# Patient Record
Sex: FEMALE | Race: WHITE | NOT HISPANIC OR LATINO | Employment: STUDENT | URBAN - METROPOLITAN AREA
[De-identification: names, ages, dates, MRNs, and addresses within clinical notes are randomized per-mention and may not be internally consistent; named-entity substitution may affect disease eponyms.]

---
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
## 2019-01-09 ENCOUNTER — TRANSFERRED RECORDS (OUTPATIENT)
Dept: HEALTH INFORMATION MANAGEMENT | Facility: CLINIC | Age: 19
End: 2019-01-09

## 2019-01-10 ENCOUNTER — TRANSFERRED RECORDS (OUTPATIENT)
Dept: HEALTH INFORMATION MANAGEMENT | Facility: CLINIC | Age: 19
End: 2019-01-10

## 2019-01-28 ENCOUNTER — TRANSFERRED RECORDS (OUTPATIENT)
Dept: HEALTH INFORMATION MANAGEMENT | Facility: CLINIC | Age: 19
End: 2019-01-28

## 2019-01-28 ENCOUNTER — MEDICAL CORRESPONDENCE (OUTPATIENT)
Dept: HEALTH INFORMATION MANAGEMENT | Facility: CLINIC | Age: 19
End: 2019-01-28

## 2019-05-20 ENCOUNTER — TRANSFERRED RECORDS (OUTPATIENT)
Dept: HEALTH INFORMATION MANAGEMENT | Facility: CLINIC | Age: 19
End: 2019-05-20

## 2019-05-22 ENCOUNTER — OFFICE VISIT (OUTPATIENT)
Dept: PEDIATRICS | Facility: CLINIC | Age: 19
End: 2019-05-22
Payer: COMMERCIAL

## 2019-05-22 VITALS
HEART RATE: 84 BPM | BODY MASS INDEX: 22.4 KG/M2 | WEIGHT: 131.2 LBS | SYSTOLIC BLOOD PRESSURE: 104 MMHG | DIASTOLIC BLOOD PRESSURE: 68 MMHG | OXYGEN SATURATION: 100 % | HEIGHT: 64 IN | TEMPERATURE: 98.1 F

## 2019-05-22 DIAGNOSIS — L71.0 PERIORAL DERMATITIS: ICD-10-CM

## 2019-05-22 DIAGNOSIS — J30.2 SEASONAL ALLERGIC RHINITIS, UNSPECIFIED TRIGGER: ICD-10-CM

## 2019-05-22 DIAGNOSIS — L30.9 ECZEMA, UNSPECIFIED TYPE: Primary | ICD-10-CM

## 2019-05-22 DIAGNOSIS — L70.0 ACNE VULGARIS: ICD-10-CM

## 2019-05-22 PROCEDURE — 99214 OFFICE O/P EST MOD 30 MIN: CPT | Performed by: INTERNAL MEDICINE

## 2019-05-22 RX ORDER — NORGESTIMATE AND ETHINYL ESTRADIOL 7DAYSX3 28
1 KIT ORAL DAILY
Qty: 84 TABLET | Refills: 1 | Status: SHIPPED | OUTPATIENT
Start: 2019-05-22 | End: 2019-08-07

## 2019-05-22 RX ORDER — MOMETASONE FUROATE 1 MG/ML
SOLUTION TOPICAL
Qty: 60 ML | Refills: 0 | Status: SHIPPED | OUTPATIENT
Start: 2019-05-22 | End: 2020-10-05

## 2019-05-22 RX ORDER — PIMECROLIMUS 10 MG/G
CREAM TOPICAL 2 TIMES DAILY
Qty: 60 G | Refills: 1 | Status: SHIPPED | OUTPATIENT
Start: 2019-05-22 | End: 2019-08-07

## 2019-05-22 SDOH — ECONOMIC STABILITY: INCOME INSECURITY: HOW HARD IS IT FOR YOU TO PAY FOR THE VERY BASICS LIKE FOOD, HOUSING, MEDICAL CARE, AND HEATING?: NOT VERY HARD

## 2019-05-22 SDOH — HEALTH STABILITY: MENTAL HEALTH: HOW OFTEN DO YOU HAVE A DRINK CONTAINING ALCOHOL?: MONTHLY OR LESS

## 2019-05-22 SDOH — SOCIAL STABILITY: SOCIAL NETWORK: ARE YOU MARRIED, WIDOWED, DIVORCED, SEPARATED, NEVER MARRIED, OR LIVING WITH A PARTNER?: NEVER MARRIED

## 2019-05-22 SDOH — ECONOMIC STABILITY: TRANSPORTATION INSECURITY
IN THE PAST 12 MONTHS, HAS THE LACK OF TRANSPORTATION KEPT YOU FROM MEDICAL APPOINTMENTS OR FROM GETTING MEDICATIONS?: NO

## 2019-05-22 SDOH — HEALTH STABILITY: MENTAL HEALTH: HOW MANY STANDARD DRINKS CONTAINING ALCOHOL DO YOU HAVE ON A TYPICAL DAY?: 3 OR 4

## 2019-05-22 SDOH — SOCIAL STABILITY: SOCIAL NETWORK: HOW OFTEN DO YOU GET TOGETHER WITH FRIENDS OR RELATIVES?: TWICE A WEEK

## 2019-05-22 SDOH — HEALTH STABILITY: MENTAL HEALTH
STRESS IS WHEN SOMEONE FEELS TENSE, NERVOUS, ANXIOUS, OR CAN'T SLEEP AT NIGHT BECAUSE THEIR MIND IS TROUBLED. HOW STRESSED ARE YOU?: ONLY A LITTLE

## 2019-05-22 SDOH — ECONOMIC STABILITY: TRANSPORTATION INSECURITY
IN THE PAST 12 MONTHS, HAS LACK OF TRANSPORTATION KEPT YOU FROM MEETINGS, WORK, OR FROM GETTING THINGS NEEDED FOR DAILY LIVING?: NO

## 2019-05-22 SDOH — ECONOMIC STABILITY: FOOD INSECURITY: WITHIN THE PAST 12 MONTHS, THE FOOD YOU BOUGHT JUST DIDN'T LAST AND YOU DIDN'T HAVE MONEY TO GET MORE.: NEVER TRUE

## 2019-05-22 SDOH — SOCIAL STABILITY: SOCIAL NETWORK: HOW OFTEN DO YOU ATTEND CHURCH OR RELIGIOUS SERVICES?: MORE THAN 4 TIMES PER YEAR

## 2019-05-22 SDOH — SOCIAL STABILITY: SOCIAL NETWORK: HOW OFTEN DO YOU ATTENT MEETINGS OF THE CLUB OR ORGANIZATION YOU BELONG TO?: 1 TO 4 TIMES PER YEAR

## 2019-05-22 SDOH — HEALTH STABILITY: PHYSICAL HEALTH: ON AVERAGE, HOW MANY DAYS PER WEEK DO YOU ENGAGE IN MODERATE TO STRENUOUS EXERCISE (LIKE A BRISK WALK)?: 1 DAY

## 2019-05-22 SDOH — HEALTH STABILITY: PHYSICAL HEALTH: ON AVERAGE, HOW MANY MINUTES DO YOU ENGAGE IN EXERCISE AT THIS LEVEL?: 90 MIN

## 2019-05-22 SDOH — SOCIAL STABILITY: SOCIAL NETWORK
IN A TYPICAL WEEK, HOW MANY TIMES DO YOU TALK ON THE PHONE WITH FAMILY, FRIENDS, OR NEIGHBORS?: MORE THAN THREE TIMES A WEEK

## 2019-05-22 SDOH — ECONOMIC STABILITY: FOOD INSECURITY: WITHIN THE PAST 12 MONTHS, YOU WORRIED THAT YOUR FOOD WOULD RUN OUT BEFORE YOU GOT MONEY TO BUY MORE.: NEVER TRUE

## 2019-05-22 SDOH — HEALTH STABILITY: MENTAL HEALTH: HOW OFTEN DO YOU HAVE 6 OR MORE DRINKS ON ONE OCCASION?: NEVER

## 2019-05-22 SDOH — SOCIAL STABILITY: SOCIAL NETWORK
DO YOU BELONG TO ANY CLUBS OR ORGANIZATIONS SUCH AS CHURCH GROUPS UNIONS, FRATERNAL OR ATHLETIC GROUPS, OR SCHOOL GROUPS?: YES

## 2019-05-22 ASSESSMENT — MIFFLIN-ST. JEOR: SCORE: 1355.12

## 2019-05-22 NOTE — PATIENT INSTRUCTIONS
Call for an appointment with dermatology. Dr. Mady Tena at [facility].  Dr. Patricia Dominguez at [facility] in [place] ([phone]) and Dr. Ronald Alba at [facility] in [place].([phone]).     Use mild soap and triamcinolone on flared areas of skin. You can also use this on your ears/lobes. You can switch to elidel and see if this works better.  Keep using really moisturizing lotions, etc.     If you feel you have to use something on your eyelids, just use OTC hydrocortisone.    Use elidel around lips. No steroid cream.    Use steroid solution on scalp areas.    Take zyrtec claritin or allegra daily.  Consider adding OTC nasal steroid such as flonase.  We could have you see an allergist. Call for an appointment.

## 2019-05-22 NOTE — PROGRESS NOTES
"Subjective     Nuris Reddy is a 18 year old female who presents to clinic today for the following health issues:    History of Present Illness     She eats 2-3 servings of fruits and vegetables daily.She consumes 4 sweetened beverage(s) daily.She is missing 2 dose(s) of medications per week.  She is not taking prescribed medications regularly due to remembering to take.     Renew birth control, referal for derm.    Has seasonal allergies, flaring right now. Had been taking wal-zyr. This works for her.    Patient Active Problem List   Diagnosis     Other congenital anomalies of intestine     Proteinuria     Ulceration of intestine     Anemia     Short gut syndrome     Iron deficiency anemia     Chronic rhinitis     Past Surgical History:   Procedure Laterality Date     COLONOSCOPY  2012     COLONOSCOPY  2012     RESECTION ILEOCECAL  1999    ~ 20 cm of ileum as well as ileocecal valve resected in  period       Social History     Tobacco Use     Smoking status: Never Smoker     Smokeless tobacco: Never Used     Tobacco comment: Non-smoking home   Substance Use Topics     Alcohol use: No     Frequency: Monthly or less     Drinks per session: 3 or 4     Binge frequency: Never     Family History   Problem Relation Age of Onset     Hypertension Paternal Grandmother      Hypertension Paternal Grandfather      Cardiovascular Paternal Grandfather         Valve condition     Cancer Sister         ovarian teratoma, s/p resection     Kidney Disease Maternal Uncle         kidney stones           Reviewed and updated as needed this visit by Provider  Tobacco  Allergies  Meds  Problems  Med Hx  Surg Hx  Fam Hx         Review of Systems   ROS COMP: Constitutional, HEENT, cardiovascular, pulmonary, gi and gu systems are negative, except as otherwise noted.      Objective    /68 (Cuff Size: Adult Regular)   Pulse 84   Temp 98.1  F (36.7  C) (Tympanic)   Ht 1.626 m (5' 4\")   Wt 59.5 kg (131 lb " 3.2 oz)   SpO2 100%   BMI 22.52 kg/m    Body mass index is 22.52 kg/m .  Physical Exam   GENERAL: healthy, alert and no distress  HENT: ear canals and TM's normal, nose and mouth without ulcers or lesions, nasal mucosa boggy  NECK: no adenopathy and thyroid normal to palpation   RESP: lungs clear to auscultation - no rales, rhonchi or wheezes  CV: regular rate and rhythm, normal S1 S2, no S3 or S4, no murmur, click or rub, no peripheral edema and peripheral pulses strong  SKIN: dry, flaky patches on forearms. Erythematous flaking of scalp at base of neck.    Diagnostic Test Results:  Labs reviewed in Epic        Assessment & Plan     1. Eczema, unspecified type  Inflammatory dermatitis of skin and scalp. Has triamcinolone at home for body which does help. Will also rx a solution for her scalp. Will refer to derm for definitive diagnosis given her complex medical issues.  - DERMATOLOGY REFERRAL  - mometasone (ELOCON) 0.1 % external solution; Apply small amount topically to affected areas of scalp daily prn for up to 2 weeks.  Dispense: 60 mL; Refill: 0    2. Perioral dermatitis  Has used elidel before with good results.   - pimecrolimus (ELIDEL) 1 % external cream; Apply topically 2 times daily  Dispense: 60 g; Refill: 1    3. Seasonal allergic rhinitis, unspecified trigger  Considering allergy testing since her symptoms are difficult to control. Discussed OTC and symptomatic management in meanwhile.  - ALLERGY/ASTHMA ADULT REFERRAL    4. Acne vulgaris  Refilled.  - norgestim-eth estrad triphasic (TRINESSA, 28,) 0.18/0.215/0.25 MG-35 MCG tablet; Take 1 tablet by mouth daily  Dispense: 84 tablet; Refill: 1       Patient Instructions   Call for an appointment with dermatology. Dr. Mady Tena at [facility].  Dr. Patricia Dominguez at [facility] in [place] ([phone]) and Dr. Ronald Alba at [facility] in [place].([phone]).     Use mild soap and triamcinolone on flared areas  of skin. You can also use this on your ears/lobes. You can switch to elidel and see if this works better.  Keep using really moisturizing lotions, etc.     If you feel you have to use something on your eyelids, just use OTC hydrocortisone.    Use elidel around lips. No steroid cream.    Use steroid solution on scalp areas.    Take zyrtec claritin or allegra daily.  Consider adding OTC nasal steroid such as flonase.  We could have you see an allergist. Call for an appointment.         I spent 30 minutes with this patient face-to-face, of which 50% or greater was spent in counseling and coordination of care with regards to allergies, eczema, acne and symptomatic management. Please see plan above.      Return in about 2 months (around 7/23/2019) for Preventive Visit.    Aliya Osei MD  [facility]

## 2019-05-28 ENCOUNTER — TELEPHONE (OUTPATIENT)
Dept: PEDIATRICS | Facility: CLINIC | Age: 19
End: 2019-05-28

## 2019-05-28 ENCOUNTER — TELEPHONE (OUTPATIENT)
Dept: DERMATOLOGY | Facility: CLINIC | Age: 19
End: 2019-05-28

## 2019-05-28 NOTE — TELEPHONE ENCOUNTER
Prior Authorization Retail Medication Request    Medication/Dose: pimecrolimus (ELIDEL) 1 % external cream  ICD code (if different than what is on RX):  [L71.0]   Previously Tried and Failed:    Rationale: [L71.0]     Insurance Name:  [facility]  Insurance ID:  [id number]      Pharmacy Information (if different than what is on RX)  Name:  [facility]  Phone:  [phone]

## 2019-06-04 NOTE — TELEPHONE ENCOUNTER
Prior Authorization Approval    Authorization Effective Date: 4/30/2019  Authorization Expiration Date: 5/29/2020  Medication: pimecrolimus (ELIDEL) 1 % external cream-APPROVED  Approved Dose/Quantity:   Reference #:     Insurance Company: [employer] - Phone [phone] Fax [phone]  Expected CoPay:       CoPay Card Available:      Foundation Assistance Needed:    Which Pharm[facility] is filling the prescription (Not needed for infusion/clinic administered[facility] [address] [place] [address] [address] AT [address]  Pharmacy Notified: Yes  Patient Notified: No    Pharmacy will notify patient when medication is ready.

## 2019-06-11 ENCOUNTER — OFFICE VISIT (OUTPATIENT)
Dept: DERMATOLOGY | Facility: CLINIC | Age: 19
End: 2019-06-11
Payer: COMMERCIAL

## 2019-06-11 DIAGNOSIS — L29.9 PRURITUS: ICD-10-CM

## 2019-06-11 DIAGNOSIS — L56.8 PHOTOSENSITIVITY: ICD-10-CM

## 2019-06-11 DIAGNOSIS — L20.84 INTRINSIC ATOPIC DERMATITIS: Primary | ICD-10-CM

## 2019-06-11 PROCEDURE — 99243 OFF/OP CNSLTJ NEW/EST LOW 30: CPT | Performed by: DERMATOLOGY

## 2019-06-11 RX ORDER — TACROLIMUS 1 MG/G
OINTMENT TOPICAL
Qty: 30 G | Refills: 11 | Status: SHIPPED | OUTPATIENT
Start: 2019-06-11 | End: 2019-08-07

## 2019-06-11 RX ORDER — TRIAMCINOLONE ACETONIDE 1 MG/G
CREAM TOPICAL
Qty: 454 G | Refills: 1 | Status: SHIPPED | OUTPATIENT
Start: 2019-06-11 | End: 2020-08-04

## 2019-06-11 RX ORDER — KETOCONAZOLE 20 MG/ML
SHAMPOO TOPICAL
Qty: 120 ML | Refills: 3 | Status: SHIPPED | OUTPATIENT
Start: 2019-06-11 | End: 2020-09-26

## 2019-06-11 RX ORDER — HYDROCORTISONE 2.5 %
CREAM (GRAM) TOPICAL
Qty: 30 G | Refills: 1 | Status: SHIPPED | OUTPATIENT
Start: 2019-06-11 | End: 2023-01-20

## 2019-06-11 NOTE — LETTER
6/11/2019      RE: Nuris Reddy  [address] [place] [address]       Dermatology Clinic Note    Dermatology Problem List:  1. Intermittent photosensitive rxn, pruritic  2. Seborrheic dermatitis  3. Dermatitis on the hands, lips, face, trunk, extremities, suspect atopic dermatitis with possible contact component      Assessment and Plan:    1. Dermatitis-  Combination of seborrheic dermatitis on the scalp and central face and contact vs atopic dermatitis on the trunk and extremities, lips. Flaring this spring in the setting of moving back home with exposure to a dog and stopping sulfasalazine. No past allergy evaluation. Given that Ms. Reddy is on a break from systemic immunosuppressive agents in preparation for GI evaluation it seems like a good time to pursue patch testing (and prick testing if needed). I will place a referral to patch clinic. In the interim I recommended:  -Daily bathing with mild cleanser'  -BID hydrocortisone 2.5% cream to face, transition to protopic when PA is completed  -Triamcinolone 0.1% cream to body, arms, legs BID  -Transition to Edilson's toothpaste  -BID Vanicream moisturizer  -Patch referral at [facility]      2. 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 Will defer to Dr. Beth in regard for need for photopatch testing.   - Anti Nuclear Tri IgG by IFA with Reflex; Future  - VIKTORIA antibody panel; Future  - Complement C3; Future  - Complement C4; Future  - Complement total; Future    3. Benign pigmented nevi: No lesions of concern. Sun protection recommended. Discussed ABCDEs of malignant melanoma.         RTC in 6-8 weeks.     Thank you for involving me in this patient's care.     Mady Tena MD  Dermatology Staff    CC:     Aliya Osei,  MD    ____________________________________________________________________________________________________________________________________________    CC: Patient presents with:  New Patient: np/ref by Dr Crenshaw/eczema      HPI: Nuris Reddy is a 18 year old female presenting for initial evaluation of dermatitis seen at the request of Dr. Crenshaw. She notes that as a child she had atopic dermatitis, but it was very mild. Since returning home from college this spring she has had a significant flare of dermatitis on the face, scalp, eyelids, lips, upper thighs, back, dorsal hands, antecubital fossae. No hobbies. She thinks that she may be allergic to the family dog. Bathing every other day to daily with Dove for sensitive skin. Using a Eucerin lotion daily. Tresseme shampoo, Argon oil conditioner. No known contact allergies to metals, but is getting dermatitis on the ear lobes, wears earrings rarely. Has prescription for triamcinolone cream for the body, elidel to the lips. Medications help, but rash recurs with stopping the topicals. She has GI symptoms, anemia and had been on sulfasalazine until this spring, but was taken off the medicine for MRI of the GI tract in July.     Notes that for the last few years she has gotten a pruritic rash on the extremities after sun exposure. Has used a variety of sunscreens. Does not feel like it was a sunburn type reaction, but was assd with diffuse itch and redness.       Patient Active Problem List   Diagnosis     Other congenital anomalies of intestine     Proteinuria     Ulceration of intestine     Anemia     Short gut syndrome     Iron deficiency anemia     Chronic rhinitis       Allergies   Allergen Reactions     No Known Allergies          Current Outpatient Medications   Medication     Cetirizine HCl (ZYRTEC ALLERGY PO)     Cyanocobalamin (VITAMIN B-12) 50 MCG TABS     hydrocortisone 2.5 % cream     ketoconazole (NIZORAL) 2 % external shampoo     mometasone  (ELOCON) 0.1 % external solution     Multiple Vitamins-Iron (MULTIVITAMIN/IRON PO)     norgestim-eth estrad triphasic (TRINESSA, 28,) 0.18/0.215/0.25 MG-35 MCG tablet     pimecrolimus (ELIDEL) 1 % external cream     tacrolimus (PROTOPIC) 0.1 % external ointment     triamcinolone (KENALOG) 0.1 % cream     VENTOLIN  (90 Base) MCG/ACT inhaler     No current facility-administered medications for this visit.        Family History   Problem Relation Age of Onset     Hypertension Paternal Grandmother      Hypertension Paternal Grandfather      Cardiovascular Paternal Grandfather         Valve condition     Cancer Sister         ovarian teratoma, s/p resection     Kidney Disease Maternal Uncle         kidney stones   Sister with asthma    Social history:  Student.       ROS: Feeling well without other skin concerns.     EXAM:  There were no vitals taken for this visit.  GEN: Alert, no distress  HEENT: Conjunctiva clear.   PULM: Breathing comfortably on RA  CV: Extrem warm and well perfused  ABD: No distension  SKIN: exam of the scalp, face, neck, chest, back, arms, legs, hands, feet, abdomen  --Pink plaques on the bilateral central cheeks with greasy scale  --Waxy scale on the scalp  --Scaling plaques on the eyelids, ear lobules  --Xerosis of the lips  --Ill defined scaling plaques on the posterior neck, upper and lower back, antecubital fossae, posterior  Thighs  -Xerosis of the dorsal hands  -Light brown macules on the upper cheeks  --Scattered medium brown macules on the bilateral upper arms, forearms      Mady Tena MD

## 2019-06-11 NOTE — PROGRESS NOTES
Dermatology Clinic Note    Dermatology Problem List:  1. Intermittent photosensitive rxn, pruritic  2. Seborrheic dermatitis  3. Dermatitis on the hands, lips, face, trunk, extremities, suspect atopic dermatitis with possible contact component      Assessment and Plan:    1. Dermatitis-  Combination of seborrheic dermatitis on the scalp and central face and contact vs atopic dermatitis on the trunk and extremities, lips. Flaring this spring in the setting of moving back home with exposure to a dog and stopping sulfasalazine. No past allergy evaluation. Given that Ms. Reddy is on a break from systemic immunosuppressive agents in preparation for GI evaluation it seems like a good time to pursue patch testing (and prick testing if needed). I will place a referral to patch clinic. In the interim I recommended:  -Daily bathing with mild cleanser'  -BID hydrocortisone 2.5% cream to face, transition to protopic when PA is completed  -Triamcinolone 0.1% cream to body, arms, legs BID  -Transition to Edilson's toothpaste  -BID Vanicream moisturizer  -[facility]      2. 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 Will defer to Dr. Beth in regard for need for photopatch testing.   - Anti Nuclear Tri IgG by IFA with Reflex; Future  - VIKTORIA antibody panel; Future  - Complement C3; Future  - Complement C4; Future  - Complement total; Future    3. Benign pigmented nevi: No lesions of concern. Sun protection recommended. Discussed ABCDEs of malignant melanoma.         RTC in 6-8 weeks.     Thank you for involving me in this patient's care.     Mady Tena MD  Dermatology Staff    CC:     Aliya Osei MD    ____________________________________________________________________________________________________________________________________________    CC: Patient presents with:  New Patient: np/ref by  Dr Crenshaw/eczema      HPI: Nuris Reddy is a 18 year old female presenting for initial evaluation of dermatitis seen at the request of Dr. Crenshaw. She notes that as a child she had atopic dermatitis, but it was very mild. Since returning home from college this spring she has had a significant flare of dermatitis on the face, scalp, eyelids, lips, upper thighs, back, dorsal hands, antecubital fossae. No hobbies. She thinks that she may be allergic to the family dog. Bathing every other day to daily with Dove for sensitive skin. Using a Eucerin lotion daily. Tresseme shampoo, Argon oil conditioner. No known contact allergies to metals, but is getting dermatitis on the ear lobes, wears earrings rarely. Has prescription for triamcinolone cream for the body, elidel to the lips. Medications help, but rash recurs with stopping the topicals. She has GI symptoms, anemia and had been on sulfasalazine until this spring, but was taken off the medicine for MRI of the GI tract in July.     Notes that for the last few years she has gotten a pruritic rash on the extremities after sun exposure. Has used a variety of sunscreens. Does not feel like it was a sunburn type reaction, but was assd with diffuse itch and redness.       Patient Active Problem List   Diagnosis     Other congenital anomalies of intestine     Proteinuria     Ulceration of intestine     Anemia     Short gut syndrome     Iron deficiency anemia     Chronic rhinitis       Allergies   Allergen Reactions     No Known Allergies          Current Outpatient Medications   Medication     Cetirizine HCl (ZYRTEC ALLERGY PO)     Cyanocobalamin (VITAMIN B-12) 50 MCG TABS     hydrocortisone 2.5 % cream     ketoconazole (NIZORAL) 2 % external shampoo     mometasone (ELOCON) 0.1 % external solution     Multiple Vitamins-Iron (MULTIVITAMIN/IRON PO)     norgestim-eth estrad triphasic (TRINESSA, 28,) 0.18/0.215/0.25 MG-35 MCG tablet     pimecrolimus (ELIDEL) 1 % external  cream     tacrolimus (PROTOPIC) 0.1 % external ointment     triamcinolone (KENALOG) 0.1 % cream     VENTOLIN  (90 Base) MCG/ACT inhaler     No current facility-administered medications for this visit.        Family History   Problem Relation Age of Onset     Hypertension Paternal Grandmother      Hypertension Paternal Grandfather      Cardiovascular Paternal Grandfather         Valve condition     Cancer Sister         ovarian teratoma, s/p resection     Kidney Disease Maternal Uncle         kidney stones   Sister with asthma    Social history:  Student.       ROS: Feeling well without other skin concerns.     EXAM:  There were no vitals taken for this visit.  GEN: Alert, no distress  HEENT: Conjunctiva clear.   PULM: Breathing comfortably on RA  CV: Extrem warm and well perfused  ABD: No distension  SKIN: exam of the scalp, face, neck, chest, back, arms, legs, hands, feet, abdomen  --Pink plaques on the bilateral central cheeks with greasy scale  --Waxy scale on the scalp  --Scaling plaques on the eyelids, ear lobules  --Xerosis of the lips  --Ill defined scaling plaques on the posterior neck, upper and lower back, antecubital fossae, posterior  Thighs  -Xerosis of the dorsal hands  -Light brown macules on the upper cheeks  --Scattered medium brown macules on the bilateral upper arms, forearms

## 2019-06-11 NOTE — PATIENT INSTRUCTIONS
Pediatric Dermatology  [facility]  [address]  1st [place]  [address]  Phone: [phone]    Pediatric & Adult Dermatology  [facility]  [address]   2nd Floor  [facility] [address]  [phone]                  General information: Dr. Mady Tena is a board-certified dermatologist with subspecialty certification in pediatric dermatology.     Scheduling and Nurse Triage: Dr. Tena sees pediatric patients on Mondays in [place] and adult and pediatric patients on Tuesdays in [place]. The remainder of the week she practices at the [facility]. Please call the above phone numbers to schedule or to talk to a nurse.     -For scheduling at the [place] or [place] locations, or to talk to the triage nurse please call the above phone number at the clinic where you were seen.     -For medication refills, please call your pharmacy.           -Referral to patch testing. They will call to schedule  -Bath or shower daily  -Use Vanicream moisturizer ([facility] best place to find) and sunscreen, look for zinc or titanium as the active ingredients  -Ketoconazole shampoo 2x/week  -OK to use the mometasone solution to scalp  -triamcinolone to body, arms, legs twice daily as needed  -use the hydrocortisone 2.5% cream to face and lips until clear, transition to protopic when available  -Consider using the Edilson's toothpaste

## 2019-07-08 ENCOUNTER — TRANSFERRED RECORDS (OUTPATIENT)
Dept: HEALTH INFORMATION MANAGEMENT | Facility: CLINIC | Age: 19
End: 2019-07-08

## 2019-07-10 ENCOUNTER — TRANSFERRED RECORDS (OUTPATIENT)
Dept: HEALTH INFORMATION MANAGEMENT | Facility: CLINIC | Age: 19
End: 2019-07-10

## 2019-07-19 NOTE — TELEPHONE ENCOUNTER
FUTURE VISIT INFORMATION      FUTURE VISIT INFORMATION:    Date: 7.22.19    Time: 8:00    Location: [facility]  REFERRAL INFORMATION:    Referring provider:  Dr. Tena    Referring providers clinic:  [facility]    Reason for visit/diagnosis:  Patch testing    RECORDS REQUESTED FROM:       Clinic name Comments Records Status Imaging Status   [facility] 6.11.19 Dr. Tena In [facility]

## 2019-07-22 ENCOUNTER — PRE VISIT (OUTPATIENT)
Dept: DERMATOLOGY | Facility: CLINIC | Age: 19
End: 2019-07-22

## 2019-07-22 ENCOUNTER — OFFICE VISIT (OUTPATIENT)
Dept: DERMATOLOGY | Facility: CLINIC | Age: 19
End: 2019-07-22
Payer: COMMERCIAL

## 2019-07-22 DIAGNOSIS — L20.84 INTRINSIC ATOPIC DERMATITIS: Primary | ICD-10-CM

## 2019-07-22 DIAGNOSIS — L29.9 PRURITUS: ICD-10-CM

## 2019-07-22 DIAGNOSIS — L23.89 ALLERGIC CONTACT DERMATITIS DUE TO OTHER AGENTS: ICD-10-CM

## 2019-07-22 RX ORDER — SULFASALAZINE 500 MG/1
2 TABLET ORAL DAILY
Refills: 0 | COMMUNITY
Start: 2018-12-14 | End: 2019-08-07

## 2019-07-22 ASSESSMENT — PAIN SCALES - GENERAL: PAINLEVEL: NO PAIN (0)

## 2019-07-22 NOTE — NURSING NOTE
Dermatology Rooming Note    Nuris Reddy's goals for this visit include:   Chief Complaint   Patient presents with     Allergies     Nuris is here for allergy testing     Khushboo Prado, CMA

## 2019-07-22 NOTE — LETTER
7/22/2019       RE: Nuris Reddy  [address] [place] [address]     Dear Colleague,    Thank you for referring your patient, Nuris Reddy, to the [facility] D[facility] at [facility]. Please see a copy of my visit note below.    [facility] Dermatology Note      Dermatology Problem List:  1. Intermittent photosensitive rxn, pruritic  2. Seborrheic dermatitis  3. 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      History of Present Illness:  Ms. Nuris Reddy is a 18 year old female who presents for consultation for possible patch testing referred from Dr. Tena. She has history of eczema since childhood that has been localized to the antecubital fossa. After returning from college in spring, she experienced a widespread rash on face, scalp, eyelids, lips, upper thighs, dorsal hands and antecubital fossae. The rash has been itchy and occasionally red on body and she has experienced frequent peeling of her lips. She also endorses dryness of her ears that started after she got them pierced but has persisted despite not wearing any jewelry. She believes that she might be allergic to family dog, as her rash worsened when she came home from college. She also experiences intermittent itchy hives that started this summer that last for a few hours and resolve with showers.    She has been using triamcinolone cream for rash on body, hydrocortisone 2.5% for face and Vanicream moisturizer. Currently, she is being evaluated for Crohn's disease and her sulfasalazine was stopped in spring for a MRI.     Of note, she has perennial rhinitis with exacerbations in the spring and around the family pet. Her sister has asthma.      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 She has never used smokeless tobacco. She reports that she does not drink alcohol or use drugs.    Family History:  Family History   Problem Relation Age of Onset     Hypertension Paternal Grandmother      Hypertension Paternal Grandfather      Cardiovascular Paternal Grandfather         Valve condition     Cancer Sister         ovarian teratoma, s/p resection     Kidney Disease Maternal Uncle         kidney stones       Medications:  Current Outpatient Medications   Medication Sig Dispense Refill     Cetirizine HCl (ZYRTEC ALLERGY PO) Take by mouth daily       Cyanocobalamin (VITAMIN B-12) 50 MCG TABS Take 100 mcg by mouth daily.       hydrocortisone 2.5 % cream To lips and face rash twice daily as needed for rash 30 g 1     ketoconazole (NIZORAL) 2 % external shampoo Use to shampoo twice weekly. Leave on 5 min then rinse. 120 mL 3     mometasone (ELOCON) 0.1 % external solution Apply small amount topically to affected areas of scalp daily prn for up to 2 weeks. 60 mL 0     Multiple Vitamins-Iron (MULTIVITAMIN/IRON PO) Take 1 tablet by mouth daily.       norgestim-eth estrad triphasic (TRINESSA, 28,) 0.18/0.215/0.25 MG-35 MCG tablet Take  1 tablet by mouth daily 84 tablet 1     pimecrolimus (ELIDEL) 1 % external cream Apply topically 2 times daily 60 g 1     tacrolimus (PROTOPIC) 0.1 % external ointment Twice daily to eyelid and face rash until clear, then as needed. 30 g 11     triamcinolone (KENALOG) 0.1 % cream Apply sparingly to affected area two times daily as needed for up to 2 weeks. Do not apply to face. 30 g 3     triamcinolone (KENALOG) 0.1 % external cream To rash on the ears, body, arms, legs twice daily until clear, then as needed ongoing. 454 g 1     VENTOLIN  (90 Base) MCG/ACT inhaler INHALE 2 PUFFS PO Q 4 H PRF SOB OR WHZ  1        Allergies   Allergen Reactions     No Known Allergies          Review of Systems:  -As per HPI  -Constitutional: Otherwise feeling well today, in usual state of health.  -HEENT: Patient denies nonhealing oral sores.  -Skin: As above in HPI. No additional skin concerns.    Physical exam:  Vitals: There were no vitals taken for this visit.  GEN: This is a well developed, well-nourished female in no acute distress, in a pleasant mood.    SKIN: Focused examination of the face, bilateral lower and upper extremities, back and abdomen was performed.  -Scattered pink plaques with mild overlying scale on anterior forearms, bilateral posteromedial thighs.  -Mild erythema of upper eyelids and bilateral cheeks.  -Back clear  -No other lesions of concern on areas examined.       Order for PATCH TESTS    [] Outpatient  [] Inpatient: [facility]..../ Bed ....      Skin Atopy (atopic dermatitis) [x] Yes   [] No  Rhinitis/Sinusitis:   [x] Yes   [] No  Allergic Asthma:   [] Yes   [x] No  Food Allergy:   [] Yes   [x] No GI evaluation for Crohn's  Leg ulcers:   [] Yes   [x] No  Hand eczema:   [] Yes   [] No   Leading hand:   [x] R   [] L       [] Ambidextrous                        Reason for tests (suspected allergy): atopic predisposition with components of contact dermatitis  Known previous allergies: NA    Standardized  panels  [x] Standard panel (40 tests)  [x] Preservatives & Antimicrobials (31 tests)  [x] Emulsifiers & Additives (25 tests)   [x] Perfumes/Flavours & Plants (25 tests)  [] Hairdresser panel (12 tests)  [] Rubber Chemicals (22 tests)  [] Plastics (26 tests)  [x] Colorants/Dyes/Food additives (20 tests)  [] Metals (implants/dental) (24 tests)  [] Local anaesthetics/NSAIDs (13 tests)  [] Antibiotics & Antimycotics (14 tests)   [] Corticosteroids (15 tests)   [] Photopatch test (62 tests)   [] others: ...      [] Patient's own products: ...    DO NOT test if chemical or biological identity is unknown!     always ask from patient the product information and safety sheets (MSDS)     [x] Atopy screen prick test    RESULTS & EVALUATION of PATCH TESTS    Patch test readings after     [x] 2 days, [] 3 days [x] 4 days, [] 5 days,    Applied patch tests with results (import here the list of patch tests):    Applied 07/22/2019  No lesions on back    STANDARD Series         No Substance 2 days 4 days remarks   1 Alverto Mix [C] - -    2 Colophony - -    3  2-Mercaptobenzothiazole  - -     4 Methylisothiazolinone - -    5 Carba Mix - -    6 Thiuram Mix [A] - -    7 Bisphenol A Epoxy Resin - -    8 O-Incz-Mwjschjufhm-Formaldehyde Resin - -    9 Mercapto Mix [A] - -    10 Black Rubber Mix- PPD [B] - -    11 Potassium Dichromate  -  -    12 Balsam of [place] (Myroxylon Pereirae Resin) - -    13 Nickel Sulphate Hexahydrate - -    14 Mixed Dialkyl Thiourea - -    15 Paraben Mix [B] - -    16 Methyldibromo Glutaronitrile - -    17 Fragrance Mix - -    18 2-Bromo-2-Nitropropane-1,3-Diol (Bronopol) - -    19 Lyral - -    20 Tixocortol-21- Pivalate - -    21 Diazolidiyl Urea (Germall II) - -    22 Methyl Methacrylate - -    23 Cobalt (II) Chloride Hexahydrate - -    24 Fragrance Mix II  - -    25 Compositae Mix - -    26 Benzoyl Peroxide - -    27 Bacitracin - -    28 Formaldehyde - -    29 Methylchloroisothiazolinone / Methylisothiazolinone -  -    30 Corticosteroid Mix - -    31 Sodium Lauryl Sulfate - -    32 Lanolin Alcohol - -    33 Turpentine - -    34 Cetylstearylalcohol - -    35 Chlorhexidine Dicluconate - -    36 Budenoside - -    37 Imidazolidinyl Urea  - -    38 Ethyl-2 Cyanoacrylate - -    39 Quaternium 15 (Dowicil 200) - -    40 Decyl Glucoside - -      COLORANTS, DYES, FOOD ADDITIVES    No Substance 2 days 4 days remarks   41 Aniline - -    42 Jasmeet Brown R - -    43 Textile Dye Mix [A] - -    44 Nixa  - -    45 Disperse Blue-3 - -    46 Disperse Orange-3 - -    47 Disperse Red-11 - -    48 Disperse Yellow-9 - -    49 Erythrosine-B - -    50 Naphthol AS - -      Food additives      51 Aspartame - -    52 Azorubine - -    53 Brilliant Black - -    54 Cochineal Red - -    55 Patent Blue-VF - -    56 Pectin - -    57 Quinoline Yellow - -    58 Saccharin - -    59 Tartrazine - -    60 Sodium Glutamate - -      EMULSIFIERS & ADDITIVES        No Substance 2 days 4 days remarks   61 Polyethylene Glycol-400 - -    62 Cocamidopropyl Betaine - -    63 Amerchol L101 - -    64 Propylene Glycol - -    65 Triethanolamine - -    66 Sorbitane Sesquiolate - -    67 Isopropylmyristate - -    68 Polysorbate 80  - -    69 Amidoamine   (Stearamidopropyl Dimethylamine) - -    70 Oleamidopropyl Dimethylamine - -    71 Lauryl Glucoside - -    72 Coconut Diethanolamide  - -    73 2-Hydroxy-4-Methoxy Benzophenone (Oxybenzone) - -    74 Benzophenone-4 (Sulisobenzon) - -    75 Propolis - -    76 Dexpanthenol - -    77 Carboxymethyl Cellulose Sodium - -    78 Abitol - -    79 Tert-Butylhydroquinone - -    80 Benzyl Salicylate - -     Antioxidant      81 Dodecyl Gallate - -    82 Butylhydroxyanisole (BHA) - -    83 Butylhydroxytoluene (BHT) - -    84 Di-Alpha-Tocopherol (Vit E) - -    85 Propyl Gallate - -      PERFUMES, FLAVORS & PLANTS         No Substance 2 days 4 days remarks   86 Benzyl Salicylate - -    87 Benzyl Cinnamate - -    88 Di-Limonene (Dipentene) - -     89 Cananga Odorata (Hailey Hart) (I) - -    90 Lichen Acid Mix - -    91 Mentha Piperita Oil (Peppermint Oil) - -    92 Sesquiterpenelactone mix - -    93 Tea Tree Oil, Oxidized - -    94 Wood Tar Mix - -    95 Abietic Acid - -    96 Lavendula Angustifolia Oil (Lavender Oil) - -    97 Camphor  - -     Fragrance Mix I      98 Oakmoss Absolute - -    99 Eugenol - -    100 Geraniol - -    101 Hydroxycitronellal - -    102 Isoeugenol - -    103 Cinnamic Aldehyde - -    104 Cinnamic Alcohol  - -    105 Sorbitane Sesquioleate - -     Fragrance mix II      106 Citronellol - -    107 Alpha-Hexylcinnamic Aldehyde    - -    108 Citral - -    109 Farnesol - -    110 Coumarin - -      PRESERVATIVES & ANTIMICROBIALS         No Substance 2 days 4 days remarks   111  1,2-Benzisothiazoline-3-One, Sodium Salt - -    112  1,3,5-Sammy (2-Hydroxyethyl) - Hexahydrotriazine (Grotan BK) - -    113 2-Vcwbxoqxurobo-6-Nitro-1, 3-Propanediol - -    114  3, 4, 4' - Triclocarban - -    115 4 - Chloro - 3 - Cresol - -    116 4 - Chloro - 4 - Xylenol (PCMX) - -    117 7-Ethylbicyclooxazolidine ([place] Transcoan [id number]) - -    118 Benzalkonium Chloride - -    119 Benzyl Alcohol - -    120 Cetalkonium Chloride - -    121 Cetylpyrimidine Chloride  - -    122 Chloroacetamide - -    123 DMDM Hydantoin - -    124 Glutaraldehyde - -    125 Triclosan - -    126 Glyoxal Trimeric Dihydrate - -    127 Iodopropynyl Butylcarbamate - -    128 Octylisothiazoline - -    129 Iodoform - -    130 (Nitrobutyl) Morpholine/(Ethylnitro-Trimethylene) Dimorpholine (Bioban P 1487) - -    131 Phenoxyethanol - -    132 Phenyl Salicylate - -    133 Povidone Iodine - -    134 Sodium Benzoate - -    135 Sodium Disulfite - -    136 Sorbic Acid - -    137 Thimerosal - -     Parabens      138 Butyl-P-Hydroxybenzoate - -    139 Ethyl-P-Hydroxybenzoate - -    140 Methyl-P-Hydroxybenzoate - -    141 Propyl-P-Hydroxybenzoate - -      Results of patch tests:                          Interpretation:    - Negative                    A    = Allergic      (+) Erythema    TI   = Toxic/irritant   + E + Infiltration    RaP = Relevance at Present     ++ E/I + Papulovesicle   Rpr  = Relevance Previously     +++ E/I/P + Blister     nR   = No Relevance      Atopy Screen (07/22/2019)    No Substance Readings (15min) Evaluation   POS Histamine 1mg/ml +/++    NEG NaCl 0.9% -      No Substance Readings (15min) Evaluation   1 Alternaria alternata (tenuis)  -    2 Cladosporium herbarum -    3 Aspergillus fumigatus -    4 Penicillium notatum -    5 Dermatophagoides pteronyssinus -    6 Dermatophagoides farinae -    7 Dog epithelium (canis spp) -    8 Cat hair (doe catus) +    9 Cockroach   (Blatella americana & germanica) -    10 Grass mix midwest   (Lauren, Orchard, Redtop, Wyatt) -    11 Leland grass (sorghum halepense) -    12 Weed mix   (common Cocklebur, Lamb s quarters, rough redroot Pigweed, Dock/Sorrel) +    13 Mugwort (artemisia vulgare) -    14 Ragweed giant/short (ambrosia spp) -    15 English Plantain (plantago lanceolata) -    16 Tree mix 1 (Pecan, Maple BHR, Oak RVW, american Seaview, black Wharton) +/++    17 Red cedar (juniperus virginia) -    18 Tree mix 2   (white Joby, river/red Birch, black [place], common Ware, american Elm) -    19 Box elder/Maple mix (acer spp) ++    20 Hickory shagbark (carya ovata) -       -      [] No relevant allergic reaction observed    [] Allergic reaction diagnosed against: see later      Interpretation/ remarks:   See later    [] Patient information given   [] ACSD information   [] SmartPractice information    ==> final Diagnosis:  See later    ==> Treatment prescribed/Plan:  See later      These conclusions are made at the best of ones knowledge and belief  based on the provided evidence such as patients history and allergy test results and they can change over time or can be incomplete because of missing informations.      Impression/Plan:    Atopic  predisposition with components of intermediate and delayed hypersensitivity reactions with       history of perennial rhinitis with seasonal exacerbations.      long-standing history of undiagnosed GI inflammation s/p MRI and biopsies. Last CBC in November 2018 did not show eosinophilia.     remote history of photosensitivity in early 2019 but has not has recurrence--currently being evaluated for SLE.    Plans    Atopic prick test today    Allergy patch testing day 1    Defer photopatch test for after evaluation of additives and preservatives    Continue using Vanicream moisturizer BID        CC Mady Tena MD  [address] [place] [address] on close of this encounter.       Staff Involved:  I, Liz Qureshi MS4, saw and examined the patient in the presence of Dr. Beth.  Staff Physician Comments:  I was present with the medical student who participated in the service and in the documentation of the note. I have verified the history and personally performed the physical exam and medical decision making. I agree with the assessment and plan as documented in the note. I have reviewed and if necessary amended the note.  I personally performed the procedure.      García Beth MD  Professor  Head of Dermato-Allergy Division  Department of Dermatology  [facility]  I spent a total of 35 minutes face to face with Nuris Reddy during today s office visit. Over 50% of this time was spent counseling the patient and/or coordinating care. Please see Assessment and Plan for details.

## 2019-07-22 NOTE — NURSING NOTE
Patient had 141 patch tests placed this visit.    Standard panel (40 tests)    Preservatives & Antimicrobials (31 tests)    Emulsifiers & Additives (25 tests)     Perfumes/Flavours & Plants (25 tests)    Colorants/Dyes/Food additives (20 tests)    Patient had 22 prick tests placed this visit.    Control Tests (2 tests)    Standard panel (20 tests)

## 2019-07-22 NOTE — PROGRESS NOTES
[facility] Dermatology Note      Dermatology Problem List:  1. Intermittent photosensitive rxn, pruritic  2. Seborrheic dermatitis  3. 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      History of Present Illness:  Ms. Nuris Reddy is a 18 year old female who presents for consultation for possible patch testing referred from Dr. Tena. She has history of eczema since childhood that has been localized to the antecubital fossa. After returning from college in spring, she experienced a widespread rash on face, scalp, eyelids, lips, upper thighs, dorsal hands and antecubital fossae. The rash has been itchy and occasionally red on body and she has experienced frequent peeling of her lips. She also endorses dryness of her ears that started after she got them pierced but has persisted despite not wearing any jewelry. She believes that she might be allergic to family dog, as her rash worsened when she came home from college. She also experiences intermittent itchy hives that started this summer that last for a few hours and resolve with showers.    She has been using triamcinolone cream for rash on body, hydrocortisone 2.5% for face and Vanicream moisturizer. Currently, she is being evaluated for Crohn's disease and her sulfasalazine was stopped in spring for a MRI.     Of note, she has perennial rhinitis with exacerbations in the spring and around the family pet. Her sister has asthma.      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 She has never used smokeless tobacco. She reports that she does not drink alcohol or use drugs.    Family History:  Family History   Problem Relation Age of Onset     Hypertension Paternal Grandmother      Hypertension Paternal Grandfather      Cardiovascular Paternal Grandfather         Valve condition     Cancer Sister         ovarian teratoma, s/p resection     Kidney Disease Maternal Uncle         kidney stones       Medications:  Current Outpatient Medications   Medication Sig Dispense Refill     Cetirizine HCl (ZYRTEC ALLERGY PO) Take by mouth daily       Cyanocobalamin (VITAMIN B-12) 50 MCG TABS Take 100 mcg by mouth daily.       hydrocortisone 2.5 % cream To lips and face rash twice daily as needed for rash 30 g 1     ketoconazole (NIZORAL) 2 % external shampoo Use to shampoo twice weekly. Leave on 5 min then rinse. 120 mL 3     mometasone (ELOCON) 0.1 % external solution Apply small amount topically to affected areas of scalp daily prn for up to 2 weeks. 60 mL 0     Multiple Vitamins-Iron (MULTIVITAMIN/IRON PO) Take 1 tablet by mouth daily.       norgestim-eth estrad triphasic (TRINESSA, 28,) 0.18/0.215/0.25 MG-35 MCG tablet Take 1 tablet by mouth daily 84 tablet 1     pimecrolimus (ELIDEL) 1 % external cream Apply topically 2 times daily 60 g 1     tacrolimus (PROTOPIC) 0.1 % external ointment Twice daily to eyelid and face rash until clear, then as needed. 30 g 11     triamcinolone (KENALOG) 0.1 % cream  Apply sparingly to affected area two times daily as needed for up to 2 weeks. Do not apply to face. 30 g 3     triamcinolone (KENALOG) 0.1 % external cream To rash on the ears, body, arms, legs twice daily until clear, then as needed ongoing. 454 g 1     VENTOLIN  (90 Base) MCG/ACT inhaler INHALE 2 PUFFS PO Q 4 H PRF SOB OR WHZ  1        Allergies   Allergen Reactions     No Known Allergies          Review of Systems:  -As per HPI  -Constitutional: Otherwise feeling well today, in usual state of health.  -HEENT: Patient denies nonhealing oral sores.  -Skin: As above in HPI. No additional skin concerns.    Physical exam:  Vitals: There were no vitals taken for this visit.  GEN: This is a well developed, well-nourished female in no acute distress, in a pleasant mood.    SKIN: Focused examination of the face, bilateral lower and upper extremities, back and abdomen was performed.  -Scattered pink plaques with mild overlying scale on anterior forearms, bilateral posteromedial thighs.  -Mild erythema of upper eyelids and bilateral cheeks.  -Back clear  -No other lesions of concern on areas examined.       Order for PATCH TESTS    [] Outpatient  [] Inpatient: [facility]..../ Bed ....      Skin Atopy (atopic dermatitis) [x] Yes   [] No  Rhinitis/Sinusitis:   [x] Yes   [] No  Allergic Asthma:   [] Yes   [x] No  Food Allergy:   [] Yes   [x] No GI evaluation for Crohn's  Leg ulcers:   [] Yes   [x] No  Hand eczema:   [] Yes   [] No   Leading hand:   [x] R   [] L       [] Ambidextrous                        Reason for tests (suspected allergy): atopic predisposition with components of contact dermatitis  Known previous allergies: NA    Standardized panels  [x] Standard panel (40 tests)  [x] Preservatives & Antimicrobials (31 tests)  [x] Emulsifiers & Additives (25 tests)   [x] Perfumes/Flavours & Plants (25 tests)  [] Hairdresser panel (12 tests)  [] Rubber Chemicals (22 tests)  [] Plastics (26 tests)  [x] Colorants/Dyes/Food  additives (20 tests)  [] Metals (implants/dental) (24 tests)  [] Local anaesthetics/NSAIDs (13 tests)  [] Antibiotics & Antimycotics (14 tests)   [] Corticosteroids (15 tests)   [] Photopatch test (62 tests)   [] others: ...      [] Patient's own products: ...    DO NOT test if chemical or biological identity is unknown!     always ask from patient the product information and safety sheets (MSDS)     [x] Atopy screen prick test    RESULTS & EVALUATION of PATCH TESTS    Patch test readings after     [x] 2 days, [] 3 days [x] 4 days, [] 5 days,    Applied patch tests with results (import here the list of patch tests):    Applied 07/22/2019  No lesions on back    STANDARD Series         No Substance 2 days 4 days remarks   1 Alverto Mix [C] - -    2 Colophony - -    3  2-Mercaptobenzothiazole  - -     4 Methylisothiazolinone - -    5 Carba Mix - -    6 Thiuram Mix [A] - -    7 Bisphenol A Epoxy Resin - -    8 G-Wmjn-Ssmhdwynwrn-Formaldehyde Resin - -    9 Mercapto Mix [A] - -    10 Black Rubber Mix- PPD [B] - -    11 Potassium Dichromate  -  -    12 Balsam of [place] (Myroxylon Pereirae Resin) - -    13 Nickel Sulphate Hexahydrate - -    14 Mixed Dialkyl Thiourea - -    15 Paraben Mix [B] - -    16 Methyldibromo Glutaronitrile - -    17 Fragrance Mix - -    18 2-Bromo-2-Nitropropane-1,3-Diol (Bronopol) - -    19 Lyral - -    20 Tixocortol-21- Pivalate - -    21 Diazolidiyl Urea (Germall II) - -    22 Methyl Methacrylate - -    23 Cobalt (II) Chloride Hexahydrate - -    24 Fragrance Mix II  - -    25 Compositae Mix - -    26 Benzoyl Peroxide - -    27 Bacitracin - -    28 Formaldehyde - -    29 Methylchloroisothiazolinone / Methylisothiazolinone - -    30 Corticosteroid Mix - -    31 Sodium Lauryl Sulfate - -    32 Lanolin Alcohol - -    33 Turpentine - -    34 Cetylstearylalcohol - -    35 Chlorhexidine Dicluconate - -    36 Budenoside - -    37 Imidazolidinyl Urea  - -    38 Ethyl-2 Cyanoacrylate - -    39 Quaternium 15  (Dowicil 200) - -    40 Decyl Glucoside - -      COLORANTS, DYES, FOOD ADDITIVES    No Substance 2 days 4 days remarks   41 Aniline - -    42 Jasmeet Brown R - -    43 Textile Dye Mix [A] - -    44 Hubbell  - -    45 Disperse Blue-3 - -    46 Disperse Orange-3 - -    47 Disperse Red-11 - -    48 Disperse Yellow-9 - -    49 Erythrosine-B - -    50 Naphthol AS - -      Food additives      51 Aspartame - -    52 Azorubine - -    53 Brilliant Black - -    54 Cochineal Red - -    55 Patent Blue-VF - -    56 Pectin - -    57 Quinoline Yellow - -    58 Saccharin - -    59 Tartrazine - -    60 Sodium Glutamate - -      EMULSIFIERS & ADDITIVES        No Substance 2 days 4 days remarks   61 Polyethylene Glycol-400 - -    62 Cocamidopropyl Betaine - -    63 Amerchol L101 - -    64 Propylene Glycol - -    65 Triethanolamine - -    66 Sorbitane Sesquiolate - -    67 Isopropylmyristate - -    68 Polysorbate 80  - -    69 Amidoamine   (Stearamidopropyl Dimethylamine) - -    70 Oleamidopropyl Dimethylamine - -    71 Lauryl Glucoside - -    72 Coconut Diethanolamide  - -    73 2-Hydroxy-4-Methoxy Benzophenone (Oxybenzone) - -    74 Benzophenone-4 (Sulisobenzon) - -    75 Propolis - -    76 Dexpanthenol - -    77 Carboxymethyl Cellulose Sodium - -    78 Abitol - -    79 Tert-Butylhydroquinone - -    80 Benzyl Salicylate - -     Antioxidant      81 Dodecyl Gallate - -    82 Butylhydroxyanisole (BHA) - -    83 Butylhydroxytoluene (BHT) - -    84 Di-Alpha-Tocopherol (Vit E) - -    85 Propyl Gallate - -      PERFUMES, FLAVORS & PLANTS         No Substance 2 days 4 days remarks   86 Benzyl Salicylate - -    87 Benzyl Cinnamate - -    88 Di-Limonene (Dipentene) - -    89 Cananga Odorata (Ylang Ylang) (I) - -    90 Lichen Acid Mix - -    91 Mentha Piperita Oil (Peppermint Oil) - -    92 Sesquiterpenelactone mix - -    93 Tea Tree Oil, Oxidized - -    94 Wood Tar Mix - -    95 Abietic Acid - -    96 Lavendula Angustifolia Oil (Lavender Oil)  - -    97 Camphor  - -     Fragrance Mix I      98 Oakmoss Absolute - -    99 Eugenol - -    100 Geraniol - -    101 Hydroxycitronellal - -    102 Isoeugenol - -    103 Cinnamic Aldehyde - -    104 Cinnamic Alcohol  - -    105 Sorbitane Sesquioleate - -     Fragrance mix II      106 Citronellol - -    107 Alpha-Hexylcinnamic Aldehyde    - -    108 Citral - -    109 Farnesol - -    110 Coumarin - -      PRESERVATIVES & ANTIMICROBIALS         No Substance 2 days 4 days remarks   111  1,2-Benzisothiazoline-3-One, Sodium Salt - -    112  1,3,5-Sammy (2-Hydroxyethyl) - Hexahydrotriazine (Grotan BK) - -    113 6-Qmnijeaikaxtq-2-Nitro-1, 3-Propanediol - -    114  3, 4, 4' - Triclocarban - -    115 4 - Chloro - 3 - Cresol - -    116 4 - Chloro - 4 - Xylenol (PCMX) - -    117 7-Ethylbicyclooxazolidine (Energatean [id number]) - -    118 Benzalkonium Chloride - -    119 Benzyl Alcohol - -    120 Cetalkonium Chloride - -    121 Cetylpyrimidine Chloride  - -    122 Chloroacetamide - -    123 DMDM Hydantoin - -    124 Glutaraldehyde - -    125 Triclosan - -    126 Glyoxal Trimeric Dihydrate - -    127 Iodopropynyl Butylcarbamate - -    128 Octylisothiazoline - -    129 Iodoform - -    130 (Nitrobutyl) Morpholine/(Ethylnitro-Trimethylene) Dimorpholine (Bioban P 1487) - -    131 Phenoxyethanol - -    132 Phenyl Salicylate - -    133 Povidone Iodine - -    134 Sodium Benzoate - -    135 Sodium Disulfite - -    136 Sorbic Acid - -    137 Thimerosal - -     Parabens      138 Butyl-P-Hydroxybenzoate - -    139 Ethyl-P-Hydroxybenzoate - -    140 Methyl-P-Hydroxybenzoate - -    141 Propyl-P-Hydroxybenzoate - -      Results of patch tests:                         Interpretation:    - Negative                    A    = Allergic      (+) Erythema    TI   = Toxic/irritant   + E + Infiltration    RaP = Relevance at Present     ++ E/I + Papulovesicle   Rpr  = Relevance Previously     +++ E/I/P + Blister     nR   = No Relevance      Atopy Screen  (07/22/2019)    No Substance Readings (15min) Evaluation   POS Histamine 1mg/ml +/++    NEG NaCl 0.9% -      No Substance Readings (15min) Evaluation   1 Alternaria alternata (tenuis)  -    2 Cladosporium herbarum -    3 Aspergillus fumigatus -    4 Penicillium notatum -    5 Dermatophagoides pteronyssinus -    6 Dermatophagoides farinae -    7 Dog epithelium (canis spp) -    8 Cat hair (doe catus) +    9 Cockroach   (Blatella americana & germanica) -    10 Grass mix midwest   (Lauren, Orchard, Redtop, Wyatt) -    11 Leland grass (sorghum halepense) -    12 Weed mix   (common Cocklebur, Lamb s quarters, rough redroot Pigweed, Dock/Sorrel) +    13 Mugwort (artemisia vulgare) -    14 Ragweed giant/short (ambrosia spp) -    15 English Plantain (plantago lanceolata) -    16 Tree mix 1 (Pecan, Maple BHR, Oak RVW, american Limestone, black Escanaba) +/++    17 Red cedar (juniperus virginia) -    18 Tree mix 2   (white Joby, river/red Birch, black Fishtail, common Galax, american Elm) -    19 Box elder/Maple mix (acer spp) ++    20 Hickory shagbark (carya ovata) -       -      [] No relevant allergic reaction observed    [] Allergic reaction diagnosed against: see later      Interpretation/ remarks:   See later    [] Patient information given   [] [facility] information   [] [employer] information    ==> final Diagnosis:  See later    ==> Treatment prescribed/Plan:  See later      These conclusions are made at the best of ones knowledge and belief  based on the provided evidence such as patients history and allergy test results and they can change over time or can be incomplete because of missing informations.      Impression/Plan:    Atopic predisposition with components of intermediate and delayed hypersensitivity reactions with       history of perennial rhinitis with seasonal exacerbations.      long-standing history of undiagnosed GI inflammation s/p MRI and biopsies. Last CBC in November 2018 did not show  eosinophilia.     remote history of photosensitivity in early 2019 but has not has recurrence--currently being evaluated for SLE.    Plans    Atopic prick test today    Allergy patch testing day 1    Defer photopatch test for after evaluation of additives and preservatives    Continue using Vanicream moisturizer BID        CC Mady Tena MD  [address] [place] [address] on close of this encounter.       Staff Involved:  I, Liz Qureshi MS4, saw and examined the patient in the presence of Dr. Beth.  Staff Physician Comments:  I was present with the medical student who participated in the service and in the documentation of the note. I have verified the history and personally performed the physical exam and medical decision making. I agree with the assessment and plan as documented in the note. I have reviewed and if necessary amended the note.  I personally performed the procedure.      García Beth MD  Professor  Head of Dermato-Allergy Division  Department of Dermatology  [facility]  I spent a total of 35 minutes face to face with Nuris Reddy during today s office visit. Over 50% of this time was spent counseling the patient and/or coordinating care. Please see Assessment and Plan for details.

## 2019-07-23 DIAGNOSIS — L56.8 PHOTOSENSITIVITY: ICD-10-CM

## 2019-07-23 PROCEDURE — 86162 COMPLEMENT TOTAL (CH50): CPT | Mod: 90 | Performed by: DERMATOLOGY

## 2019-07-23 PROCEDURE — 99000 SPECIMEN HANDLING OFFICE-LAB: CPT | Performed by: DERMATOLOGY

## 2019-07-23 PROCEDURE — 86160 COMPLEMENT ANTIGEN: CPT | Performed by: DERMATOLOGY

## 2019-07-23 PROCEDURE — 86038 ANTINUCLEAR ANTIBODIES: CPT | Performed by: DERMATOLOGY

## 2019-07-23 PROCEDURE — 86235 NUCLEAR ANTIGEN ANTIBODY: CPT | Performed by: DERMATOLOGY

## 2019-07-23 PROCEDURE — 36415 COLL VENOUS BLD VENIPUNCTURE: CPT | Performed by: DERMATOLOGY

## 2019-07-24 ENCOUNTER — OFFICE VISIT (OUTPATIENT)
Dept: ALLERGY | Facility: CLINIC | Age: 19
End: 2019-07-24
Payer: COMMERCIAL

## 2019-07-24 DIAGNOSIS — L23.0 ALLERGIC CONTACT DERMATITIS DUE TO METALS: Primary | ICD-10-CM

## 2019-07-24 LAB
ANA SER QL IF: NEGATIVE
C3 SERPL-MCNC: 91 MG/DL (ref 76–169)
C4 SERPL-MCNC: 10 MG/DL (ref 15–50)
ENA RNP IGG SER IA-ACNC: <0.2 AI (ref 0–0.9)
ENA SCL70 IGG SER IA-ACNC: <0.2 AI (ref 0–0.9)
ENA SM IGG SER-ACNC: <0.2 AI (ref 0–0.9)
ENA SS-A IGG SER IA-ACNC: <0.2 AI (ref 0–0.9)
ENA SS-B IGG SER IA-ACNC: <0.2 AI (ref 0–0.9)

## 2019-07-24 ASSESSMENT — PAIN SCALES - GENERAL: PAINLEVEL: NO PAIN (0)

## 2019-07-24 NOTE — NURSING NOTE
Dermatology Rooming Note    Nuris Reddy's goals for this visit include:   Chief Complaint   Patient presents with     Allergies     Nuris is here for allergy testing day 3     Khushboo Prado, CMA

## 2019-07-24 NOTE — PROGRESS NOTES
[facility] Dermatology Note      Dermatology Problem List:  1. Intermittent photosensitive rxn, pruritic  2. Seborrheic dermatitis  3. 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 Nuris Reddy is a 18 year old female who presents for consultation for possible patch testing referred from Dr. Tena. She has history of eczema since childhood that has been localized to the antecubital fossa. After returning from college in spring, she experienced a widespread rash on face, scalp, eyelids, lips, upper thighs, dorsal hands and antecubital fossae. The rash has been itchy and occasionally red on body and she has experienced frequent peeling of her lips. She also endorses dryness of her ears that started after she got them pierced but has persisted despite not wearing any jewelry. She believes that she might be allergic to family dog, as her rash worsened when she came home from college. She also experiences intermittent itchy hives that started this summer that last for a few hours and resolve with showers.    She has been using triamcinolone cream for rash on body, hydrocortisone 2.5% for face and Vanicream moisturizer. Currently, she is being evaluated for Crohn's disease and her sulfasalazine was stopped in spring for a MRI.     Of note, she has perennial rhinitis with exacerbations in the spring and around the family pet. Her sister has asthma.    In January biopsy done outside by [facility] and in the biopsies some non-specfic inflemmation, but no clear description if Eosinophils in there      Past Medical History:   Patient Active Problem List   Diagnosis     Other congenital  anomalies of intestine     Proteinuria     Ulceration of intestine     Anemia     Short gut syndrome     Iron deficiency anemia     Chronic rhinitis     Past Medical History:   Diagnosis Date     Anemia 10/16/2012     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, s/p resection     Short gut syndrome 10/16/2012    ~ 20 cm of ileum as well as ileocecal valve resected in  period     Ulceration of intestine 2012     Past Surgical History:   Procedure Laterality Date     COLONOSCOPY  2012     COLONOSCOPY  2012     RESECTION ILEOCECAL  1999    ~ 20 cm of ileum as well as ileocecal valve resected in  period       Social History:  Patient reports that she has never smoked. She has never used smokeless tobacco. She reports that she does not drink alcohol or use drugs.    Family History:  Family History   Problem Relation Age of Onset     Hypertension Paternal Grandmother      Hypertension Paternal Grandfather      Cardiovascular Paternal Grandfather         Valve condition     Cancer Sister         ovarian teratoma, s/p resection     Kidney Disease Maternal Uncle         kidney stones       Medications:  Current Outpatient Medications   Medication Sig Dispense Refill     Cetirizine HCl (ZYRTEC ALLERGY PO) Take by mouth daily       Cyanocobalamin (VITAMIN B-12) 50 MCG TABS Take 100 mcg by mouth daily.       hydrocortisone 2.5 % cream To lips and face rash twice daily as needed for rash 30 g 1     ketoconazole (NIZORAL) 2 % external shampoo Use to shampoo twice weekly. Leave on 5 min then rinse. 120 mL 3     mometasone (ELOCON) 0.1 % external solution Apply small amount topically to affected areas of scalp daily prn for up to 2 weeks. (Patient not taking: Reported on 2019) 60 mL 0     Multiple Vitamins-Iron (MULTIVITAMIN/IRON PO) Take 1 tablet by mouth daily.       norgestim-eth estrad triphasic (TRINESSA, 28,) 0.18/0.215/0.25 MG-35 MCG tablet Take 1 tablet by mouth  daily 84 tablet 1     pimecrolimus (ELIDEL) 1 % external cream Apply topically 2 times daily (Patient not taking: Reported on 7/22/2019) 60 g 1     sulfaSALAzine (AZULFIDINE) 500 MG tablet Take 2 tablets by mouth daily  0     tacrolimus (PROTOPIC) 0.1 % external ointment Twice daily to eyelid and face rash until clear, then as needed. (Patient not taking: Reported on 7/22/2019) 30 g 11     triamcinolone (KENALOG) 0.1 % cream Apply sparingly to affected area two times daily as needed for up to 2 weeks. Do not apply to face. 30 g 3     triamcinolone (KENALOG) 0.1 % external cream To rash on the ears, body, arms, legs twice daily until clear, then as needed ongoing. (Patient not taking: Reported on 7/22/2019) 454 g 1     VENTOLIN  (90 Base) MCG/ACT inhaler INHALE 2 PUFFS PO Q 4 H PRF SOB OR WHZ  1        Allergies   Allergen Reactions     No Known Allergies          Review of Systems:  -As per HPI  -Constitutional: Otherwise feeling well today, in usual state of health.  -HEENT: Patient denies nonhealing oral sores.  -Skin: As above in HPI. No additional skin concerns.    Physical exam:  Vitals: There were no vitals taken for this visit.  GEN: This is a well developed, well-nourished female in no acute distress, in a pleasant mood.    SKIN: Focused examination of the face, bilateral lower and upper extremities, back and abdomen was performed.  -Scattered pink plaques with mild overlying scale on anterior forearms, bilateral posteromedial thighs.  -Mild erythema of upper eyelids and bilateral cheeks.  -Back clear  -No other lesions of concern on areas examined.       Order for PATCH TESTS    [] Outpatient  [] Inpatient: [facility]..../ Bed ....      Skin Atopy (atopic dermatitis) [x] Yes   [] No  Rhinitis/Sinusitis:   [x] Yes   [] No  Allergic Asthma:   [] Yes   [x] No  Food Allergy:   [] Yes   [x] No GI evaluation for Crohn's  Leg ulcers:   [] Yes   [x] No  Hand eczema:   [] Yes   [] No   Leading hand:   [x] R   [] L        [] Ambidextrous                        Reason for tests (suspected allergy): atopic predisposition with components of contact dermatitis  Known previous allergies: NA    Standardized panels  [x] Standard panel (40 tests)  [x] Preservatives & Antimicrobials (31 tests)  [x] Emulsifiers & Additives (25 tests)   [x] Perfumes/Flavours & Plants (25 tests)  [] Hairdresser panel (12 tests)  [] Rubber Chemicals (22 tests)  [] Plastics (26 tests)  [x] Colorants/Dyes/Food additives (20 tests)  [] Metals (implants/dental) (24 tests)  [] Local anaesthetics/NSAIDs (13 tests)  [] Antibiotics & Antimycotics (14 tests)   [] Corticosteroids (15 tests)   [] Photopatch test (62 tests)   [] others: ...      [] Patient's own products: ...    DO NOT test if chemical or biological identity is unknown!     always ask from patient the product information and safety sheets (MSDS)     [x] Atopy screen prick test    RESULTS & EVALUATION of PATCH TESTS    Patch test readings after     [x] 2 days, [] 3 days [x] 4 days, [] 5 days,    Applied patch tests with results (import here the list of patch tests):    Applied 07/22/2019  No lesions on back    STANDARD Series         No Substance 2 days 4 days remarks   1 Alverto Mix [C] - -    2 Colophony - -    3  2-Mercaptobenzothiazole  - -     4 Methylisothiazolinone - -    5 Carba Mix - -    6 Thiuram Mix [A] - -    7 Bisphenol A Epoxy Resin + -    8 E-Hslt-Kfwgtiavixr-Formaldehyde Resin + -    9 Mercapto Mix [A] - -    10 Black Rubber Mix- PPD [B] - -    11 Potassium Dichromate +/++  -    12 Balsam of [place] (Myroxylon Pereirae Resin) - -    13 Nickel Sulphate Hexahydrate - -    14 Mixed Dialkyl Thiourea - -    15 Paraben Mix [B] - -    16 Methyldibromo Glutaronitrile - -    17 Fragrance Mix - -    18 2-Bromo-2-Nitropropane-1,3-Diol (Bronopol) - -    19 Lyral - -    20 Tixocortol-21- Pivalate - -    21 Diazolidiyl Urea (Germall II) - -    22 Methyl Methacrylate - -    23 Cobalt (II) Chloride  Hexahydrate - -    24 Fragrance Mix II  - -    25 Compositae Mix - -    26 Benzoyl Peroxide (+) -    27 Bacitracin - -    28 Formaldehyde - -    29 Methylchloroisothiazolinone / Methylisothiazolinone - -    30 Corticosteroid Mix - -    31 Sodium Lauryl Sulfate - -    32 Lanolin Alcohol - -    33 Turpentine + -    34 Cetylstearylalcohol - -    35 Chlorhexidine Dicluconate - -    36 Budenoside - -    37 Imidazolidinyl Urea  - -    38 Ethyl-2 Cyanoacrylate - -    39 Quaternium 15 (Dowicil 200) - -    40 Decyl Glucoside - -      COLORANTS, DYES, FOOD ADDITIVES    No Substance 2 days 4 days remarks   41 Aniline - -    42 Jasmeet Brown R - -    43 Textile Dye Mix [A] - -    44 Billings  - -    45 Disperse Blue-3 - -    46 Disperse Orange-3 - -    47 Disperse Red-11 - -    48 Disperse Yellow-9 - -    49 Erythrosine-B - -    50 Naphthol AS - -      Food additives      51 Aspartame - -    52 Azorubine - -    53 Brilliant Black - -    54 Cochineal Red - -    55 Patent Blue-VF - -    56 Pectin - -    57 Quinoline Yellow - -    58 Saccharin - -    59 Tartrazine - -    60 Sodium Glutamate - -      EMULSIFIERS & ADDITIVES        No Substance 2 days 4 days remarks   61 Polyethylene Glycol-400 - -    62 Cocamidopropyl Betaine - -    63 Amerchol L101 - -    64 Propylene Glycol - -    65 Triethanolamine - -    66 Sorbitane Sesquiolate - -    67 Isopropylmyristate - -    68 Polysorbate 80  - -    69 Amidoamine   (Stearamidopropyl Dimethylamine) - -    70 Oleamidopropyl Dimethylamine - -    71 Lauryl Glucoside - -    72 Coconut Diethanolamide  - -    73 2-Hydroxy-4-Methoxy Benzophenone (Oxybenzone) - -    74 Benzophenone-4 (Sulisobenzon) - -    75 Propolis - -    76 Dexpanthenol - -    77 Carboxymethyl Cellulose Sodium - -    78 Abitol - -    79 Tert-Butylhydroquinone - -    80 Benzyl Salicylate - -     Antioxidant      81 Dodecyl Gallate - -    82 Butylhydroxyanisole (BHA) - -    83 Butylhydroxytoluene (BHT) - -    84  Di-Alpha-Tocopherol (Vit E) - -    85 Propyl Gallate - -      PERFUMES, FLAVORS & PLANTS         No Substance 2 days 4 days remarks   86 Benzyl Salicylate - -    87 Benzyl Cinnamate - -    88 Di-Limonene (Dipentene) - -    89 Cananga Odorata (Hailey Hart) (I) - -    90 Lichen Acid Mix - -    91 Mentha Piperita Oil (Peppermint Oil) - -    92 Sesquiterpenelactone mix - -    93 Tea Tree Oil, Oxidized - -    94 Wood Tar Mix - -    95 Abietic Acid - -    96 Lavendula Angustifolia Oil (Lavender Oil) - -    97 Camphor  - -     Fragrance Mix I      98 Oakmoss Absolute - -    99 Eugenol - -    100 Geraniol - -    101 Hydroxycitronellal - -    102 Isoeugenol - -    103 Cinnamic Aldehyde - -    104 Cinnamic Alcohol  - -    105 Sorbitane Sesquioleate - -     Fragrance mix II      106 Citronellol - -    107 Alpha-Hexylcinnamic Aldehyde    - -    108 Citral - -    109 Farnesol - -    110 Coumarin - -      PRESERVATIVES & ANTIMICROBIALS         No Substance 2 days 4 days remarks   111  1,2-Benzisothiazoline-3-One, Sodium Salt - -    112  1,3,5-Sammy (2-Hydroxyethyl) - Hexahydrotriazine (Grotan BK) - -    113 1-Tuzsoojphadrm-3-Nitro-1, 3-Propanediol - -    114  3, 4, 4' - Triclocarban - -    115 4 - Chloro - 3 - Cresol - -    116 4 - Chloro - 4 - Xylenol (PCMX) - -    117 7-Ethylbicyclooxazolidine (Bioban [id number]) - -    118 Benzalkonium Chloride - -    119 Benzyl Alcohol - -    120 Cetalkonium Chloride - -    121 Cetylpyrimidine Chloride  - -    122 Chloroacetamide - -    123 DMDM Hydantoin - -    124 Glutaraldehyde - -    125 Triclosan - -    126 Glyoxal Trimeric Dihydrate - -    127 Iodopropynyl Butylcarbamate - -    128 Octylisothiazoline - -    129 Iodoform - -    130 (Nitrobutyl) Morpholine/(Ethylnitro-Trimethylene) Dimorpholine (Bioban P 1487) - -    131 Phenoxyethanol - -    132 Phenyl Salicylate - -    133 Povidone Iodine - -    134 Sodium Benzoate - -    135 Sodium Disulfite - -    136 Sorbic Acid - -    137 Thimerosal - -      Parabens      138 Butyl-P-Hydroxybenzoate - -    139 Ethyl-P-Hydroxybenzoate - -    140 Methyl-P-Hydroxybenzoate - -    141 Propyl-P-Hydroxybenzoate - -      Results of patch tests:                         Interpretation:    - Negative                    A    = Allergic      (+) Erythema    TI   = Toxic/irritant   + E + Infiltration    RaP = Relevance at Present     ++ E/I + Papulovesicle   Rpr  = Relevance Previously     +++ E/I/P + Blister     nR   = No Relevance      Atopy Screen (07/22/2019)    No Substance Readings (15min) Evaluation   POS Histamine 1mg/ml +/++    NEG NaCl 0.9% -      No Substance Readings (15min) Evaluation   1 Alternaria alternata (tenuis)  -    2 Cladosporium herbarum -    3 Aspergillus fumigatus -    4 Penicillium notatum -    5 Dermatophagoides pteronyssinus -    6 Dermatophagoides farinae -    7 Dog epithelium (canis spp) -    8 Cat hair (doe catus) +    9 Cockroach   (Blatella americana & germanica) -    10 Grass mix [facility]   (Lauren, Orchard, Redtop, Wyatt) -    11 Leland grass (sorghum halepense) -    12 Weed mix   (common Cocklebur, Lamb s quarters, rough redroot Pigweed, Dock/Sorrel) +    13 Mugwort (artemisia vulgare) -    14 Ragweed giant/short (ambrosia spp) -    15 English Plantain (plantago lanceolata) -    16 Tree mix 1 (Pecan, Maple BHR, Oak RVW, american [place], black Rentz) +/++    17 Red cedar (juniperus virginia) -    18 Tree mix 2   (white Joby, river/red Birch, black Mon[place], common [place], american Elm) -    19 Box elder/Maple mix (acer spp) ++    20 Hickory shagbark (carya ovata) -       -      [] No relevant allergic reaction observed    [x] Allergic reaction diagnosed against: Chromate +/++, Epoxide? Formaldehyde resin? Turpentine      Interpretation/ remarks:   See later    [x] Patient information given   [] [facility] information   [x] SmartPractice information: chromate and Bisphenol Epoxide  ==> final Diagnosis:  See later    ==> Treatment  prescribed/Plan:  See later      These conclusions are made at the best of ones knowledge and belief  based on the provided evidence such as patients history and allergy test results and they can change over time or can be incomplete because of missing informations.      Impression/Plan:    Atopic predisposition with components of intermediate and delayed hypersensitivity reactions with       history of perennial rhinitis with seasonal exacerbations.      long-standing history of undiagnosed GI inflammation s/p MRI and biopsies. Last CBC in November 2018 did not show eosinophilia.     remote history of photosensitivity in early 2019 but has not has recurrence--currently being evaluated for SLE.    Plans    Atopic prick test today    Allergy patch testing day 1    Defer photopatch test for after evaluation of additives and preservatives    Continue using Vanicream moisturizer BID        CC Mady Tena MD  [address] [place] [place] [address] on close of this encounter.

## 2019-07-24 NOTE — LETTER
7/24/2019         RE: Nuris Reddy  [address] [place] [address]        Dear Colleague,    Thank you for referring your patient, Nuris Reddy, to the [facility]. Please see a copy of my visit note below.    [facility] Dermatology Note      Dermatology Problem List:  1. Intermittent photosensitive rxn, pruritic  2. Seborrheic dermatitis  3. 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 Nuris Reddy is a 18 year old female who presents for consultation for possible patch testing referred from Dr. Tena. She has history of eczema since childhood that has been localized to the antecubital fossa. After returning from college in spring, she experienced a widespread rash on face, scalp, eyelids, lips, upper thighs, dorsal hands and antecubital fossae. The rash has been itchy and occasionally red on body and she has experienced frequent peeling of her lips. She also endorses dryness of her ears that started after she got them pierced but has persisted despite not wearing any jewelry. She believes that she might be allergic to family dog, as her rash worsened when she came home from college. She also experiences intermittent itchy hives that started this summer that last for a few hours and resolve with showers.    She has been using triamcinolone cream for rash on body, hydrocortisone 2.5% for face and Vanicream moisturizer. Currently, she is being evaluated for Crohn's disease and her sulfasalazine was stopped in spring for a MRI.     Of note, she has perennial rhinitis with exacerbations in the spring and around the family pet. Her sister has  asthma.    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 She has never used smokeless tobacco. She reports that she does not drink alcohol or use drugs.    Family History:  Family History   Problem Relation Age of Onset     Hypertension Paternal Grandmother      Hypertension Paternal Grandfather      Cardiovascular Paternal Grandfather         Valve condition     Cancer Sister         ovarian teratoma, s/p resection     Kidney Disease Maternal Uncle         kidney stones       Medications:  Current Outpatient Medications   Medication Sig Dispense Refill     Cetirizine HCl (ZYRTEC ALLERGY PO) Take by mouth daily       Cyanocobalamin (VITAMIN B-12) 50 MCG TABS Take 100 mcg by mouth daily.       hydrocortisone 2.5 % cream To lips and face rash twice daily as needed for rash 30 g 1     ketoconazole (NIZORAL) 2 % external shampoo Use to shampoo twice weekly. Leave on 5 min then rinse. 120 mL 3     mometasone (ELOCON) 0.1 % external solution Apply small amount topically to affected areas of scalp daily prn for up to 2 weeks.  (Patient not taking: Reported on 7/22/2019) 60 mL 0     Multiple Vitamins-Iron (MULTIVITAMIN/IRON PO) Take 1 tablet by mouth daily.       norgestim-eth estrad triphasic (TRINESSA, 28,) 0.18/0.215/0.25 MG-35 MCG tablet Take 1 tablet by mouth daily 84 tablet 1     pimecrolimus (ELIDEL) 1 % external cream Apply topically 2 times daily (Patient not taking: Reported on 7/22/2019) 60 g 1     sulfaSALAzine (AZULFIDINE) 500 MG tablet Take 2 tablets by mouth daily  0     tacrolimus (PROTOPIC) 0.1 % external ointment Twice daily to eyelid and face rash until clear, then as needed. (Patient not taking: Reported on 7/22/2019) 30 g 11     triamcinolone (KENALOG) 0.1 % cream Apply sparingly to affected area two times daily as needed for up to 2 weeks. Do not apply to face. 30 g 3     triamcinolone (KENALOG) 0.1 % external cream To rash on the ears, body, arms, legs twice daily until clear, then as needed ongoing. (Patient not taking: Reported on 7/22/2019) 454 g 1     VENTOLIN  (90 Base) MCG/ACT inhaler INHALE 2 PUFFS PO Q 4 H PRF SOB OR WHZ  1        Allergies   Allergen Reactions     No Known Allergies          Review of Systems:  -As per HPI  -Constitutional: Otherwise feeling well today, in usual state of health.  -HEENT: Patient denies nonhealing oral sores.  -Skin: As above in HPI. No additional skin concerns.    Physical exam:  Vitals: There were no vitals taken for this visit.  GEN: This is a well developed, well-nourished female in no acute distress, in a pleasant mood.    SKIN: Focused examination of the face, bilateral lower and upper extremities, back and abdomen was performed.  -Scattered pink plaques with mild overlying scale on anterior forearms, bilateral posteromedial thighs.  -Mild erythema of upper eyelids and bilateral cheeks.  -Back clear  -No other lesions of concern on areas examined.       Order for PATCH TESTS    [] Outpatient  [] Inpatient: [facility]..../ Bed ....      Skin Atopy (atopic  dermatitis) [x] Yes   [] No  Rhinitis/Sinusitis:   [x] Yes   [] No  Allergic Asthma:   [] Yes   [x] No  Food Allergy:   [] Yes   [x] No GI evaluation for Crohn's  Leg ulcers:   [] Yes   [x] No  Hand eczema:   [] Yes   [] No   Leading hand:   [x] R   [] L       [] Ambidextrous                        Reason for tests (suspected allergy): atopic predisposition with components of contact dermatitis  Known previous allergies: NA    Standardized panels  [x] Standard panel (40 tests)  [x] Preservatives & Antimicrobials (31 tests)  [x] Emulsifiers & Additives (25 tests)   [x] Perfumes/Flavours & Plants (25 tests)  [] Hairdresser panel (12 tests)  [] Rubber Chemicals (22 tests)  [] Plastics (26 tests)  [x] Colorants/Dyes/Food additives (20 tests)  [] Metals (implants/dental) (24 tests)  [] Local anaesthetics/NSAIDs (13 tests)  [] Antibiotics & Antimycotics (14 tests)   [] Corticosteroids (15 tests)   [] Photopatch test (62 tests)   [] others: ...      [] Patient's own products: ...    DO NOT test if chemical or biological identity is unknown!     always ask from patient the product information and safety sheets (MSDS)     [x] Atopy screen prick test    RESULTS & EVALUATION of PATCH TESTS    Patch test readings after     [x] 2 days, [] 3 days [x] 4 days, [] 5 days,    Applied patch tests with results (import here the list of patch tests):    Applied 07/22/2019  No lesions on back    STANDARD Series         No Substance 2 days 4 days remarks   1 Alverto Mix [C] - -    2 Colophony - -    3  2-Mercaptobenzothiazole  - -     4 Methylisothiazolinone - -    5 Carba Mix - -    6 Thiuram Mix [A] - -    7 Bisphenol A Epoxy Resin + -    8 F-Efut-Bwdvvzzutjs-Formaldehyde Resin + -    9 Mercapto Mix [A] - -    10 Black Rubber Mix- PPD [B] - -    11 Potassium Dichromate +/++  -    12 Balsam of [place] (Myroxylon Pereirae Resin) - -    13 Nickel Sulphate Hexahydrate - -    14 Mixed Dialkyl Thiourea - -    15 Paraben Mix [B] - -    16  Methyldibromo Glutaronitrile - -    17 Fragrance Mix - -    18 2-Bromo-2-Nitropropane-1,3-Diol (Bronopol) - -    19 Lyral - -    20 Tixocortol-21- Pivalate - -    21 Diazolidiyl Urea (Germall II) - -    22 Methyl Methacrylate - -    23 Cobalt (II) Chloride Hexahydrate - -    24 Fragrance Mix II  - -    25 Compositae Mix - -    26 Benzoyl Peroxide (+) -    27 Bacitracin - -    28 Formaldehyde - -    29 Methylchloroisothiazolinone / Methylisothiazolinone - -    30 Corticosteroid Mix - -    31 Sodium Lauryl Sulfate - -    32 Lanolin Alcohol - -    33 Turpentine + -    34 Cetylstearylalcohol - -    35 Chlorhexidine Dicluconate - -    36 Budenoside - -    37 Imidazolidinyl Urea  - -    38 Ethyl-2 Cyanoacrylate - -    39 Quaternium 15 (Dowicil 200) - -    40 Decyl Glucoside - -      COLORANTS, DYES, FOOD ADDITIVES    No Substance 2 days 4 days remarks   41 Aniline - -    42 Jasmeet Brown R - -    43 Textile Dye Mix [A] - -    44 [place]  - -    45 Disperse Blue-3 - -    46 Disperse Orange-3 - -    47 Disperse Red-11 - -    48 Disperse Yellow-9 - -    49 Erythrosine-B - -    50 Naphthol AS - -      Food additives      51 Aspartame - -    52 Azorubine - -    53 Brilliant Black - -    54 Cochineal Red - -    55 Patent Blue-VF - -    56 Pectin - -    57 Quinoline Yellow - -    58 Saccharin - -    59 Tartrazine - -    60 Sodium Glutamate - -      EMULSIFIERS & ADDITIVES        No Substance 2 days 4 days remarks   61 Polyethylene Glycol-400 - -    62 Cocamidopropyl Betaine - -    63 Amerchol L101 - -    64 Propylene Glycol - -    65 Triethanolamine - -    66 Sorbitane Sesquiolate - -    67 Isopropylmyristate - -    68 Polysorbate 80  - -    69 Amidoamine   (Stearamidopropyl Dimethylamine) - -    70 Oleamidopropyl Dimethylamine - -    71 Lauryl Glucoside - -    72 Coconut Diethanolamide  - -    73 2-Hydroxy-4-Methoxy Benzophenone (Oxybenzone) - -    74 Benzophenone-4 (Sulisobenzon) - -    75 Propolis - -    76 Dexpanthenol - -     77 Carboxymethyl Cellulose Sodium - -    78 Abitol - -    79 Tert-Butylhydroquinone - -    80 Benzyl Salicylate - -     Antioxidant      81 Dodecyl Gallate - -    82 Butylhydroxyanisole (BHA) - -    83 Butylhydroxytoluene (BHT) - -    84 Di-Alpha-Tocopherol (Vit E) - -    85 Propyl Gallate - -      PERFUMES, FLAVORS & PLANTS         No Substance 2 days 4 days remarks   86 Benzyl Salicylate - -    87 Benzyl Cinnamate - -    88 Di-Limonene (Dipentene) - -    89 Cananga Odorata (Hailey Hart) (I) - -    90 Lichen Acid Mix - -    91 Mentha Piperita Oil (Peppermint Oil) - -    92 Sesquiterpenelactone mix - -    93 Tea Tree Oil, Oxidized - -    94 Wood Tar Mix - -    95 Abietic Acid - -    96 Lavendula Angustifolia Oil (Lavender Oil) - -    97 Camphor  - -     Fragrance Mix I      98 [place]oss Absolute - -    99 Eugenol - -    100 Geraniol - -    101 Hydroxycitronellal - -    102 Isoeugenol - -    103 Cinnamic Aldehyde - -    104 Cinnamic Alcohol  - -    105 Sorbitane Sesquioleate - -     Fragrance mix II      106 Citronellol - -    107 Alpha-Hexylcinnamic Aldehyde    - -    108 Citral - -    109 Farnesol - -    110 Coumarin - -      PRESERVATIVES & ANTIMICROBIALS         No Substance 2 days 4 days remarks   111  1,2-Benzisothiazoline-3-One, Sodium Salt - -    112  1,3,5-Sammy (2-Hydroxyethyl) - Hexahydrotriazine (Grotan BK) - -    113 2-Rizqirtvaqbwi-8-Nitro-1, 3-Propanediol - -    114  3, 4, 4' - Triclocarban - -    115 4 - Chloro - 3 - Cresol - -    116 4 - Chloro - 4 - Xylenol (PCMX) - -    117 7-Ethylbicyclooxazolidine (Bioban [id number]) - -    118 Benzalkonium Chloride - -    119 Benzyl Alcohol - -    120 Cetalkonium Chloride - -    121 Cetylpyrimidine Chloride  - -    122 Chloroacetamide - -    123 DMDM Hydantoin - -    124 Glutaraldehyde - -    125 Triclosan - -    126 Glyoxal Trimeric Dihydrate - -    127 Iodopropynyl Butylcarbamate - -    128 Octylisothiazoline - -    129 Iodoform - -    130 (Nitrobutyl)  Morpholine/(Ethylnitro-Trimethylene) Dimorpholine (Bioban P 1487) - -    131 Phenoxyethanol - -    132 Phenyl Salicylate - -    133 Povidone Iodine - -    134 Sodium Benzoate - -    135 Sodium Disulfite - -    136 Sorbic Acid - -    137 Thimerosal - -     Parabens      138 Butyl-P-Hydroxybenzoate - -    139 Ethyl-P-Hydroxybenzoate - -    140 Methyl-P-Hydroxybenzoate - -    141 Propyl-P-Hydroxybenzoate - -      Results of patch tests:                         Interpretation:    - Negative                    A    = Allergic      (+) Erythema    TI   = Toxic/irritant   + E + Infiltration    RaP = Relevance at Present     ++ E/I + Papulovesicle   Rpr  = Relevance Previously     +++ E/I/P + Blister     nR   = No Relevance      Atopy Screen (07/22/2019)    No Substance Readings (15min) Evaluation   POS Histamine 1mg/ml +/++    NEG NaCl 0.9% -      No Substance Readings (15min) Evaluation   1 Alternaria alternata (tenuis)  -    2 Cladosporium herbarum -    3 Aspergillus fumigatus -    4 Penicillium notatum -    5 Dermatophagoides pteronyssinus -    6 Dermatophagoides farinae -    7 Dog epithelium (canis spp) -    8 Cat hair (doe catus) +    9 Cockroach   (Blatella americana & germanica) -    10 Grass mix Ingalls   (Lauren, Orchard, Redtop, Wyatt) -    11 Leland grass (sorghum halepense) -    12 Weed mix   (common Cocklebur, Lamb s quarters, rough redroot Pigweed, Dock/Sorrel) +    13 Mugwort (artemisia vulgare) -    14 Ragweed giant/short (ambrosia spp) -    15 English Plantain (plantago lanceolata) -    16 Tree mix 1 (Pecan, Maple BHR, Oak RVW, american Rex, black Monsey) +/++    17 Red cedar (juniperus virginia) -    18 Tree mix 2   (white Joby, river/red Birch, black [place], common Frio, american Elm) -    19 Box elder/Maple mix (acer spp) ++    20 Hickory shagbark (carya ovata) -       -      [] No relevant allergic reaction observed    [x] Allergic reaction diagnosed against: Chromate +/++, Epoxide?  Formaldehyde resin? Turpentine      Interpretation/ remarks:   See later    [x] Patient information given   [] ACSD information   [x] SmartPractice information: chromate and Bisphenol Epoxide  ==> final Diagnosis:  See later    ==> Treatment prescribed/Plan:  See later      These conclusions are made at the best of ones knowledge and belief  based on the provided evidence such as patients history and allergy test results and they can change over time or can be incomplete because of missing informations.      Impression/Plan:    Atopic predisposition with components of intermediate and delayed hypersensitivity reactions with       history of perennial rhinitis with seasonal exacerbations.      long-standing history of undiagnosed GI inflammation s/p MRI and biopsies. Last CBC in November 2018 did not show eosinophilia.     remote history of photosensitivity in early 2019 but has not has recurrence--currently being evaluated for SLE.    Plans    Atopic prick test today    Allergy patch testing day 1    Defer photopatch test for after evaluation of additives and preservatives    Continue using Vanicream moisturizer BID        CC Mady Tena MD  [address] [place] [address] on close of this encounter.             Again, thank you for allowing me to participate in the care of your patient.        Sincerely,        García Beth MD

## 2019-07-25 LAB — CH50 SERPL-ACNC: 104 CAE UNITS (ref 60–144)

## 2019-07-26 ENCOUNTER — OFFICE VISIT (OUTPATIENT)
Dept: ALLERGY | Facility: CLINIC | Age: 19
End: 2019-07-26
Payer: COMMERCIAL

## 2019-07-26 DIAGNOSIS — L20.89 OTHER ATOPIC DERMATITIS: Primary | ICD-10-CM

## 2019-07-26 ASSESSMENT — PAIN SCALES - GENERAL: PAINLEVEL: NO PAIN (0)

## 2019-07-26 NOTE — LETTER
August 23, 2019      Nuris Reddy  [address] [place] [address]              Dear Nuris,      Thank you for referring your patient, Nuris Reddy, for allergy testing. This patient was seen on 08/23/2019 for Patch testing. The below compounds were tested. Please see below for my interpretation and a list of tests preformed.       Interpretation/Remarks  No relevant contact allergies, but signs for irritant reactions.    Information Given  Please download the [facility] lenore from either the Google play store or Apple Lenore store. It is a blue and white lenore icon. When you open it the first time it will prompt you to enter the two codes below. If you find a product on the lenore it is safe to use.  SmartPractice information: chromate and Bisphenol Epoxide >> in second reading,  turns out to not be relevant    Final Diagnosis  Atopic dermatitis with:   - atopic predisposition with inhalent allergies (mostly tree pollens)   - Irritant reactions on the patch tests (SDS, BPO)    Treatment/Plan  >> Pt to apply an unscented emollient several times daily.  >> Pt to avoid heavily scented body washes.  >> Add 1-2 teaspoons of white vinegar to laundry instead of  Fabric softener     Tested Products  (Standard) Alverto Mix [C], Colophony, 2-Mercaptobenzothiazole, Methylisothiazolinone, Carba Mix, Thiuram Mix [A], Bisphenol A Epoxy Resin, C-Bnny-Rentoyptsxj Formaldehyde Resin, Mercapto Mix [A], Black Rubber Mix- PPD [B], Potassium Dichromate, Balsam of [place] (Myroxylon Pereirae Resin), Nickel Sulphate Hexahydrate, Mixed Dialkyl Thiourea, Paraben Mix [B], Methyldibromo Glutaro-Nitrile, Fragrance mix,  2-Bromo-2-nitropropane-1,3-diol (Bronopol), Lyral, Tixocortol-21-Pivalate, Diazolidiyl Urea (Germall II), Methyl Methacrylate, Cobalt (II) Chloride Hexahydrate, Fragrance Mix II, Compositae Mix, Benzoyl Peroxide, Bacitracin, Formaldehyde, Methylchloroisothiazolinone / Methylisothiazolinone, Corticoseteroid Mix, Sodium Lauryl  Sulfate, Lanolin Alcohol, Oil of Turpentine, Cetylstearylalcohol, Chlorhexidine Dicluconate, Budenoside, Imidazolidinyl Urea, Ethyl Cyanoacrylate, Quaternium 15, Decyl Glucoside    (Preservatives and Antimicrobial) 1,2-benzisothiazoline-3-one, Sodium Salt, 1,3,5-Sammy(2-Hydroxyethyl) -Hexahydrotriazine(Grotan BK), 2-Hydroxymethyl- 2-Nitro-1,3-Propanediol, 3,4,4'-Triclocarban, 4-Chloro-3- Cresol, 4-Chloro-3-5-Xylénol (PCMX), 7-Ethylbicyclooxazolidine (BioBan CS 1246), Benzalkonium Chloride, Benzyl Alcohol, Cetalkonium Chloride, Cetylpyrimidine Chloride, Chloroacetamide, DMDM Hydantoin, Glutaraldehyde, Triclosan, Glyoxal Trimeric Dihydrate, Iodopropynyl Butylcarbamate, Octylisothiazolinone, Iodoform, (Nitrobutyl) Morpholine/ (Ethylnitrotrimethylene) dimorpholine(Biopan ), Phenoxyethanol, Phenyl Salicylate, Povidone Iodine, Sodium Benzoate, Sodium Disulfite, Sorbic Acid, Thimerosal, Butyl-P-Hydroxybenzoate, Ethyl-P-Hydroxybenzoate, Methyl-P-Hydroxybenzoate, Propyl-P-Hydroxybenzoate    (Emulsifiers) Polyethylene Glycol-400, Cocamidopropyl Betaine, Amerchol L101, Propylene Glycol, Triethanolamine, Sorbitane Sesquiolate, Isopropylmyristate, Polysorbate 80, Amidoamine (Stearamidopropyl Dimethylamine), Oleamidopropyl Dimethylamine, Lauryl Glucoside, Coconut Diethanolamide, 2-Hydroxy-4-Methoxybenzo-Phenone (Oxybenzone), Benzophenone-4 (Sulisobenzon), Propolis, Dexpanthenol, Carboxymethyl Cellulose Sodium, Abitol, Tert- Butylhydroquinone, Benzyl Salicylate, Dodecyl Gallate, Butylhydroxyanisole (BHA), Butylhydroxytoluene (BHT), Di-Alpha-Tocopherol, Propyl Gallate    (Perfume, Flavors, and Plants) Benzyl Salicylate, Benzyl Cinnamate, Dl-Limonene (Dipentene), Cananga Odorata ((Hailey Hart)(I)), Lichen Acid Mix, Mentha Piperita Oil (Peppermint Oil), Sesquiterpene Lactone Mix, Tea Tree Oil- Oxidized, Wood Tar Mix, Abietic Acid, Lavendula Angustifolia Oil (Lavender Oil), Camphor, Oak Lacey Absolute, Eugenol, Geraniol,  Hydroxycitronellal, Isoeugenol, Cinnamic Aldehyde, Cinnamic Alcohol, Sorbitane Sesquioleate, Citronellol, Alpha-Hexylcinnamic Aldehyde, Citral, Farnesol, Coumarin    (Colorants, Dyes, and Food Additives) Aniline, Jasmeet Brown R, Textile Dye Mix [A], [place], Disperse Blue-3, Disperse Orange-3, Disperse Red-11, Disperse Yellow-9, Erythrosine-B, Naphthol-AS, Aspartame, Azorubine, Brilliant Black, Cochineal Red, Patent Blue-VF, Pectin, Quinoline Yellow, Saccharin, Tartrazine, Sodium Glutamate      These conclusions are made at the best of ones knowledge and belief  based on the provided evidence such as patients history and allergy test results and they can change over time or can be incomplete because of missing informations.       If you have any questions please call [phone]      Sincerely,    García Beth MD

## 2019-07-26 NOTE — PROGRESS NOTES
[facility] Dermatology Note      Dermatology Problem List:  1. Intermittent photosensitive rxn, pruritic  2. Seborrheic dermatitis  3. 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 Nuris Reddy is a 18 year old female who presents for consultation for possible patch testing referred from Dr. Tena. She has history of eczema since childhood that has been localized to the antecubital fossa. After returning from college in spring, she experienced a widespread rash on face, scalp, eyelids, lips, upper thighs, dorsal hands and antecubital fossae. The rash has been itchy and occasionally red on body and she has experienced frequent peeling of her lips. She also endorses dryness of her ears that started after she got them pierced but has persisted despite not wearing any jewelry. She believes that she might be allergic to family dog, as her rash worsened when she came home from college. She also experiences intermittent itchy hives that started this summer that last for a few hours and resolve with showers.    She has been using triamcinolone cream for rash on body, hydrocortisone 2.5% for face and Vanicream moisturizer. Currently, she is being evaluated for Crohn's disease and her sulfasalazine was stopped in spring for a MRI.     Of note, she has perennial rhinitis with exacerbations in the spring and around the family pet. Her sister has asthma.    In January biopsy done outside by [facility] and in the biopsies some non-specfic inflemmation, but no clear description if Eosinophils in there      Past Medical History:   Patient Active Problem List   Diagnosis     Other congenital  anomalies of intestine     Proteinuria     Ulceration of intestine     Anemia     Short gut syndrome     Iron deficiency anemia     Chronic rhinitis     Past Medical History:   Diagnosis Date     Anemia 10/16/2012     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, s/p resection     Short gut syndrome 10/16/2012    ~ 20 cm of ileum as well as ileocecal valve resected in  period     Ulceration of intestine 2012     Past Surgical History:   Procedure Laterality Date     COLONOSCOPY  2012     COLONOSCOPY  2012     RESECTION ILEOCECAL  1999    ~ 20 cm of ileum as well as ileocecal valve resected in  period       Social History:  Patient reports that she has never smoked. She has never used smokeless tobacco. She reports that she does not drink alcohol or use drugs.    Family History:  Family History   Problem Relation Age of Onset     Hypertension Paternal Grandmother      Hypertension Paternal Grandfather      Cardiovascular Paternal Grandfather         Valve condition     Cancer Sister         ovarian teratoma, s/p resection     Kidney Disease Maternal Uncle         kidney stones       Medications:  Current Outpatient Medications   Medication Sig Dispense Refill     Cetirizine HCl (ZYRTEC ALLERGY PO) Take by mouth daily       Cyanocobalamin (VITAMIN B-12) 50 MCG TABS Take 100 mcg by mouth daily.       hydrocortisone 2.5 % cream To lips and face rash twice daily as needed for rash 30 g 1     ketoconazole (NIZORAL) 2 % external shampoo Use to shampoo twice weekly. Leave on 5 min then rinse. 120 mL 3     mometasone (ELOCON) 0.1 % external solution Apply small amount topically to affected areas of scalp daily prn for up to 2 weeks. (Patient not taking: Reported on 2019) 60 mL 0     Multiple Vitamins-Iron (MULTIVITAMIN/IRON PO) Take 1 tablet by mouth daily.       norgestim-eth estrad triphasic (TRINESSA, 28,) 0.18/0.215/0.25 MG-35 MCG tablet Take 1 tablet by mouth  daily 84 tablet 1     pimecrolimus (ELIDEL) 1 % external cream Apply topically 2 times daily (Patient not taking: Reported on 7/22/2019) 60 g 1     sulfaSALAzine (AZULFIDINE) 500 MG tablet Take 2 tablets by mouth daily  0     tacrolimus (PROTOPIC) 0.1 % external ointment Twice daily to eyelid and face rash until clear, then as needed. (Patient not taking: Reported on 7/22/2019) 30 g 11     triamcinolone (KENALOG) 0.1 % cream Apply sparingly to affected area two times daily as needed for up to 2 weeks. Do not apply to face. 30 g 3     triamcinolone (KENALOG) 0.1 % external cream To rash on the ears, body, arms, legs twice daily until clear, then as needed ongoing. (Patient not taking: Reported on 7/22/2019) 454 g 1     VENTOLIN  (90 Base) MCG/ACT inhaler INHALE 2 PUFFS PO Q 4 H PRF SOB OR WHZ  1        Allergies   Allergen Reactions     No Known Allergies          Review of Systems:  -As per HPI  -Constitutional: Otherwise feeling well today, in usual state of health.  -HEENT: Patient denies nonhealing oral sores.  -Skin: As above in HPI. No additional skin concerns.    Physical exam:  Vitals: There were no vitals taken for this visit.  GEN: This is a well developed, well-nourished female in no acute distress, in a pleasant mood.    SKIN: Focused examination of the face, bilateral lower and upper extremities, back and abdomen was performed.  -Scattered pink plaques with mild overlying scale on anterior forearms, bilateral posteromedial thighs.  -Mild erythema of upper eyelids and bilateral cheeks.  -Back clear  -No other lesions of concern on areas examined.       Order for PATCH TESTS    [] Outpatient  [] Inpatient: [facility]..../ Bed ....      Skin Atopy (atopic dermatitis) [x] Yes   [] No  Rhinitis/Sinusitis:   [x] Yes   [] No  Allergic Asthma:   [] Yes   [x] No  Food Allergy:   [] Yes   [x] No GI evaluation for Crohn's  Leg ulcers:   [] Yes   [x] No  Hand eczema:   [] Yes   [] No   Leading hand:   [x] R   [] L        [] Ambidextrous                        Reason for tests (suspected allergy): atopic predisposition with components of contact dermatitis  Known previous allergies: NA    Standardized panels  [x] Standard panel (40 tests)  [x] Preservatives & Antimicrobials (31 tests)  [x] Emulsifiers & Additives (25 tests)   [x] Perfumes/Flavours & Plants (25 tests)  [] Hairdresser panel (12 tests)  [] Rubber Chemicals (22 tests)  [] Plastics (26 tests)  [x] Colorants/Dyes/Food additives (20 tests)  [] Metals (implants/dental) (24 tests)  [] Local anaesthetics/NSAIDs (13 tests)  [] Antibiotics & Antimycotics (14 tests)   [] Corticosteroids (15 tests)   [] Photopatch test (62 tests)   [] others: ...      [] Patient's own products: ...    DO NOT test if chemical or biological identity is unknown!     always ask from patient the product information and safety sheets (MSDS)     [x] Atopy screen prick test    RESULTS & EVALUATION of PATCH TESTS    Patch test readings after     [x] 2 days, [] 3 days [x] 4 days, [] 5 days,    Applied patch tests with results (import here the list of patch tests):    Applied 07/22/2019  No lesions on back    STANDARD Series         No Substance 2 days 4 days remarks   1 Alverto Mix [C] - -    2 Colophony - -    3  2-Mercaptobenzothiazole  - -     4 Methylisothiazolinone - -    5 Carba Mix - -    6 Thiuram Mix [A] - -    7 Bisphenol A Epoxy Resin + -    8 P-Roak-Targdvwrftw-Formaldehyde Resin + -    9 Mercapto Mix [A] - -    10 Black Rubber Mix- PPD [B] - -    11 Potassium Dichromate +/++  -    12 Balsam of [place] (Myroxylon Pereirae Resin) - -    13 Nickel Sulphate Hexahydrate - -    14 Mixed Dialkyl Thiourea - -    15 Paraben Mix [B] - -    16 Methyldibromo Glutaronitrile - -    17 Fragrance Mix - -    18 2-Bromo-2-Nitropropane-1,3-Diol (Bronopol) - -    19 Lyral - -    20 Tixocortol-21- Pivalate - -    21 Diazolidiyl Urea (Germall II) - -    22 Methyl Methacrylate - -    23 Cobalt (II) Chloride  Hexahydrate - -    24 Fragrance Mix II  - -    25 Compositae Mix - -    26 Benzoyl Peroxide (+) - Irritant   27 Bacitracin - -    28 Formaldehyde - -    29 Methylchloroisothiazolinone / Methylisothiazolinone - -    30 Corticosteroid Mix - -    31 Sodium Lauryl Sulfate - (+) Ring-type erythema and slight roughness  Irritant   32 Lanolin Alcohol - -    33 Turpentine + -    34 Cetylstearylalcohol - -    35 Chlorhexidine Dicluconate - -    36 Budenoside - -    37 Imidazolidinyl Urea  - -    38 Ethyl-2 Cyanoacrylate - -    39 Quaternium 15 (Dowicil 200) - -    40 Decyl Glucoside - -      COLORANTS, DYES, FOOD ADDITIVES    No Substance 2 days 4 days remarks   41 Aniline - -    42 Jasmeet Brown R - -    43 Textile Dye Mix [A] - -    44 [place]  - -    45 Disperse Blue-3 - -    46 Disperse Orange-3 - -    47 Disperse Red-11 - -    48 Disperse Yellow-9 - -    49 Erythrosine-B - -    50 Naphthol AS - -      Food additives      51 Aspartame - -    52 Azorubine - -    53 Brilliant Black - -    54 Cochineal Red - -    55 Patent Blue-VF - -    56 Pectin - -    57 Quinoline Yellow - -    58 Saccharin - -    59 Tartrazine - -    60 Sodium Glutamate - -      EMULSIFIERS & ADDITIVES        No Substance 2 days 4 days remarks   61 Polyethylene Glycol-400 - -    62 Cocamidopropyl Betaine - -    63 Amerchol L101 - -    64 Propylene Glycol - -    65 Triethanolamine - -    66 Sorbitane Sesquiolate - -    67 Isopropylmyristate - -    68 Polysorbate 80  - -    69 Amidoamine   (Stearamidopropyl Dimethylamine) - -    70 Oleamidopropyl Dimethylamine - -    71 Lauryl Glucoside - -    72 Coconut Diethanolamide  - -    73 2-Hydroxy-4-Methoxy Benzophenone (Oxybenzone) - -    74 Benzophenone-4 (Sulisobenzon) - -    75 Propolis - -    76 Dexpanthenol - -    77 Carboxymethyl Cellulose Sodium - -    78 Abitol - -    79 Tert-Butylhydroquinone - -    80 Benzyl Salicylate - -     Antioxidant      81 Dodecyl Gallate - -    82 Butylhydroxyanisole (BHA) - -     83 Butylhydroxytoluene (BHT) - -    84 Di-Alpha-Tocopherol (Vit E) - -    85 Propyl Gallate - -      PERFUMES, FLAVORS & PLANTS         No Substance 2 days 4 days remarks   86 Benzyl Salicylate - -    87 Benzyl Cinnamate - -    88 Di-Limonene (Dipentene) - -    89 Cananga Odorata (Hailey Hart) (I) - -    90 Lichen Acid Mix - -    91 Mentha Piperita Oil (Peppermint Oil) - -    92 Sesquiterpenelactone mix - -    93 Tea Tree Oil, Oxidized - -    94 Wood Tar Mix - -    95 Abietic Acid - -    96 Lavendula Angustifolia Oil (Lavender Oil) - -    97 Camphor  - -     Fragrance Mix I      98 [place] Absolute - -    99 Eugenol - -    100 Geraniol - -    101 Hydroxycitronellal - -    102 Isoeugenol - -    103 Cinnamic Aldehyde - -    104 Cinnamic Alcohol  - -    105 Sorbitane Sesquioleate - -     Fragrance mix II      106 Citronellol - -    107 Alpha-Hexylcinnamic Aldehyde    - -    108 Citral - -    109 Farnesol - -    110 Coumarin - -      PRESERVATIVES & ANTIMICROBIALS         No Substance 2 days 4 days remarks   111  1,2-Benzisothiazoline-3-One, Sodium Salt - -    112  1,3,5-Sammy (2-Hydroxyethyl) - Hexahydrotriazine (Grotan BK) - -    113 4-Snmlqzacexfbx-1-Nitro-1, 3-Propanediol - -    114  3, 4, 4' - Triclocarban - -    115 4 - Chloro - 3 - Cresol - -    116 4 - Chloro - 4 - Xylenol (PCMX) - -    117 7-Ethylbicyclooxazolidine (Bioban IJ9408) - -    118 Benzalkonium Chloride - -    119 Benzyl Alcohol - -    120 Cetalkonium Chloride - -    121 Cetylpyrimidine Chloride  - -    122 Chloroacetamide - -    123 DMDM Hydantoin - -    124 Glutaraldehyde - -    125 Triclosan - -    126 Glyoxal Trimeric Dihydrate - -    127 Iodopropynyl Butylcarbamate - -    128 Octylisothiazoline - -    129 Iodoform - -    130 (Nitrobutyl) Morpholine/(Ethylnitro-Trimethylene) Dimorpholine (Bioban P 1487) - -    131 Phenoxyethanol - -    132 Phenyl Salicylate - -    133 Povidone Iodine - -    134 Sodium Benzoate - -    135 Sodium Disulfite - -     136 Sorbic Acid - -    137 Thimerosal - -     Parabens      138 Butyl-P-Hydroxybenzoate - -    139 Ethyl-P-Hydroxybenzoate - -    140 Methyl-P-Hydroxybenzoate - -    141 Propyl-P-Hydroxybenzoate - -      Results of patch tests:                         Interpretation:    - Negative                    A    = Allergic      (+) Erythema    TI   = Toxic/irritant   + E + Infiltration    RaP = Relevance at Present     ++ E/I + Papulovesicle   Rpr  = Relevance Previously     +++ E/I/P + Blister     nR   = No Relevance    Atopy Screen (07/22/2019)    No Substance Readings (15min) Evaluation   POS Histamine 1mg/ml +/++    NEG NaCl 0.9% -      No Substance Readings (15min) Evaluation   1 Alternaria alternata (tenuis)  -    2 Cladosporium herbarum -    3 Aspergillus fumigatus -    4 Penicillium notatum -    5 Dermatophagoides pteronyssinus -    6 Dermatophagoides farinae -    7 Dog epithelium (canis spp) -    8 Cat hair (doe catus) +    9 Cockroach   (Blatella americana & germanica) -    10 Grass mix midwest   (Lauren, Orchard, Redtop, Wyatt) -    11 Leland grass (sorghum halepense) -    12 Weed mix   (common Cocklebur, Lamb s quarters, rough redroot Pigweed, Dock/Sorrel) +    13 Mugwort (artemisia vulgare) -    14 Ragweed giant/short (ambrosia spp) -    15 English Plantain (plantago lanceolata) -    16 Tree mix 1 (Pecan, Maple BHR, Oak RVW, american Alsey, black [place]sinore) +/++    17 Red cedar (juniperus virginia) -    18 Tree mix 2   (white Joby, river/red Birch, black [place], common [place], american Elm) -    19 Box elder/Maple mix (acer spp) ++    20 Hickory shagbark (carya ovata) -       -      [x] No relevant allergic reaction observed      Interpretation/ remarks:   No relevant contact allergies, but signs for irritant reactions.    [x] Patient information given   [] ACSD information   [x] [employer] information: chromate and Bisphenol Epoxide >> in second reading,  turns out to not be relevant    ==> final  Diagnosis:    1. Atopic dermatitis with:   - atopic predisposition with inhalent allergies (mostly tree pollens)   - Irritant reactions on the patch tests (SDS, BPO)    These conclusions are made at the best of ones knowledge and belief  based on the provided evidence such as patients history and allergy test results and they can change over time or can be incomplete because of missing informations.    ==> Treatment prescribed/Plan:  >> Pt to apply an unscented emollient several times daily.  >> Pt to avoid heavily scented body washes.  >> Add 1-2 teaspoons of white vinegar to laundry instead of  Fabric softener       Follow-up with Dr. Darinel Tena MD  [address] [place] [address] on close of this encounter.       Staff Involved:    Scribe Disclosure  I, Rinku Mckinney, am serving as a scribe to document services personally performed by Dr. García Beth, based on data collection and the provider's statements to me.     Start Time: 9:54 AM   End Time: 10:11 AM     I spent a total of 15 min face to face with Nuris Reddy during today's office visit. About 50% of the time was spent counseling the patient and/or coordinating care regarding their allergy.

## 2019-07-26 NOTE — NURSING NOTE
Dermatology Rooming Note    Nuris Reddy's goals for this visit include:   Chief Complaint   Patient presents with     Allergies     Nuris is here for allergy tetsing day 5     Khushboo Prado, CMA

## 2019-07-26 NOTE — LETTER
7/26/2019         RE: Nuris Reddy  [address] [place] [address]        Dear Colleague,    Thank you for referring your patient, Nuris Reddy, to the [facility]. Please see a copy of my visit note below.    [facility] Dermatology Note      Dermatology Problem List:  1. Intermittent photosensitive rxn, pruritic  2. Seborrheic dermatitis  3. 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 Nuris Reddy is a 18 year old female who presents for consultation for possible patch testing referred from Dr. Tena. She has history of eczema since childhood that has been localized to the antecubital fossa. After returning from college in spring, she experienced a widespread rash on face, scalp, eyelids, lips, upper thighs, dorsal hands and antecubital fossae. The rash has been itchy and occasionally red on body and she has experienced frequent peeling of her lips. She also endorses dryness of her ears that started after she got them pierced but has persisted despite not wearing any jewelry. She believes that she might be allergic to family dog, as her rash worsened when she came home from college. She also experiences intermittent itchy hives that started this summer that last for a few hours and resolve with showers.    She has been using triamcinolone cream for rash on body, hydrocortisone 2.5% for face and Vanicream moisturizer. Currently, she is being evaluated for Crohn's disease and her sulfasalazine was stopped in spring for a MRI.     Of note, she has perennial rhinitis with exacerbations in the spring and around the family pet. Her sister has  asthma.    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 She has never used smokeless tobacco. She reports that she does not drink alcohol or use drugs.    Family History:  Family History   Problem Relation Age of Onset     Hypertension Paternal Grandmother      Hypertension Paternal Grandfather      Cardiovascular Paternal Grandfather         Valve condition     Cancer Sister         ovarian teratoma, s/p resection     Kidney Disease Maternal Uncle         kidney stones       Medications:  Current Outpatient Medications   Medication Sig Dispense Refill     Cetirizine HCl (ZYRTEC ALLERGY PO) Take by mouth daily       Cyanocobalamin (VITAMIN B-12) 50 MCG TABS Take 100 mcg by mouth daily.       hydrocortisone 2.5 % cream To lips and face rash twice daily as needed for rash 30 g 1     ketoconazole (NIZORAL) 2 % external shampoo Use to shampoo twice weekly. Leave on 5 min then rinse. 120 mL 3     mometasone (ELOCON) 0.1 % external solution Apply small amount topically to affected areas of scalp daily prn for up to 2 weeks.  (Patient not taking: Reported on 7/22/2019) 60 mL 0     Multiple Vitamins-Iron (MULTIVITAMIN/IRON PO) Take 1 tablet by mouth daily.       norgestim-eth estrad triphasic (TRINESSA, 28,) 0.18/0.215/0.25 MG-35 MCG tablet Take 1 tablet by mouth daily 84 tablet 1     pimecrolimus (ELIDEL) 1 % external cream Apply topically 2 times daily (Patient not taking: Reported on 7/22/2019) 60 g 1     sulfaSALAzine (AZULFIDINE) 500 MG tablet Take 2 tablets by mouth daily  0     tacrolimus (PROTOPIC) 0.1 % external ointment Twice daily to eyelid and face rash until clear, then as needed. (Patient not taking: Reported on 7/22/2019) 30 g 11     triamcinolone (KENALOG) 0.1 % cream Apply sparingly to affected area two times daily as needed for up to 2 weeks. Do not apply to face. 30 g 3     triamcinolone (KENALOG) 0.1 % external cream To rash on the ears, body, arms, legs twice daily until clear, then as needed ongoing. (Patient not taking: Reported on 7/22/2019) 454 g 1     VENTOLIN  (90 Base) MCG/ACT inhaler INHALE 2 PUFFS PO Q 4 H PRF SOB OR WHZ  1        Allergies   Allergen Reactions     No Known Allergies          Review of Systems:  -As per HPI  -Constitutional: Otherwise feeling well today, in usual state of health.  -HEENT: Patient denies nonhealing oral sores.  -Skin: As above in HPI. No additional skin concerns.    Physical exam:  Vitals: There were no vitals taken for this visit.  GEN: This is a well developed, well-nourished female in no acute distress, in a pleasant mood.    SKIN: Focused examination of the face, bilateral lower and upper extremities, back and abdomen was performed.  -Scattered pink plaques with mild overlying scale on anterior forearms, bilateral posteromedial thighs.  -Mild erythema of upper eyelids and bilateral cheeks.  -Back clear  -No other lesions of concern on areas examined.       Order for PATCH TESTS    [] Outpatient  [] Inpatient: [facility]..../ Bed ....      Skin Atopy (atopic  dermatitis) [x] Yes   [] No  Rhinitis/Sinusitis:   [x] Yes   [] No  Allergic Asthma:   [] Yes   [x] No  Food Allergy:   [] Yes   [x] No GI evaluation for Crohn's  Leg ulcers:   [] Yes   [x] No  Hand eczema:   [] Yes   [] No   Leading hand:   [x] R   [] L       [] Ambidextrous                        Reason for tests (suspected allergy): atopic predisposition with components of contact dermatitis  Known previous allergies: NA    Standardized panels  [x] Standard panel (40 tests)  [x] Preservatives & Antimicrobials (31 tests)  [x] Emulsifiers & Additives (25 tests)   [x] Perfumes/Flavours & Plants (25 tests)  [] Hairdresser panel (12 tests)  [] Rubber Chemicals (22 tests)  [] Plastics (26 tests)  [x] Colorants/Dyes/Food additives (20 tests)  [] Metals (implants/dental) (24 tests)  [] Local anaesthetics/NSAIDs (13 tests)  [] Antibiotics & Antimycotics (14 tests)   [] Corticosteroids (15 tests)   [] Photopatch test (62 tests)   [] others: ...      [] Patient's own products: ...    DO NOT test if chemical or biological identity is unknown!     always ask from patient the product information and safety sheets (MSDS)     [x] Atopy screen prick test    RESULTS & EVALUATION of PATCH TESTS    Patch test readings after     [x] 2 days, [] 3 days [x] 4 days, [] 5 days,    Applied patch tests with results (import here the list of patch tests):    Applied 07/22/2019  No lesions on back    STANDARD Series         No Substance 2 days 4 days remarks   1 Alverto Mix [C] - -    2 Colophony - -    3  2-Mercaptobenzothiazole  - -     4 Methylisothiazolinone - -    5 Carba Mix - -    6 Thiuram Mix [A] - -    7 Bisphenol A Epoxy Resin + -    8 M-Hiyy-Flgzbygtqrz-Formaldehyde Resin + -    9 Mercapto Mix [A] - -    10 Black Rubber Mix- PPD [B] - -    11 Potassium Dichromate +/++  -    12 Balsam of [place] (Myroxylon Pereirae Resin) - -    13 Nickel Sulphate Hexahydrate - -    14 Mixed Dialkyl Thiourea - -    15 Paraben Mix [B] - -    16  Methyldibromo Glutaronitrile - -    17 Fragrance Mix - -    18 2-Bromo-2-Nitropropane-1,3-Diol (Bronopol) - -    19 Lyral - -    20 Tixocortol-21- Pivalate - -    21 Diazolidiyl Urea (Germall II) - -    22 Methyl Methacrylate - -    23 Cobalt (II) Chloride Hexahydrate - -    24 Fragrance Mix II  - -    25 Compositae Mix - -    26 Benzoyl Peroxide (+) - Irritant   27 Bacitracin - -    28 Formaldehyde - -    29 Methylchloroisothiazolinone / Methylisothiazolinone - -    30 Corticosteroid Mix - -    31 Sodium Lauryl Sulfate - (+) Ring-type erythema and slight roughness  Irritant   32 Lanolin Alcohol - -    33 Turpentine + -    34 Cetylstearylalcohol - -    35 Chlorhexidine Dicluconate - -    36 Budenoside - -    37 Imidazolidinyl Urea  - -    38 Ethyl-2 Cyanoacrylate - -    39 Quaternium 15 (Dowicil 200) - -    40 Decyl Glucoside - -      COLORANTS, DYES, FOOD ADDITIVES    No Substance 2 days 4 days remarks   41 Aniline - -    42 Jasmeet Brown R - -    43 Textile Dye Mix [A] - -    44 Kihei  - -    45 Disperse Blue-3 - -    46 Disperse Orange-3 - -    47 Disperse Red-11 - -    48 Disperse Yellow-9 - -    49 Erythrosine-B - -    50 Naphthol AS - -      Food additives      51 Aspartame - -    52 Azorubine - -    53 Brilliant Black - -    54 Cochineal Red - -    55 Patent Blue-VF - -    56 Pectin - -    57 Quinoline Yellow - -    58 Saccharin - -    59 Tartrazine - -    60 Sodium Glutamate - -      EMULSIFIERS & ADDITIVES        No Substance 2 days 4 days remarks   61 Polyethylene Glycol-400 - -    62 Cocamidopropyl Betaine - -    63 Amerchol L101 - -    64 Propylene Glycol - -    65 Triethanolamine - -    66 Sorbitane Sesquiolate - -    67 Isopropylmyristate - -    68 Polysorbate 80  - -    69 Amidoamine   (Stearamidopropyl Dimethylamine) - -    70 Oleamidopropyl Dimethylamine - -    71 Lauryl Glucoside - -    72 Coconut Diethanolamide  - -    73 2-Hydroxy-4-Methoxy Benzophenone (Oxybenzone) - -    74 Benzophenone-4  (Sulisobenzon) - -    75 Propolis - -    76 Dexpanthenol - -    77 Carboxymethyl Cellulose Sodium - -    78 Abitol - -    79 Tert-Butylhydroquinone - -    80 Benzyl Salicylate - -     Antioxidant      81 Dodecyl Gallate - -    82 Butylhydroxyanisole (BHA) - -    83 Butylhydroxytoluene (BHT) - -    84 Di-Alpha-Tocopherol (Vit E) - -    85 Propyl Gallate - -      PERFUMES, FLAVORS & PLANTS         No Substance 2 days 4 days remarks   86 Benzyl Salicylate - -    87 Benzyl Cinnamate - -    88 Di-Limonene (Dipentene) - -    89 Cananga Odorata (Hailey Hart) (I) - -    90 Lichen Acid Mix - -    91 Mentha Piperita Oil (Peppermint Oil) - -    92 Sesquiterpenelactone mix - -    93 Tea Tree Oil, Oxidized - -    94 Wood Tar Mix - -    95 Abietic Acid - -    96 Lavendula Angustifolia Oil (Lavender Oil) - -    97 Camphor  - -     Fragrance Mix I      98 [place]moss Absolute - -    99 Eugenol - -    100 Geraniol - -    101 Hydroxycitronellal - -    102 Isoeugenol - -    103 Cinnamic Aldehyde - -    104 Cinnamic Alcohol  - -    105 Sorbitane Sesquioleate - -     Fragrance mix II      106 Citronellol - -    107 Alpha-Hexylcinnamic Aldehyde    - -    108 Citral - -    109 Farnesol - -    110 Coumarin - -      PRESERVATIVES & ANTIMICROBIALS         No Substance 2 days 4 days remarks   111  1,2-Benzisothiazoline-3-One, Sodium Salt - -    112  1,3,5-Sammy (2-Hydroxyethyl) - Hexahydrotriazine (Grotan BK) - -    113 0-Gnhsmknpddlma-3-Nitro-1, 3-Propanediol - -    114  3, 4, 4' - Triclocarban - -    115 4 - Chloro - 3 - Cresol - -    116 4 - Chloro - 4 - Xylenol (PCMX) - -    117 7-Ethylbicyclooxazolidine (Bioban NV7717) - -    118 Benzalkonium Chloride - -    119 Benzyl Alcohol - -    120 Cetalkonium Chloride - -    121 Cetylpyrimidine Chloride  - -    122 Chloroacetamide - -    123 DMDM Hydantoin - -    124 Glutaraldehyde - -    125 Triclosan - -    126 Glyoxal Trimeric Dihydrate - -    127 Iodopropynyl Butylcarbamate - -    128  Octylisothiazoline - -    129 Iodoform - -    130 (Nitrobutyl) Morpholine/(Ethylnitro-Trimethylene) Dimorpholine (Bioban P 1487) - -    131 Phenoxyethanol - -    132 Phenyl Salicylate - -    133 Povidone Iodine - -    134 Sodium Benzoate - -    135 Sodium Disulfite - -    136 Sorbic Acid - -    137 Thimerosal - -     Parabens      138 Butyl-P-Hydroxybenzoate - -    139 Ethyl-P-Hydroxybenzoate - -    140 Methyl-P-Hydroxybenzoate - -    141 Propyl-P-Hydroxybenzoate - -      Results of patch tests:                         Interpretation:    - Negative                    A    = Allergic      (+) Erythema    TI   = Toxic/irritant   + E + Infiltration    RaP = Relevance at Present     ++ E/I + Papulovesicle   Rpr  = Relevance Previously     +++ E/I/P + Blister     nR   = No Relevance    Atopy Screen (07/22/2019)    No Substance Readings (15min) Evaluation   POS Histamine 1mg/ml +/++    NEG NaCl 0.9% -      No Substance Readings (15min) Evaluation   1 Alternaria alternata (tenuis)  -    2 Cladosporium herbarum -    3 Aspergillus fumigatus -    4 Penicillium notatum -    5 Dermatophagoides pteronyssinus -    6 Dermatophagoides farinae -    7 Dog epithelium (canis spp) -    8 Cat hair (doe catus) +    9 Cockroach   (Blatella americana & germanica) -    10 Grass mix [place]   (Lauren, Orchard, Redtop, Wyatt) -    11 Leland grass (sorghum halepense) -    12 Weed mix   (common Cocklebur, Lamb s quarters, rough redroot Pigweed, Dock/Sorrel) +    13 Mugwort (artemisia vulgare) -    14 Ragweed giant/short (ambrosia spp) -    15 English Plantain (plantago lanceolata) -    16 Tree mix 1 (Pecan, Maple BHR, Oak RVW, american [place], black Belen) +/++    17 Red cedar (juniperus virginia) -    18 Tree mix 2   (white Joby, river/red Birch, black [place], common [place], american Elm) -    19 Box elder/Maple mix (acer spp) ++    20 Hickory shagbark (carya ovata) -       -      [x] No relevant allergic reaction  observed      Interpretation/ remarks:   No relevant contact allergies, but signs for irritant reactions.    [x] Patient information given   [] ACSD information   [x] SmartPractice information: chromate and Bisphenol Epoxide >> in second reading,  turns out to not be relevant    ==> final Diagnosis:    1. Atopic dermatitis with:   - atopic predisposition with inhalent allergies (mostly tree pollens)   - Irritant reactions on the patch tests (SDS, BPO)    These conclusions are made at the best of ones knowledge and belief  based on the provided evidence such as patients history and allergy test results and they can change over time or can be incomplete because of missing informations.    ==> Treatment prescribed/Plan:  >> Pt to apply an unscented emollient several times daily.  >> Pt to avoid heavily scented body washes.  >> Add 1-2 teaspoons of white vinegar to laundry instead of  Fabric softener       Follow-up with Dr. Darinel Tena MD  [address] [place] [address] on close of this encounter.       Staff Involved:    Scribe Disclosure  I, Rinku Mckinney, am serving as a scribe to document services personally performed by Dr. García Beth, based on data collection and the provider's statements to me.     Start Time: 9:54 AM   End Time: 10:11 AM     I spent a total of 15 min face to face with Nuris Reddy during today's office visit. About 50% of the time was spent counseling the patient and/or coordinating care regarding their allergy.        Again, thank you for allowing me to participate in the care of your patient.        Sincerely,        García Beth MD

## 2019-07-29 PROBLEM — L20.89 OTHER ATOPIC DERMATITIS: Status: ACTIVE | Noted: 2019-07-29

## 2019-08-07 ENCOUNTER — OFFICE VISIT (OUTPATIENT)
Dept: PEDIATRICS | Facility: CLINIC | Age: 19
End: 2019-08-07
Payer: COMMERCIAL

## 2019-08-07 ENCOUNTER — TRANSFERRED RECORDS (OUTPATIENT)
Dept: HEALTH INFORMATION MANAGEMENT | Facility: CLINIC | Age: 19
End: 2019-08-07

## 2019-08-07 VITALS
SYSTOLIC BLOOD PRESSURE: 100 MMHG | BODY MASS INDEX: 22.65 KG/M2 | TEMPERATURE: 98 F | WEIGHT: 132.7 LBS | OXYGEN SATURATION: 99 % | HEART RATE: 86 BPM | HEIGHT: 64 IN | DIASTOLIC BLOOD PRESSURE: 62 MMHG

## 2019-08-07 DIAGNOSIS — L70.0 ACNE VULGARIS: ICD-10-CM

## 2019-08-07 DIAGNOSIS — R80.9 PROTEINURIA, UNSPECIFIED TYPE: ICD-10-CM

## 2019-08-07 DIAGNOSIS — D50.9 IRON DEFICIENCY ANEMIA, UNSPECIFIED IRON DEFICIENCY ANEMIA TYPE: ICD-10-CM

## 2019-08-07 DIAGNOSIS — J31.0 CHRONIC RHINITIS: ICD-10-CM

## 2019-08-07 DIAGNOSIS — Z11.3 ROUTINE SCREENING FOR STI (SEXUALLY TRANSMITTED INFECTION): ICD-10-CM

## 2019-08-07 DIAGNOSIS — Z00.00 ROUTINE GENERAL MEDICAL EXAMINATION AT A HEALTH CARE FACILITY: Primary | ICD-10-CM

## 2019-08-07 DIAGNOSIS — K52.9 ILEITIS: ICD-10-CM

## 2019-08-07 DIAGNOSIS — K13.0 ANGULAR CHEILITIS: ICD-10-CM

## 2019-08-07 LAB
ALBUMIN UR-MCNC: NEGATIVE MG/DL
APPEARANCE UR: CLEAR
BASOPHILS # BLD AUTO: 0 10E9/L (ref 0–0.2)
BASOPHILS NFR BLD AUTO: 0.6 %
BILIRUB UR QL STRIP: NEGATIVE
COLOR UR AUTO: YELLOW
CRP SERPL-MCNC: <2.9 MG/L (ref 0–8)
DIFFERENTIAL METHOD BLD: ABNORMAL
EOSINOPHIL # BLD AUTO: 0.4 10E9/L (ref 0–0.7)
EOSINOPHIL NFR BLD AUTO: 6.7 %
ERYTHROCYTE [DISTWIDTH] IN BLOOD BY AUTOMATED COUNT: 12.5 % (ref 10–15)
ERYTHROCYTE [SEDIMENTATION RATE] IN BLOOD BY WESTERGREN METHOD: 15 MM/H (ref 0–20)
FOLATE SERPL-MCNC: 32.4 NG/ML
GLUCOSE UR STRIP-MCNC: NEGATIVE MG/DL
HCT VFR BLD AUTO: 34.2 % (ref 35–47)
HGB BLD-MCNC: 11.2 G/DL (ref 11.7–15.7)
HGB UR QL STRIP: NEGATIVE
KETONES UR STRIP-MCNC: NEGATIVE MG/DL
LEUKOCYTE ESTERASE UR QL STRIP: NEGATIVE
LYMPHOCYTES # BLD AUTO: 1.5 10E9/L (ref 0.8–5.3)
LYMPHOCYTES NFR BLD AUTO: 23.5 %
MCH RBC QN AUTO: 29.2 PG (ref 26.5–33)
MCHC RBC AUTO-ENTMCNC: 32.7 G/DL (ref 31.5–36.5)
MCV RBC AUTO: 89 FL (ref 78–100)
MONOCYTES # BLD AUTO: 0.5 10E9/L (ref 0–1.3)
MONOCYTES NFR BLD AUTO: 7.9 %
NEUTROPHILS # BLD AUTO: 4 10E9/L (ref 1.6–8.3)
NEUTROPHILS NFR BLD AUTO: 61.3 %
NITRATE UR QL: NEGATIVE
PH UR STRIP: 7 PH (ref 5–7)
PLATELET # BLD AUTO: 370 10E9/L (ref 150–450)
RBC # BLD AUTO: 3.83 10E12/L (ref 3.8–5.2)
SOURCE: NORMAL
SP GR UR STRIP: 1.02 (ref 1–1.03)
UROBILINOGEN UR STRIP-ACNC: 0.2 EU/DL (ref 0.2–1)
WBC # BLD AUTO: 6.6 10E9/L (ref 4–11)

## 2019-08-07 PROCEDURE — 36415 COLL VENOUS BLD VENIPUNCTURE: CPT | Performed by: INTERNAL MEDICINE

## 2019-08-07 PROCEDURE — 83550 IRON BINDING TEST: CPT | Performed by: INTERNAL MEDICINE

## 2019-08-07 PROCEDURE — 82746 ASSAY OF FOLIC ACID SERUM: CPT | Performed by: INTERNAL MEDICINE

## 2019-08-07 PROCEDURE — 86140 C-REACTIVE PROTEIN: CPT | Performed by: INTERNAL MEDICINE

## 2019-08-07 PROCEDURE — 99395 PREV VISIT EST AGE 18-39: CPT | Performed by: INTERNAL MEDICINE

## 2019-08-07 PROCEDURE — 87491 CHLMYD TRACH DNA AMP PROBE: CPT | Performed by: INTERNAL MEDICINE

## 2019-08-07 PROCEDURE — 84630 ASSAY OF ZINC: CPT | Mod: 90 | Performed by: INTERNAL MEDICINE

## 2019-08-07 PROCEDURE — 84207 ASSAY OF VITAMIN B-6: CPT | Mod: 90 | Performed by: INTERNAL MEDICINE

## 2019-08-07 PROCEDURE — 85652 RBC SED RATE AUTOMATED: CPT | Performed by: INTERNAL MEDICINE

## 2019-08-07 PROCEDURE — 87591 N.GONORRHOEAE DNA AMP PROB: CPT | Performed by: INTERNAL MEDICINE

## 2019-08-07 PROCEDURE — 99000 SPECIMEN HANDLING OFFICE-LAB: CPT | Performed by: INTERNAL MEDICINE

## 2019-08-07 PROCEDURE — 83540 ASSAY OF IRON: CPT | Performed by: INTERNAL MEDICINE

## 2019-08-07 PROCEDURE — 82728 ASSAY OF FERRITIN: CPT | Performed by: INTERNAL MEDICINE

## 2019-08-07 PROCEDURE — 85025 COMPLETE CBC W/AUTO DIFF WBC: CPT | Performed by: INTERNAL MEDICINE

## 2019-08-07 PROCEDURE — 80053 COMPREHEN METABOLIC PANEL: CPT | Performed by: INTERNAL MEDICINE

## 2019-08-07 PROCEDURE — 84591 ASSAY OF NOS VITAMIN: CPT | Mod: 90 | Performed by: INTERNAL MEDICINE

## 2019-08-07 PROCEDURE — 84252 ASSAY OF VITAMIN B-2: CPT | Mod: 90 | Performed by: INTERNAL MEDICINE

## 2019-08-07 PROCEDURE — 82306 VITAMIN D 25 HYDROXY: CPT | Performed by: INTERNAL MEDICINE

## 2019-08-07 PROCEDURE — 99213 OFFICE O/P EST LOW 20 MIN: CPT | Mod: 25 | Performed by: INTERNAL MEDICINE

## 2019-08-07 PROCEDURE — 81003 URINALYSIS AUTO W/O SCOPE: CPT | Performed by: INTERNAL MEDICINE

## 2019-08-07 RX ORDER — NORGESTIMATE AND ETHINYL ESTRADIOL 7DAYSX3 28
1 KIT ORAL DAILY
Qty: 84 TABLET | Refills: 4 | Status: SHIPPED | OUTPATIENT
Start: 2019-08-07 | End: 2020-09-14

## 2019-08-07 RX ORDER — FLUTICASONE PROPIONATE 50 MCG
1-2 SPRAY, SUSPENSION (ML) NASAL DAILY
Qty: 16 G | Refills: 11 | Status: SHIPPED | OUTPATIENT
Start: 2019-08-07 | End: 2020-04-27

## 2019-08-07 ASSESSMENT — ENCOUNTER SYMPTOMS
ARTHRALGIAS: 0
PARESTHESIAS: 0
DIARRHEA: 1
HEMATOCHEZIA: 0
ABDOMINAL PAIN: 1
HEARTBURN: 0
CHILLS: 0
DYSURIA: 0
COUGH: 0
CONSTIPATION: 1
FREQUENCY: 0
HEMATURIA: 0
MYALGIAS: 0
NAUSEA: 0
JOINT SWELLING: 0
SORE THROAT: 0
WEAKNESS: 0
NERVOUS/ANXIOUS: 0
BREAST MASS: 0
FEVER: 0
DIZZINESS: 0
SHORTNESS OF BREATH: 0
PALPITATIONS: 0
EYE PAIN: 0
HEADACHES: 0

## 2019-08-07 ASSESSMENT — MIFFLIN-ST. JEOR: SCORE: 1356.92

## 2019-08-07 NOTE — PROGRESS NOTES
SUBJECTIVE:   CC: Nuris Reddy is an 19 year old woman who presents for preventive health visit.     Healthy Habits:     Getting at least 3 servings of Calcium per day:  Yes    Bi-annual eye exam:  Yes    Dental care twice a year:  Yes    Sleep apnea or symptoms of sleep apnea:  None    Diet:  Regular (no restrictions)    Frequency of exercise:  1 day/week    Duration of exercise:  Greater than 60 minutes    Taking medications regularly:  Yes    Medication side effects:  None    PHQ-2 Total Score: 0    Additional concerns today:  No      Today's PHQ-2 Score:   PHQ-2 ( 1999 Pfizer) 8/7/2019   Q1: Little interest or pleasure in doing things 0   Q2: Feeling down, depressed or hopeless 0   PHQ-2 Score 0   Q1: Little interest or pleasure in doing things Not at all   Q2: Feeling down, depressed or hopeless Not at all   PHQ-2 Score 0       Abuse: Current or Past(Physical, Sexual or Emotional)- No  Do you feel safe in your environment? Yes    Social History     Tobacco Use     Smoking status: Never Smoker     Smokeless tobacco: Never Used     Tobacco comment: Non-smoking home   Substance Use Topics     Alcohol use: No     Frequency: Monthly or less     Drinks per session: 3 or 4     Binge frequency: Never         Alcohol Use 8/7/2019   Prescreen: >3 drinks/day or >7 drinks/week? No       Reviewed orders with patient.  Reviewed health maintenance and updated orders accordingly - Yes  Lab work is in process  Labs reviewed in [facility]    Mammogram not appropriate for this patient based on age.    Pertinent mammograms are reviewed under the imaging tab.  History of abnormal Pap smear: NO - under age 21, PAP not appropriate for age     Reviewed and updated as needed this visit by clinical staff  Tobacco  Allergies  Med Hx  Surg Hx  Fam Hx  Soc Hx        Reviewed and updated as needed this visit by Provider            Review of Systems   Constitutional: Negative for chills and fever.   HENT: Positive for congestion.  "Negative for ear pain, hearing loss and sore throat.    Eyes: Negative for pain and visual disturbance.   Respiratory: Negative for cough and shortness of breath.    Cardiovascular: Positive for chest pain. Negative for palpitations and peripheral edema.   Gastrointestinal: Positive for abdominal pain, constipation and diarrhea. Negative for heartburn, hematochezia and nausea.   Breasts:  Negative for tenderness, breast mass and discharge.   Genitourinary: Negative for dysuria, frequency, genital sores, hematuria, pelvic pain, urgency, vaginal bleeding and vaginal discharge.   Musculoskeletal: Negative for arthralgias, joint swelling and myalgias.   Skin: Positive for rash.   Neurological: Negative for dizziness, weakness, headaches and paresthesias.   Psychiatric/Behavioral: Negative for mood changes. The patient is not nervous/anxious.           OBJECTIVE:   /62 (Cuff Size: Adult Regular)   Pulse 86   Temp 98  F (36.7  C) (Tympanic)   Ht 1.626 m (5' 4\")   Wt 60.2 kg (132 lb 11.2 oz)   SpO2 99%   BMI 22.78 kg/m    Physical Exam  GENERAL: healthy, alert and no distress  EYES: Eyes grossly normal to inspection, PERRL and conjunctivae and sclerae normal  HENT: ear canals and TM's normal, nose and mouth without ulcers or lesions  NECK: no adenopathy, no asymmetry, masses, or scars and thyroid normal to palpation  RESP: lungs clear to auscultation - no rales, rhonchi or wheezes  CV: regular rate and rhythm, normal S1 S2, no S3 or S4, no murmur, click or rub, no peripheral edema and peripheral pulses strong  ABDOMEN: soft, nontender, no hepatosplenomegaly, no masses and bowel sounds normal  MS: no gross musculoskeletal defects noted, no edema  SKIN: no suspicious lesions or rashes  NEURO: Normal strength and tone, mentation intact and speech normal  PSYCH: mentation appears normal, affect normal/bright    Diagnostic Test Results:  Labs reviewed in Epic    ASSESSMENT/PLAN:   1. Routine general medical " examination at a health care facility      2. Angular cheilitis  Improved with topicals from derm/allergy. Asking about nutrient deficiency. Will check lab. Has been diagnosed with metal allergy, but not thought likely to be causing her GI symptoms. Discussed that less commonly, vitamin Deficiency can result in mouth sores.   - Zinc  - Vitamin B2  - Vitamin B6  - Folate  - Vitamin B3    3. Iron (Fe) deficiency anemia  Due for repeat labs. Discussed that she is likely absorbing iron fine since her levels have been high on replacement. Likely GI blood loss, but could consider referral to heme, since no evidence of inflammation on GI testing. Await these results. She will discuss with GI.  - Vitamin D Deficiency  - CRP inflammation  - ESR: Erythrocyte sedimentation rate  - Iron and iron binding capacity  - Ferritin  - Comprehensive metabolic panel  - CBC with platelets differential    4. Proteinuria, unspecified type  Due for her yearly lab.  - UA reflex to Microscopic and Culture  - Protein timed urine with Creat Ratio; Future    5. Chronic rhinitis  Add flonase. Summer is a bad time for her allergies.  - fluticasone (FLONASE) 50 MCG/ACT nasal spray; Spray 1-2 sprays into both nostrils daily  Dispense: 16 g; Refill: 11    6. Acne vulgaris  Continue.   - norgestim-eth estrad triphasic (TRINESSA, 28,) 0.18/0.215/0.25 MG-35 MCG tablet; Take 1 tablet by mouth daily  Dispense: 84 tablet; Refill: 4    7. Ileitis    - Vitamin D Deficiency  - CRP inflammation  - ESR: Erythrocyte sedimentation rate  - Iron and iron binding capacity  - Ferritin  - Comprehensive metabolic panel  - CBC with platelets differential    8. Routine screening for STI (sexually transmitted infection)    - Neisseria gonorrhoeae PCR  - Chlamydia trachomatis PCR    COUNSELING:  Reviewed preventive health counseling, as reflected in patient instructions    Estimated body mass index is 22.78 kg/m  as calculated from the following:    Height as of this  "encounter: 1.626 m (5' 4\").    Weight as of this encounter: 60.2 kg (132 lb 11.2 oz).         reports that she has never smoked. She has never used smokeless tobacco.      Counseling Resources:  ATP IV Guidelines  Pooled Cohorts Equation Calculator  Breast Cancer Risk Calculator  FRAX Risk Assessment  ICSI Preventive Guidelines  Dietary Guidelines for Americans, 2010  USDA's MyPlate  ASA Prophylaxis  Lung CA Screening    Aliya Osei MD  [facility]  "

## 2019-08-08 LAB
ALBUMIN SERPL-MCNC: 3.3 G/DL (ref 3.4–5)
ALP SERPL-CCNC: 54 U/L (ref 40–150)
ALT SERPL W P-5'-P-CCNC: 21 U/L (ref 0–50)
ANION GAP SERPL CALCULATED.3IONS-SCNC: 4 MMOL/L (ref 3–14)
AST SERPL W P-5'-P-CCNC: 13 U/L (ref 0–45)
BILIRUB SERPL-MCNC: 0.3 MG/DL (ref 0.2–1.3)
BUN SERPL-MCNC: 7 MG/DL (ref 7–30)
CALCIUM SERPL-MCNC: 8.6 MG/DL (ref 8.5–10.1)
CHLORIDE SERPL-SCNC: 109 MMOL/L (ref 94–109)
CO2 SERPL-SCNC: 27 MMOL/L (ref 20–32)
CREAT SERPL-MCNC: 0.8 MG/DL (ref 0.52–1.04)
DEPRECATED CALCIDIOL+CALCIFEROL SERPL-MC: 44 UG/L (ref 20–75)
FERRITIN SERPL-MCNC: 16 NG/ML (ref 12–150)
GFR SERPL CREATININE-BSD FRML MDRD: >90 ML/MIN/{1.73_M2}
GLUCOSE SERPL-MCNC: 72 MG/DL (ref 70–99)
IRON SATN MFR SERPL: 8 % (ref 15–46)
IRON SERPL-MCNC: 40 UG/DL (ref 35–180)
POTASSIUM SERPL-SCNC: 3.8 MMOL/L (ref 3.4–5.3)
PROT SERPL-MCNC: 7 G/DL (ref 6.8–8.8)
SODIUM SERPL-SCNC: 140 MMOL/L (ref 133–144)
TIBC SERPL-MCNC: 488 UG/DL (ref 240–430)

## 2019-08-08 PROCEDURE — 81050 URINALYSIS VOLUME MEASURE: CPT | Performed by: INTERNAL MEDICINE

## 2019-08-08 PROCEDURE — 84156 ASSAY OF PROTEIN URINE: CPT | Performed by: INTERNAL MEDICINE

## 2019-08-09 DIAGNOSIS — R80.9 PROTEINURIA, UNSPECIFIED TYPE: ICD-10-CM

## 2019-08-09 LAB
C TRACH DNA SPEC QL NAA+PROBE: NEGATIVE
N GONORRHOEA DNA SPEC QL NAA+PROBE: NEGATIVE
SPECIMEN SOURCE: NORMAL
SPECIMEN SOURCE: NORMAL
VIT B6 SERPL-MCNC: 57.4 NMOL/L (ref 20–125)
ZINC SERPL-MCNC: 89.9 UG/DL (ref 60–120)

## 2019-08-10 LAB
COLLECT DURATION TIME UR: 24 H
CREAT 24H UR-MRATE: 1.54 G/(24.H) (ref 0.8–1.8)
CREAT UR-MCNC: 56 MG/DL
PROT 24H UR-MRATE: 0.2 G/(24.H) (ref 0.04–0.23)
PROT UR-MCNC: 0.07 G/L
PROT/CREAT 24H UR: 0.13 G/G CR (ref 0–0.2)
SPECIMEN VOL UR: 2750 ML
VIT B2 SERPL-MCNC: 6 MCG/L (ref 1–19)

## 2019-08-12 ENCOUNTER — TRANSFERRED RECORDS (OUTPATIENT)
Dept: HEALTH INFORMATION MANAGEMENT | Facility: CLINIC | Age: 19
End: 2019-08-12

## 2019-08-16 ENCOUNTER — TRANSFERRED RECORDS (OUTPATIENT)
Dept: HEALTH INFORMATION MANAGEMENT | Facility: CLINIC | Age: 19
End: 2019-08-16

## 2019-08-16 LAB — NIACIN SERPL-MCNC: 3.06 UG/ML (ref 0.5–8.45)

## 2019-08-20 ENCOUNTER — TELEPHONE (OUTPATIENT)
Dept: DERMATOLOGY | Facility: CLINIC | Age: 19
End: 2019-08-20

## 2019-08-20 ENCOUNTER — OFFICE VISIT (OUTPATIENT)
Dept: DERMATOLOGY | Facility: CLINIC | Age: 19
End: 2019-08-20
Payer: COMMERCIAL

## 2019-08-20 DIAGNOSIS — L20.84 INTRINSIC ATOPIC DERMATITIS: Primary | ICD-10-CM

## 2019-08-20 PROCEDURE — 99214 OFFICE O/P EST MOD 30 MIN: CPT | Performed by: DERMATOLOGY

## 2019-08-20 RX ORDER — TACROLIMUS 1 MG/G
OINTMENT TOPICAL
Qty: 30 G | Refills: 11 | Status: SHIPPED | OUTPATIENT
Start: 2019-08-20 | End: 2020-07-07

## 2019-08-20 NOTE — PROGRESS NOTES
Service Date: 08/20/2019      DERMATOLOGY CLINIC VISIT NOTE     Dermatology Problem List:  1. Intermittent photosensitive rxn, pruritic  2. Seborrheic dermatitis  3. 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      HISTORY OF PRESENT ILLNESS:  Ms. Reddy is a 19-year-old female who returns to Dermatology Clinic for ongoing evaluation of severe atopic dermatitis.  She was last seen 06/11/2019.  At that point in time, she relayed a history of having mild atopic dermatitis as a child but having severe flares while at college over the past year.  I recommended a hypoallergenic skin care regimen, triamcinolone 0.1% cream to the body, arms, legs twice a day, hydrocortisone 2.5% cream to face twice daily as needed with transition to Protopic.  I also suggested that Nuris undergo patch testing because of her increased risk of having allergic contact dermatitis given her history of atopic dermatitis as well as the recent worsening of her skin disease.  She had patch testing performed by Dr. Beth and it did not show any relevant allergies.  She has several irritants that cause reactions but they were felt not to be relevant to her distribution of her eruption.  She notes that she continues to have ups and downs in regard to her eczema.  She notes that medications help when she develops a rash, but despite using gentle cleanser and a daily thick emollient including Vanicream and Eucerin, she continues to develop new areas of involvement.  She is interested in systemic treatment.  In addition, Ms. Reddy has problems with chronic diarrhea in the setting of her history of short gut syndrome.  We had discussed that methotrexate would not be an ideal treatment option for her because of this potential side effect.      Patient Active  Problem List   Diagnosis     Other congenital anomalies of intestine     Proteinuria     Ulceration of intestine     Short gut syndrome     Iron deficiency anemia     Chronic rhinitis     Other atopic dermatitis       Allergies   Allergen Reactions     No Known Allergies          Current Outpatient Medications   Medication     Cetirizine HCl (ZYRTEC ALLERGY PO)     Cyanocobalamin (VITAMIN B-12) 50 MCG TABS     Dupilumab (DUPIXENT) 300 MG/2ML syringe     fluticasone (FLONASE) 50 MCG/ACT nasal spray     hydrocortisone 2.5 % cream     ketoconazole (NIZORAL) 2 % external shampoo     Multiple Vitamins-Iron (MULTIVITAMIN/IRON PO)     norgestim-eth estrad triphasic (TRINESSA, 28,) 0.18/0.215/0.25 MG-35 MCG tablet     tacrolimus (PROTOPIC) 0.1 % external ointment     triamcinolone (KENALOG) 0.1 % external cream     VENTOLIN  (90 Base) MCG/ACT inhaler     mometasone (ELOCON) 0.1 % external solution     No current facility-administered medications for this visit.        ROS: Feels well. No other skin concerns.        SOCIAL HISTORY:  The patient is a college student.  She is leaving for college tomorrow.      FAMILY HISTORY:  Sister with asthma.      REVIEW OF SYSTEMS:  Feeling well without additional skin concerns.      PHYSICAL EXAMINATION:   GENERAL:  Nuris is a healthy-appearing 19-year-old female in no distress.   HEENT:  Conjunctivae clear.   PULMONARY:  Breathing comfortably on room air.   ABDOMEN:  No abdominal distention.   SKIN:  Exam included the scalp, face, neck, chest, arms, hands, legs.  Skin exam normal except for as follows:   -- Scaling plaques on the bilateral upper and lower eyelids.   -- Ill-defined, pink plaques on the bilateral thighs and lower legs.   -- Xerosis of the lips.   -- Pink plaques in the bilateral antecubital fossae.   -- Light brown macules on upper cheeks.      ASSESSMENT AND PLAN:   1.  Atopic dermatitis with recent worsening over the last 1-2 years.  Negative relevant patch  testing.  Nuris continues to develop new lesions despite chronic treatment with topical corticosteroids.  Her dermatitis is significantly impacting her quality of life.  I would consider systemic options, but given that both cyclosporine as well as methotrexate can cause GI distress and diarrhea, these would not be advised in the setting of her short gut syndrome.  For this reason, I suggested use of dupilumab.  A 600 mg loading dose followed by 300 mg subcutaneous every 2 weeks was prescribed.  In the interim, she may continue to use hydrocortisone 2.5% cream to the face twice daily.  Given that the Protopic prescription was cost prohibitive, I printed a Good Rx coupon for Ms. Echols which she may fill at [facility]. Continue triamcinolone 0.1% cream to the body, arms and legs twice daily.   2.  History of photosensitivity.  Not addressed today.  Past evaluation with CARIE, VIKTORIA, complement was negative.   3.  Benign pigmented nevi.  No past history of concerns.      The patient to return during her winter break.     Mady Tena MD  Pediatric Dermatology Staff       cc:   Aliya Osei MD   [address] [place]  [address]         MADY TENA MD             D: 2019   T: 2019   MT: devin      Name:     NURIS ECHOLS   MRN:      [id number]        Account:      [id number]   :      2000           Service Date: 2019      Document: [id number]

## 2019-08-20 NOTE — TELEPHONE ENCOUNTER
Prior Authorization Approval    Authorization Effective Date: 7/21/2019  Authorization Expiration Date: 12/18/2019  Medication: dupixent pa approved  Approved Dose/Quantity: 3/28ds  Reference #: [id number]   Insurance Company: [employer] - Phone [phone] Fax [phone]  Expected CoPay: $3531.40 ($0 WITH COPAY CARD)     CoPay Card Available: Yes    Foundation Assistance Needed: na  Which Pharmacy is filling the prescription (Not needed for infusion/clinic administered): [address]  Pharmacy Notified: Yes  Patient Notified: Yes

## 2019-08-20 NOTE — LETTER
RE: Nuris Reddy  [address] [place] [address]     Service Date: 08/20/2019      DERMATOLOGY CLINIC VISIT NOTE     Dermatology Problem List:  1. Intermittent photosensitive rxn, pruritic  2. Seborrheic dermatitis  3. 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      HISTORY OF PRESENT ILLNESS:  Ms. Reddy is a 19-year-old female who returns to Dermatology Clinic for ongoing evaluation of severe atopic dermatitis.  She was last seen 06/11/2019.  At that point in time, she relayed a history of having mild atopic dermatitis as a child but having severe flares while at college over the past year.  I recommended a hypoallergenic skin care regimen, triamcinolone 0.1% cream to the body, arms, legs twice a day, hydrocortisone 2.5% cream to face twice daily as needed with transition to Protopic.  I also suggested that Nuris undergo patch testing because of her increased risk of having allergic contact dermatitis given her history of atopic dermatitis as well as the recent worsening of her skin disease.  She had patch testing performed by Dr. Beth and it did not show any relevant allergies.  She has several irritants that cause reactions but they were felt not to be relevant to her distribution of her eruption.  She notes that she continues to have ups and downs in regard to her eczema.  She notes that medications help when she develops a rash, but despite using gentle cleanser and a daily thick emollient including Vanicream and Eucerin, she continues to develop new areas of involvement.  She is interested in systemic treatment.  In addition, Ms. Reddy has problems with chronic diarrhea in the setting of her history of short gut syndrome.  We had discussed that methotrexate would not be an ideal treatment option for  her because of this potential side effect.      Patient Active Problem List   Diagnosis     Other congenital anomalies of intestine     Proteinuria     Ulceration of intestine     Short gut syndrome     Iron deficiency anemia     Chronic rhinitis     Other atopic dermatitis       Allergies   Allergen Reactions     No Known Allergies          Current Outpatient Medications   Medication     Cetirizine HCl (ZYRTEC ALLERGY PO)     Cyanocobalamin (VITAMIN B-12) 50 MCG TABS     Dupilumab (DUPIXENT) 300 MG/2ML syringe     fluticasone (FLONASE) 50 MCG/ACT nasal spray     hydrocortisone 2.5 % cream     ketoconazole (NIZORAL) 2 % external shampoo     Multiple Vitamins-Iron (MULTIVITAMIN/IRON PO)     norgestim-eth estrad triphasic (TRINESSA, 28,) 0.18/0.215/0.25 MG-35 MCG tablet     tacrolimus (PROTOPIC) 0.1 % external ointment     triamcinolone (KENALOG) 0.1 % external cream     VENTOLIN  (90 Base) MCG/ACT inhaler     mometasone (ELOCON) 0.1 % external solution     No current facility-administered medications for this visit.        ROS: Feels well. No other skin concerns.        SOCIAL HISTORY:  The patient is a college student.  She is leaving for college tomorrow.      FAMILY HISTORY:  Sister with asthma.      REVIEW OF SYSTEMS:  Feeling well without additional skin concerns.      PHYSICAL EXAMINATION:   GENERAL:  Nuris is a healthy-appearing 19-year-old female in no distress.   HEENT:  Conjunctivae clear.   PULMONARY:  Breathing comfortably on room air.   ABDOMEN:  No abdominal distention.   SKIN:  Exam included the scalp, face, neck, chest, arms, hands, legs.  Skin exam normal except for as follows:   -- Scaling plaques on the bilateral upper and lower eyelids.   -- Ill-defined, pink plaques on the bilateral thighs and lower legs.   -- Xerosis of the lips.   -- Pink plaques in the bilateral antecubital fossae.   -- Light brown macules on upper cheeks.      ASSESSMENT AND PLAN:   1.  Atopic dermatitis with recent  worsening over the last 1-2 years.  Negative relevant patch testing.  Nuris continues to develop new lesions despite chronic treatment with topical corticosteroids.  Her dermatitis is significantly impacting her quality of life.  I would consider systemic options, but given that both cyclosporine as well as methotrexate can cause GI distress and diarrhea, these would not be advised in the setting of her short gut syndrome.  For this reason, I suggested use of dupilumab.  A 600 mg loading dose followed by 300 mg subcutaneous every 2 weeks was prescribed.  In the interim, she may continue to use hydrocortisone 2.5% cream to the face twice daily.  Given that the Protopic prescription was cost prohibitive, I printed a Good Rx coupon for Ms. Echols which she may fill at [facility]. Continue triamcinolone 0.1% cream to the body, arms and legs twice daily.   2.  History of photosensitivity.  Not addressed today.  Past evaluation with CARIE, VIKTORIA, complement was negative.   3.  Benign pigmented nevi.  No past history of concerns.      The patient to return during her winter break.     Mady Tena MD  Pediatric Dermatology Staff       cc:   Aliya Osei MD   [address] [place]  [address]      D: 2019   T: 2019   MT: devin    Name:     NURIS ECHOLS   MRN:      [id number]        Account:      [id number]   :      2000           Service Date: 2019    Document: [id number]

## 2019-08-20 NOTE — PATIENT INSTRUCTIONS
-Rx sent to [facility]. You will receive a letter about coverage in the next few weeks. If coverage is denied let us know and we can start an appeal process if that is possible with your insurance.

## 2019-08-22 ENCOUNTER — TELEPHONE (OUTPATIENT)
Dept: PEDIATRICS | Facility: CLINIC | Age: 19
End: 2019-08-22

## 2019-08-22 DIAGNOSIS — L20.89 OTHER ATOPIC DERMATITIS: Primary | ICD-10-CM

## 2019-08-22 NOTE — TELEPHONE ENCOUNTER
[facility] called and said Dr. Tena only ordered 14 days of Dupixent for patient. They said she needs 28 days. Pharmacy number is [phone].

## 2019-08-23 ENCOUNTER — TELEPHONE (OUTPATIENT)
Dept: PEDIATRICS | Facility: CLINIC | Age: 19
End: 2019-08-23

## 2019-08-23 NOTE — TELEPHONE ENCOUNTER
Reason for Call:  Other prescription    Detailed comments: Dupilumab (DUPIXENT) 300 MG/2ML syringe  Pharmacy ([facility]) is calling about the medication that was ordered they need a call back. They have questions about the script.    Ref # [id number]    Phone Number Patient can be reached at: [phone] option 1 then option 6  You will get a pharmacist.  Use the above reference number.      Best Time: any    Can we leave a detailed message on this number? YES    Call taken on 8/23/2019 at 10:12 AM by Heather Serra

## 2019-08-28 ENCOUNTER — TELEPHONE (OUTPATIENT)
Dept: PEDIATRICS | Facility: CLINIC | Age: 19
End: 2019-08-28

## 2019-08-28 NOTE — TELEPHONE ENCOUNTER
[facility] would like to clarify a prescription from Dr. Tena for Dupixent. They have a question regarding directions and quantity. Please call them back at [phone].      09/04/19:  specialty called back today to clarify. They haven't heard back from clinic.

## 2019-10-21 ENCOUNTER — TELEPHONE (OUTPATIENT)
Dept: PEDIATRICS | Facility: CLINIC | Age: 19
End: 2019-10-21

## 2019-10-21 NOTE — TELEPHONE ENCOUNTER
Reason for Call:  Other preventative visit    Detailed comments: pt was seen 8/7 for a preventative visit and was told that she would be reimbursed for the $25.00. pt said the insurance has gotten nothing form [employer] that they need to get the money.    Phone Number Patient can be reached at: Home number on file [phone] (home)    Best Time: any    Can we leave a detailed message on this number? YES    Call taken on 10/21/2019 at 3:56 PM by Heather Serra

## 2019-10-23 NOTE — TELEPHONE ENCOUNTER
Left message for patient to call back.      Please inform patient that she will have to call [facility] at [phone].       Close encounter when complete.       Tammie Wray, CMA

## 2019-11-02 ENCOUNTER — HEALTH MAINTENANCE LETTER (OUTPATIENT)
Age: 19
End: 2019-11-02

## 2019-11-11 ENCOUNTER — TRANSFERRED RECORDS (OUTPATIENT)
Dept: HEALTH INFORMATION MANAGEMENT | Facility: CLINIC | Age: 19
End: 2019-11-11

## 2020-03-18 ENCOUNTER — MYC MEDICAL ADVICE (OUTPATIENT)
Dept: PEDIATRICS | Facility: CLINIC | Age: 20
End: 2020-03-18

## 2020-03-18 DIAGNOSIS — D64.9 ANEMIA, UNSPECIFIED TYPE: Primary | ICD-10-CM

## 2020-03-19 NOTE — TELEPHONE ENCOUNTER
PCP:    Please see below. The Pt had a full Px in August. Please advise.     Fernanda Reyes, JULIANA   [facility] -- Triage Nurse

## 2020-03-23 DIAGNOSIS — D64.9 ANEMIA, UNSPECIFIED TYPE: ICD-10-CM

## 2020-03-23 LAB
FERRITIN SERPL-MCNC: 5 NG/ML (ref 12–150)
HGB BLD-MCNC: 9.6 G/DL (ref 11.7–15.7)
IRON SATN MFR SERPL: 3 % (ref 15–46)
IRON SERPL-MCNC: 17 UG/DL (ref 35–180)
TIBC SERPL-MCNC: 560 UG/DL (ref 240–430)

## 2020-03-23 PROCEDURE — 85018 HEMOGLOBIN: CPT | Performed by: INTERNAL MEDICINE

## 2020-03-23 PROCEDURE — 83540 ASSAY OF IRON: CPT | Performed by: INTERNAL MEDICINE

## 2020-03-23 PROCEDURE — 83550 IRON BINDING TEST: CPT | Performed by: INTERNAL MEDICINE

## 2020-03-23 PROCEDURE — 82728 ASSAY OF FERRITIN: CPT | Performed by: INTERNAL MEDICINE

## 2020-03-23 PROCEDURE — 36415 COLL VENOUS BLD VENIPUNCTURE: CPT | Performed by: INTERNAL MEDICINE

## 2020-04-23 ENCOUNTER — MYC MEDICAL ADVICE (OUTPATIENT)
Dept: DERMATOLOGY | Facility: CLINIC | Age: 20
End: 2020-04-23

## 2020-04-27 ENCOUNTER — OFFICE VISIT (OUTPATIENT)
Dept: URGENT CARE | Facility: URGENT CARE | Age: 20
End: 2020-04-27
Payer: COMMERCIAL

## 2020-04-27 VITALS
OXYGEN SATURATION: 99 % | HEART RATE: 90 BPM | WEIGHT: 139.9 LBS | TEMPERATURE: 98.8 F | SYSTOLIC BLOOD PRESSURE: 123 MMHG | DIASTOLIC BLOOD PRESSURE: 78 MMHG | BODY MASS INDEX: 24.01 KG/M2

## 2020-04-27 DIAGNOSIS — J31.0 CHRONIC RHINITIS: ICD-10-CM

## 2020-04-27 DIAGNOSIS — L30.9 DERMATITIS: Primary | ICD-10-CM

## 2020-04-27 DIAGNOSIS — J30.89 SEASONAL ALLERGIC RHINITIS DUE TO OTHER ALLERGIC TRIGGER: ICD-10-CM

## 2020-04-27 PROCEDURE — 99214 OFFICE O/P EST MOD 30 MIN: CPT | Performed by: PHYSICIAN ASSISTANT

## 2020-04-27 RX ORDER — FLUTICASONE PROPIONATE 50 MCG
1-2 SPRAY, SUSPENSION (ML) NASAL DAILY
Qty: 16 G | Refills: 11 | Status: SHIPPED | OUTPATIENT
Start: 2020-04-27 | End: 2022-02-01

## 2020-04-27 RX ORDER — PREDNISONE 50 MG/1
50 TABLET ORAL DAILY
Qty: 5 TABLET | Refills: 0 | Status: SHIPPED | OUTPATIENT
Start: 2020-04-27 | End: 2020-05-19

## 2020-04-27 ASSESSMENT — PAIN SCALES - GENERAL: PAINLEVEL: SEVERE PAIN (6)

## 2020-04-27 NOTE — PROGRESS NOTES
URGENT CARE VISIT:    SUBJECTIVE:     HPI:   Nuris Reddy is a 19 year old who presents with rash located over face since this morning. Rash is sudden onset and rash seems to be stable. She describes rash as burning. Patient denies difficulty breathing or throat/tongue swelling. Patient has not tried any treatments. Patient has not had contact exposures to new laundry detergents, soaps, lotions, or other potential irritants. Denies new foods or medications.  Patient denies previous history of a similar rash however has severe atopic dermatitis. Currently on regimen of OTC HC cream. Sees dermatologist. Takes daily antihistamine for seasonal allergies. Wants Flonase to be refilled. No one around them has had a similar rash.     PMH:   Past Medical History:   Diagnosis Date     Anemia 10/16/2012     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, s/p resection     Short gut syndrome 10/16/2012    ~ 20 cm of ileum as well as ileocecal valve resected in  period     Ulceration of intestine 2012     Allergies: No known allergies   Medications:   Current Outpatient Medications   Medication Sig Dispense Refill     Cetirizine HCl (ZYRTEC ALLERGY PO) Take by mouth daily       Cyanocobalamin (VITAMIN B-12) 50 MCG TABS Take 100 mcg by mouth daily.       fluticasone (FLONASE) 50 MCG/ACT nasal spray Spray 1-2 sprays into both nostrils daily 16 g 11     hydrocortisone 2.5 % cream To lips and face rash twice daily as needed for rash 30 g 1     ketoconazole (NIZORAL) 2 % external shampoo Use to shampoo twice weekly. Leave on 5 min then rinse. 120 mL 3     mometasone (ELOCON) 0.1 % external solution Apply small amount topically to affected areas of scalp daily prn for up to 2 weeks. 60 mL 0     Multiple Vitamins-Iron (MULTIVITAMIN/IRON PO) Take 1 tablet by mouth daily.       norgestim-eth estrad triphasic (TRINESSA, 28,) 0.18/0.215/0.25 MG-35 MCG tablet Take 1 tablet by mouth daily 84 tablet 4      predniSONE (DELTASONE) 50 MG tablet Take 1 tablet (50 mg) by mouth daily for 5 days 5 tablet 0     tacrolimus (PROTOPIC) 0.1 % external ointment Apply to face rash twice daily until clear then twice daily as needed. 30 g 11     triamcinolone (KENALOG) 0.1 % external cream To rash on the ears, body, arms, legs twice daily until clear, then as needed ongoing. 454 g 1     VENTOLIN  (90 Base) MCG/ACT inhaler INHALE 2 PUFFS PO Q 4 H PRF SOB OR WHZ  1     Dupilumab (DUPIXENT) 300 MG/2ML syringe Inject 2 mLs (300 mg) Subcutaneous every 14 days (Patient not taking: Reported on 4/27/2020) 4 mL 11     Social History:   Social History     Socioeconomic History     Marital status: Single     Spouse name: Not on file     Number of children: Not on file     Years of education: Not on file     Highest education level: Some college, no degree   Occupational History     Not on file   Social Needs     Financial resource strain: Not very hard     Food insecurity     Worry: Never true     Inability: Never true     Transportation needs     Medical: No     Non-medical: No   Tobacco Use     Smoking status: Never Smoker     Smokeless tobacco: Never Used     Tobacco comment: Non-smoking home   Substance and Sexual Activity     Alcohol use: No     Frequency: Monthly or less     Drinks per session: 3 or 4     Binge frequency: Never     Drug use: No     Sexual activity: Never   Lifestyle     Physical activity     Days per week: 1 day     Minutes per session: 90 min     Stress: Only a little   Relationships     Social connections     Talks on phone: More than three times a week     Gets together: Twice a week     Attends Congregational service: More than 4 times per year     Active member of club or organization: Yes     Attends meetings of clubs or organizations: 1 to 4 times per year     Relationship status: Never      Intimate partner violence     Fear of current or ex partner: Not on file     Emotionally abused: Not on file      Physically abused: Not on file     Forced sexual activity: Not on file   Other Topics Concern     Parent/sibling w/ CABG, MI or angioplasty before 65F 55M? Not Asked   Social History Narrative     Not on file       ROS: ROS otherwise found to be negative except as noted above.    OBJECTIVE:  /78   Pulse 90   Temp 98.8  F (37.1  C) (Tympanic)   Wt 63.5 kg (139 lb 14.4 oz)   SpO2 99%   BMI 24.01 kg/m    General: WDWN in NAD.   Eyes: Non-injected conjunctivas without drainage bilaterally.  Ears: Bilateral TMs are easily visualized without erythema, injection, or effusion. No erythema or edema of external canals.    Oropharynx: No erythema of oropharynx. No edema of tongue.   Cardiac: RRR without murmurs, rubs, or gallops.  Respiratory: LCTAB without adventitious sounds. Non-labored breathing.  Integumentary:   Distribution: generalized  Location: face    Color: red, dry with some serous fluid on chin,  Lesion type: plaque, blotchy, confluent with warmth  Neuro: Alert and oriented.       ASSESSMENT:     ICD-10-CM    1. Dermatitis  L30.9 predniSONE (DELTASONE) 50 MG tablet   2. Chronic rhinitis  J31.0    3. Seasonal allergic rhinitis due to other allergic trigger  J30.89 fluticasone (FLONASE) 50 MCG/ACT nasal spray        PLAN:  Patient Instructions   Patient was educated on the natural course of inflammatory reaction. Cause is unknown. Take medication as prescribed. Side effects discussed. Conservative measures discussed including applying cool compresses, and over-the-counter antihistamines (Benadryl every 4 to 6 hours). Continue your daily regimen for atopic dermatitis as recommended by your dermatologist. See your derm specialist if symptoms worsen or do not improve in 3 days. Seek emergency care if you develop severe pain/redness, shortness of breath, or difficulty swallowing. Patient verbalized understanding and is agreeable to plan. The patient was discharged ambulatory and in stable condition.    Sammie  CHENCHO Vazquez on 4/27/2020 at 11:32 AM

## 2020-04-27 NOTE — PATIENT INSTRUCTIONS
Patient was educated on the natural course of rash. Take medication as prescribed. Side effects discussed. Conservative measures discussed including applying cool compresses, and over-the-counter antihistamines (Benadryl every 4 to 6 hours). See your primary care provider if symptoms worsen or do not improve in 3 days. Seek emergency care if you develop severe pain/redness, shortness of breath, or difficulty swallowing.

## 2020-04-29 ENCOUNTER — TELEPHONE (OUTPATIENT)
Dept: DERMATOLOGY | Facility: CLINIC | Age: 20
End: 2020-04-29

## 2020-04-29 NOTE — TELEPHONE ENCOUNTER
PA Initiation    Medication: dupixent   Insurance Company: [employer] - Phone [phone] Fax [phone]  [facility] [address]  Filling Pharmacy Phone:    Filling Pharmacy Fax:    Start Date: 4/29/2020

## 2020-05-03 ENCOUNTER — E-VISIT (OUTPATIENT)
Dept: PEDIATRICS | Facility: CLINIC | Age: 20
End: 2020-05-03
Payer: COMMERCIAL

## 2020-05-03 ENCOUNTER — MYC MEDICAL ADVICE (OUTPATIENT)
Dept: PEDIATRICS | Facility: CLINIC | Age: 20
End: 2020-05-03

## 2020-05-03 DIAGNOSIS — L50.9 URTICARIA: Primary | ICD-10-CM

## 2020-05-03 PROCEDURE — 99423 OL DIG E/M SVC 21+ MIN: CPT | Performed by: INTERNAL MEDICINE

## 2020-05-04 RX ORDER — PREDNISONE 20 MG/1
TABLET ORAL
Qty: 20 TABLET | Refills: 0 | Status: SHIPPED | OUTPATIENT
Start: 2020-05-04 | End: 2020-07-07

## 2020-05-04 NOTE — TELEPHONE ENCOUNTER
Prior Authorization Approval    Authorization Effective Date: 4/15/2020  Authorization Expiration Date: 8/28/2020  Medication: dupixent   Approved Dose/Quantity: loading and maintenance   Reference #: [id number]   Insurance Company: [employer] - Phone [phone] Fax [phone]  Expected CoPay: na     CoPay Card Available: Yes    Foundation Assistance Needed: na  Which Pharmacy is filling the prescription (Not needed for infusion/clinic administered): [facility] - [address] [place] [address]  Pharmacy Notified: Yes  Patient Notified: Yes

## 2020-05-11 ENCOUNTER — TELEPHONE (OUTPATIENT)
Dept: DERMATOLOGY | Facility: CLINIC | Age: 20
End: 2020-05-11

## 2020-05-11 NOTE — TELEPHONE ENCOUNTER
General Call:   Who is calling:  Nuris Gutierrez  Reason for Call:  Wondering which pharmacy Rx was sent to. Pt called [facility] and they stated it was sent to  specialty pharmacy.  What are your questions or concerns:  N/A  Date of last appointment with provider: 5/3   Okay to leave a detailed message:Yes at Cell number on file:    Telephone Information:   Mobile [phone]   Mobile [phone]         and Medication

## 2020-05-12 NOTE — TELEPHONE ENCOUNTER
Spoke to [facility] pharmacy- They had to reinitiate the rx according to the rep I spoke with- She stated that they would be in contact with the patient in the next few days about getting rx to her. LVM with this info for patient    Caty Plascencia SALIMA 5/12/2020 2:07 PM

## 2020-05-13 ENCOUNTER — MYC MEDICAL ADVICE (OUTPATIENT)
Dept: PEDIATRICS | Facility: CLINIC | Age: 20
End: 2020-05-13

## 2020-05-13 ENCOUNTER — E-VISIT (OUTPATIENT)
Dept: PEDIATRICS | Facility: CLINIC | Age: 20
End: 2020-05-13
Payer: COMMERCIAL

## 2020-05-13 DIAGNOSIS — L50.9 URTICARIA: ICD-10-CM

## 2020-05-13 PROCEDURE — 99423 OL DIG E/M SVC 21+ MIN: CPT | Performed by: INTERNAL MEDICINE

## 2020-05-13 NOTE — TELEPHONE ENCOUNTER
"In November got a respiratory illness. In December, had start of rash on eyelids. Kept getting laryngitis on and off.     Since early March when she came back from school, started getting eczema on her face. Had never had it on her cheeks before, some days better than others. Overall, slow steady worsening over 6 weeks.  One morning she woke up and her face was swollen and she put ointment on it. Was weeping clear fluid. Had open sores almost. Would crust over. Those were painful. Had eczema like rash on neck.Went to  and prednisone was much better by even later that day and the next day was 75% better. Was all resolved by the end of the 5 days. Two days after she stopped prednisone, rash got bad again, extending to upper back.     Woke up this morning and felt her face was a little swollen and warm. Feeling more itchy again. Skin under her lips starts to get \"peely\".    No fever, conjunctivitis, mouth sores or rash, joint pain, swollen glands. She was able to send pictures in mychart visit.    Forwarding to derm to see if they can schedule something with her. Total evisit time 30 minutes.  Aliya Osei M.D.    "

## 2020-05-15 ENCOUNTER — TELEPHONE (OUTPATIENT)
Dept: PEDIATRICS | Facility: CLINIC | Age: 20
End: 2020-05-15

## 2020-05-15 RX ORDER — PREDNISONE 10 MG/1
TABLET ORAL
Qty: 20 TABLET | Refills: 0 | Status: SHIPPED | OUTPATIENT
Start: 2020-05-15 | End: 2020-07-07

## 2020-05-15 NOTE — TELEPHONE ENCOUNTER
Received VM that was left at 5:06pm on 5/14/20- Patient stating has not yet heard from Dr. Tena's office and requesting call back.    See e-visit encounter 5/13/20. Per JEANETTE Deng to schedule phone visit for Tuesday. Scheduled for 9:45am 5/19/20. Left VM for patient confirming scheduled appointment and to call back if this does not work or if any other questions.    Kelsie SAUCEDA RN

## 2020-05-15 NOTE — PROGRESS NOTES
"Nuris is being evaluated via a billable teledermatology visit.             The patient has been notified of following:            \"We have asked you to send in photos via [employer] or e-mail. These photos will be seen and reviewed by an MD or PA-C.  A telederm visit is not as thorough as an in-person visit, photo assessment does not replace an in-person skin exam.  The quality of the photograph sent may not be of the same quality as that taken by the dermatology clinic. With that being said, we have found that certain health care needs can be provided without the need for a physical exam.  This service lets us provide the care you need with a short phone conversation. If prescriptions are needed we can send directly to your pharmacy.If lab work is needed we can place an order for that and you can then stop by our lab to have the test done at a later time. An MD/PA/Resident will call you around the time of your visit. This may be from a blocked number.     This is a billable visit. If during the course of the call the physician/provider feels a telephone visit is not appropriate, you will not be charged for this service.            Patient has given verbal consent for Telephone visit?  Yes           The patient would like to proceed with an teledermatology because of the COVID Pandemic.     Patient complains of facial rash and chest, itchy and redness. Treating with prednisone. No new exposures or recent travel.     Patient advised should be receiving Dupixent today. This will be first treatment when medication is received today.      ALLERGIES REVIEWED?  None   "

## 2020-05-19 ENCOUNTER — VIRTUAL VISIT (OUTPATIENT)
Dept: DERMATOLOGY | Facility: CLINIC | Age: 20
End: 2020-05-19
Payer: COMMERCIAL

## 2020-05-19 DIAGNOSIS — L56.8 PHOTOSENSITIVITY: ICD-10-CM

## 2020-05-19 DIAGNOSIS — L20.84 INTRINSIC ATOPIC DERMATITIS: Primary | ICD-10-CM

## 2020-05-19 DIAGNOSIS — L29.9 PRURITUS: ICD-10-CM

## 2020-05-19 PROCEDURE — 99213 OFFICE O/P EST LOW 20 MIN: CPT | Mod: 95 | Performed by: DERMATOLOGY

## 2020-05-19 RX ORDER — MUPIROCIN 20 MG/G
OINTMENT TOPICAL
Qty: 22 G | Refills: 1 | Status: SHIPPED | OUTPATIENT
Start: 2020-05-19 | End: 2021-03-01

## 2020-05-19 NOTE — PATIENT INSTRUCTIONS
Pediatric Dermatology  [facility]  [address]  1st [place]  [address]  Phone: [phone]    Pediatric & Adult Dermatology  [facility]  [address]   2nd Floor  [facility] [address]  [phone]                  General information: Dr. Mady Tena is a board-certified dermatologist with subspecialty certification in pediatric dermatology.     Scheduling and Nurse Triage: Dr. Tena sees pediatric patients on Mondays in [place] and adult and pediatric patients on Tuesdays in [place]. The remainder of the week she practices at the [facility]. Please call the above phone numbers to schedule or to talk to a nurse.     -For scheduling at the [place] or [place] locations, or to talk to the triage nurse please call the above phone number at the clinic where you were seen.     -For medication refills, please call your pharmacy.     -Start dupilumab  Will check in in about 3 weeks  Let me know if you start to flare with prednisone taper

## 2020-05-19 NOTE — PROGRESS NOTES
[facility] Record (Store and Forward ((National Emergency Concerning the CORONAVIRUS (COVID 19), preferred for return patients. )    Image quality and interpretability: acceptable    Physician has received verbal consent for a Video/Photos Visit from the patient? Yes    In-person dermatology visit recommendation: no    Consent has been obtained for this service by 1 care team member: yes.     Teledermatology information:  - Location of patient: Home  - Location of teledermatologist:  ([facility] (Dr. Tena, [place], [place])  - Reason teledermatology is appropriate:  of National Emergency Regarding Coronavirus disease (COVID 19) Outbreak  - Method of transmission:  Store and Forward ((National Emergency Concerning the CORONAVIRUS (COVID 19), preferred for return patients.   - Date of images: 05/19/20  - Service start time: 0948  - Service end time:1002  - Date of report: 05/19/20      ___________________________________________________________________________      DERMATOLOGY CLINIC VISIT NOTE     Dermatology Problem List:  1. Intermittent photosensitive rxn, pruritic  2. Seborrheic dermatitis  3. 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      HISTORY OF PRESENT ILLNESS:  Ms. Reddy is a 19-year-old female who returns to Dermatology Clinic for ongoing evaluation of severe atopic dermatitis.  She will be starting dupilumab today. There was delay in starting this medication due to coverage. She will plan to use the online dupilumab resources to learn how to inject the medication. She is currently taking prednisone 20 mg daily and notes that when this is tapered she develops severe swelling and itch with peeling of the face. She is using a mild Neutagena face wash and Vaseline for moisturizer. She notes that she  sometimes gets skin breakdown and yellow crusting. She denies any other topical medications, no fragrance, room sprays, candles, sunscreen. The current rash is not photosensitive, but she does note that she gets a rash when in the sun.     She had patch testing performed by Dr. Beth and it did not show any relevant allergies.  She has several irritants that cause reactions but they were felt not to be relevant to her distribution of her eruption.       Patient Active Problem List   Diagnosis     Other congenital anomalies of intestine     Proteinuria     Ulceration of intestine     Short gut syndrome     Iron deficiency anemia     Chronic rhinitis     Other atopic dermatitis       Allergies   Allergen Reactions     No Known Allergies          Current Outpatient Medications   Medication     Cetirizine HCl (ZYRTEC ALLERGY PO)     Cyanocobalamin (VITAMIN B-12) 50 MCG TABS     Ferrous Sulfate (IRON PO)     fluticasone (FLONASE) 50 MCG/ACT nasal spray     ketoconazole (NIZORAL) 2 % external shampoo     Loratadine-Pseudoephedrine (CLARITIN-D 24 HOUR PO)     Multiple Vitamins-Iron (MULTIVITAMIN/IRON PO)     norgestim-eth estrad triphasic (TRINESSA, 28,) 0.18/0.215/0.25 MG-35 MCG tablet     predniSONE (DELTASONE) 20 MG tablet     triamcinolone (KENALOG) 0.1 % external cream     Dupilumab (DUPIXENT) 300 MG/2ML syringe     hydrocortisone 2.5 % cream     mometasone (ELOCON) 0.1 % external solution     predniSONE (DELTASONE) 10 MG tablet     tacrolimus (PROTOPIC) 0.1 % external ointment     VENTOLIN  (90 Base) MCG/ACT inhaler     No current facility-administered medications for this visit.      SOCIAL HISTORY:  The patient is a college student.      FAMILY HISTORY:  Sister with asthma.      REVIEW OF SYSTEMS:  No rhinorrhea, headache, cough, fevers, wheezing, eye pain.      PHYSICAL EXAMINATION:   GENERAL:  Nuris is a healthy-appearing 19-year-old female in no distress.   NEURO: Normal mentation and speech  PSYCH:  Normal mood and affect  HEENT:  Conjunctivae clear.   SKIN:  Exam included the scalp, face, neck, chest, arms, hands, legs.  Skin exam normal except for as follows:   -- Scaling plaques on the bilateral upper and lower eyelids.   --Ill defined pink plaques on the bilateral central cheeks  -- Light brown macules on upper cheeks.                  ASSESSMENT AND PLAN:   1.  Atopic dermatitis severe, with facial swelling. She notes that she has required systemic steroids to manage her eruption. I continue to question a contactant, but allergy testing unrevealing. The dupilumab should begin to decrease eruption quickly over about 6 weeks. Patient to continue with the prednisone taper and contact me if she flares during this course.   2.  History of photosensitivity. Normal B vitamin levels when checked previously. No current concerns, but patient avoiding the sun.  Past evaluation with CARIE, VIKTORIA, complement was negative. Will determine if this remains an issue after dupilumab       F/up by phone in 3 weeks.     Mady Tena MD  Pediatric Dermatology Staff       cc:   Aliya Osei MD   [facility]   [address] [place]  [address]

## 2020-05-19 NOTE — TELEPHONE ENCOUNTER
Patient had a telephone visit 5/19/20. Confirmed with patient she got the information she needed from Jyoti. Vy Blankenship MA

## 2020-06-09 ENCOUNTER — VIRTUAL VISIT (OUTPATIENT)
Dept: DERMATOLOGY | Facility: CLINIC | Age: 20
End: 2020-06-09
Payer: COMMERCIAL

## 2020-06-09 DIAGNOSIS — L20.84 INTRINSIC ATOPIC DERMATITIS: Primary | ICD-10-CM

## 2020-06-09 DIAGNOSIS — L23.89 ALLERGIC CONTACT DERMATITIS DUE TO OTHER AGENTS: ICD-10-CM

## 2020-06-09 DIAGNOSIS — L29.9 PRURITUS: ICD-10-CM

## 2020-06-09 PROCEDURE — 99213 OFFICE O/P EST LOW 20 MIN: CPT | Mod: 95 | Performed by: DERMATOLOGY

## 2020-06-09 RX ORDER — FEXOFENADINE HCL 60 MG/1
60 TABLET, FILM COATED ORAL 2 TIMES DAILY PRN
COMMUNITY
End: 2023-01-20

## 2020-06-09 RX ORDER — TRIAMCINOLONE ACETONIDE 1 MG/G
OINTMENT TOPICAL
Qty: 60 G | Refills: 3 | Status: SHIPPED | OUTPATIENT
Start: 2020-06-09 | End: 2021-07-09

## 2020-06-09 NOTE — LETTER
"  6/9/2020      RE: Nuris VASQUEZ Reddy  [address] [place] [address]       Nuris is being evaluated via a billable teledermatology visit.             The patient has been notified of following:            \"We have asked you to send in photos via [employer] or e-mail. These photos will be seen and reviewed by an MD or PAJavierC.  A telederm visit is not as thorough as an in-person visit, photo assessment does not replace an in-person skin exam.  The quality of the photograph sent may not be of the same quality as that taken by the dermatology clinic. With that being said, we have found that certain health care needs can be provided without the need for a physical exam.  This service lets us provide the care you need with a short phone conversation. If prescriptions are needed we can send directly to your pharmacy.If lab work is needed we can place an order for that and you can then stop by our lab to have the test done at a later time. An MD/PA/Resident will call you around the time of your visit. This may be from a blocked number.     This is a billable visit. If during the course of the call the physician/provider feels a telephone visit is not appropriate, you will not be charged for this service.            Patient has given verbal consent for Telephone visit?  Yes           The patient would like to proceed with an teledermatology because of the COVID Pandemic.     Patient complains of    Recheck         ALLERGIES REVIEWED?  Yes    [facility] ((National Emergency Concerning the CORONAVIRUS (COVID 19), preferred for return patients. )    Image quality and interpretability: acceptable    Physician has received verbal consent for a Video/Photos Visit from the patient? Yes    In-person dermatology visit recommendation: no    Consent has been obtained for this service by 1 care team member: yes.     Teledermatology information:  - Location of patient: Home  - Location of " teledermatologist:  ([facility] (Dr. Tena, [place], [place])  - Reason teledermatology is appropriate:  of National Emergency Regarding Coronavirus disease (COVID 19) Outbreak  - Method of transmission:  Store and Forward ((National Emergency Concerning the CORONAVIRUS (COVID 19), preferred for return patients.   - Date of images: 06/09/20  - Service start time: 1116  - Service end time:1126  - Date of report: 06/09/20      ___________________________________________________________________________    DERMATOLOGY CLINIC VISIT NOTE     Dermatology Problem List:  1. Intermittent photosensitive rxn, pruritic  2. Seborrheic dermatitis  3. 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      HISTORY OF PRESENT ILLNESS:  Ms. Reddy is a 19-year-old female who returns to Dermatology Clinic for ongoing evaluation of severe atopic dermatitis.  She stared dupilumab on 5/19/20. She has had one loading dose and a 2nd dose, is due for her 3rd injection next week. She notes that after stopping her prednisone the rash started to flare again. The areas on the back of the legs have also flared a bit. Uses Olay soap to wash face. Tried hydrocortisone 2.5% ointment to the face rash without benefit.       Patient Active Problem List   Diagnosis     Other congenital anomalies of intestine     Proteinuria     Ulceration of intestine     Short gut syndrome     Iron deficiency anemia     Chronic rhinitis     Other atopic dermatitis       Allergies   Allergen Reactions     No Known Allergies          Current Outpatient Medications   Medication     Cetirizine HCl (ZYRTEC ALLERGY PO)     Cyanocobalamin (VITAMIN B-12) 50 MCG TABS     Dupilumab (DUPIXENT) 300 MG/2ML syringe     Ferrous Sulfate (IRON PO)     fluticasone (FLONASE) 50 MCG/ACT nasal spray      hydrocortisone 2.5 % cream     ketoconazole (NIZORAL) 2 % external shampoo     Loratadine-Pseudoephedrine (CLARITIN-D 24 HOUR PO)     mometasone (ELOCON) 0.1 % external solution     Multiple Vitamins-Iron (MULTIVITAMIN/IRON PO)     mupirocin (BACTROBAN) 2 % external ointment     norgestim-eth estrad triphasic (TRINESSA, 28,) 0.18/0.215/0.25 MG-35 MCG tablet     predniSONE (DELTASONE) 20 MG tablet     tacrolimus (PROTOPIC) 0.1 % external ointment     triamcinolone (KENALOG) 0.1 % external cream     VENTOLIN  (90 Base) MCG/ACT inhaler     No current facility-administered medications for this visit.      SOCIAL HISTORY:  The patient is a college student.      FAMILY HISTORY:  Sister with asthma.      REVIEW OF SYSTEMS:  No rhinorrhea, headache, cough, fevers, wheezing, eye pain.      PHYSICAL EXAMINATION:   GENERAL:  Nuris is a healthy-appearing 19-year-old female in no distress.   NEURO: Normal mentation and speech  PSYCH: Normal mood and affect  HEENT:  Conjunctivae clear.   SKIN:  Exam included the face: Indurated pink plaques on the bilateral cheeks and upper eyelids      ASSESSMENT AND PLAN:   1.  Atopic dermatitis severe, with facial swelling. She notes that she has required systemic steroids to manage her eruption. I continue to question a contactant, but allergy testing was unrevealing. She has flared off of her previously prescribed prednisone. I noted that this may be resurgence of the primary process vs rebound from completing the prednisone. I recommended a trial of topical treatment in an effort to avoid further prednisone and as she will continue to have benefit from the dupilumab.       2.  History of photosensitivity. Normal B vitamin levels when checked previously. No current concerns, but patient avoiding the sun.  Past evaluation with CARIE, VIKTORIA, complement was negative. Will determine if this remains an issue after dupilumab       Patient to send me an update in 2 weeks and have repeat phone  visit in 4 weeks.     Mady Tena MD  Pediatric Dermatology Staff       cc:   Aliya Osei MD   [place] [id number] [place]  [address]

## 2020-06-09 NOTE — PROGRESS NOTES
"Nuris is being evaluated via a billable teledermatology visit.             The patient has been notified of following:            \"We have asked you to send in photos via [employer] or e-mail. These photos will be seen and reviewed by an MD or PA-C.  A telederm visit is not as thorough as an in-person visit, photo assessment does not replace an in-person skin exam.  The quality of the photograph sent may not be of the same quality as that taken by the dermatology clinic. With that being said, we have found that certain health care needs can be provided without the need for a physical exam.  This service lets us provide the care you need with a short phone conversation. If prescriptions are needed we can send directly to your pharmacy.If lab work is needed we can place an order for that and you can then stop by our lab to have the test done at a later time. An MD/PA/Resident will call you around the time of your visit. This may be from a blocked number.     This is a billable visit. If during the course of the call the physician/provider feels a telephone visit is not appropriate, you will not be charged for this service.            Patient has given verbal consent for Telephone visit?  Yes           The patient would like to proceed with an teledermatology because of the COVID Pandemic.     Patient complains of    Recheck         ALLERGIES REVIEWED?  Yes  "

## 2020-06-09 NOTE — PROGRESS NOTES
[facility]Teledermatology Record (Store and Forward ((National Emergency Concerning the CORONAVIRUS (COVID 19), preferred for return patients. )    Image quality and interpretability: acceptable    Physician has received verbal consent for a Video/Photos Visit from the patient? Yes    In-person dermatology visit recommendation: no    Consent has been obtained for this service by 1 care team member: yes.     Teledermatology information:  - Location of patient: Home  - Location of teledermatologist:  ([facility] (Dr. Tena, [place], [place])  - Reason teledermatology is appropriate:  of National Emergency Regarding Coronavirus disease (COVID 19) Outbreak  - Method of transmission:  Store and Forward ((National Emergency Concerning the CORONAVIRUS (COVID 19), preferred for return patients.   - Date of images: 06/09/20  - Service start time: 1116  - Service end time:1126  - Date of report: 06/09/20      ___________________________________________________________________________    DERMATOLOGY CLINIC VISIT NOTE     Dermatology Problem List:  1. Intermittent photosensitive rxn, pruritic  2. Seborrheic dermatitis  3. 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      HISTORY OF PRESENT ILLNESS:  Ms. Reddy is a 19-year-old female who returns to Dermatology Clinic for ongoing evaluation of severe atopic dermatitis.  She stared dupilumab on 5/19/20. She has had one loading dose and a 2nd dose, is due for her 3rd injection next week. She notes that after stopping her prednisone the rash started to flare again. The areas on the back of the legs have also flared a bit. Uses Olay soap to wash face. Tried hydrocortisone 2.5% ointment to the face rash without benefit.       Patient Active Problem List   Diagnosis     Other congenital anomalies  of intestine     Proteinuria     Ulceration of intestine     Short gut syndrome     Iron deficiency anemia     Chronic rhinitis     Other atopic dermatitis       Allergies   Allergen Reactions     No Known Allergies          Current Outpatient Medications   Medication     Cetirizine HCl (ZYRTEC ALLERGY PO)     Cyanocobalamin (VITAMIN B-12) 50 MCG TABS     Dupilumab (DUPIXENT) 300 MG/2ML syringe     Ferrous Sulfate (IRON PO)     fluticasone (FLONASE) 50 MCG/ACT nasal spray     hydrocortisone 2.5 % cream     ketoconazole (NIZORAL) 2 % external shampoo     Loratadine-Pseudoephedrine (CLARITIN-D 24 HOUR PO)     mometasone (ELOCON) 0.1 % external solution     Multiple Vitamins-Iron (MULTIVITAMIN/IRON PO)     mupirocin (BACTROBAN) 2 % external ointment     norgestim-eth estrad triphasic (TRINESSA, 28,) 0.18/0.215/0.25 MG-35 MCG tablet     predniSONE (DELTASONE) 20 MG tablet     tacrolimus (PROTOPIC) 0.1 % external ointment     triamcinolone (KENALOG) 0.1 % external cream     VENTOLIN  (90 Base) MCG/ACT inhaler     No current facility-administered medications for this visit.      SOCIAL HISTORY:  The patient is a college student.      FAMILY HISTORY:  Sister with asthma.      REVIEW OF SYSTEMS:  No rhinorrhea, headache, cough, fevers, wheezing, eye pain.      PHYSICAL EXAMINATION:   GENERAL:  Nuris is a healthy-appearing 19-year-old female in no distress.   NEURO: Normal mentation and speech  PSYCH: Normal mood and affect  HEENT:  Conjunctivae clear.   SKIN:  Exam included the face: Indurated pink plaques on the bilateral cheeks and upper eyelids      ASSESSMENT AND PLAN:   1.  Atopic dermatitis severe, with facial swelling. She notes that she has required systemic steroids to manage her eruption. I continue to question a contactant, but allergy testing was unrevealing. She has flared off of her previously prescribed prednisone. I noted that this may be resurgence of the primary process vs rebound from completing  the prednisone. I recommended a trial of topical treatment in an effort to avoid further prednisone and as she will continue to have benefit from the dupilumab.       2.  History of photosensitivity. Normal B vitamin levels when checked previously. No current concerns, but patient avoiding the sun.  Past evaluation with CARIE, VIKTORIA, complement was negative. Will determine if this remains an issue after dupilumab       Patient to send me an update in 2 weeks and have repeat phone visit in 4 weeks.     Mady Tena MD  Pediatric Dermatology Staff       cc:   Aliya Osei MD   [address] [place]  [address]

## 2020-06-30 NOTE — PATIENT INSTRUCTIONS
Pediatric Dermatology  [facility]  [address]  1st [place]  [address]  Phone: [phone]    Pediatric & Adult Dermatology  [facility]  [address]   2nd Floor  [facility] [address]  [phone]                  General information: Dr. Mady Tena is a board-certified dermatologist with subspecialty certification in pediatric dermatology.     Scheduling and Nurse Triage: Dr. Tena sees pediatric patients on Mondays in [place] and adult and pediatric patients on Tuesdays in [place]. The remainder of the week she practices at the [facility]. Please call the above phone numbers to schedule or to talk to a nurse.     -For scheduling at the [place] or [place] locations, or to talk to the triage nurse please call the above phone number at the clinic where you were seen.     -For medication refills, please call your pharmacy.

## 2020-06-30 NOTE — PROGRESS NOTES
"Nuris is being evaluated via a billable teledermatology visit.             The patient has been notified of following:         \"We have asked you to send in photos via [employer] or e-mail. These photos will be seen and reviewed by an MD or PA-C.  A telederm visit is not as thorough as an in-person visit, photo assessment does not replace an in-person skin exam.  The quality of the photograph sent may not be of the same quality as that taken by the dermatology clinic. With that being said, we have found that certain health care needs can be provided without the need for a physical exam.  This service lets us provide the care you need with a short phone conversation. If prescriptions are needed we can send directly to your pharmacy.If lab work is needed we can place an order for that and you can then stop by our lab to have the test done at a later time. An MD/PA/Resident will call you around the time of your visit. This may be from a blocked number.     This is a billable visit. If during the course of the call the physician/provider feels a telephone visit is not appropriate, you will not be charged for this service.            Patient has given verbal consent for Telephone visit?  Yes           The patient would like to proceed with an teledermatology because of the COVID Pandemic.     Patient complains of    Dermatitis follow up, few flare ups treating with   Triamcinolone ointent  and Dupixent     ALLERGIES REVIEWED?  yes    "

## 2020-07-07 ENCOUNTER — VIRTUAL VISIT (OUTPATIENT)
Dept: DERMATOLOGY | Facility: CLINIC | Age: 20
End: 2020-07-07
Payer: COMMERCIAL

## 2020-07-07 DIAGNOSIS — L20.84 INTRINSIC ATOPIC DERMATITIS: ICD-10-CM

## 2020-07-07 DIAGNOSIS — L29.9 PRURITUS: Primary | ICD-10-CM

## 2020-07-07 PROCEDURE — 99213 OFFICE O/P EST LOW 20 MIN: CPT | Mod: 95 | Performed by: DERMATOLOGY

## 2020-07-07 RX ORDER — TACROLIMUS 1 MG/G
OINTMENT TOPICAL
Qty: 30 G | Refills: 11 | Status: SHIPPED | OUTPATIENT
Start: 2020-07-07 | End: 2021-08-10

## 2020-07-07 NOTE — PROGRESS NOTES
[facility] (Store and Forward ((National Emergency Concerning the CORONAVIRUS (COVID 19), preferred for return patients. )    Image quality and interpretability: acceptable    Physician has received verbal consent for a Video/Photos Visit from the patient? Yes    In-person dermatology visit recommendation: no    Consent has been obtained for this service by 1 care team member: yes.     Teledermatology information:  - Location of patient: Home  - Location of teledermatologist:  ([facility] (Dr. Tena, [place], [place])  - Reason teledermatology is appropriate:  of National Emergency Regarding Coronavirus disease (COVID 19) Outbreak  - Method of transmission:  Store and Forward ((National Emergency Concerning the CORONAVIRUS (COVID 19), preferred for return patients.   - Date of images: 07/07/20  - Service start time:0957  - Service end time:1007  - Date of report: 07/07/20    ___________________________________________________________________________    DERMATOLOGY CLINIC VISIT NOTE     Dermatology Problem List:  1. Intermittent photosensitive rxn, pruritic. Negative CARIE, VIKTORIA, B vitamins, zinc.   2. Seborrheic dermatitis  3. 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      HISTORY OF PRESENT ILLNESS:  Ms. Reddy is a 19-year-old female who returns to Dermatology Clinic for ongoing evaluation of severe atopic dermatitis.  She stared dupilumab on 5/19/20. She has been off prednisone for >1 month. She notes that use of triamcinolone 0.1% ointment on the face helps after applying for 3 days in a row for flares. She is not using protopic. She thinks that the dupilumab is helping, but has received info that her copay card is running out and the copay will otherwise be $3000. She wonders if there are other options.       Patient Active Problem List   Diagnosis     Other congenital anomalies of intestine     Proteinuria     Ulceration of intestine     Short gut syndrome     Iron deficiency anemia     Chronic rhinitis     Other atopic dermatitis       Allergies   Allergen Reactions     No Known Allergies          Current Outpatient Medications   Medication     Cetirizine HCl (ZYRTEC ALLERGY PO)     Cyanocobalamin (VITAMIN B-12) 50 MCG TABS     Dupilumab (DUPIXENT) 300 MG/2ML syringe     Ferrous Sulfate (IRON PO)     fexofenadine (ALLEGRA) 60 MG tablet     fluticasone (FLONASE) 50 MCG/ACT nasal spray     ketoconazole (NIZORAL) 2 % external shampoo     Loratadine-Pseudoephedrine (CLARITIN-D 24 HOUR PO)     Multiple Vitamins-Iron (MULTIVITAMIN/IRON PO)     triamcinolone (KENALOG) 0.1 % external ointment     VENTOLIN  (90 Base) MCG/ACT inhaler     hydrocortisone 2.5 % cream     mometasone (ELOCON) 0.1 % external solution     mupirocin (BACTROBAN) 2 % external ointment     norgestim-eth estrad triphasic (TRINESSA, 28,) 0.18/0.215/0.25 MG-35 MCG tablet     tacrolimus (PROTOPIC) 0.1 % external ointment     triamcinolone (KENALOG) 0.1 % external cream     No current facility-administered medications for this visit.      SOCIAL HISTORY:  The patient is a college student.      FAMILY HISTORY:  Sister with asthma.      REVIEW OF SYSTEMS:  No rhinorrhea, headache, cough, fevers, wheezing, eye pain.      PHYSICAL EXAMINATION:   GENERAL:  Nuris is a healthy-appearing 19-year-old female in no distress.   NEURO: Normal mentation and speech  PSYCH: Normal mood and affect  HEENT:  Conjunctivae clear.   SKIN:  Exam included the face: Indurated pink plaques on the bilateral cheeks and upper eyelids, improved from previous.      ASSESSMENT AND PLAN:   1.  Atopic dermatitis severe, with facial swelling. She notes that she has required systemic steroids to manage her eruption. I continue to question a contactant, but allergy testing was  unrevealing. Since starting dupilumab she has had improved control of the dermatitis, but has been using triamcinolone 0.1% ointment on the face more often than I would advise. I suggested using the triamcinolone only for 1-2 days if flares and then transitioning to protopic 0.1% ointment daily for maintenance. I have messaged Ms. Ojeda with pharmacy to determine coverage information for Nuris. There are not good alternatives to the dupilumab, especially given her history of short gut. Methotrexate could worsen GI symptoms. Nuris should update me with coverage info in the next few weeks to make a plan prior to her leaving for college.       2.  History of photosensitivity. Normal B vitamin levels when checked previously. No current concerns, but patient avoiding the sun.  Past evaluation with CARIE, VIKTORIA, complement was negative. Will determine if this remains an issue after dupilumab       Patient to send me an update in 2 weeks and have repeat phone visit in 4 weeks.     Mady Tena MD  Pediatric Dermatology Staff       cc:   Aliya Osei MD   [facility]   [address] [place]  [address]

## 2020-07-07 NOTE — LETTER
"  7/7/2020      RE: Nuris VASQUEZ Reddy  [address] [place] [address]       Nuris is being evaluated via a billable teledermatology visit.             The patient has been notified of following:         \"We have asked you to send in photos via [employer] or e-mail. These photos will be seen and reviewed by an MD or PAJavierC.  A telederm visit is not as thorough as an in-person visit, photo assessment does not replace an in-person skin exam.  The quality of the photograph sent may not be of the same quality as that taken by the dermatology clinic. With that being said, we have found that certain health care needs can be provided without the need for a physical exam.  This service lets us provide the care you need with a short phone conversation. If prescriptions are needed we can send directly to your pharmacy.If lab work is needed we can place an order for that and you can then stop by our lab to have the test done at a later time. An MD/PA/Resident will call you around the time of your visit. This may be from a blocked number.     This is a billable visit. If during the course of the call the physician/provider feels a telephone visit is not appropriate, you will not be charged for this service.            Patient has given verbal consent for Telephone visit?  Yes           The patient would like to proceed with an teledermatology because of the COVID Pandemic.     Patient complains of    Dermatitis follow up, few flare ups treating with   Triamcinolone ointent  and Dupixent     ALLERGIES REVIEWED?  yes      [facility] ((National Emergency Concerning the CORONAVIRUS (COVID 19), preferred for return patients. )    Image quality and interpretability: acceptable    Physician has received verbal consent for a Video/Photos Visit from the patient? Yes    In-person dermatology visit recommendation: no    Consent has been obtained for this service by 1 care team member: yes. "     Teledermatology information:  - Location of patient: Home  - Location of teledermatologist:  ([facility] (Dr. Tena, [place], [place])  - Reason teledermatology is appropriate:  of National Emergency Regarding Coronavirus disease (COVID 19) Outbreak  - Method of transmission:  Store and Forward ((National Emergency Concerning the CORONAVIRUS (COVID 19), preferred for return patients.   - Date of images: 07/07/20  - Service start time:0957  - Service end time:1007  - Date of report: 07/07/20    ___________________________________________________________________________    DERMATOLOGY CLINIC VISIT NOTE     Dermatology Problem List:  1. Intermittent photosensitive rxn, pruritic. Negative CARIE, VIKTORIA, B vitamins, zinc.   2. Seborrheic dermatitis  3. 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      HISTORY OF PRESENT ILLNESS:  Ms. Reddy is a 19-year-old female who returns to Dermatology Clinic for ongoing evaluation of severe atopic dermatitis.  She stared dupilumab on 5/19/20. She has been off prednisone for >1 month. She notes that use of triamcinolone 0.1% ointment on the face helps after applying for 3 days in a row for flares. She is not using protopic. She thinks that the dupilumab is helping, but has received info that her copay card is running out and the copay will otherwise be $3000. She wonders if there are other options.      Patient Active Problem List   Diagnosis     Other congenital anomalies of intestine     Proteinuria     Ulceration of intestine     Short gut syndrome     Iron deficiency anemia     Chronic rhinitis     Other atopic dermatitis       Allergies   Allergen Reactions     No Known Allergies          Current Outpatient Medications   Medication     Cetirizine HCl (ZYRTEC ALLERGY PO)     Cyanocobalamin  (VITAMIN B-12) 50 MCG TABS     Dupilumab (DUPIXENT) 300 MG/2ML syringe     Ferrous Sulfate (IRON PO)     fexofenadine (ALLEGRA) 60 MG tablet     fluticasone (FLONASE) 50 MCG/ACT nasal spray     ketoconazole (NIZORAL) 2 % external shampoo     Loratadine-Pseudoephedrine (CLARITIN-D 24 HOUR PO)     Multiple Vitamins-Iron (MULTIVITAMIN/IRON PO)     triamcinolone (KENALOG) 0.1 % external ointment     VENTOLIN  (90 Base) MCG/ACT inhaler     hydrocortisone 2.5 % cream     mometasone (ELOCON) 0.1 % external solution     mupirocin (BACTROBAN) 2 % external ointment     norgestim-eth estrad triphasic (TRINESSA, 28,) 0.18/0.215/0.25 MG-35 MCG tablet     tacrolimus (PROTOPIC) 0.1 % external ointment     triamcinolone (KENALOG) 0.1 % external cream     No current facility-administered medications for this visit.      SOCIAL HISTORY:  The patient is a college student.      FAMILY HISTORY:  Sister with asthma.      REVIEW OF SYSTEMS:  No rhinorrhea, headache, cough, fevers, wheezing, eye pain.      PHYSICAL EXAMINATION:   GENERAL:  Nuris is a healthy-appearing 19-year-old female in no distress.   NEURO: Normal mentation and speech  PSYCH: Normal mood and affect  HEENT:  Conjunctivae clear.   SKIN:  Exam included the face: Indurated pink plaques on the bilateral cheeks and upper eyelids, improved from previous.      ASSESSMENT AND PLAN:   1.  Atopic dermatitis severe, with facial swelling. She notes that she has required systemic steroids to manage her eruption. I continue to question a contactant, but allergy testing was unrevealing. Since starting dupilumab she has had improved control of the dermatitis, but has been using triamcinolone 0.1% ointment on the face more often than I would advise. I suggested using the triamcinolone only for 1-2 days if flares and then transitioning to protopic 0.1% ointment daily for maintenance. I have messaged Ms. Ojeda with pharmacy to determine coverage information for Nuris. There are  not good alternatives to the dupilumab, especially given her history of short gut. Methotrexate could worsen GI symptoms. Nuris should update me with coverage info in the next few weeks to make a plan prior to her leaving for college.       2.  History of photosensitivity. Normal B vitamin levels when checked previously. No current concerns, but patient avoiding the sun.  Past evaluation with CARIE, VIKTORIA, complement was negative. Will determine if this remains an issue after dupilumab       Patient to send me an update in 2 weeks and have repeat phone visit in 4 weeks.     Mady Tena MD  Pediatric Dermatology Staff       cc:   Aliya Osei MD   [facility] [address] [place]  [address]

## 2020-08-01 ENCOUNTER — MYC MEDICAL ADVICE (OUTPATIENT)
Dept: PEDIATRICS | Facility: CLINIC | Age: 20
End: 2020-08-01

## 2020-08-04 ENCOUNTER — VIRTUAL VISIT (OUTPATIENT)
Dept: DERMATOLOGY | Facility: CLINIC | Age: 20
End: 2020-08-04
Payer: COMMERCIAL

## 2020-08-04 DIAGNOSIS — L20.84 INTRINSIC ATOPIC DERMATITIS: Primary | ICD-10-CM

## 2020-08-04 PROCEDURE — 99213 OFFICE O/P EST LOW 20 MIN: CPT | Mod: 95 | Performed by: DERMATOLOGY

## 2020-08-04 NOTE — PROGRESS NOTES
"Nuris is being evaluated via a billable teledermatology visit.             The patient has been notified of following:            \"We have asked you to send in photos via IDInteractt or e-mail. These photos will be seen and reviewed by an MD or PA-C.  A telederm visit is not as thorough as an in-person visit, photo assessment does not replace an in-person skin exam.  The quality of the photograph sent may not be of the same quality as that taken by the dermatology clinic. With that being said, we have found that certain health care needs can be provided without the need for a physical exam.  This service lets us provide the care you need with a short phone conversation. If prescriptions are needed we can send directly to your pharmacy.If lab work is needed we can place an order for that and you can then stop by our lab to have the test done at a later time. An MD/PA/Resident will call you around the time of your visit. This may be from a blocked number.     This is a billable visit. If during the course of the call the physician/provider feels a telephone visit is not appropriate, you will not be charged for this service.            Patient has given verbal consent for Telephone visit?  Yes           The patient would like to proceed with an teledermatology because of the COVID Pandemic.     Patient complains of    Dermatitis patient reports things have been very good, barely and flares.        ALLERGIES REVIEWED?  Yes   "

## 2020-08-04 NOTE — LETTER
"  8/4/2020      RE: Nuris VASQUEZ Reddy  [address] [place] [address]       Nuris is being evaluated via a billable teledermatology visit.             The patient has been notified of following:            \"We have asked you to send in photos via [employer] or e-mail. These photos will be seen and reviewed by an MD or PAJavierC.  A telederm visit is not as thorough as an in-person visit, photo assessment does not replace an in-person skin exam.  The quality of the photograph sent may not be of the same quality as that taken by the dermatology clinic. With that being said, we have found that certain health care needs can be provided without the need for a physical exam.  This service lets us provide the care you need with a short phone conversation. If prescriptions are needed we can send directly to your pharmacy.If lab work is needed we can place an order for that and you can then stop by our lab to have the test done at a later time. An MD/PA/Resident will call you around the time of your visit. This may be from a blocked number.     This is a billable visit. If during the course of the call the physician/provider feels a telephone visit is not appropriate, you will not be charged for this service.            Patient has given verbal consent for Telephone visit?  Yes           The patient would like to proceed with an teledermatology because of the COVID Pandemic.     Patient complains of    Dermatitis patient reports things have been very good, barely and flares.        ALLERGIES REVIEWED?  Yes     [facility] (Store and Forward ((National Emergency Concerning the CORONAVIRUS (COVID 19), preferred for return patients. )    Image quality and interpretability: NA    Physician has received verbal consent for a Video/Photos Visit from the patient? Yes    In-person dermatology visit recommendation: no    Consent has been obtained for this service by 1 care team member: yes.     Teledermatology " information:  - Location of patient: Home  - Location of teledermatologist:  ([facility] (Dr. Tena, [place], [place])  - Reason teledermatology is appropriate:  of National Emergency Regarding Coronavirus disease (COVID 19) Outbreak  - Method of transmission:  Store and Forward ((National Emergency Concerning the CORONAVIRUS (COVID 19), preferred for return patients.   - Date of images: NA  - Service start time:1011  - Service end time:1022  - Date of report: 08/04/20      ___________________________________________________________________________      DERMATOLOGY CLINIC VISIT NOTE     Dermatology Problem List:  1. Intermittent photosensitive rxn, pruritic. Negative CARIE, VIKTORIA, B vitamins, zinc.   2. Seborrheic dermatitis  3. 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      HISTORY OF PRESENT ILLNESS:  Ms. Reddy is a 19-year-old female who returns to Dermatology Clinic for ongoing evaluation of severe atopic dermatitis.  She has been on dupilumab, but has run of this due to cost and lack of coverage. She is in the process of applying for assistance from Dupixent. Using protopic 2-3 times per week on the eyelids, but not having a flare. Mild eye irritation which she attributes to allergies.      Patient Active Problem List   Diagnosis     Other congenital anomalies of intestine     Proteinuria     Ulceration of intestine     Short gut syndrome     Iron deficiency anemia     Chronic rhinitis     Other atopic dermatitis       Allergies   Allergen Reactions     No Known Allergies        Current Outpatient Medications   Medication     Cetirizine HCl (ZYRTEC ALLERGY PO)     Cyanocobalamin (VITAMIN B-12) 50 MCG TABS     Dupilumab (DUPIXENT) 300 MG/2ML syringe     Ferrous Sulfate (IRON PO)     fexofenadine (ALLEGRA) 60 MG tablet      fluticasone (FLONASE) 50 MCG/ACT nasal spray     ketoconazole (NIZORAL) 2 % external shampoo     Loratadine-Pseudoephedrine (CLARITIN-D 24 HOUR PO)     mometasone (ELOCON) 0.1 % external solution     Multiple Vitamins-Iron (MULTIVITAMIN/IRON PO)     norgestim-eth estrad triphasic (TRINESSA, 28,) 0.18/0.215/0.25 MG-35 MCG tablet     Probiotic Product (PRO-BIOTIC BLEND PO)     tacrolimus (PROTOPIC) 0.1 % external ointment     triamcinolone (KENALOG) 0.1 % external ointment     hydrocortisone 2.5 % cream     mupirocin (BACTROBAN) 2 % external ointment     VENTOLIN  (90 Base) MCG/ACT inhaler     No current facility-administered medications for this visit.      SOCIAL HISTORY:  The patient is a college student at [employer].      FAMILY HISTORY:  Sister with asthma.      REVIEW OF SYSTEMS:  No rhinorrhea, headache, cough, fevers, wheezing, eye pain.       PHYSICAL EXAMINATION:   GENERAL:  Nuris is a healthy-appearing 19-year-old female in no distress.   NEURO: Normal mentation and speech  PSYCH: Normal mood and affect      ASSESSMENT AND PLAN:   1.  Atopic dermatitis severe, with facial swelling. She notes that she has required systemic steroids to manage her eruption. I continue to question a contactant, but allergy testing was unrevealing. Since starting dupilumab she has had improved control of the dermatitis, but has run out of medication and is awaiting support from Dupilumab. Forms signed today and faxed back to Klarissa Ojeda. Will need to consider alternatives such as methotrexate if dupilumab is not covered. For now, continue protopic as needed for eyelids.     2.  History of photosensitivity. Normal B vitamin levels when checked previously. No current concerns, but patient avoiding the sun.  Past evaluation with CARIE, VIKTORIA, complement was negative. Will determine if this remains an issue after dupilumab       Patient to f/up via phone in 4 weekis.     Mady Tena MD  Pediatric Dermatology Staff       cc:    Aliya Osei MD   [facility]   [address] [place]  [address]

## 2020-08-04 NOTE — PATIENT INSTRUCTIONS
Pediatric Dermatology  [facility]  [address]  1st [place]  [address]  Phone: [phone]    Pediatric & Adult Dermatology  [facility]  [address]   2nd [address]  [facility] [address]  [phone]                  General information: Dr. Mady Tena is a board-certified dermatologist with subspecialty certification in pediatric dermatology.     Scheduling and Nurse Triage: Dr. Tena sees pediatric patients on Mondays in [place] and adult and pediatric patients on Tuesdays in [place]. The remainder of the week she practices at the [facility]. Please call the above phone numbers to schedule or to talk to a nurse.     -For scheduling at the [place] or [place] locations, or to talk to the triage nurse please call the above phone number at the clinic where you were seen.     -For medication refills, please call your pharmacy.           -I have bill Montes and will sign forms today.

## 2020-08-04 NOTE — PROGRESS NOTES
[facility] (Store and Forward ((National Emergency Concerning the CORONAVIRUS (COVID 19), preferred for return patients. )    Image quality and interpretability: NA    Physician has received verbal consent for a Video/Photos Visit from the patient? Yes    In-person dermatology visit recommendation: no    Consent has been obtained for this service by 1 care team member: yes.     Teledermatology information:  - Location of patient: Home  - Location of teledermatologist:  ([facility] (Dr. Tena, [place], [place])  - Reason teledermatology is appropriate:  of National Emergency Regarding Coronavirus disease (COVID 19) Outbreak  - Method of transmission:  Store and Forward ((National Emergency Concerning the CORONAVIRUS (COVID 19), preferred for return patients.   - Date of images: NA  - Service start time:1011  - Service end time:1022  - Date of report: 08/04/20      ___________________________________________________________________________      DERMATOLOGY CLINIC VISIT NOTE     Dermatology Problem List:  1. Intermittent photosensitive rxn, pruritic. Negative CARIE, VIKTORIA, B vitamins, zinc.   2. Seborrheic dermatitis  3. 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      HISTORY OF PRESENT ILLNESS:  Ms. Reddy is a 19-year-old female who returns to Dermatology Clinic for ongoing evaluation of severe atopic dermatitis.  She has been on dupilumab, but has run of this due to cost and lack of coverage. She is in the process of applying for assistance from Dupixent. Using protopic 2-3 times per week on the eyelids, but not having a flare. Mild eye irritation which she attributes to allergies.      Patient Active Problem List   Diagnosis     Other congenital anomalies of intestine     Proteinuria     Ulceration of  intestine     Short gut syndrome     Iron deficiency anemia     Chronic rhinitis     Other atopic dermatitis       Allergies   Allergen Reactions     No Known Allergies        Current Outpatient Medications   Medication     Cetirizine HCl (ZYRTEC ALLERGY PO)     Cyanocobalamin (VITAMIN B-12) 50 MCG TABS     Dupilumab (DUPIXENT) 300 MG/2ML syringe     Ferrous Sulfate (IRON PO)     fexofenadine (ALLEGRA) 60 MG tablet     fluticasone (FLONASE) 50 MCG/ACT nasal spray     ketoconazole (NIZORAL) 2 % external shampoo     Loratadine-Pseudoephedrine (CLARITIN-D 24 HOUR PO)     mometasone (ELOCON) 0.1 % external solution     Multiple Vitamins-Iron (MULTIVITAMIN/IRON PO)     norgestim-eth estrad triphasic (TRINESSA, 28,) 0.18/0.215/0.25 MG-35 MCG tablet     Probiotic Product (PRO-BIOTIC BLEND PO)     tacrolimus (PROTOPIC) 0.1 % external ointment     triamcinolone (KENALOG) 0.1 % external ointment     hydrocortisone 2.5 % cream     mupirocin (BACTROBAN) 2 % external ointment     VENTOLIN  (90 Base) MCG/ACT inhaler     No current facility-administered medications for this visit.      SOCIAL HISTORY:  The patient is a college student at [employer].      FAMILY HISTORY:  Sister with asthma.      REVIEW OF SYSTEMS:  No rhinorrhea, headache, cough, fevers, wheezing, eye pain.       PHYSICAL EXAMINATION:   GENERAL:  Nuris is a healthy-appearing 19-year-old female in no distress.   NEURO: Normal mentation and speech  PSYCH: Normal mood and affect      ASSESSMENT AND PLAN:   1.  Atopic dermatitis severe, with facial swelling. She notes that she has required systemic steroids to manage her eruption. I continue to question a contactant, but allergy testing was unrevealing. Since starting dupilumab she has had improved control of the dermatitis, but has run out of medication and is awaiting support from Dupilumab. Forms signed today and faxed back to Klarissa Ojeda. Will need to consider alternatives such as methotrexate if dupilumab  is not covered. For now, continue protopic as needed for eyelids.     2.  History of photosensitivity. Normal B vitamin levels when checked previously. No current concerns, but patient avoiding the sun.  Past evaluation with CARIE, VIKTORIA, complement was negative. Will determine if this remains an issue after dupilumab       Patient to f/up via phone in 4 weekis.     Mady Tena MD  Pediatric Dermatology Staff       cc:   Aliya Osei MD   [address] [place]  [address]

## 2020-08-07 ENCOUNTER — MYC MEDICAL ADVICE (OUTPATIENT)
Dept: PEDIATRICS | Facility: CLINIC | Age: 20
End: 2020-08-07

## 2020-08-07 DIAGNOSIS — Q43.8 OTHER CONGENITAL ANOMALIES OF INTESTINE: Primary | ICD-10-CM

## 2020-08-19 DIAGNOSIS — D64.9 ANEMIA, UNSPECIFIED TYPE: ICD-10-CM

## 2020-08-19 LAB — HGB BLD-MCNC: 12.5 G/DL (ref 11.7–15.7)

## 2020-08-19 PROCEDURE — 83550 IRON BINDING TEST: CPT | Performed by: INTERNAL MEDICINE

## 2020-08-19 PROCEDURE — 83540 ASSAY OF IRON: CPT | Performed by: INTERNAL MEDICINE

## 2020-08-19 PROCEDURE — 36415 COLL VENOUS BLD VENIPUNCTURE: CPT | Performed by: INTERNAL MEDICINE

## 2020-08-19 PROCEDURE — 82728 ASSAY OF FERRITIN: CPT | Performed by: INTERNAL MEDICINE

## 2020-08-19 PROCEDURE — 85018 HEMOGLOBIN: CPT | Performed by: INTERNAL MEDICINE

## 2020-08-20 LAB
FERRITIN SERPL-MCNC: 12 NG/ML (ref 12–150)
IRON SATN MFR SERPL: 26 % (ref 15–46)
IRON SERPL-MCNC: 141 UG/DL (ref 35–180)
TIBC SERPL-MCNC: 537 UG/DL (ref 240–430)

## 2020-09-13 DIAGNOSIS — L70.0 ACNE VULGARIS: ICD-10-CM

## 2020-09-14 RX ORDER — NORGESTIMATE AND ETHINYL ESTRADIOL 7DAYSX3 28
1 KIT ORAL DAILY
Qty: 84 TABLET | Refills: 0 | Status: SHIPPED | OUTPATIENT
Start: 2020-09-14 | End: 2020-11-27

## 2020-09-14 NOTE — TELEPHONE ENCOUNTER
Prescription approved per [facility] Refill Protocol.    Smita Zamarripa RN

## 2020-09-26 ENCOUNTER — MYC REFILL (OUTPATIENT)
Dept: DERMATOLOGY | Facility: CLINIC | Age: 20
End: 2020-09-26

## 2020-09-26 DIAGNOSIS — L20.84 INTRINSIC ATOPIC DERMATITIS: ICD-10-CM

## 2020-09-29 RX ORDER — KETOCONAZOLE 20 MG/ML
SHAMPOO TOPICAL
Qty: 120 ML | Refills: 3 | Status: SHIPPED | OUTPATIENT
Start: 2020-09-29 | End: 2024-06-18

## 2020-11-02 ENCOUNTER — TELEPHONE (OUTPATIENT)
Dept: GASTROENTEROLOGY | Facility: CLINIC | Age: 20
End: 2020-11-02

## 2020-11-02 NOTE — TELEPHONE ENCOUNTER
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 ongoing issues wtih ulceration, This Dx is not in the call center GL's for scheduling please review and advise who Pt should schedule with. Thank you!     Action Taken: Message routed to:  [facility] GI    Travel Screening: Not Applicable

## 2020-11-03 NOTE — TELEPHONE ENCOUNTER
Contacted patient and left a message for patient to call back to discuss appointment.   Also sent a my chart message.       reviewed chart. She seems to have a superficial ulcer at her anastomosis which is quite common and likely not significant.     I recommend she see someone in IBD clinic to discuss further and provide reassurance. I am happy to see her.

## 2020-11-06 ENCOUNTER — PATIENT OUTREACH (OUTPATIENT)
Dept: GASTROENTEROLOGY | Facility: CLINIC | Age: 20
End: 2020-11-06

## 2020-11-06 NOTE — PROGRESS NOTES
Patient returned call and scheduled on November 17 at 340 with Dr. MARIANO Barrett. Pt previously seen by [facility].     Chart reviewed by Dr. Gracia     She seems to have a superficial ulcer at her anastomosis which is quite common and likely not significant.     I recommend she see someone in IBD clinic to discuss further and provide reassurance.

## 2020-11-09 NOTE — TELEPHONE ENCOUNTER
REFERRAL INFORMATION:    Referring Provider:  Dr. Aliya Osei    Referring Clinic:  [facility]     Reason for Visit/Diagnosis: Congenital ileal atresia, S/P resection, ongoing issues with ulceration      FUTURE VISIT INFORMATION:    Appointment Date: 11/17/2020    Appointment Time: 3:40 PM      NOTES STATUS DETAILS   OFFICE NOTE from Referring Provider N/A    OFFICE NOTE from Other Specialist Received/ Internal  [facility]:  - 8/12/19 Office visit with Dr. Dianelys King   - 7/30/18 Office visit with Dr. Miley Souza   - 7/17/17 Office visit with Dr. Elizabeth Gonsales     5/5/14 Office visit with Dr. Annamaria Vargas ([facility])    HOSPITAL DISCHARGE SUMMARY/  ED VISITS N/A    OPERATIVE REPORT N/A    MEDICATION LIST Internal         ENDOSCOPY  Received EGD: 7/16/12 ([facility])    COLONOSCOPY Received 1/9/19, 10/22/12 ([facility])    8/12/13, 7/16/12 ([facility])    ERCP N/A    EUS N/A    STOOL TESTING N/A    PERTINENT LABS Internal    PATHOLOGY REPORTS (RELATED) N/A    IMAGING (CT, MRI, EGD, MRCP, Small Bowel Follow Through/SBT, MR/CT Enterography) Received MRI Enterography: 7/24/17 ([facility]) - resolved into PACS on 11/13/2020 11/9/2020 8:57am Fax request sent to [facility] ([phone]) for image noted above. Monae

## 2020-11-14 ENCOUNTER — HEALTH MAINTENANCE LETTER (OUTPATIENT)
Age: 20
End: 2020-11-14

## 2020-11-17 ENCOUNTER — VIRTUAL VISIT (OUTPATIENT)
Dept: GASTROENTEROLOGY | Facility: CLINIC | Age: 20
End: 2020-11-17
Payer: COMMERCIAL

## 2020-11-17 ENCOUNTER — PRE VISIT (OUTPATIENT)
Dept: GASTROENTEROLOGY | Facility: CLINIC | Age: 20
End: 2020-11-17

## 2020-11-17 VITALS — WEIGHT: 135 LBS | HEIGHT: 64 IN | BODY MASS INDEX: 23.05 KG/M2

## 2020-11-17 DIAGNOSIS — D50.0 IRON DEFICIENCY ANEMIA DUE TO CHRONIC BLOOD LOSS: Primary | ICD-10-CM

## 2020-11-17 PROCEDURE — 99204 OFFICE O/P NEW MOD 45 MIN: CPT | Mod: GC | Performed by: INTERNAL MEDICINE

## 2020-11-17 RX ORDER — FERROUS GLUCONATE 324(38)MG
324 TABLET ORAL
Qty: 60 TABLET | Refills: 3 | Status: SHIPPED | OUTPATIENT
Start: 2020-11-17 | End: 2021-03-01 | Stop reason: SINTOL

## 2020-11-17 RX ORDER — POLYETHYLENE GLYCOL 3350 17 G/17G
POWDER, FOR SOLUTION ORAL
Qty: 850 G | Refills: 1 | Status: SHIPPED | OUTPATIENT
Start: 2020-11-17 | End: 2021-04-05

## 2020-11-17 ASSESSMENT — PAIN SCALES - GENERAL: PAINLEVEL: NO PAIN (0)

## 2020-11-17 ASSESSMENT — MIFFLIN-ST. JEOR: SCORE: 1362.36

## 2020-11-17 NOTE — NURSING NOTE
"Chief Complaint   Patient presents with     New Patient     New consult for congenital ileal atresia, s/p resection. ongoing issues wtih ulceration.       Vitals:    11/17/20 1521   Weight: 61.2 kg (135 lb)   Height: 1.626 m (5' 4\")       Body mass index is 23.17 kg/m .            Aaliyah Johnson CMA    "

## 2020-11-17 NOTE — LETTER
"  11/17/2020       RE: Nuris Reddy  [address] [place] [address]      Dear Colleague,    Thank you for referring your patient, Nuris Reddy, to the [facility] GASTROENTEROLOGY [facility]. Please see a copy of my visit note below.    Nuris Reddy is a 20 year old female who is being evaluated via a billable video visit.      The patient has been notified of following:     \"This video visit will be conducted via a call between you and your physician/provider. We have found that certain health care needs can be provided without the need for an in-person physical exam.  This service lets us provide the care you need with a video conversation.  If a prescription is necessary we can send it directly to your pharmacy.  If lab work is needed we can place an order for that and you can then stop by our lab to have the test done at a later time.    Video visits are billed at different rates depending on your insurance coverage.  Please reach out to your insurance provider with any questions.    If during the course of the call the physician/provider feels a video visit is not appropriate, you will not be charged for this service.\"    Patient has given verbal consent for Video visit? Yes  How would you like to obtain your AVS? MyChart  If you are dropped from the video visit, the video invite should be resent to: Text to cell phone: [phone]  Will anyone else be joining your video visit? Yes: Mom and Dad. How would they like to receive their invitation? Other e-mail: [email or web address] (Inspect Manager)    Video-Visit Details    Type of service:  Video Visit    Video Start Time: 4:14 PM  Video End Time: 4:39 PM    Originating Location (pt. Location): Home    Distant Location (provider location):  [facility] GASTROENTEROLOGY [facility]     Platform used for Video Visit: Jackie    I performed a history and physical examination of this patient and discussed the management with Dr. Tapia on " 2020. I reviewed the note and there are no changes to the past medical, family or social history.  A complete 10 point review of systems was obtained. Please see the HPI for pertinent positives and negatives. All other systems were reviewed and were found to be negative. I agree with the documented findings and plan of care as outlined.    History of ileal atresia as a child. Has had recurrent anastomotic ulcer. Was actually placed on sulfasalazine for a while but this was stopped.     Colonoscopy in 2019 with ileocolonic anastomosis with superficial ulcer. Colon was normal. Ileum was normal. Biopsies of the colon and ileum were normal.     EGD without etiology. Biopsies negative for celiac disease    No NSAID use.     Light menstrual cycle, she is on OCPs.     Suspect her anemia is from chronic anastomotic ulcer. Will see if we can manage with ongoing iron supplementation.  If unable to, will need to consider revision of her anastomosis. Prior to that would like to ensure no other small bowel etiology of bleeding (likely would need capsule or MRE). Family would also like to consider trial of sulfasalazine again prior to surgery, which is reasonable, although discussed no real evidence based on mechanism of anastomotic ulcer.     Moisés Gupta MD  GI Attending  Pager: [phone]      GI CLINIC VISIT  2020    CC/REFERRING MD:  No ref. provider found  REASON FOR CONSULTATION:   No ref. provider found for   Chief Complaint   Patient presents with     New Patient     New consult for congenital ileal atresia, s/p resection. ongoing issues wtih ulceration.       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    There are a few  clinical questions that we need to address since then.  First and foremost, she has history of these anastomotic ulcers following her ileal resection as a  for ileal atresia.  On review of literature, it seems that perianastomotic ulceration can present with long-term iron deficiency anemia in children who have had this surgery.  At this time, she has iron responsive process and not anemic.  She does not have any signs of overt GI bleeding.   Endoscopic therapy for this types of ulcerations are limited outside of acute bleeding setting.  We discussed that the risk factors for anastomotic ulcers are NSAID use and smoking (both of which she has been avoiding).  Based on the mechanism of these ulcerations, utility of medication such as sulfasalazine is limited.However if in the future, if this becomes an ongoing issue, she would have to be referred to our surgery colleagues for surgical opinion.  Prior to surgical consultation, would consider doing enterography or video capsule endoscopy for both to rule out any other possible etiologies of blood loss\.    The next question is regarding if this is inflammatory bowel disease.  Based on the findings of recent colonoscopy and MR enterography, the findings are not consistent with Crohn's disease.     She does have occasional abdominal cramping, constipation and bloating.  These seem to be more consistent with irritable bowel syndrome rather than inflammatory bowel disease.  We discussed that symptomatic management with daily fiber and MiraLAX as a mainstay of treatment for her symptoms.  Patient inquired about use of probiotics to help her symptoms, at this time there are no clear evidence to suggest one way or other, however, trial of probiotics is okay it provides for symptomatic relief.  It is okay to discontinue if there is no added benefit that is noted clinically.    She does have borderline iron deficiency anemia.  This could be reflection of small ongoing  loss from anastomotic ulcer.  She has been tested for Crohn's disease in the past.  She has no dietary restrictions.  She inconsistently takes her iron supplement because of its GI side effect.  I recommended changing that to ferrous gluconate and have prescribed that for her as well.  We will repeat a CBC, B12 and folate levels after this visit.  I will follow-up with her in 3 months.    RTC 3 months    Thank you for this consultation.  It was a pleasure to participate in the care of this patient; please contact us with any further questions.      This note was created with voice recognition software, and while reviewed for accuracy, typos may remain.     Patient was discussed with Dr. 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  Prior to this, she was being seen at [facility]; and prior to that was followed at pediatric GI clinic.    She presents today via video visit.  Her parents also joined in    Patient had her surgery as a  and since birth until about 5 or 6 years old, she recalls having loose stools, and after that she had normal stools.  In , she presented with rectal bleeding and underwent colonoscopy which showed anastomotic ulcers.  There was question of IBD after this bleeding, and was maintained on sulfasalazine for many years.  She underwent colonoscopy in early , with biopsy of the anastomotic area showing some ileitis.  She also underwent follow-up MRI read which did not show any evidence of inflammation.  After this her  sulfasalazine was discontinued.    Her current GI symptoms include occasional abdominal cramping, bloating and constipation.  Often feels gassy.  Her bowel habits have somewhat changed after she started in college and started eating cafeteria food.  She otherwise denies any rectal bleeding in fact he has not had any letter of bleeding since her admission in 2012.  No melena.  Denies any dysphagia, odynophagia, nausea or vomiting.  With her bloating, does not have any obstructive symptoms.  She does take iron tablets which seems to give her more constipation.          ROS:    No fevers or chills  No weight loss  No blurry vision, double vision or change in vision  No sore throat  No lymphadenopathy  No headache, paraesthesias, or weakness in a limb  No shortness of breath or wheezing  No chest pain or pressure  No arthralgias or myalgias  No rashes or skin changes  No odynophagia or dysphagia  No BRBPR, hematochezia, melena  No dysuria, frequency or urgency  No hot/cold intolerance or polyria  No anxiety or depression      PREVIOUS ENDOSCOPY:    EGD 2019 January  Normal  Biopsy of the stomach and duodenum were normal.  Negative for H. pylori.    Colonoscopy in 2019 January  -Ileum was noted to be normal.  Ileocecal anastomosis showed superficial ulcer along the one half anastomosis.  This was biopsied.  There were no strictures.  Remainder of the colonic exam was normal.    Pathology results  -Terminal ileum biopsy showed nonspecific mildly active chronic ileitis; random colonic biopsies showed normal colonic mucosa, ulcer biopsy showed nonspecific moderately active chronic ileitis consistent with anastomotic site ulcer        PERTINENT RELEVANT IMAGING OR LABS:  Last hemoglobin from August 2020 was normal.  Ferritin was low normal at 12.  Iron binding capacity was high at 537.  Total iron was 141 and iron saturation index was 26%.    ALLERGIES:     Allergies   Allergen Reactions     No Known Allergies         PERTINENT MEDICATIONS:    Current Outpatient Medications:      Cetirizine HCl (ZYRTEC ALLERGY PO), Take by mouth daily, Disp: , Rfl:      Cyanocobalamin (VITAMIN B-12) 50 MCG TABS, Take 100 mcg by mouth daily., Disp: , Rfl:      Dupilumab (DUPIXENT) 300 MG/2ML syringe, Inject 2 mLs (300 mg) Subcutaneous every 14 days, Disp: 4 mL, Rfl: 11     Ferrous Sulfate (IRON PO), Take 65 mg by mouth daily Patient reports taking 1 Tablet 65 MG of Iron Daily, Disp: , Rfl:      fexofenadine (ALLEGRA) 60 MG tablet, Take 60 mg by mouth 2 times daily, Disp: , Rfl:      fluticasone (FLONASE) 50 MCG/ACT nasal spray, Spray 1-2 sprays into both nostrils daily, Disp: 16 g, Rfl: 11     ketoconazole (NIZORAL) 2 % external shampoo, Use to shampoo twice weekly. Leave on 5 min then rinse., Disp: 120 mL, Rfl: 3     Loratadine-Pseudoephedrine (CLARITIN-D 24 HOUR PO), , Disp: , Rfl:      mometasone (ELOCON) 0.1 % external solution, Apply small amount topically to affected areas of scalp daily prn for up to 2 weeks., Disp: 60 mL, Rfl: 0     Multiple Vitamins-Iron (MULTIVITAMIN/IRON PO), Take 1 tablet by mouth daily., Disp: , Rfl:      norgestim-eth estrad triphasic (TRINESSA, 28,) 0.18/0.215/0.25 MG-35 MCG tablet, Take 1 tablet by mouth daily, Disp: 84 tablet, Rfl: 0     Probiotic Product (PRO-BIOTIC BLEND PO), , Disp: , Rfl:      tacrolimus (PROTOPIC) 0.1 % external ointment, Apply to face daily in rash areas, Disp: 30 g, Rfl: 11     triamcinolone (KENALOG) 0.1 % external ointment, Apply to face rash twice daily for the next two weeks until clear then twice daily as needed., Disp: 60 g, Rfl: 3     VENTOLIN  (90 Base) MCG/ACT inhaler, INHALE 2 PUFFS PO Q 4 H PRF SOB OR WHZ, Disp: , Rfl: 1     hydrocortisone 2.5 % cream, To lips and face rash twice daily as needed for rash (Patient not taking: Reported on 7/7/2020), Disp: 30 g, Rfl: 1     mupirocin (BACTROBAN) 2 % external ointment, Use 2 times a day to affected area with crusting.  (Patient not taking: Reported on 2020), Disp: 22 g, Rfl: 1    Dupexient - will start again next year  Triamcinolone  Mometasone  Zyrtec   B12  MVI  Tacrolimus topical  Off iron tablet       PROBLEM LIST  Patient Active Problem List    Diagnosis Date Noted     Other atopic dermatitis 2019     Priority: Medium     Chronic rhinitis 2016     Priority: Medium     Iron deficiency anemia 2013     Priority: Medium     Short gut syndrome 10/16/2012     Priority: Medium     Ulceration of intestine 2012     Priority: Medium     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 diagnosed due to hematochezia.       Proteinuria 2012     Priority: Medium     Felt consistent with benign intermittent proteinuria or postural proteinuria. Yearly urinalysis and prot/cr ratio. Contact nephrology if the prot/cr is >0.2 or if there are any abnormalities noted in the urinalysis.         Other congenital anomalies of intestine      Priority: Medium     congenital ileal atresia, s/p resection         PERTINENT PAST MEDICAL HISTORY:  Past Medical History:   Diagnosis Date     Anemia 10/16/2012     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, s/p resection     Short gut syndrome 10/16/2012    ~ 20 cm of ileum as well as ileocecal valve resected in  period     Ulceration of intestine 2012       PREVIOUS SURGERIES:  Past Surgical History:   Procedure Laterality Date     COLONOSCOPY  2012     COLONOSCOPY  2012     RESECTION ILEOCECAL  1999    ~ 20 cm of ileum as well as ileocecal valve resected in  period       SOCIAL HISTORY:  Social History     Socioeconomic History     Marital status: Single     Spouse name: Not on file     Number of children: Not on file     Years of education: Not on file     Highest education level: Some college, no degree   Occupational History     Not on file   Social Needs     Financial resource strain: Not very hard     Food  insecurity     Worry: Never true     Inability: Never true     Transportation needs     Medical: No     Non-medical: No   Tobacco Use     Smoking status: Never Smoker     Smokeless tobacco: Never Used     Tobacco comment: Non-smoking home   Substance and Sexual Activity     Alcohol use: No     Frequency: Monthly or less     Drinks per session: 3 or 4     Binge frequency: Never     Drug use: No     Sexual activity: Never   Lifestyle     Physical activity     Days per week: 1 day     Minutes per session: 90 min     Stress: Only a little   Relationships     Social connections     Talks on phone: More than three times a week     Gets together: Twice a week     Attends Mu-ism service: More than 4 times per year     Active member of club or organization: Yes     Attends meetings of clubs or organizations: 1 to 4 times per year     Relationship status: Never      Intimate partner violence     Fear of current or ex partner: Not on file     Emotionally abused: Not on file     Physically abused: Not on file     Forced sexual activity: Not on file   Other Topics Concern     Parent/sibling w/ CABG, MI or angioplasty before 65F 55M? Not Asked   Social History Narrative     Not on file       FAMILY HISTORY:  Family History   Problem Relation Age of Onset     Hypertension Paternal Grandmother      Hypertension Paternal Grandfather      Cardiovascular Paternal Grandfather         Valve condition     Cancer Sister         ovarian teratoma, s/p resection     Kidney Disease Maternal Uncle         kidney stones       Past/family/social history reviewed and no changes    PHYSICAL EXAMINATION:  This is a video visit.  She appeared healthy in no acute distress.  Answering questions appropriately.  Normal respiratory effort.  Visual skin was clear without any jaundice.  Cranial nerves are grossly intact.  Full and normal affect.    Arnold Tapia MD

## 2020-11-17 NOTE — PROGRESS NOTES
GI CLINIC VISIT  2020      CC/REFERRING MD:  No ref. provider found  REASON FOR CONSULTATION:   No ref. provider found for   Chief Complaint   Patient presents with     New Patient     New consult for congenital ileal atresia, s/p resection. ongoing issues wtih ulceration.       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    There are a few clinical questions that we need to address since then.  First and foremost, she has history of these anastomotic ulcers following her ileal resection as a  for ileal atresia.  On review of literature, it seems that perianastomotic ulceration can present with long-term iron deficiency anemia in children who have had this surgery.  At this time, she has iron responsive process and not anemic.  She does not have any signs of overt GI bleeding.   Endoscopic therapy for this types of ulcerations are limited outside of acute bleeding setting.  We discussed that the risk factors for anastomotic ulcers are NSAID use and smoking (both of which she has been avoiding).  Based on the mechanism of these ulcerations, utility of medication such as sulfasalazine is limited.However if in the future, if this becomes an ongoing issue, she would have to be referred to our surgery colleagues for surgical opinion.  Prior to surgical consultation, would consider doing enterography or video capsule endoscopy for both to rule out any other possible etiologies of blood loss\.    The next question is regarding if this is inflammatory bowel disease.  Based on the findings of recent colonoscopy and MR enterography, the findings are not consistent with Crohn's disease.     She does have occasional  abdominal cramping, constipation and bloating.  These seem to be more consistent with irritable bowel syndrome rather than inflammatory bowel disease.  We discussed that symptomatic management with daily fiber and MiraLAX as a mainstay of treatment for her symptoms.  Patient inquired about use of probiotics to help her symptoms, at this time there are no clear evidence to suggest one way or other, however, trial of probiotics is okay it provides for symptomatic relief.  It is okay to discontinue if there is no added benefit that is noted clinically.    She does have borderline iron deficiency anemia.  This could be reflection of small ongoing loss from anastomotic ulcer.  She has been tested for Crohn's disease in the past.  She has no dietary restrictions.  She inconsistently takes her iron supplement because of its GI side effect.  I recommended changing that to ferrous gluconate and have prescribed that for her as well.  We will repeat a CBC, B12 and folate levels after this visit.  I will follow-up with her in 3 months.        RTC 3 months    Thank you for this consultation.  It was a pleasure to participate in the care of this patient; please contact us with any further questions.      This note was created with voice recognition software, and while reviewed for accuracy, typos may remain.     Patient was discussed with Dr. 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  Prior to this, she was being seen at [facility]; and prior to that was followed  at pediatric GI clinic.    She presents today via video visit.  Her parents also joined in    Patient had her surgery as a  and since birth until about 5 or 6 years old, she recalls having loose stools, and after that she had normal stools.  In , she presented with rectal bleeding and underwent colonoscopy which showed anastomotic ulcers.  There was question of IBD after this bleeding, and was maintained on sulfasalazine for many years.  She underwent colonoscopy in early , with biopsy of the anastomotic area showing some ileitis.  She also underwent follow-up MRI read which did not show any evidence of inflammation.  After this her sulfasalazine was discontinued.    Her current GI symptoms include occasional abdominal cramping, bloating and constipation.  Often feels gassy.  Her bowel habits have somewhat changed after she started in college and started eating cafeteria food.  She otherwise denies any rectal bleeding in fact he has not had any letter of bleeding since her admission in .  No melena.  Denies any dysphagia, odynophagia, nausea or vomiting.  With her bloating, does not have any obstructive symptoms.  She does take iron tablets which seems to give her more constipation.          ROS:    No fevers or chills  No weight loss  No blurry vision, double vision or change in vision  No sore throat  No lymphadenopathy  No headache, paraesthesias, or weakness in a limb  No shortness of breath or wheezing  No chest pain or pressure  No arthralgias or myalgias  No rashes or skin changes  No odynophagia or dysphagia  No BRBPR, hematochezia, melena  No dysuria, frequency or urgency  No hot/cold intolerance or polyria  No anxiety or depression      PREVIOUS ENDOSCOPY:    EGD 2019  Normal  Biopsy of the stomach and duodenum were normal.  Negative for H. pylori.    Colonoscopy in 2019  -Ileum was noted to be normal.  Ileocecal anastomosis showed superficial ulcer along the one half  anastomosis.  This was biopsied.  There were no strictures.  Remainder of the colonic exam was normal.    Pathology results  -Terminal ileum biopsy showed nonspecific mildly active chronic ileitis; random colonic biopsies showed normal colonic mucosa, ulcer biopsy showed nonspecific moderately active chronic ileitis consistent with anastomotic site ulcer        PERTINENT RELEVANT IMAGING OR LABS:  Last hemoglobin from August 2020 was normal.  Ferritin was low normal at 12.  Iron binding capacity was high at 537.  Total iron was 141 and iron saturation index was 26%.    ALLERGIES:     Allergies   Allergen Reactions     No Known Allergies        PERTINENT MEDICATIONS:    Current Outpatient Medications:      Cetirizine HCl (ZYRTEC ALLERGY PO), Take by mouth daily, Disp: , Rfl:      Cyanocobalamin (VITAMIN B-12) 50 MCG TABS, Take 100 mcg by mouth daily., Disp: , Rfl:      Dupilumab (DUPIXENT) 300 MG/2ML syringe, Inject 2 mLs (300 mg) Subcutaneous every 14 days, Disp: 4 mL, Rfl: 11     Ferrous Sulfate (IRON PO), Take 65 mg by mouth daily Patient reports taking 1 Tablet 65 MG of Iron Daily, Disp: , Rfl:      fexofenadine (ALLEGRA) 60 MG tablet, Take 60 mg by mouth 2 times daily, Disp: , Rfl:      fluticasone (FLONASE) 50 MCG/ACT nasal spray, Spray 1-2 sprays into both nostrils daily, Disp: 16 g, Rfl: 11     ketoconazole (NIZORAL) 2 % external shampoo, Use to shampoo twice weekly. Leave on 5 min then rinse., Disp: 120 mL, Rfl: 3     Loratadine-Pseudoephedrine (CLARITIN-D 24 HOUR PO), , Disp: , Rfl:      mometasone (ELOCON) 0.1 % external solution, Apply small amount topically to affected areas of scalp daily prn for up to 2 weeks., Disp: 60 mL, Rfl: 0     Multiple Vitamins-Iron (MULTIVITAMIN/IRON PO), Take 1 tablet by mouth daily., Disp: , Rfl:      norgestim-eth estrad triphasic (TRINESSA, 28,) 0.18/0.215/0.25 MG-35 MCG tablet, Take 1 tablet by mouth daily, Disp: 84 tablet, Rfl: 0     Probiotic Product (PRO-BIOTIC BLEND  PO), , Disp: , Rfl:      tacrolimus (PROTOPIC) 0.1 % external ointment, Apply to face daily in rash areas, Disp: 30 g, Rfl: 11     triamcinolone (KENALOG) 0.1 % external ointment, Apply to face rash twice daily for the next two weeks until clear then twice daily as needed., Disp: 60 g, Rfl: 3     VENTOLIN  (90 Base) MCG/ACT inhaler, INHALE 2 PUFFS PO Q 4 H PRF SOB OR WHZ, Disp: , Rfl: 1     hydrocortisone 2.5 % cream, To lips and face rash twice daily as needed for rash (Patient not taking: Reported on 7/7/2020), Disp: 30 g, Rfl: 1     mupirocin (BACTROBAN) 2 % external ointment, Use 2 times a day to affected area with crusting. (Patient not taking: Reported on 7/7/2020), Disp: 22 g, Rfl: 1    Dupexient - will start again next year  Triamcinolone  Mometasone  Zyrtec   B12  MVI  Tacrolimus topical  Off iron tablet       PROBLEM LIST  Patient Active Problem List    Diagnosis Date Noted     Other atopic dermatitis 07/29/2019     Priority: Medium     Chronic rhinitis 05/31/2016     Priority: Medium     Iron deficiency anemia 02/26/2013     Priority: Medium     Short gut syndrome 10/16/2012     Priority: Medium     Ulceration of intestine 07/27/2012     Priority: Medium     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 diagnosed due to hematochezia.       Proteinuria 07/18/2012     Priority: Medium     Felt consistent with benign intermittent proteinuria or postural proteinuria. Yearly urinalysis and prot/cr ratio. Contact nephrology if the prot/cr is >0.2 or if there are any abnormalities noted in the urinalysis.         Other congenital anomalies of intestine      Priority: Medium     congenital ileal atresia, s/p resection         PERTINENT PAST MEDICAL HISTORY:  Past Medical History:   Diagnosis Date     Anemia 10/16/2012     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, s/p resection     Short gut syndrome 10/16/2012    ~ 20 cm of ileum as well as ileocecal valve resected in   period     Ulceration of intestine 2012       PREVIOUS SURGERIES:  Past Surgical History:   Procedure Laterality Date     COLONOSCOPY  2012     COLONOSCOPY  2012     RESECTION ILEOCECAL  1999    ~ 20 cm of ileum as well as ileocecal valve resected in  period       SOCIAL HISTORY:  Social History     Socioeconomic History     Marital status: Single     Spouse name: Not on file     Number of children: Not on file     Years of education: Not on file     Highest education level: Some college, no degree   Occupational History     Not on file   Social Needs     Financial resource strain: Not very hard     Food insecurity     Worry: Never true     Inability: Never true     Transportation needs     Medical: No     Non-medical: No   Tobacco Use     Smoking status: Never Smoker     Smokeless tobacco: Never Used     Tobacco comment: Non-smoking home   Substance and Sexual Activity     Alcohol use: No     Frequency: Monthly or less     Drinks per session: 3 or 4     Binge frequency: Never     Drug use: No     Sexual activity: Never   Lifestyle     Physical activity     Days per week: 1 day     Minutes per session: 90 min     Stress: Only a little   Relationships     Social connections     Talks on phone: More than three times a week     Gets together: Twice a week     Attends Hinduism service: More than 4 times per year     Active member of club or organization: Yes     Attends meetings of clubs or organizations: 1 to 4 times per year     Relationship status: Never      Intimate partner violence     Fear of current or ex partner: Not on file     Emotionally abused: Not on file     Physically abused: Not on file     Forced sexual activity: Not on file   Other Topics Concern     Parent/sibling w/ CABG, MI or angioplasty before 65F 55M? Not Asked   Social History Narrative     Not on file       FAMILY HISTORY:  Family History   Problem Relation Age of Onset     Hypertension Paternal  Grandmother      Hypertension Paternal Grandfather      Cardiovascular Paternal Grandfather         Valve condition     Cancer Sister         ovarian teratoma, s/p resection     Kidney Disease Maternal Uncle         kidney stones       Past/family/social history reviewed and no changes    PHYSICAL EXAMINATION:  This is a video visit.  She appeared healthy in no acute distress.  Answering questions appropriately.  Normal respiratory effort.  Visual skin was clear without any jaundice.  Cranial nerves are grossly intact.  Full and normal affect.

## 2020-11-17 NOTE — PROGRESS NOTES
"Nuris Reddy is a 20 year old female who is being evaluated via a billable video visit.      The patient has been notified of following:     \"This video visit will be conducted via a call between you and your physician/provider. We have found that certain health care needs can be provided without the need for an in-person physical exam.  This service lets us provide the care you need with a video conversation.  If a prescription is necessary we can send it directly to your pharmacy.  If lab work is needed we can place an order for that and you can then stop by our lab to have the test done at a later time.    Video visits are billed at different rates depending on your insurance coverage.  Please reach out to your insurance provider with any questions.    If during the course of the call the physician/provider feels a video visit is not appropriate, you will not be charged for this service.\"    Patient has given verbal consent for Video visit? Yes  How would you like to obtain your AVS? MyChart  If you are dropped from the video visit, the video invite should be resent to: Text to cell phone: [phone]  Will anyone else be joining your video visit? Yes: Mom and Dad. How would they like to receive their invitation? Other e-mail: [email or web address]        Video-Visit Details    Type of service:  Video Visit    Video Start Time: 4:14 PM  Video End Time: 4:39 PM    Originating Location (pt. Location): [place]    Distant Location (provider location):  [facility] [place]     Platform used for Video Visit: Jackie    I performed a history and physical examination of this patient and discussed the management with Dr. Tapia on 11/17/2020. I reviewed the note and there are no changes to the past medical, family or social history.  A complete 10 point review of systems was obtained. Please see the HPI for pertinent positives and negatives. All other systems were reviewed and were found to " be negative. I agree with the documented findings and plan of care as outlined.    History of ileal atresia as a child. Has had recurrent anastomotic ulcer. Was actually placed on sulfasalazine for a while but this was stopped.     Colonoscopy in 2019 with ileocolonic anastomosis with superficial ulcer. Colon was normal. Ileum was normal. Biopsies of the colon and ileum were normal.     EGD without etiology. Biopsies negative for celiac disease    No NSAID use.     Light menstrual cycle, she is on OCPs.     Suspect her anemia is from chronic anastomotic ulcer. Will see if we can manage with ongoing iron supplementation.  If unable to, will need to consider revision of her anastomosis. Prior to that would like to ensure no other small bowel etiology of bleeding (likely would need capsule or MRE). Family would also like to consider trial of sulfasalazine again prior to surgery, which is reasonable, although discussed no real evidence based on mechanism of anastomotic ulcer.     Moisés Gupta MD  GI Attending  Pager: [phone]

## 2020-11-23 DIAGNOSIS — D50.0 IRON DEFICIENCY ANEMIA DUE TO CHRONIC BLOOD LOSS: ICD-10-CM

## 2020-11-23 LAB
BASOPHILS # BLD AUTO: 0.1 10E9/L (ref 0–0.2)
BASOPHILS NFR BLD AUTO: 0.6 %
DIFFERENTIAL METHOD BLD: ABNORMAL
EOSINOPHIL # BLD AUTO: 1.7 10E9/L (ref 0–0.7)
EOSINOPHIL NFR BLD AUTO: 21.4 %
ERYTHROCYTE [DISTWIDTH] IN BLOOD BY AUTOMATED COUNT: 12.9 % (ref 10–15)
HCT VFR BLD AUTO: 34.7 % (ref 35–47)
HGB BLD-MCNC: 11.4 G/DL (ref 11.7–15.7)
LYMPHOCYTES # BLD AUTO: 1.9 10E9/L (ref 0.8–5.3)
LYMPHOCYTES NFR BLD AUTO: 23.8 %
MCH RBC QN AUTO: 28.8 PG (ref 26.5–33)
MCHC RBC AUTO-ENTMCNC: 32.9 G/DL (ref 31.5–36.5)
MCV RBC AUTO: 88 FL (ref 78–100)
MONOCYTES # BLD AUTO: 0.5 10E9/L (ref 0–1.3)
MONOCYTES NFR BLD AUTO: 6.1 %
NEUTROPHILS # BLD AUTO: 3.9 10E9/L (ref 1.6–8.3)
NEUTROPHILS NFR BLD AUTO: 48.1 %
PLATELET # BLD AUTO: 399 10E9/L (ref 150–450)
RBC # BLD AUTO: 3.96 10E12/L (ref 3.8–5.2)
WBC # BLD AUTO: 8.1 10E9/L (ref 4–11)

## 2020-11-23 PROCEDURE — 82607 VITAMIN B-12: CPT | Performed by: INTERNAL MEDICINE

## 2020-11-23 PROCEDURE — 82746 ASSAY OF FOLIC ACID SERUM: CPT | Performed by: INTERNAL MEDICINE

## 2020-11-23 PROCEDURE — 36415 COLL VENOUS BLD VENIPUNCTURE: CPT | Performed by: INTERNAL MEDICINE

## 2020-11-23 PROCEDURE — 85025 COMPLETE CBC W/AUTO DIFF WBC: CPT | Performed by: INTERNAL MEDICINE

## 2020-11-24 LAB
FOLATE SERPL-MCNC: 42.5 NG/ML
VIT B12 SERPL-MCNC: 305 PG/ML (ref 193–986)

## 2021-01-09 ENCOUNTER — VIRTUAL VISIT (OUTPATIENT)
Dept: FAMILY MEDICINE | Facility: OTHER | Age: 21
End: 2021-01-09
Payer: COMMERCIAL

## 2021-01-09 PROCEDURE — 99421 OL DIG E/M SVC 5-10 MIN: CPT | Performed by: PHYSICIAN ASSISTANT

## 2021-01-09 NOTE — PROGRESS NOTES
"Date: 2021 12:44:09  Clinician: Lasha Ludwig  Clinician NPI: [id number]  Patient: Nuris Reddy  Patient : 2000  Patient Address: [address] [place] [address] [address], [place] [address]  Patient Phone: [phone]  Visit Protocol: URI  Patient Summary:  Nuris is a 20 year old ( : 2000 ) female who initiated a [facility] Visit for cold, sinus infection, or influenza. When asked the question \"Please sign me up to receive news, health information and promotions from [facility].\", Nuris responded \"No\".    Nuris states her symptoms started 1-2 days ago.   Her symptoms consist of a sore throat, myalgia, malaise, enlarged lymph nodes, and a cough.   Symptom details     Cough: Nuris coughs a few times an hour and her cough is more bothersome at night. Phlegm comes into her throat when she coughs. She believes her cough is caused by post-nasal drip. The color of the phlegm is clear.     Sore throat: Nuris reports having mild throat pain (1-3 on a 10 point pain scale), has exudate on her tonsils, and can swallow liquids. The lymph nodes in her neck are enlarged. A rash has appeared on the skin since the sore throat started. Nuris uploaded photos of her rash (see below).      Nuris denies having teeth pain, ageusia, diarrhea, wheezing, facial pain or pressure, rhinitis, fever, nasal congestion, nausea, anosmia, ear pain, headache, chills, and vomiting. She also denies having recent facial or sinus surgery in the past 60 days and taking antibiotic medication in the past month. She is not experiencing dyspnea.   Precipitating events  Nuris is not sure if she has been exposed to someone with strep throat. She has not recently been exposed to someone with influenza. Nuris has not been in close contact with any high risk individuals.   Pertinent COVID-19 (Coronavirus) information  Nuris does not work or volunteer as healthcare worker or a . In the past 14 days, Nuris has not worked or " volunteered at a healthcare facility or group living setting.   In the past 14 days, she also has not lived in a congregate living setting.   Nuris has not had a close contact with a laboratory-confirmed COVID-19 patient within 14 days of symptom onset.    Nuris has been tested for COVID-19.      Date(s) of her COVID-19 test as reported by the patient (free text): 10/30/2020 and 11/19/2020       Result of COVID-19 test as reported by the patient (free text): negative, negative       Type of test as reported by the patient (free text): saliva        Nuris has not received a COVID-19 vaccine.   Pertinent medical history  She has been told by her provider to avoid NSAIDs.   Nuris does not get yeast infections when she takes antibiotics.   Nuris does not have diabetes. She denies having immunosuppressive conditions (e.g., chemotherapy, HIV, organ transplant, long-term use of steroids or other immunosuppressive medications, splenectomy). She denies having congestive heart failure and severe COPD. She does not have asthma.   Nuris needs a return to work/school note.   Nuris does not smoke or use smokeless tobacco.   She denies pregnancy and denies breastfeeding. She is currently menstruating.   Additional information as reported by the patient (free text): The rash is on my back and stomach and is bumpy little red dots that are different from my eczema (I don't get eczema on my back either)   Weight: 133 lbs    MEDICATIONS: Daily Multiple Vitamins with Iron oral, All Day Allergy (cetirizine) oral, Allergy Relief (fluticasone) nasal, loratadine-pseudoephedrine oral, triamcinolone (bulk), Allergy Relief (fexofenadine) oral, tacrolimus topical, Digestive Probiotic oral, ketoconazole topical, mometasone topical, polyethylene glycol 8000 (bulk), ferrous gluconate oral, norgestimate-ethinyl estradiol oral, B Complex-Vitamin B12 oral, ALLERGIES: NKDA  Clinician Response:  Dear Nuris,   Your symptoms show that you  may have coronavirus (COVID-19). This illness can cause fever, cough and trouble breathing. Many people get a mild case and get better on their own. Some people can get very sick.  What should I do?  We would like to test you for this virus.   1. Please call [phone] to schedule your visit. Explain that you were referred by [facility] to have a COVID-19 test. Be ready to share your [facility] visit ID number.  * If you need to schedule in [facility] please call [phone] or for [employer] employees please call [phone].  * If you need to schedule in the [place] area please call [phone]. [place] employees call [phone].  The following will serve as your written order for this COVID Test, ordered by me, for the indication of suspected COVID [Z20.828]: The test will be ordered in [employer], our electronic health record, after you are scheduled. It will show as ordered and authorized by Jairon Mcgraw MD.  Order: COVID-19 (Coronavirus) PCR for SYMPTOMATIC testing from [facility].   2. When it's time for your COVID test:  Stay at least 6 feet away from others. (If someone will drive you to your test, stay in the backseat, as far away from the  as you can.)   Cover your mouth and nose with a mask, tissue or washcloth.  Go straight to the testing site. Don't make any stops on the way there or back.      3.Starting now: Stay home and away from others (self-isolate) until:   You've had no fever---and no medicine that reduces fever---for one full day (24 hours). And...   Your other symptoms have gotten better. For example, your cough or breathing has improved. And...   At least 10 days have passed since your symptoms started.       During this time, don't leave the house except for testing or medical care.   Stay in your own room, even for meals. Use your own bathroom if you can.   Stay away from others in your home. No hugging, kissing or shaking hands. No visitors.  Don't go to work, school or anywhere else.    Clean  "\"high touch\" surfaces often (doorknobs, counters, handles, etc.). Use a household cleaning spray or wipes. You'll find a full list of  on the EPA website: www.epa.gov/pesticide-registration/list-n-disinfectants-use-against-sars-cov-2.   Cover your mouth and nose with a mask, tissue or washcloth to avoid spreading germs.  Wash your hands and face often. Use soap and water.  Caregivers in these groups are at risk for severe illness due to COVID-19:  o People 65 years and older  o People who live in a nursing home or long-term care facility  o People with chronic disease (lung, heart, cancer, diabetes, kidney, liver, immunologic)  o People who have a weakened immune system, including those who:   Are in cancer treatment  Take medicine that weakens the immune system, such as corticosteroids  Had a bone marrow or organ transplant  Have an immune deficiency  Have poorly controlled HIV or AIDS  Are obese (body mass index of 40 or higher)  Smoke regularly   o Caregivers should wear gloves while washing dishes, handling laundry and cleaning bedrooms and bathrooms.  o Use caution when washing and drying laundry: Don't shake dirty laundry, and use the warmest water setting that you can.  o For more tips, go to www.cdc.gov/coronavirus/2019-ncov/downloads/10Things.pdf.    4.Sign up for [employer]. We know it's scary to hear that you might have COVID-19. We want to track your symptoms to make sure you're okay over the next 2 weeks. Please look for an email from [employer]---this is a free, online program that we'll use to keep in touch. To sign up, follow the link in the email. Learn more at [email or web address]  How can I take care of myself?   Get lots of rest. Drink extra fluids (unless a doctor has told you not to).   Take Tylenol (acetaminophen) for fever or pain. If you have liver or kidney problems, ask your family doctor if it's okay to take Tylenol.   Adults can take either:    650 mg (two 325 mg " pills) every 4 to 6 hours, or...   1,000 mg (two 500 mg pills) every 8 hours as needed.    Note: Don't take more than 3,000 mg in one day. Acetaminophen is found in many medicines (both prescribed and over-the-counter medicines). Read all labels to be sure you don't take too much.   For children, check the Tylenol bottle for the right dose. The dose is based on the child's age or weight.    If you have other health problems (like cancer, heart failure, an organ transplant or severe kidney disease): Call your specialty clinic if you don't feel better in the next 2 days.       Know when to call 911. Emergency warning signs include:    Trouble breathing or shortness of breath Pain or pressure in the chest that doesn't go away Feeling confused like you haven't felt before, or not being able to wake up Bluish-colored lips or face.  Where can I get more information?   [facility] -- About COVID-19: [email or web address]   CDC -- What to Do If You're Sick: www.cdc.gov/coronavirus/2019-ncov/about/steps-when-sick.html   CDC -- Ending Home Isolation: www.cdc.gov/coronavirus/2019-ncov/hcp/disposition-in-home-patients.html   CDC -- Caring for Someone: www.cdc.gov/coronavirus/2019-ncov/if-you-are-sick/care-for-someone.html   [facility] -- Interim Guidance for Hospital Discharge to Home: www.health.[facility].[place]/diseases/coronavirus/hcp/hospdischarge.pdf   [facility] clinical trials (COVID-19 research studies): clinicalaffairs.[facility]-clinical-trials    Below are the COVID-19 hotlines at the [facility] of Health ([facility]). Interpreters are available.    For health questions: Call [phone] or 1-404.898.4578 (7 a.m. to 7 p.m.) For questions about schools and childcare: Call 262-092-6403 or 1-404.490.7352 (7 a.m. to 7 p.m.)    Diagnosis: Contact with and (suspected) exposure to other viral communicable diseases  Diagnosis ICD: Z20.828  Additional Clinician Notes:  If your symptoms are not improving  or worsen, please go to one of our urgent care locations for evaluation and possible lab work.     Please see your regular doctor for any return to work or school documentation once COVID testing is complete and results are available.

## 2021-01-12 DIAGNOSIS — D50.0 IRON DEFICIENCY ANEMIA DUE TO CHRONIC BLOOD LOSS: ICD-10-CM

## 2021-03-01 ENCOUNTER — TELEPHONE (OUTPATIENT)
Dept: PEDIATRICS | Facility: CLINIC | Age: 21
End: 2021-03-01

## 2021-03-01 ENCOUNTER — OFFICE VISIT (OUTPATIENT)
Dept: PEDIATRICS | Facility: CLINIC | Age: 21
End: 2021-03-01
Payer: COMMERCIAL

## 2021-03-01 VITALS
HEART RATE: 80 BPM | TEMPERATURE: 98.7 F | BODY MASS INDEX: 21.97 KG/M2 | RESPIRATION RATE: 20 BRPM | SYSTOLIC BLOOD PRESSURE: 94 MMHG | DIASTOLIC BLOOD PRESSURE: 60 MMHG | HEIGHT: 64 IN | WEIGHT: 128.7 LBS

## 2021-03-01 DIAGNOSIS — L70.0 ACNE VULGARIS: ICD-10-CM

## 2021-03-01 DIAGNOSIS — R80.9 PROTEINURIA, UNSPECIFIED TYPE: ICD-10-CM

## 2021-03-01 DIAGNOSIS — Z11.3 ROUTINE SCREENING FOR STI (SEXUALLY TRANSMITTED INFECTION): ICD-10-CM

## 2021-03-01 DIAGNOSIS — D50.0 IRON DEFICIENCY ANEMIA DUE TO CHRONIC BLOOD LOSS: ICD-10-CM

## 2021-03-01 DIAGNOSIS — Z12.4 SCREENING FOR MALIGNANT NEOPLASM OF CERVIX: ICD-10-CM

## 2021-03-01 DIAGNOSIS — Z11.59 NEED FOR HEPATITIS C SCREENING TEST: ICD-10-CM

## 2021-03-01 DIAGNOSIS — Z11.4 SCREENING FOR HIV (HUMAN IMMUNODEFICIENCY VIRUS): ICD-10-CM

## 2021-03-01 DIAGNOSIS — Z00.00 ROUTINE GENERAL MEDICAL EXAMINATION AT A HEALTH CARE FACILITY: Primary | ICD-10-CM

## 2021-03-01 DIAGNOSIS — L20.89 OTHER ATOPIC DERMATITIS: ICD-10-CM

## 2021-03-01 PROCEDURE — G0145 SCR C/V CYTO,THINLAYER,RESCR: HCPCS | Performed by: INTERNAL MEDICINE

## 2021-03-01 PROCEDURE — 87591 N.GONORRHOEAE DNA AMP PROB: CPT | Performed by: INTERNAL MEDICINE

## 2021-03-01 PROCEDURE — 99395 PREV VISIT EST AGE 18-39: CPT | Performed by: INTERNAL MEDICINE

## 2021-03-01 PROCEDURE — 99214 OFFICE O/P EST MOD 30 MIN: CPT | Mod: 25 | Performed by: INTERNAL MEDICINE

## 2021-03-01 PROCEDURE — 87491 CHLMYD TRACH DNA AMP PROBE: CPT | Performed by: INTERNAL MEDICINE

## 2021-03-01 PROCEDURE — 87624 HPV HI-RISK TYP POOLED RSLT: CPT | Performed by: INTERNAL MEDICINE

## 2021-03-01 RX ORDER — MULTIVIT WITH MINERALS/LUTEIN
250 TABLET ORAL DAILY
Qty: 100 TABLET | Refills: 11 | Status: SHIPPED | OUTPATIENT
Start: 2021-03-01 | End: 2023-01-20

## 2021-03-01 RX ORDER — NORGESTIMATE AND ETHINYL ESTRADIOL 7DAYSX3 28
1 KIT ORAL DAILY
Qty: 84 TABLET | Refills: 4 | Status: SHIPPED | OUTPATIENT
Start: 2021-03-01 | End: 2022-01-26

## 2021-03-01 RX ORDER — DOCUSATE SODIUM 100 MG/1
100-200 CAPSULE, LIQUID FILLED ORAL 2 TIMES DAILY
Qty: 120 CAPSULE | Refills: 11 | Status: SHIPPED | OUTPATIENT
Start: 2021-03-01 | End: 2023-01-20

## 2021-03-01 ASSESSMENT — ENCOUNTER SYMPTOMS
CONSTIPATION: 1
HEMATOCHEZIA: 0
SORE THROAT: 0
HEARTBURN: 0
DIARRHEA: 0
COUGH: 0
FEVER: 0
MYALGIAS: 0
HEADACHES: 0
DYSURIA: 0
JOINT SWELLING: 0
ARTHRALGIAS: 0
PARESTHESIAS: 0
WEAKNESS: 0
NERVOUS/ANXIOUS: 0
EYE PAIN: 0
FREQUENCY: 0
PALPITATIONS: 0
SHORTNESS OF BREATH: 0
ABDOMINAL PAIN: 1
HEMATURIA: 0
NAUSEA: 0
BREAST MASS: 0
CHILLS: 0
DIZZINESS: 0

## 2021-03-01 ASSESSMENT — MIFFLIN-ST. JEOR: SCORE: 1333.78

## 2021-03-01 NOTE — PROGRESS NOTES
SUBJECTIVE:   CC: Nuris Reddy is an 20 year old woman who presents for preventive health visit.       Patient has been advised of split billing requirements and indicates understanding: Yes  Healthy Habits:     Getting at least 3 servings of Calcium per day:  Yes    Bi-annual eye exam:  Yes    Dental care twice a year:  Yes    Sleep apnea or symptoms of sleep apnea:  Daytime drowsiness    Diet:  Regular (no restrictions)    Frequency of exercise:  2-3 days/week    Duration of exercise:  30-45 minutes    Taking medications regularly:  No    Barriers to taking medications:  Side effects    Medication side effects:  Other    PHQ-2 Total Score: 0    Additional concerns today:  No        Today's PHQ-2 Score: 0  PHQ-2 ( 2000 Pfizer) 3/1/2021   Q1: Little interest or pleasure in doing things 0   Q2: Feeling down, depressed or hopeless 0   PHQ-2 Score 0   Q1: Little interest or pleasure in doing things Not at all   Q2: Feeling down, depressed or hopeless Not at all   PHQ-2 Score 0       Abuse: Current or Past (Physical, Sexual or Emotional) - No  Do you feel safe in your environment? Yes    Have you ever done Advance Care Planning? (For example, a Health Directive, POLST, or a discussion with a medical provider or your loved ones about your wishes): No, advance care planning information given to patient to review.  Patient declined advance care planning discussion at this time.    Social History     Tobacco Use     Smoking status: Never Smoker     Smokeless tobacco: Never Used     Tobacco comment: Non-smoking home   Substance Use Topics     Alcohol use: No     Frequency: Monthly or less     Drinks per session: 3 or 4     Binge frequency: Never     If you drink alcohol do you typically have >3 drinks per day or >7 drinks per week? No    Alcohol Use 3/1/2021   Prescreen: >3 drinks/day or >7 drinks/week? No   Prescreen: >3 drinks/day or >7 drinks/week? -   No flowsheet data found.      Reviewed orders with patient.   "Reviewed health maintenance and updated orders accordingly - Yes  Labs reviewed in EPIC        History of abnormal Pap smear: NO - age 21-29 PAP every 3 years recommended     Reviewed and updated as needed this visit by clinical staff  Tobacco  Allergies  Meds   Med Hx  Surg Hx  Fam Hx  Soc Hx        Reviewed and updated as needed this visit by Provider                    Review of Systems   Constitutional: Negative for chills and fever.   HENT: Negative for congestion, ear pain, hearing loss and sore throat.    Eyes: Negative for pain and visual disturbance.   Respiratory: Negative for cough and shortness of breath.    Cardiovascular: Negative for chest pain, palpitations and peripheral edema.   Gastrointestinal: Positive for abdominal pain and constipation. Negative for diarrhea, heartburn, hematochezia and nausea.   Breasts:  Negative for tenderness, breast mass and discharge.   Genitourinary: Negative for dysuria, frequency, genital sores, hematuria, pelvic pain, urgency, vaginal bleeding and vaginal discharge.   Musculoskeletal: Negative for arthralgias, joint swelling and myalgias.   Skin: Positive for rash.   Neurological: Negative for dizziness, weakness, headaches and paresthesias.   Psychiatric/Behavioral: Negative for mood changes. The patient is not nervous/anxious.      Eczema bothering her still. Not as bad as last year, and notices that she has rashes worse when she comes home and is with her dog. Better when she is away at school. Dupixent has been helpful but is very expensive so she hasn't been taking.     Has not tolerate oral iron. Takes her multivitamin daily, but the ferrous sulfate and ferrous gluconate both make her stomach hurt within an hour or so of taking.     OBJECTIVE:   BP 94/60   Pulse 80   Temp 98.7  F (37.1  C) (Oral)   Resp 20   Ht 1.626 m (5' 4\")   Wt 58.4 kg (128 lb 11.2 oz)   LMP 02/01/2021   BMI 22.09 kg/m    Physical Exam  GENERAL: healthy, alert and no " distress  EYES: Eyes grossly normal to inspection, PERRL and conjunctivae and sclerae normal  HENT: ear canals and TM's normal, nose and mouth without ulcers or lesions  NECK: no adenopathy, no asymmetry, masses, or scars and thyroid normal to palpation  RESP: lungs clear to auscultation - no rales, rhonchi or wheezes  CV: regular rate and rhythm, normal S1 S2, no S3 or S4, no murmur, click or rub, no peripheral edema and peripheral pulses strong  ABDOMEN: soft, nontender, no hepatosplenomegaly, no masses and bowel sounds normal   (female): normal female external genitalia, normal urethral meatus, vaginal mucosa pink, moist, well rugated, and normal cervix/adnexa/uterus without masses or discharge  MS: no gross musculoskeletal defects noted, no edema  SKIN: no suspicious lesions or rashes  NEURO: Normal strength and tone, mentation intact and speech normal  PSYCH: mentation appears normal, affect normal/bright    Diagnostic Test Results:  Labs reviewed in Epic    ASSESSMENT/PLAN:   1. Routine general medical examination at a health care facility    - REVIEW OF HEALTH MAINTENANCE PROTOCOL ORDERS    2. Other atopic dermatitis  At this point, with symptoms worse at home, discussed following up with allergy. Referral done.  - ALLERGY/ASTHMA ADULT REFERRAL    3. Iron deficiency anemia due to chronic blood loss  Last iron levels OK, currently getting 18 mg elemental iron in her MVI. GI had put her on ferrous gluconate. Will try lower dose to see if she tolerates. Will check lab in 3 months.  - vitamin C (ASCORBIC ACID) 250 MG tablet; Take 1 tablet (250 mg) by mouth daily  Dispense: 100 tablet; Refill: 11  - docusate sodium (COLACE) 100 MG capsule; Take 1-2 capsules (100-200 mg) by mouth 2 times daily  Dispense: 120 capsule; Refill: 11  - CBC with platelets; Future  - **Iron and iron binding capacity FUTURE anytime; Future  - iron (FERROUS GLUCONATE) 256 (28 Fe) MG tablet; 1 tablet by mouth once daily  Dispense: 100  "tablet; Refill: 11    4. Acne vulgaris  refill  - norgestim-eth estrad triphasic (TRINESSA, 28,) 0.18/0.215/0.25 MG-35 MCG tablet; Take 1 tablet by mouth daily  Dispense: 84 tablet; Refill: 4    5. Proteinuria, unspecified type  Due for yearly urine protein/creatinine  - Protein timed urine with Creat Ratio; Future    6. Screening for HIV (human immunodeficiency virus)    - **HIV Antigen Antibody Combo FUTURE anytime; Future    7. Need for hepatitis C screening test    - **Hepatitis C Screen Reflex to RNA FUTURE anytime; Future    8. Screening for malignant neoplasm of cervix    - Pap imaged thin layer screen only - recommended age 21 - 24 years    9. Routine screening for STI (sexually transmitted infection)    - NEISSERIA GONORRHOEA PCR  - CHLAMYDIA TRACHOMATIS PCR    Patient has been advised of split billing requirements and indicates understanding: Yes  COUNSELING:  Reviewed preventive health counseling, as reflected in patient instructions    Estimated body mass index is 22.09 kg/m  as calculated from the following:    Height as of this encounter: 1.626 m (5' 4\").    Weight as of this encounter: 58.4 kg (128 lb 11.2 oz).        She reports that she has never smoked. She has never used smokeless tobacco.      Counseling Resources:  ATP IV Guidelines  Pooled Cohorts Equation Calculator  Breast Cancer Risk Calculator  BRCA-Related Cancer Risk Assessment: FHS-7 Tool  FRAX Risk Assessment  ICSI Preventive Guidelines  Dietary Guidelines for Americans, 2010  USDA's MyPlate  ASA Prophylaxis  Lung CA Screening    Aliya Osei MD  [facility] [place]  "

## 2021-03-01 NOTE — TELEPHONE ENCOUNTER
Not tolerating fergon. Ferrous gluconate 65 mg elemental iron.   Iron order clarification. pls confirm she is taking multivitamin with iron. If yes, would like to know which one and how much iron is in there (elemental).  If not taking MVI, may be better to take PNV or flintstones 2 daily. Either would be about 30 mg elemental iron/day.   If already taking MVI, would do low dose iron 28 or 38 mg elemental iron separate from multivitamin, as I have ordered, with vitamin C.  May be a good option to have her talk with pharmD/MTM.   Aliya Osei M.D.

## 2021-03-01 NOTE — PATIENT INSTRUCTIONS
Preventive Health Recommendations  Female Ages 21 to 25     Yearly exam:     See your health care provider every year in order to  o Review health changes.   o Discuss preventive care.    o Review your medicines if your doctor has prescribed any.      You should be tested each year for STDs (sexually transmitted diseases).       Talk to your provider about how often you should have cholesterol testing.      Get a Pap test every three years. If you have an abnormal result, your doctor may have you test more often.      If you are at risk for diabetes, you should have a diabetes test (fasting glucose).     Shots:     Get a flu shot each year.     Get a tetanus shot every 10 years.     Consider getting the shot (vaccine) that prevents cervical cancer (Gardasil).    Nutrition:     Eat at least 5 servings of fruits and vegetables each day.    Eat whole-grain bread, whole-wheat pasta and brown rice instead of white grains and rice.    Get adequate Calcium and Vitamin D.     Lifestyle    Exercise at least 150 minutes a week each week (30 minutes a day, 5 days a week). This will help you control your weight and prevent disease.    Limit alcohol to one drink per day.    No smoking.     Wear sunscreen to prevent skin cancer.    See your dentist every six months for an exam and cleaning.    Take the low dose iron with vitamin C at least once every day. If you don't tolerate this, let me know.      miralax 1 capful twice daily until you are more regular and soft stools.   Start colace stool softener also. You can keep going with this while you are on the iron.   Patient Education     Understanding Hemorrhoids  Hemorrhoid tissues are  cushions  of blood vessels that swell slightly during bowel movements. Too much pressure on the anal canal can make these tissues stay enlarged. They may become inflamed and cause symptoms. This can happen both inside and outside the anal canal.     Parts of the anal canal  The parts of the anal  canal are:     Internal hemorrhoid tissue is in the upper area of the anal canal.    The rectum is the last several inches of the colon. This is where stool is stored prior to bowel movements.    Anal sphincters are ring-shaped muscles that expand and contract to control the anal opening.    External hemorrhoid tissue lies under the anal skin.    The anus is the passage between the rectum and the outside of the body.  Normal hemorrhoid tissue  Hemorrhoid tissues play a vital role in helping your body get rid of waste. Food passes from the stomach through the intestines. The waste (stool) then travels through the colon to the rectum. It is stored in the rectum until it s ready to be passed from the anus. During bowel movements, hemorrhoids swell with blood and become slightly larger. This swelling helps protect and cushion the anal canal as stool passes from the body. Once the stool has passed, the tissues stop swelling and go back to normal.   Problem hemorrhoids  Pressure due to straining or other factors can cause hemorrhoid tissues to stay swollen. When this happens to the hemorrhoid tissues in the anal canal, they re called internal hemorrhoids. Swollen tissues around the anal opening are called external hemorrhoids. Depending on the location, your symptoms can differ.       Internal hemorrhoids often happen in clusters around the wall of the anal canal. They are often painless. But they may prolapse (protrude out of the anus) due to straining or pressure from hard stool. After the bowel movement is over, they may then reduce (go back inside the body). Internal hemorrhoids often bleed. They can also discharge mucus.    External hemorrhoids are located at the anal opening, just beneath the skin. These tissues rarely cause problems unless they thrombose (form a blood clot). When this happens, a hard, bluish lump may appear. A thrombosed hemorrhoid also causes sudden, severe pain. In time, the clot may go away on  its own. This sometimes leaves a  skin tag  of tissue stretched by the clot.  Hemorrhoid symptoms  Hemorrhoid symptoms may include:     Pain or a burning sensation    Bleeding during bowel movements    Protrusion of tissue from the anus    Feeling of blockage or fullness in the rectum during bowel movements    Itching around the anus    Mucus discharge from the anus  Causes of hemorrhoids  There s no single cause of hemorrhoids. Most often, though, they are caused by too much pressure on the anal canal. This can be due to:     Chronic (ongoing) constipation    Straining during bowel movements    Sitting too long on the toilet    Strenuous exercise or heavy lifting    Pregnancy and childbirth    Aging    Diarrhea  Blanca last reviewed this educational content on 4/1/2019 2000-2020 [employer]. [address] [place] [address], [place] [address]. All rights reserved. This information is not intended as a substitute for professional medical care. Always follow your healthcare professional's instructions.

## 2021-03-01 NOTE — TELEPHONE ENCOUNTER
Talked with Nuris and she is taking multivitamin (Women's) one daily.  Iron is 18mg in MVI  Informed her you would be ordering additional iron pill.  Melissa Gaytan LPN

## 2021-03-02 LAB
C TRACH DNA SPEC QL NAA+PROBE: NEGATIVE
N GONORRHOEA DNA SPEC QL NAA+PROBE: NEGATIVE
SPECIMEN SOURCE: NORMAL
SPECIMEN SOURCE: NORMAL

## 2021-03-04 LAB
COPATH REPORT: NORMAL
PAP: NORMAL

## 2021-03-05 LAB
FINAL DIAGNOSIS: NORMAL
HPV HR 12 DNA CVX QL NAA+PROBE: NEGATIVE
HPV16 DNA SPEC QL NAA+PROBE: NEGATIVE
HPV18 DNA SPEC QL NAA+PROBE: NEGATIVE
SPECIMEN DESCRIPTION: NORMAL
SPECIMEN SOURCE CVX/VAG CYTO: NORMAL

## 2021-03-08 ENCOUNTER — MYC MEDICAL ADVICE (OUTPATIENT)
Dept: PEDIATRICS | Facility: CLINIC | Age: 21
End: 2021-03-08

## 2021-04-05 RX ORDER — POLYETHYLENE GLYCOL 3350 17 G/17G
POWDER, FOR SOLUTION ORAL
Qty: 850 G | Refills: 1 | Status: ON HOLD | OUTPATIENT
Start: 2021-04-05 | End: 2022-05-23

## 2021-06-21 ENCOUNTER — OFFICE VISIT (OUTPATIENT)
Dept: PEDIATRICS | Facility: CLINIC | Age: 21
End: 2021-06-21
Payer: COMMERCIAL

## 2021-06-21 VITALS
HEART RATE: 68 BPM | HEIGHT: 64 IN | DIASTOLIC BLOOD PRESSURE: 64 MMHG | SYSTOLIC BLOOD PRESSURE: 110 MMHG | TEMPERATURE: 98.3 F | WEIGHT: 126.45 LBS | BODY MASS INDEX: 21.59 KG/M2 | OXYGEN SATURATION: 100 % | RESPIRATION RATE: 13 BRPM

## 2021-06-21 DIAGNOSIS — N76.0 ACUTE VAGINITIS: Primary | ICD-10-CM

## 2021-06-21 DIAGNOSIS — Z11.3 ROUTINE SCREENING FOR STI (SEXUALLY TRANSMITTED INFECTION): ICD-10-CM

## 2021-06-21 PROBLEM — L30.9 CHRONIC ECZEMA: Status: ACTIVE | Noted: 2019-07-29

## 2021-06-21 PROCEDURE — 87591 N.GONORRHOEAE DNA AMP PROB: CPT | Performed by: NURSE PRACTITIONER

## 2021-06-21 PROCEDURE — 99213 OFFICE O/P EST LOW 20 MIN: CPT | Performed by: NURSE PRACTITIONER

## 2021-06-21 PROCEDURE — 87491 CHLMYD TRACH DNA AMP PROBE: CPT | Performed by: NURSE PRACTITIONER

## 2021-06-21 RX ORDER — INFLUENZA VIRUS VACCINE 15; 15; 15; 15 UG/.5ML; UG/.5ML; UG/.5ML; UG/.5ML
SUSPENSION INTRAMUSCULAR
COMMUNITY
Start: 2020-11-02 | End: 2023-01-20

## 2021-06-21 ASSESSMENT — MIFFLIN-ST. JEOR: SCORE: 1323.57

## 2021-06-21 NOTE — PROGRESS NOTES
"Assessment & Plan     Acute vaginitis  Vaginitis that resolved with cream  Normal female external genitalia  No rash seen    Routine screening for STI (sexually transmitted infection)  Routine screening  - NEISSERIA GONORRHOEA PCR  - CHLAMYDIA TRACHOMATIS PCR    I spent a total of 10 minutes on the day of the visit.   Time spent doing chart review, history and exam, documentation and further activities per the note       See Patient Instructions  Return if symptoms worsen or fail to improve.    Kylah Sorensen NP  [facility] LALITHA Lawler is a 20 year old who presents for the following health issues:     History of Present Illness       She eats 2-3 servings of fruits and vegetables daily.She consumes 3 sweetened beverage(s) daily.She exercises with enough effort to increase her heart rate 60 or more minutes per day.  She exercises with enough effort to increase her heart rate 4 days per week. She is missing 3 dose(s) of medications per week.       Rash  Left labia  Noticed it a few weeks ago, itchy  Thought it may be a yeast infection  Used Monistat topical cream--stopped itching after a few days but bumps were still present  Prior history of vaginal yeast infection that cleared with use of Monistat cream  Tried new lubricant, unsure if allergic  Sexually active, male partner, 1 partner; uses protection  No exposure to STI that patient is aware of  Denies abnormal vaginal discharge  Denies pain with rash  Rash has not improved or worsened.      Review of Systems   Constitutional, HEENT, cardiovascular, pulmonary, gi and gu systems are negative, except as otherwise noted.      Objective    /64 (BP Location: Right arm, Patient Position: Chair, Cuff Size: Adult Regular)   Pulse 68   Temp 98.3  F (36.8  C) (Tympanic)   Resp 13   Ht 1.626 m (5' 4\")   Wt 57.4 kg (126 lb 7.2 oz)   SpO2 100%   BMI 21.71 kg/m    Body mass index is 21.71 kg/m .     Physical Exam   GENERAL: healthy, " alert and no distress   (female): normal female external genitalia, normal urethral meatus  and vaginal mucosa pink, moist, well rugated  PSYCH: mentation appears normal, affect normal/bright

## 2021-06-21 NOTE — PATIENT INSTRUCTIONS
It was nice meeting you today.    Please let me know if you have any questions regarding today's visit!    Take care,    Kylah Sorensen DNP  Family Medicine

## 2021-07-08 DIAGNOSIS — L20.84 INTRINSIC ATOPIC DERMATITIS: ICD-10-CM

## 2021-07-09 NOTE — TELEPHONE ENCOUNTER
Routing refill request to provider for review/approval because:  Drug interaction warning - triamcinolone and ketoconazole    Richard FUNK RN

## 2021-07-14 RX ORDER — TRIAMCINOLONE ACETONIDE 1 MG/G
OINTMENT TOPICAL
Qty: 60 G | Refills: 1 | Status: SHIPPED | OUTPATIENT
Start: 2021-07-14 | End: 2022-07-21

## 2021-07-26 ENCOUNTER — TELEPHONE (OUTPATIENT)
Dept: PEDIATRICS | Facility: CLINIC | Age: 21
End: 2021-07-26

## 2021-07-26 ENCOUNTER — TRANSFERRED RECORDS (OUTPATIENT)
Dept: HEALTH INFORMATION MANAGEMENT | Facility: CLINIC | Age: 21
End: 2021-07-26

## 2021-07-26 NOTE — TELEPHONE ENCOUNTER
Dr. Tena:    The Pt called in wanting to know if she should hold her Dupilumab (DUPIXENT) 300 MG/2ML syringe medication today due to active illness.   The patient reports a sore throat and a fever of 101 today.  Also has white spots on the back of her tonsils.  I did advise her to come into urgent care today to be seen.   She is wondering if she should hold her medication today, and if so when to resume.     The patient would like a MyChart message with your response versus a call.     Fernanda Reyes RN   [facility] -- Triage Nurse

## 2021-07-27 NOTE — TELEPHONE ENCOUNTER
Please advise no need to hold dupilumab as we don't think about this med as being immunosuppressive for viral or bacterial infections.

## 2021-08-10 ENCOUNTER — TELEPHONE (OUTPATIENT)
Dept: DERMATOLOGY | Facility: CLINIC | Age: 21
End: 2021-08-10

## 2021-08-10 ENCOUNTER — MYC MEDICAL ADVICE (OUTPATIENT)
Dept: PEDIATRICS | Facility: CLINIC | Age: 21
End: 2021-08-10

## 2021-08-10 ENCOUNTER — OFFICE VISIT (OUTPATIENT)
Dept: DERMATOLOGY | Facility: CLINIC | Age: 21
End: 2021-08-10
Payer: COMMERCIAL

## 2021-08-10 ENCOUNTER — OFFICE VISIT (OUTPATIENT)
Dept: PEDIATRICS | Facility: CLINIC | Age: 21
End: 2021-08-10
Payer: COMMERCIAL

## 2021-08-10 VITALS
HEART RATE: 81 BPM | SYSTOLIC BLOOD PRESSURE: 108 MMHG | TEMPERATURE: 98.6 F | BODY MASS INDEX: 21.46 KG/M2 | RESPIRATION RATE: 16 BRPM | HEIGHT: 64 IN | DIASTOLIC BLOOD PRESSURE: 62 MMHG | OXYGEN SATURATION: 100 % | WEIGHT: 125.7 LBS

## 2021-08-10 DIAGNOSIS — Z30.09 GENERAL COUNSELING FOR PRESCRIPTION OF ORAL CONTRACEPTIVES: ICD-10-CM

## 2021-08-10 DIAGNOSIS — Z11.59 NEED FOR HEPATITIS C SCREENING TEST: ICD-10-CM

## 2021-08-10 DIAGNOSIS — L20.84 INTRINSIC ATOPIC DERMATITIS: ICD-10-CM

## 2021-08-10 DIAGNOSIS — D22.9 MULTIPLE NEVI: ICD-10-CM

## 2021-08-10 DIAGNOSIS — B37.31 YEAST VAGINITIS: ICD-10-CM

## 2021-08-10 DIAGNOSIS — Z30.09 COUNSELING FOR BIRTH CONTROL REGARDING INTRAUTERINE DEVICE (IUD): ICD-10-CM

## 2021-08-10 DIAGNOSIS — L29.9 PRURITUS: Primary | ICD-10-CM

## 2021-08-10 DIAGNOSIS — R63.4 UNEXPLAINED WEIGHT LOSS: ICD-10-CM

## 2021-08-10 DIAGNOSIS — L30.9 CHRONIC ECZEMA: ICD-10-CM

## 2021-08-10 DIAGNOSIS — D50.0 IRON DEFICIENCY ANEMIA DUE TO CHRONIC BLOOD LOSS: Primary | ICD-10-CM

## 2021-08-10 DIAGNOSIS — K92.1 HEMATOCHEZIA: ICD-10-CM

## 2021-08-10 DIAGNOSIS — Z11.4 SCREENING FOR HIV (HUMAN IMMUNODEFICIENCY VIRUS): ICD-10-CM

## 2021-08-10 LAB
CLUE CELLS: ABNORMAL
ERYTHROCYTE [DISTWIDTH] IN BLOOD BY AUTOMATED COUNT: 16.5 % (ref 10–15)
HCT VFR BLD AUTO: 25 % (ref 35–47)
HGB BLD-MCNC: 7.6 G/DL (ref 11.7–15.7)
MCH RBC QN AUTO: 22.6 PG (ref 26.5–33)
MCHC RBC AUTO-ENTMCNC: 30.4 G/DL (ref 31.5–36.5)
MCV RBC AUTO: 74 FL (ref 78–100)
PLATELET # BLD AUTO: 589 10E3/UL (ref 150–450)
RBC # BLD AUTO: 3.36 10E6/UL (ref 3.8–5.2)
TRICHOMONAS, WET PREP: ABNORMAL
WBC # BLD AUTO: 7.9 10E3/UL (ref 4–11)
WBC'S/HIGH POWER FIELD, WET PREP: ABNORMAL
YEAST, WET PREP: PRESENT

## 2021-08-10 PROCEDURE — 85027 COMPLETE CBC AUTOMATED: CPT | Performed by: INTERNAL MEDICINE

## 2021-08-10 PROCEDURE — 36415 COLL VENOUS BLD VENIPUNCTURE: CPT | Performed by: INTERNAL MEDICINE

## 2021-08-10 PROCEDURE — 87389 HIV-1 AG W/HIV-1&-2 AB AG IA: CPT | Performed by: INTERNAL MEDICINE

## 2021-08-10 PROCEDURE — 99214 OFFICE O/P EST MOD 30 MIN: CPT | Performed by: DERMATOLOGY

## 2021-08-10 PROCEDURE — 99215 OFFICE O/P EST HI 40 MIN: CPT | Performed by: INTERNAL MEDICINE

## 2021-08-10 PROCEDURE — 86803 HEPATITIS C AB TEST: CPT | Performed by: INTERNAL MEDICINE

## 2021-08-10 PROCEDURE — 87210 SMEAR WET MOUNT SALINE/INK: CPT | Performed by: INTERNAL MEDICINE

## 2021-08-10 PROCEDURE — 83550 IRON BINDING TEST: CPT | Performed by: INTERNAL MEDICINE

## 2021-08-10 PROCEDURE — 84443 ASSAY THYROID STIM HORMONE: CPT | Performed by: INTERNAL MEDICINE

## 2021-08-10 PROCEDURE — 84439 ASSAY OF FREE THYROXINE: CPT | Performed by: INTERNAL MEDICINE

## 2021-08-10 RX ORDER — TACROLIMUS 1 MG/G
OINTMENT TOPICAL
Qty: 30 G | Refills: 11 | Status: SHIPPED | OUTPATIENT
Start: 2021-08-10 | End: 2023-04-04

## 2021-08-10 RX ORDER — TERCONAZOLE 80 MG/1
80 SUPPOSITORY VAGINAL AT BEDTIME
Qty: 3 SUPPOSITORY | Refills: 1 | Status: SHIPPED | OUTPATIENT
Start: 2021-08-10 | End: 2023-01-20

## 2021-08-10 RX ORDER — FERROUS SULFATE 7.5 MG/0.5
3 SYRINGE (EA) ORAL DAILY
Qty: 100 ML | Refills: 1 | Status: SHIPPED | OUTPATIENT
Start: 2021-08-10 | End: 2023-01-20

## 2021-08-10 ASSESSMENT — MIFFLIN-ST. JEOR: SCORE: 1315.17

## 2021-08-10 NOTE — PROGRESS NOTES
"    {PROVIDER CHARTING PREFERENCE:510507}    Subjective   Nuris is a 21 year old who presents for the following health issues {ACCOMPANIED BY STATEMENT (Optional):732718}    History of Present Illness       She eats 2-3 servings of fruits and vegetables daily.She consumes 2 sweetened beverage(s) daily.She exercises with enough effort to increase her heart rate 60 or more minutes per day.  She exercises with enough effort to increase her heart rate 3 or less days per week. She is missing 2 dose(s) of medications per week.  She is not taking prescribed medications regularly due to side effects and remembering to take.       Vaginal Symptoms  Onset/Duration: ***  Description:  Vaginal Discharge: {.:227281::\"none\"}   Itching (Pruritis): {.:714100::\"no\"}  Burning sensation:  {.:084617::\"no\"}  Odor: {.:920644::\"no\"}  Accompanying Signs & Symptoms:  Urinary symptoms: {.:752715::\"no\"}  Abdominal pain: {.:173502::\"no\"}  Fever: {.:746848::\"no\"}  History:   Sexually active: {.:181924::\"no\"}  New Partner: {.:542970::\"no\"}  Possibility of Pregnancy:  {.:445384::\"no\"}  Recent antibiotic use: {.:929957::\"no\"}  Previous vaginitis issues: {.:918214::\"no\"}  Precipitating or alleviating factors: {NONE DEFAULTED:851770::\"None\"}  Therapies tried and outcome: {VAGINAL TREATMENTS:219658::\"none\"}      {additonal problems for provider to add (Optional):927941}    Review of Systems   {ROS COMP (Optional):944829}      Objective    There were no vitals taken for this visit.  There is no height or weight on file to calculate BMI.  Physical Exam   {Exam List (Optional):744411}    {Diagnostic Test Results (Optional):694123}    {AMBULATORY ATTESTATION (Optional):287955}        "

## 2021-08-10 NOTE — LETTER
8/10/2021      RE: Nuris Reddy  [address] [place] [address]           DERMATOLOGY CLINIC VISIT NOTE     Dermatology Problem List:  1. Intermittent photosensitive rxn, pruritic. Negative CARIE, VIKTORIA, B vitamins, zinc.   2. Seborrheic dermatitis  3. Atopic predisposition with intermediate and delayed hypersensitive dermatitis on the hands, lips, face, trunk, extremities  - allergy patch testing on 7/22/19  - atopic prick test on 7/22/19  4. Benign pigmented nevi      CHIEF COMPLAINT:  Followup dermatitis and skin check     HISTORY OF PRESENT ILLNESS:  Ms. Reddy is a 21-year-old female who returns to Dermatology Clinic for ongoing evaluation of severe atopic dermatitis and multiple nevi. She was clear on dupilumab, but needed to stop due to insurance coverage. She was unable to resume and is now having some breakthrough atopic dermatitis.  Using protopic 2-3 times per week on the eyelids, but not having a flare. Using triamcinolone 0.1% ointment as needed.      Patient Active Problem List   Diagnosis     Other congenital anomalies of intestine     Proteinuria     Ulceration of intestine     Short gut syndrome     Iron deficiency anemia     Chronic rhinitis     Chronic eczema       Allergies   Allergen Reactions     No Known Allergies        Current Outpatient Medications   Medication     Cetirizine HCl (ZYRTEC ALLERGY PO)     Cyanocobalamin (VITAMIN B-12) 50 MCG TABS     docusate sodium (COLACE) 100 MG capsule     Dupilumab (DUPIXENT) 300 MG/2ML syringe     Ferrous Gluconate 240 (27 Fe) MG TABS     fexofenadine (ALLEGRA) 60 MG tablet     FLUARIX QUADRIVALENT 0.5 ML injection     fluticasone (FLONASE) 50 MCG/ACT nasal spray     hydrocortisone 2.5 % cream     ketoconazole (NIZORAL) 2 % external shampoo     Loratadine-Pseudoephedrine (CLARITIN-D 24 HOUR PO)     mometasone (ELOCON) 0.1 % external solution     Multiple Vitamins-Iron (MULTIVITAMIN/IRON PO)     norgestim-eth estrad triphasic (TRINESSA, 28,)  0.18/0.215/0.25 MG-35 MCG tablet     polyethylene glycol (MIRALAX) 17 GM/Dose powder     Probiotic Product (PRO-BIOTIC BLEND PO)     tacrolimus (PROTOPIC) 0.1 % external ointment     triamcinolone (KENALOG) 0.1 % external ointment     VENTOLIN  (90 Base) MCG/ACT inhaler     vitamin C (ASCORBIC ACID) 250 MG tablet     No current facility-administered medications for this visit.     SOCIAL HISTORY:  The patient is a college student at [employer]. [employer]. Senior.      FAMILY HISTORY:  Sister with asthma.      REVIEW OF SYSTEMS:  No rhinorrhea, headache, cough, fevers, wheezing, eye pain.       PHYSICAL EXAMINATION:   Exam of the face, neck, chest, abdomen, back, arms, legs, hands, feet, buttocks,. Normal except as follows:  - Pink scaling plaques on the antecubital fossae  - Mild scaling of the eyelids  - Scattered light brown macules on the nose, upper cheeks, shoulders, back  - Approx 100 medium to dark brown 3-5 mm macules on the arms, legs, buttocks, many with indistinct borders but uniform pigment      ASSESSMENT AND PLAN:   1.  Atopic dermatitis severe, with facial swelling. Patch testing unrevealing. Cleared on dupilumab, but now recurring off of this medication. Will resume the dupilumab in preparation for winter with 600 mg loading dose and 300 mg qow. Recommended ongoing protopic to the face BID, triamcinolone ointment to the arms, legs BID, patient to notify me if flaring.     2.  History of photosensitivity. Normal B vitamin levels when checked previously. No current concerns, but patient avoiding the sun.  Past evaluation with CARIE, VIKTORIA, complement was negative.     3. Benign pigmented nevi: No lesions of concern. Sun protection recommended. Discussed ABCDEs of malignant melanoma.        Patient to f/up in 1 year, sooner prn.     Mady Tena MD  Pediatric Dermatology Staff       cc:   Aliya Osei MD   [facility]   [address] [place]  [address]

## 2021-08-10 NOTE — TELEPHONE ENCOUNTER
Received rx for loading dose and chart notes from today's OV state pt has been off of treatment however, [employer] appear to show that patient restarted therapy back in April and she has paid claims at the pharmacy since beginning of May (which was a loading dose). Does she really need to reload again?

## 2021-08-10 NOTE — PROGRESS NOTES
Assessment & Plan     Iron deficiency anemia due to chronic blood loss  Last iron level 8 months ago. 1 episode of hwit blood in stool, but when results returned after she had left clinic, hgb noted at 7.6. Had discussed starting with very low dose iron, but mychart message sent recommending increase dose to 15 mg and try to do bid. In past, her absorption has been good.  - **Iron and iron binding capacity FUTURE anytime  - CBC with platelets  - ferrous sulfate (PATRIC-IN-SOL) 75 (15 FE) MG/ML oral drops; Take 0.2 mLs (3 mg) by mouth daily  - **Hemoglobin FUTURE 14d; Future    Hematochezia  Discussed seeing Dr. Arnold Tapia this fall, but given hgb level, will forward to him for consideration of more urgent appointment.     Unexplained weight loss  Weight has stabilized, check thyroid and follow closely. Further evaluation as indicated.   - TSH  - T4 FREE    Yeast vaginitis  Symptoms consistent. Discussed oral diflucan vs vaginal topical. She prefers topical suppositories. Will let me know if symptoms don't resolve.   - Wet prep - Clinic Collect  - terconazole (TERAZOL 3) 80 MG vaginal suppository; Place 1 suppository (80 mg) vaginally At Bedtime    General counseling for prescription of contraceptives  Concerned OCP may be making her eczema worse. Discussed options. Recommended she continue OCP until she gets alternate backup method in place. Interested in copper iud since no hormones, but as above, needs hgb up before would consider.    Chronic eczema  As above, stable at this time, but does think it worsened after starting OCP.    Need for hepatitis C screening test    - **Hepatitis C Screen Reflex to RNA FUTURE anytime    Screening for HIV (human immunodeficiency virus)    - **HIV Antigen Antibody Combo FUTURE anytime      60 minutes spent on the date of the encounter doing chart review, history and exam, documentation and further activities per the note       See Patient Instructions    Return if symptoms worsen  or fail to improve.    Aliya Osei MD  [facility] LALITHA Lawler is a 21 year old who presents for the following health issues    Contraception    History of Present Illness       She eats 2-3 servings of fruits and vegetables daily.She consumes 2 sweetened beverage(s) daily.She exercises with enough effort to increase her heart rate 60 or more minutes per day.  She exercises with enough effort to increase her heart rate 3 or less days per week. She is missing 2 dose(s) of medications per week.  She is not taking prescribed medications regularly due to side effects and remembering to take.     Episode of blood in stool 3 days ago. Felt stomach upset, pain, then resolved later that day. Feels like her stomach has been more crampy, with intermittent diarrhea and constipation this last year, unusually uncomfortable for her.    Vaginal Symptoms  Onset/Duration: one week  Description:  Vaginal Discharge: none   Itching (Pruritis): YES  Burning sensation:  no  Odor: no  Accompanying Signs & Symptoms:  Urinary symptoms: no  Abdominal pain: no  Fever: no  History:   Sexually active: YES  New Partner: no  Possibility of Pregnancy:  no  Recent antibiotic use: YES- 2 weeks ago was on abx fro strept throat  Previous vaginitis issues: YES- 6/21/21  Precipitating or alleviating factors: Mom also has had these issues  Therapies tried and outcome: none    Discuss weight loss. Hasn't been as active, but still lost weight over winter. Weight relatively stable in the last 3 months    Discuss iron pills causing abdomen pain. Only takes once a week since it makes her very nauseated even at 27 mg elemental iron dose. Takes with vitamin C.      Review of Systems   Constitutional, HEENT, cardiovascular, pulmonary, gi and gu systems are negative, except as otherwise noted.      Objective    /62 (BP Location: Right arm, Patient Position: Sitting, Cuff Size: Adult Regular)   Pulse 81   Temp 98.6  F  "(37  C) (Tympanic)   Resp 16   Ht 1.626 m (5' 4\")   Wt 57 kg (125 lb 11.2 oz)   SpO2 100%   BMI 21.58 kg/m    Body mass index is 21.58 kg/m .  Physical Exam   GENERAL: healthy, alert and no distress  NECK: no adenopathy, no asymmetry, masses, or scars and thyroid normal to palpation  RESP: lungs clear to auscultation - no rales, rhonchi or wheezes  CV: regular rate and rhythm, normal S1 S2, no S3 or S4, no murmur, click or rub, no peripheral edema and peripheral pulses strong  ABDOMEN: soft, nontender, no hepatosplenomegaly, no masses and bowel sounds normal   (female): normal female external genitalia, normal urethral meatus, vaginal mucosa, normal cervix/adnexa/uterus without masses, thick, whitish discharge noted.  MS: no gross musculoskeletal defects noted, no edema    Results for orders placed or performed in visit on 08/10/21 (from the past 24 hour(s))   Wet prep - Clinic Collect    Specimen: Vagina; Swab   Result Value Ref Range    Trichomonas Absent Absent    Yeast Present (A) Absent    Clue Cells Absent Absent    WBCs/high power field 2+ (A) None   CBC with platelets   Result Value Ref Range    WBC Count 7.9 4.0 - 11.0 10e3/uL    RBC Count 3.36 (L) 3.80 - 5.20 10e6/uL    Hemoglobin 7.6 (LL) 11.7 - 15.7 g/dL    Hematocrit 25.0 (L) 35.0 - 47.0 %    MCV 74 (L) 78 - 100 fL    MCH 22.6 (L) 26.5 - 33.0 pg    MCHC 30.4 (L) 31.5 - 36.5 g/dL    RDW 16.5 (H) 10.0 - 15.0 %    Platelet Count 589 (H) 150 - 450 10e3/uL       "

## 2021-08-10 NOTE — PROGRESS NOTES
DERMATOLOGY CLINIC VISIT NOTE     Dermatology Problem List:  1. Intermittent photosensitive rxn, pruritic. Negative CARIE, VIKTORIA, B vitamins, zinc.   2. Seborrheic dermatitis  3. Atopic predisposition with intermediate and delayed hypersensitive dermatitis on the hands, lips, face, trunk, extremities  - allergy patch testing on 7/22/19  - atopic prick test on 7/22/19  4. Benign pigmented nevi      CHIEF COMPLAINT:  Followup dermatitis and skin check     HISTORY OF PRESENT ILLNESS:  Ms. Reddy is a 21-year-old female who returns to Dermatology Clinic for ongoing evaluation of severe atopic dermatitis and multiple nevi. She was clear on dupilumab, but needed to stop due to insurance coverage. She was unable to resume and is now having some breakthrough atopic dermatitis.  Using protopic 2-3 times per week on the eyelids, but not having a flare. Using triamcinolone 0.1% ointment as needed.      Patient Active Problem List   Diagnosis     Other congenital anomalies of intestine     Proteinuria     Ulceration of intestine     Short gut syndrome     Iron deficiency anemia     Chronic rhinitis     Chronic eczema       Allergies   Allergen Reactions     No Known Allergies        Current Outpatient Medications   Medication     Cetirizine HCl (ZYRTEC ALLERGY PO)     Cyanocobalamin (VITAMIN B-12) 50 MCG TABS     docusate sodium (COLACE) 100 MG capsule     Dupilumab (DUPIXENT) 300 MG/2ML syringe     Ferrous Gluconate 240 (27 Fe) MG TABS     fexofenadine (ALLEGRA) 60 MG tablet     FLUARIX QUADRIVALENT 0.5 ML injection     fluticasone (FLONASE) 50 MCG/ACT nasal spray     hydrocortisone 2.5 % cream     ketoconazole (NIZORAL) 2 % external shampoo     Loratadine-Pseudoephedrine (CLARITIN-D 24 HOUR PO)     mometasone (ELOCON) 0.1 % external solution     Multiple Vitamins-Iron (MULTIVITAMIN/IRON PO)     norgestim-eth estrad triphasic (TRINESSA, 28,) 0.18/0.215/0.25 MG-35 MCG tablet     polyethylene glycol (MIRALAX) 17 GM/Dose powder      Probiotic Product (PRO-BIOTIC BLEND PO)     tacrolimus (PROTOPIC) 0.1 % external ointment     triamcinolone (KENALOG) 0.1 % external ointment     VENTOLIN  (90 Base) MCG/ACT inhaler     vitamin C (ASCORBIC ACID) 250 MG tablet     No current facility-administered medications for this visit.     SOCIAL HISTORY:  The patient is a college student at [employer]. [employer]. Senior.      FAMILY HISTORY:  Sister with asthma.      REVIEW OF SYSTEMS:  No rhinorrhea, headache, cough, fevers, wheezing, eye pain.       PHYSICAL EXAMINATION:   Exam of the face, neck, chest, abdomen, back, arms, legs, hands, feet, buttocks,. Normal except as follows:  - Pink scaling plaques on the antecubital fossae  - Mild scaling of the eyelids  - Scattered light brown macules on the nose, upper cheeks, shoulders, back  - Approx 100 medium to dark brown 3-5 mm macules on the arms, legs, buttocks, many with indistinct borders but uniform pigment      ASSESSMENT AND PLAN:   1.  Atopic dermatitis severe, with facial swelling. Patch testing unrevealing. Cleared on dupilumab, but now recurring off of this medication. Will resume the dupilumab in preparation for winter with 600 mg loading dose and 300 mg qow. Recommended ongoing protopic to the face BID, triamcinolone ointment to the arms, legs BID, patient to notify me if flaring.     2.  History of photosensitivity. Normal B vitamin levels when checked previously. No current concerns, but patient avoiding the sun.  Past evaluation with CARIE, VIKTORIA, complement was negative.     3. Benign pigmented nevi: No lesions of concern. Sun protection recommended. Discussed ABCDEs of malignant melanoma.        Patient to f/up in 1 year, sooner prn.     Mady Tena MD  Pediatric Dermatology Staff       cc:   Aliya Osei MD   [facility]   [address] [place]  [address]

## 2021-08-10 NOTE — Clinical Note
Dr. Barrett, Hgb very low at 7.6, increased GI symptoms overall but only 1 episode hematochezia. Wondering if you would like to see her sooner than next available, she may need help scheduling as [place] is [place]. Working on finding iron she will tolerate or I may need to do iron infusion. Thanks for your help with her. Aliya Osei

## 2021-08-10 NOTE — TELEPHONE ENCOUNTER
Prior Authorization Approval    Authorization Effective Date: 7/11/2021  Authorization Expiration Date: 8/10/2022  Medication: Dupixent- Renewal- Approved  Approved Dose/Quantity:300mg  Reference #: [id number]   Insurance Company: [employer] - Phone [phone] Fax [phone]  Expected CoPay:       CoPay Card Available: Yes    Foundation Assistance Needed: N/A  Which Pharmacy is filling the prescription (Not needed for infusion/clinic administered): [facility] - [place], [place] - [address]  Pharmacy Notified: No  Patient Notified: No

## 2021-08-11 LAB
HCV AB SERPL QL IA: NONREACTIVE
HIV 1+2 AB+HIV1 P24 AG SERPL QL IA: NONREACTIVE
IRON SATN MFR SERPL: 2 % (ref 15–46)
IRON SERPL-MCNC: 12 UG/DL (ref 35–180)
T4 FREE SERPL-MCNC: 0.98 NG/DL (ref 0.76–1.46)
TIBC SERPL-MCNC: 575 UG/DL (ref 240–430)
TSH SERPL DL<=0.005 MIU/L-ACNC: 2.28 MU/L (ref 0.4–4)

## 2021-08-19 ENCOUNTER — LAB (OUTPATIENT)
Dept: LAB | Facility: CLINIC | Age: 21
End: 2021-08-19
Payer: COMMERCIAL

## 2021-08-19 DIAGNOSIS — D50.0 IRON DEFICIENCY ANEMIA DUE TO CHRONIC BLOOD LOSS: ICD-10-CM

## 2021-08-19 LAB — HGB BLD-MCNC: 7.3 G/DL (ref 11.7–15.7)

## 2021-08-19 PROCEDURE — 85018 HEMOGLOBIN: CPT

## 2021-08-19 PROCEDURE — 36415 COLL VENOUS BLD VENIPUNCTURE: CPT

## 2021-08-20 NOTE — TELEPHONE ENCOUNTER
REFERRAL INFORMATION:    Referring Provider:  Dr. Aliya Osei     Referring Clinic:   [facility]     Reason for Visit/Diagnosis: Anemia due to chronic blood loss      FUTURE VISIT INFORMATION:    Appointment Date: 9/8/2021    Appointment Time: 10 AM      NOTES STATUS DETAILS   OFFICE NOTE from Referring Provider Internal 8/10/2021 Office visit with Dr. Osei     OFFICE NOTE from Other Specialist Internal/ Received 11/17/2020 Office visit with Dr. Arnold Tapia ([facility])    [facility]:  - 8/12/19 Office visit with Dr. Dianelys King   - 7/30/18 Office visit with Dr. Miley Souza   - 7/17/17 Office visit with Dr. Elizabeth Gonsales      5/5/14 Office visit with Dr. Annamaria Vargas ( [facility])    HOSPITAL DISCHARGE SUMMARY/  ED VISITS N/A    OPERATIVE REPORT N/A    MEDICATION LIST Internal         ENDOSCOPY  Received EGD: 7/16/12 ([facility])    COLONOSCOPY Received 1/9/19, 10/22/12 ([facility])     8/12/13, 7/16/12 ([facility])    ERCP N/A    EUS N/A    STOOL TESTING N/A    PERTINENT LABS Internal    PATHOLOGY REPORTS (RELATED) N/A    IMAGING (CT, MRI, EGD, MRCP, Small Bowel Follow Through/SBT, MR/CT Enterography) Received MRI Enterography: 7/24/17 ([facility])

## 2021-08-23 ENCOUNTER — TELEPHONE (OUTPATIENT)
Dept: PEDIATRICS | Facility: CLINIC | Age: 21
End: 2021-08-23

## 2021-08-23 NOTE — TELEPHONE ENCOUNTER
----- Message from Aliya Osei MD sent at 8/21/2021  7:56 PM CDT -----  Forwarding to \[facility]\"". Please reach out to Nuris next week Monday or tuesday. If not tolerating increase in iron, please see if we can set up virtual visit with [facility] asap.   Aliya Osei M.D.

## 2021-08-23 NOTE — TELEPHONE ENCOUNTER
"Called patient.      Spoke with edilson, father of the patient. CTC on file for Edilson.   Patient's cell [phone].     Called patient cell.  Left a voicemail with instruction to return call.     When patient calls back:  -How is the patient feeling? Any symptoms of low hgb?  -Please relay below note from Dr. Osei to the patient.  -What dose of iron is the patient currently tolerating?  -If not tolerating, the patient needs MTM visit ASAP.      Result note from Dr. Osei from 8/21/21:  \"Your hemoglobin is a bit lower. Not the right direction.      We need to get your iron replacement increased more rapidly. You can start by increasing to twice daily. Then increase the dose from 0.2 ml up to 0.5 ml. If you do OK with this, try increasing to 1 ml twice daily. If you need a lower dose, you could even divide 3 times a day.      With your hemoglobin low, if you tolerate 1 ml twice daily (15 mg twice daily), we should continue to increase. You may tolerate the liquid iron better than the tablets anyway. If you tolerated tablets, I'd have you on 120 mg elemental iron per day, just to give you a frame of reference. For you, I'd be glad to get 60 mg/day or 2 ml twice daily. Again, you didn't tolerate that dose in tablets, but maybe can with liquid.     Looks like a couple weeks before you see gastroenterology. Let's see how high we can get your iron dose before then.      I'm going to order another hemoglobin test in 2-3 more weeks. As long as you don't have any more blood in your stool and otherwise are doing OK we can wait until then.      I'll ask one of our nurses to give you a call next week and see how you are doing with the iron replacement.\"      Italo Conde RN on 8/23/2021 at 8:18 AM      "

## 2021-08-24 NOTE — TELEPHONE ENCOUNTER
Called patient [phone].  Left a voicemail with instruction to call back.    When patient calls back:  -How is the patient feeling? Any symptoms of low hgb?  -Please relay below note from Dr. Osei to the patient.  -What dose of iron is the patient currently tolerating?  -If not tolerating the iron, the patient needs MTM visit ASAP.    Italo Conde RN on 8/24/2021 at 5:22 PM

## 2021-08-25 ENCOUNTER — MYC MEDICAL ADVICE (OUTPATIENT)
Dept: PEDIATRICS | Facility: CLINIC | Age: 21
End: 2021-08-25

## 2021-08-25 NOTE — TELEPHONE ENCOUNTER
Attempted to call patient, left message for pt to return call to clinic. Recent hemglobin low:    Hemoglobin   Date Value Ref Range Status   08/19/2021 7.3 (LL) 11.7 - 15.7 g/dL Final   11/23/2020 11.4 (L) 11.7 - 15.7 g/dL Final

## 2021-08-26 ENCOUNTER — MYC MEDICAL ADVICE (OUTPATIENT)
Dept: PEDIATRICS | Facility: CLINIC | Age: 21
End: 2021-08-26

## 2021-08-26 ENCOUNTER — TELEPHONE (OUTPATIENT)
Dept: PEDIATRICS | Facility: CLINIC | Age: 21
End: 2021-08-26

## 2021-08-26 NOTE — TELEPHONE ENCOUNTER
Please review [employer] message from Patient in encounter from 8/25/21.    Patient notes some symptoms of low hgb such as getting tired easily when exercising and feeling sleepy during class.  Sometimes feels heart pounding and gets restless legs once in a while.  Stool is dark sometimes, but not bloody.     Patient is Currently taking the iron once per day, and is tolerating it well. Patient will go up to twice per day.     Routing to Dr. Osei:  Ok for patient to continue with current plan and increase dose of liquid iron?  Appears that the goal is to get to 60 mg/day or 2ml twice daily.     Italo Conde RN on 8/26/2021 at 11:48 AM

## 2021-08-26 NOTE — TELEPHONE ENCOUNTER
This topic is addressed in another encounter.     Please see mychart encounter from 8/25/21.    Italo Conde RN on 8/26/2021 at 12:50 PM

## 2021-08-26 NOTE — TELEPHONE ENCOUNTER
Recommend she continue with the plan as stated and f/up for repeat hgb in 2-3 weeks.  Keep appt with MIKEL.    Ray Skinner MD

## 2021-08-31 ENCOUNTER — IMMUNIZATION (OUTPATIENT)
Dept: NURSING | Facility: CLINIC | Age: 21
End: 2021-08-31
Payer: COMMERCIAL

## 2021-08-31 PROCEDURE — 91300 PR COVID VAC PFIZER DIL RECON 30 MCG/0.3 ML IM: CPT

## 2021-08-31 PROCEDURE — 0003A PR COVID VAC PFIZER DIL RECON 30 MCG/0.3 ML IM: CPT

## 2021-09-08 ENCOUNTER — VIRTUAL VISIT (OUTPATIENT)
Dept: GASTROENTEROLOGY | Facility: CLINIC | Age: 21
End: 2021-09-08
Attending: INTERNAL MEDICINE
Payer: COMMERCIAL

## 2021-09-08 ENCOUNTER — PRE VISIT (OUTPATIENT)
Dept: GASTROENTEROLOGY | Facility: CLINIC | Age: 21
End: 2021-09-08

## 2021-09-08 VITALS — BODY MASS INDEX: 21.46 KG/M2 | WEIGHT: 125 LBS

## 2021-09-08 DIAGNOSIS — D50.0 IRON DEFICIENCY ANEMIA DUE TO CHRONIC BLOOD LOSS: Primary | ICD-10-CM

## 2021-09-08 DIAGNOSIS — K92.1 HEMATOCHEZIA: ICD-10-CM

## 2021-09-08 PROCEDURE — 99215 OFFICE O/P EST HI 40 MIN: CPT | Mod: 95 | Performed by: PHYSICIAN ASSISTANT

## 2021-09-08 NOTE — NURSING NOTE
Chief Complaint   Patient presents with     New Patient       Vitals:    09/08/21 0942   Weight: 56.7 kg (125 lb)       Body mass index is 21.46 kg/m .    Yuliya Meadows CMA

## 2021-09-08 NOTE — LETTER
2021         RE: Nuris Reddy  [address] [place] [address]        Dear Colleague,    Thank you for referring your patient, Nuris Reddy, to the [facility] [place]. Please see a copy of my visit note below.    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 Anemia, BRBPR  Addendum: Discussed with Dr. MARIANO Barrett and Dr. Gupta.  Given significant drop in hemoglobin, would recommend plan to evaluate with colonoscopy. Unlikely to be IBD, no role for sulfasalazine at this time. Pending results will consider referral to surgery.     I have also messaged [facility] to order iron infusions.     For more chronic symptoms, would recommend trial of fiber supplementation to better regulate stool consistency.   -- colonoscopy. If you have not heard from endoscopy within a week, please call [phone] to schedule.   -- iron infusions  -- start fiber     Thank you for this consultation.  It was a pleasure to participate in the care of this patient; please contact us with any further questions.     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 She has  previously been seen by Dr. QUINTANA and Dr Gupta, and through [facility]. This is my first encounter with the patient. Her parents Caty and Edilson are present for today's visit.     History per Dr. QUINTANA:    Patient had her surgery as a  and since birth until about 5 or 6 years old, she recalls having loose stools, and after that she had normal stools.  In , she presented with rectal bleeding and underwent colonoscopy which showed anastomotic ulcers.  There was question of IBD after this bleeding, and was maintained on sulfasalazine for many years.  She underwent colonoscopy in early , with biopsy of the anastomotic area showing some ileitis.  She also underwent follow-up MRI read which did not show any evidence of inflammation.  After this her sulfasalazine was discontinued.     Her current GI symptoms include occasional abdominal cramping, bloating and constipation.  Often feels gassy.  Her bowel habits have somewhat changed after she started in college and started eating cafeteria food.  She otherwise denies any rectal bleeding in fact he has not had any letter of bleeding since her admission in .  No melena.  Denies any dysphagia, odynophagia, nausea or vomiting.  With her bloating, does not have any obstructive symptoms.  She does take iron tablets which seems to give her more constipation.     Interval History 2021:  Per the patient's mother, she has been working more pale with her skin on her lips starting around  of this year.  Nuris notes that she had an episode of abdominal pain early August.  Around the same time she had bright red blood in her stool which tinted the toilet water red.  She describes that this was mixed into the stool and with wiping, and quantifies amount of blood around one half of a cup.  She notes her stool may have been harder around this time and then later switched to diarrhea.  She denies any further episodes of melena or hematochezia.  She was  seen by her primary care provider a couple days later on August 10 at which time she had hemoglobin check at 7.6.  She does report she was on her period around this time, but states it was not particularly heavy.      Right after her primary care visit she switched her iron dosing to her children's dosing with ferrous sulfate 75 mg- she is taking about 1 mL.  She notes that the change in iron formulation does seem to help with abdominal pain and cramping.  Cramping can be worsened after a meal in afternoon and night.     Her stool pattern has been variable. She has had strep recently. She is usually going 1-2 times a day. She will feel  Fullness in her abdomen. Since switching to kids iron she does not feel like she has needed bowel meds. Her PCP has also given her docusate- she has not taken this recently.    2 episodes- both with coffee, had creamer. 4:30. She got short of breath, light headed, palpatations. She was better around 2 am in the ED. On Thursday of last week- shortness of breath, light headed, tingling sensation in fingers and abdomen. Cotninued to be short of breath into the dollowing day and then gradually got better.       PROBLEM LIST  Patient Active Problem List    Diagnosis Date Noted     Chronic eczema 07/29/2019     Priority: Medium     Chronic rhinitis 05/31/2016     Priority: Medium     Iron deficiency anemia 02/26/2013     Priority: Medium     Short gut syndrome 10/16/2012     Priority: Medium     Ulceration of intestine 07/27/2012     Priority: Medium     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 diagnosed due to hematochezia.       Proteinuria 07/18/2012     Priority: Medium     Felt consistent with benign intermittent proteinuria or postural proteinuria. Yearly urinalysis and prot/cr ratio. Contact nephrology if the prot/cr is >0.2 or if there are any abnormalities noted in the urinalysis.         Other congenital anomalies of intestine      Priority: Medium     congenital ileal  atresia, s/p resection         PERTINENT PAST MEDICAL HISTORY:  Past Medical History:   Diagnosis Date     Anemia 10/16/2012     COPD (chronic obstructive pulmonary disease) (H)      History of blood transfusion      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, s/p resection     Short gut syndrome 10/16/2012    ~ 20 cm of ileum as well as ileocecal valve resected in  period     Ulceration of intestine 2012       PREVIOUS SURGERIES:  Past Surgical History:   Procedure Laterality Date     COLONOSCOPY  2012     COLONOSCOPY  2012     RESECTION ILEOCECAL  1999    ~ 20 cm of ileum as well as ileocecal valve resected in  period       ALLERGIES:  Allergies   Allergen Reactions     No Known Allergies        PERTINENT MEDICATIONS:    Current Outpatient Medications:      Cetirizine HCl (ZYRTEC ALLERGY PO), Take by mouth daily, Disp: , Rfl:      Cyanocobalamin (VITAMIN B-12) 50 MCG TABS, Take 100 mcg by mouth daily., Disp: , Rfl:      docusate sodium (COLACE) 100 MG capsule, Take 1-2 capsules (100-200 mg) by mouth 2 times daily, Disp: 120 capsule, Rfl: 11     Dupilumab 300 MG/2ML SOPN, Inject 300 mg Subcutaneous every 14 days To start after 600 mg loading dose., Disp: 4 mL, Rfl: 11     ferrous sulfate (PATRIC-IN-SOL) 75 (15 FE) MG/ML oral drops, Take 0.2 mLs (3 mg) by mouth daily, Disp: 100 mL, Rfl: 1     fexofenadine (ALLEGRA) 60 MG tablet, Take 60 mg by mouth 2 times daily, Disp: , Rfl:      FLUARIX QUADRIVALENT 0.5 ML injection, ADM 0.5ML IM UTD, Disp: , Rfl:      fluticasone (FLONASE) 50 MCG/ACT nasal spray, Spray 1-2 sprays into both nostrils daily, Disp: 16 g, Rfl: 11     hydrocortisone 2.5 % cream, To lips and face rash twice daily as needed for rash (Patient not taking: Reported on 2020), Disp: 30 g, Rfl: 1     ketoconazole (NIZORAL) 2 % external shampoo, Use to shampoo twice weekly. Leave on 5 min then rinse., Disp: 120 mL, Rfl: 3     Loratadine-Pseudoephedrine  (CLARITIN-D 24 HOUR PO), , Disp: , Rfl:      mometasone (ELOCON) 0.1 % external solution, Apply small amount topically to affected areas of scalp daily prn for up to 2 weeks., Disp: 60 mL, Rfl: 0     Multiple Vitamins-Iron (MULTIVITAMIN/IRON PO), Take 1 tablet by mouth daily., Disp: , Rfl:      norgestim-eth estrad triphasic (TRINESSA, 28,) 0.18/0.215/0.25 MG-35 MCG tablet, Take 1 tablet by mouth daily, Disp: 84 tablet, Rfl: 4     polyethylene glycol (MIRALAX) 17 GM/Dose powder, 1 capful (17 g) in 8 oz of liquid, Disp: 850 g, Rfl: 1     Probiotic Product (PRO-BIOTIC BLEND PO), , Disp: , Rfl:      tacrolimus (PROTOPIC) 0.1 % external ointment, Apply to face daily in rash areas, Disp: 30 g, Rfl: 11     terconazole (TERAZOL 3) 80 MG vaginal suppository, Place 1 suppository (80 mg) vaginally At Bedtime, Disp: 3 suppository, Rfl: 1     triamcinolone (KENALOG) 0.1 % external ointment, Apply to face rash twice daily for the next two weeks until clear then twice daily as needed., Disp: 60 g, Rfl: 1     VENTOLIN  (90 Base) MCG/ACT inhaler, INHALE 2 PUFFS PO Q 4 H PRF SOB OR WHZ, Disp: , Rfl: 1     vitamin C (ASCORBIC ACID) 250 MG tablet, Take 1 tablet (250 mg) by mouth daily, Disp: 100 tablet, Rfl: 11    SOCIAL HISTORY:  Social History     Socioeconomic History     Marital status: Single     Spouse name: Not on file     Number of children: Not on file     Years of education: Not on file     Highest education level: Some college, no degree   Occupational History     Not on file   Tobacco Use     Smoking status: Never Smoker     Smokeless tobacco: Never Used     Tobacco comment: Non-smoking home   Substance and Sexual Activity     Alcohol use: Yes     Drug use: No     Sexual activity: Yes     Partners: Male     Birth control/protection: Condom, Pill   Other Topics Concern     Parent/sibling w/ CABG, MI or angioplasty before 65F 55M? No   Social History Narrative     Not on file     Social Determinants of Health      Financial Resource Strain:      Difficulty of Paying Living Expenses:    Food Insecurity:      Worried About Running Out of Food in the Last Year:      Ran Out of Food in the Last Year:    Transportation Needs:      Lack of Transportation (Medical):      Lack of Transportation (Non-Medical):    Physical Activity:      Days of Exercise per Week:      Minutes of Exercise per Session:    Stress:      Feeling of Stress :    Social Connections:      Frequency of Communication with Friends and Family:      Frequency of Social Gatherings with Friends and Family:      Attends [employer] Services:      Active Member of Clubs or Organizations:      Attends Club or Organization Meetings:      Marital Status:    Intimate Partner Violence:      Fear of Current or Ex-Partner:      Emotionally Abused:      Physically Abused:      Sexually Abused:        FAMILY HISTORY:  Family History   Problem Relation Age of Onset     Other Cancer Maternal Grandfather         skin cancer     Hypertension Paternal Grandmother      Hypertension Paternal Grandfather      Cardiovascular Paternal Grandfather         Valve condition     Prostate Cancer Paternal Grandfather      Cancer Sister         ovarian teratoma, s/p resection     Kidney Disease Maternal Uncle         kidney stones       Past/family/social history reviewed and no changes    PHYSICAL EXAMINATION:  Constitutional: aaox3, cooperative, pleasant, not dyspneic/diaphoretic, no acute distress  Vitals reviewed: There were no vitals taken for this visit.  Wt:   Wt Readings from Last 2 Encounters:   08/10/21 57 kg (125 lb 11.2 oz)   06/21/21 57.4 kg (126 lb 7.2 oz)   General appearance: Healthy appearing adult, in no acute distress  Eyes: Sclera anicteric  Ears, nose, mouth and throat: No obvious external lesions of ears and nose, Hearing intact  Neck: Symmetric, No obvious external lesions  Respiratory: Normal respiration, no use of accessory muscles   Skin: No rashes or jaundice    Psychiatric: Oriented to person, place and time, Appropriate mood and affect.     PERTINENT STUDIES:  Lab on 08/19/2021   Component Date Value Ref Range Status     Hemoglobin 08/19/2021 7.3* 11.7 - 15.7 g/dL Final         Again, thank you for allowing me to participate in the care of your patient.      Sincerely,    Delfina Reddy PA-C

## 2021-09-08 NOTE — PROGRESS NOTES
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 Anemia, BRBPR  Addendum: Discussed with Dr. MARIANO Barrett and Dr. Gupta.  Given significant drop in hemoglobin, would recommend plan to evaluate with colonoscopy. Unlikely to be IBD, no role for sulfasalazine at this time. Pending results will consider referral to surgery.     I have also messaged [facility] to order iron infusions.     For more chronic symptoms, would recommend trial of fiber supplementation to better regulate stool consistency.   -- colonoscopy. If you have not heard from endoscopy within a week, please call [phone] to schedule.   -- iron infusions  -- start fiber     Thank you for this consultation.  It was a pleasure to participate in the care of this patient; please contact us with any further questions.     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 She has previously been seen by Dr. QUINTANA and Dr Gupta, and through [facility]. This is my first encounter with the patient. Her parents Caty and Edilson are present for today's visit.     History per Dr. QUINTANA:    Patient had her surgery as a  and since birth  until about 5 or 6 years old, she recalls having loose stools, and after that she had normal stools.  In 2012, she presented with rectal bleeding and underwent colonoscopy which showed anastomotic ulcers.  There was question of IBD after this bleeding, and was maintained on sulfasalazine for many years.  She underwent colonoscopy in early 2019, with biopsy of the anastomotic area showing some ileitis.  She also underwent follow-up MRI read which did not show any evidence of inflammation.  After this her sulfasalazine was discontinued.     Her current GI symptoms include occasional abdominal cramping, bloating and constipation.  Often feels gassy.  Her bowel habits have somewhat changed after she started in college and started eating cafeteria food.  She otherwise denies any rectal bleeding in fact he has not had any letter of bleeding since her admission in 2012.  No melena.  Denies any dysphagia, odynophagia, nausea or vomiting.  With her bloating, does not have any obstructive symptoms.  She does take iron tablets which seems to give her more constipation.     Interval History 9/8/2021:  Per the patient's mother, she has been working more pale with her skin on her lips starting around June of this year.  Nuris notes that she had an episode of abdominal pain early August.  Around the same time she had bright red blood in her stool which tinted the toilet water red.  She describes that this was mixed into the stool and with wiping, and quantifies amount of blood around one half of a cup.  She notes her stool may have been harder around this time and then later switched to diarrhea.  She denies any further episodes of melena or hematochezia.  She was seen by her primary care provider a couple days later on August 10 at which time she had hemoglobin check at 7.6.  She does report she was on her period around this time, but states it was not particularly heavy.      Right after her primary care visit she switched her  iron dosing to her children's dosing with ferrous sulfate 75 mg- she is taking about 1 mL.  She notes that the change in iron formulation does seem to help with abdominal pain and cramping.  Cramping can be worsened after a meal in afternoon and night.     Her stool pattern has been variable. She has had strep recently. She is usually going 1-2 times a day. She will feel  Fullness in her abdomen. Since switching to kids iron she does not feel like she has needed bowel meds. Her PCP has also given her docusate- she has not taken this recently.    2 episodes- both with coffee, had creamer. 4:30. She got short of breath, light headed, palpatations. She was better around 2 am in the ED. On Thursday of last week- shortness of breath, light headed, tingling sensation in fingers and abdomen. Cotninued to be short of breath into the dollowing day and then gradually got better.       PROBLEM LIST  Patient Active Problem List    Diagnosis Date Noted     Chronic eczema 07/29/2019     Priority: Medium     Chronic rhinitis 05/31/2016     Priority: Medium     Iron deficiency anemia 02/26/2013     Priority: Medium     Short gut syndrome 10/16/2012     Priority: Medium     Ulceration of intestine 07/27/2012     Priority: Medium     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 diagnosed due to hematochezia.       Proteinuria 07/18/2012     Priority: Medium     Felt consistent with benign intermittent proteinuria or postural proteinuria. Yearly urinalysis and prot/cr ratio. Contact nephrology if the prot/cr is >0.2 or if there are any abnormalities noted in the urinalysis.         Other congenital anomalies of intestine      Priority: Medium     congenital ileal atresia, s/p resection         PERTINENT PAST MEDICAL HISTORY:  Past Medical History:   Diagnosis Date     Anemia 10/16/2012     COPD (chronic obstructive pulmonary disease) (H)      History of blood transfusion      Other congenital anomalies of intestine      congenital ileal atresia, s/p resection     Short gut syndrome 10/16/2012    ~ 20 cm of ileum as well as ileocecal valve resected in  period     Ulceration of intestine 2012       PREVIOUS SURGERIES:  Past Surgical History:   Procedure Laterality Date     COLONOSCOPY  2012     COLONOSCOPY  2012     RESECTION ILEOCECAL  1999    ~ 20 cm of ileum as well as ileocecal valve resected in  period       ALLERGIES:  Allergies   Allergen Reactions     No Known Allergies        PERTINENT MEDICATIONS:    Current Outpatient Medications:      Cetirizine HCl (ZYRTEC ALLERGY PO), Take by mouth daily, Disp: , Rfl:      Cyanocobalamin (VITAMIN B-12) 50 MCG TABS, Take 100 mcg by mouth daily., Disp: , Rfl:      docusate sodium (COLACE) 100 MG capsule, Take 1-2 capsules (100-200 mg) by mouth 2 times daily, Disp: 120 capsule, Rfl: 11     Dupilumab 300 MG/2ML SOPN, Inject 300 mg Subcutaneous every 14 days To start after 600 mg loading dose., Disp: 4 mL, Rfl: 11     ferrous sulfate (PATRIC-IN-SOL) 75 (15 FE) MG/ML oral drops, Take 0.2 mLs (3 mg) by mouth daily, Disp: 100 mL, Rfl: 1     fexofenadine (ALLEGRA) 60 MG tablet, Take 60 mg by mouth 2 times daily, Disp: , Rfl:      FLUARIX QUADRIVALENT 0.5 ML injection, ADM 0.5ML IM UTD, Disp: , Rfl:      fluticasone (FLONASE) 50 MCG/ACT nasal spray, Spray 1-2 sprays into both nostrils daily, Disp: 16 g, Rfl: 11     hydrocortisone 2.5 % cream, To lips and face rash twice daily as needed for rash (Patient not taking: Reported on 2020), Disp: 30 g, Rfl: 1     ketoconazole (NIZORAL) 2 % external shampoo, Use to shampoo twice weekly. Leave on 5 min then rinse., Disp: 120 mL, Rfl: 3     Loratadine-Pseudoephedrine (CLARITIN-D 24 HOUR PO), , Disp: , Rfl:      mometasone (ELOCON) 0.1 % external solution, Apply small amount topically to affected areas of scalp daily prn for up to 2 weeks., Disp: 60 mL, Rfl: 0     Multiple Vitamins-Iron (MULTIVITAMIN/IRON PO), Take 1 tablet  by mouth daily., Disp: , Rfl:      norgestim-eth estrad triphasic (TRINESSA, 28,) 0.18/0.215/0.25 MG-35 MCG tablet, Take 1 tablet by mouth daily, Disp: 84 tablet, Rfl: 4     polyethylene glycol (MIRALAX) 17 GM/Dose powder, 1 capful (17 g) in 8 oz of liquid, Disp: 850 g, Rfl: 1     Probiotic Product (PRO-BIOTIC BLEND PO), , Disp: , Rfl:      tacrolimus (PROTOPIC) 0.1 % external ointment, Apply to face daily in rash areas, Disp: 30 g, Rfl: 11     terconazole (TERAZOL 3) 80 MG vaginal suppository, Place 1 suppository (80 mg) vaginally At Bedtime, Disp: 3 suppository, Rfl: 1     triamcinolone (KENALOG) 0.1 % external ointment, Apply to face rash twice daily for the next two weeks until clear then twice daily as needed., Disp: 60 g, Rfl: 1     VENTOLIN  (90 Base) MCG/ACT inhaler, INHALE 2 PUFFS PO Q 4 H PRF SOB OR WHZ, Disp: , Rfl: 1     vitamin C (ASCORBIC ACID) 250 MG tablet, Take 1 tablet (250 mg) by mouth daily, Disp: 100 tablet, Rfl: 11    SOCIAL HISTORY:  Social History     Socioeconomic History     Marital status: Single     Spouse name: Not on file     Number of children: Not on file     Years of education: Not on file     Highest education level: Some college, no degree   Occupational History     Not on file   Tobacco Use     Smoking status: Never Smoker     Smokeless tobacco: Never Used     Tobacco comment: Non-smoking home   Substance and Sexual Activity     Alcohol use: Yes     Drug use: No     Sexual activity: Yes     Partners: Male     Birth control/protection: Condom, Pill   Other Topics Concern     Parent/sibling w/ CABG, MI or angioplasty before 65F 55M? No   Social History Narrative     Not on file     Social Determinants of Health     Financial Resource Strain:      Difficulty of Paying Living Expenses:    Food Insecurity:      Worried About Running Out of Food in the Last Year:      Ran Out of Food in the Last Year:    Transportation Needs:      Lack of Transportation (Medical):      Lack of  Transportation (Non-Medical):    Physical Activity:      Days of Exercise per Week:      Minutes of Exercise per Session:    Stress:      Feeling of Stress :    Social Connections:      Frequency of Communication with Friends and Family:      Frequency of Social Gatherings with Friends and Family:      Attends Islam Services:      Active Member of Clubs or Organizations:      Attends Club or Organization Meetings:      Marital Status:    Intimate Partner Violence:      Fear of Current or Ex-Partner:      Emotionally Abused:      Physically Abused:      Sexually Abused:        FAMILY HISTORY:  Family History   Problem Relation Age of Onset     Other Cancer Maternal Grandfather         skin cancer     Hypertension Paternal Grandmother      Hypertension Paternal Grandfather      Cardiovascular Paternal Grandfather         Valve condition     Prostate Cancer Paternal Grandfather      Cancer Sister         ovarian teratoma, s/p resection     Kidney Disease Maternal Uncle         kidney stones       Past/family/social history reviewed and no changes    PHYSICAL EXAMINATION:  Constitutional: aaox3, cooperative, pleasant, not dyspneic/diaphoretic, no acute distress  Vitals reviewed: There were no vitals taken for this visit.  Wt:   Wt Readings from Last 2 Encounters:   08/10/21 57 kg (125 lb 11.2 oz)   06/21/21 57.4 kg (126 lb 7.2 oz)   General appearance: Healthy appearing adult, in no acute distress  Eyes: Sclera anicteric  Ears, nose, mouth and throat: No obvious external lesions of ears and nose, Hearing intact  Neck: Symmetric, No obvious external lesions  Respiratory: Normal respiration, no use of accessory muscles   Skin: No rashes or jaundice   Psychiatric: Oriented to person, place and time, Appropriate mood and affect.     PERTINENT STUDIES:  Lab on 08/19/2021   Component Date Value Ref Range Status     Hemoglobin 08/19/2021 7.3* 11.7 - 15.7 g/dL Final

## 2021-09-08 NOTE — PROGRESS NOTES
"Nuris Reddy is a 21 year old female who is being evaluated via a billable video visit.      The patient has been notified of following:     \"This video visit will be conducted via a call between you and your physician/provider. We have found that certain health care needs can be provided without the need for an in-person physical exam.  This service lets us provide the care you need with a video conversation.  If a prescription is necessary we can send it directly to your pharmacy.  If lab work is needed we can place an order for that and you can then stop by our lab to have the test done at a later time.    If during the course of the call the physician/provider feels a video visit is not appropriate, you will not be charged for this service.\"     Patient confirmed that they are in [place] for today's visit yes.    Video-Visit Details  Type of service:  Video Visit    Video Start Time: 10:06 am  Video End Time:  10:48 am     Originating Location (pt. Location): Home    Distant Location (provider location):  [facility] [place]     Platform used: Jackie            "

## 2021-09-10 NOTE — TELEPHONE ENCOUNTER
Called patient.     Doing well, feeling better than last week.   Feeling better as time goes on.     Has episodes of chest pain 1-2 times per day for the past 3 weeks.   This has not changed.    Denies changes in condition in the past 24 hours.   Denies shortness of breath.   Denies fever.   Denies lightheadedness.   Denies swelling in the lip, or face or tongue.    9/2/21 Had an episode of chest pain, shortness of breath, and swollen lip.   Took benadryl, and the symptoms went away over time.   Shortness of breath lingered until Monday.   SOB resolved on Monday.     Currently taking the iron as 1 ml once per day.   Planning to take 2 ml once per day.     Advised patient to contact care team if symptoms worsen.    Italo Conde RN on 9/10/2021 at 4:04 PM

## 2021-09-12 ENCOUNTER — HEALTH MAINTENANCE LETTER (OUTPATIENT)
Age: 21
End: 2021-09-12

## 2021-09-22 ENCOUNTER — PATIENT OUTREACH (OUTPATIENT)
Dept: GASTROENTEROLOGY | Facility: CLINIC | Age: 21
End: 2021-09-22

## 2021-09-22 DIAGNOSIS — K90.829 SHORT GUT SYNDROME: ICD-10-CM

## 2021-09-22 DIAGNOSIS — D50.9 IRON DEFICIENCY ANEMIA: Primary | ICD-10-CM

## 2021-09-22 DIAGNOSIS — K63.3 ULCERATION OF INTESTINE: ICD-10-CM

## 2021-09-22 RX ORDER — DIPHENHYDRAMINE HYDROCHLORIDE 50 MG/ML
50 INJECTION INTRAMUSCULAR; INTRAVENOUS
Status: CANCELLED
Start: 2021-09-22

## 2021-09-22 RX ORDER — METHYLPREDNISOLONE SODIUM SUCCINATE 125 MG/2ML
125 INJECTION, POWDER, LYOPHILIZED, FOR SOLUTION INTRAMUSCULAR; INTRAVENOUS
Status: CANCELLED
Start: 2021-09-22

## 2021-09-22 RX ORDER — NALOXONE HYDROCHLORIDE 0.4 MG/ML
0.2 INJECTION, SOLUTION INTRAMUSCULAR; INTRAVENOUS; SUBCUTANEOUS
Status: CANCELLED | OUTPATIENT
Start: 2021-09-22

## 2021-09-22 RX ORDER — EPINEPHRINE 1 MG/ML
0.3 INJECTION, SOLUTION, CONCENTRATE INTRAVENOUS EVERY 5 MIN PRN
Status: CANCELLED | OUTPATIENT
Start: 2021-09-22

## 2021-09-22 RX ORDER — ALBUTEROL SULFATE 0.83 MG/ML
2.5 SOLUTION RESPIRATORY (INHALATION)
Status: CANCELLED | OUTPATIENT
Start: 2021-09-22

## 2021-09-22 RX ORDER — MEPERIDINE HYDROCHLORIDE 25 MG/ML
25 INJECTION INTRAMUSCULAR; INTRAVENOUS; SUBCUTANEOUS EVERY 30 MIN PRN
Status: CANCELLED | OUTPATIENT
Start: 2021-09-22

## 2021-09-22 RX ORDER — ALBUTEROL SULFATE 90 UG/1
1-2 AEROSOL, METERED RESPIRATORY (INHALATION)
Status: CANCELLED
Start: 2021-09-22

## 2021-09-22 NOTE — PATIENT INSTRUCTIONS
It was a pleasure taking care of you today.  I've included a brief summary of our discussion and care plan from today's visit below.  Please review this information with your primary care provider.  ______________________________________________________________________    My recommendations are summarized as follows:  -- colonoscopy: If you have not heard from endoscopy within a week, please call [phone] to schedule.   -- iron infusions  -- start fiber   -- please see scheduling information provided below     Return to GI Clinic in 1-2 months with Dr. Gupta and MARIANO to review your progress.    ______________________________________________________________________    How do I schedule labs, imaging studies, or procedures that were ordered in clinic today?     Labs: To schedule lab appointment at the [facility], use my chart or call [phone]. If you have a [place] lab closer to home where you are regularly seen you can give them a call.     Procedures: If a colonoscopy, upper endoscopy, breath test, esophageal manometry, or pH impedence was ordered today, our endoscopy team will call you to schedule this. If you have not heard from our endoscopy team within a week, please call [phone] to schedule.     Imaging Studies: If you were scheduled for a CT scan, X-ray, MRI, ultrasound, HIDA scan or other imaging study, please call [phone] to have this scheduled.     Referral: If a referral to another specialty was ordered, expect a phone call or follow instructions above. If you have not heard from anyone regarding your referral in a week, please call our clinic to check the status.     Who do I call with any questions after my visit?  Please be in touch if there are any further questions that arise following today's visit.  There are multiple ways to contact your gastroenterology care team.        During business hours, you may reach a Gastroenterology nurse at [phone]      To  schedule or reschedule an appointment, please call [phone].       You can always send a secure message through [employer].  [employer] messages are answered by your nurse or doctor typically within 24 hours.  Please allow extra time on weekends and holidays.        For urgent/emergent questions after business hours, you may reach the on-call GI Fellow by contacting the [facility]  at [phone].     How will I get the results of any tests ordered?    You will receive all of your results.  If you have signed up for [employer], any tests ordered at your visit will be available to you after your physician reviews them.  Typically this takes 1-2 weeks.  If there are urgent results that require a change in your care plan, your physician or nurse will call you to discuss the next steps.      What is [employer]?  [employer] is a secure way for you to access all of your healthcare records from the [facility].  It is a web based computer program, so you can sign on to it from any location.  It also allows you to send secure messages to your care team.  I recommend signing up for [employer] access if you have not already done so and are comfortable with using a computer.      How to I schedule a follow-up visit?  If you did not schedule a follow-up visit today, please call [phone] to schedule a follow-up office visit.      Sincerely,    Delfina Reddy PA-C  Division of Gastroenterology, Hepatology & Nutrition  [facility]

## 2021-09-22 NOTE — TELEPHONE ENCOUNTER
Venofer infusion plan entered   Left a message for patient to call back     Also sent a my chart message  Message to infusion finance.

## 2021-09-23 ENCOUNTER — PATIENT OUTREACH (OUTPATIENT)
Dept: GASTROENTEROLOGY | Facility: CLINIC | Age: 21
End: 2021-09-23

## 2021-09-23 NOTE — TELEPHONE ENCOUNTER
No prior authorization required; on label or compliant with insurance policy.  is confident this will be covered and recommends moving forward with dosing  Left a voice mail message for patient that she is approved and can go to any [facility] facility

## 2021-09-24 ENCOUNTER — LAB (OUTPATIENT)
Dept: LAB | Facility: CLINIC | Age: 21
End: 2021-09-24
Payer: COMMERCIAL

## 2021-09-24 ENCOUNTER — NURSE TRIAGE (OUTPATIENT)
Dept: NURSING | Facility: CLINIC | Age: 21
End: 2021-09-24

## 2021-09-24 ENCOUNTER — MYC MEDICAL ADVICE (OUTPATIENT)
Dept: PEDIATRICS | Facility: CLINIC | Age: 21
End: 2021-09-24

## 2021-09-24 DIAGNOSIS — D50.0 IRON DEFICIENCY ANEMIA DUE TO CHRONIC BLOOD LOSS: ICD-10-CM

## 2021-09-24 DIAGNOSIS — K92.1 HEMATOCHEZIA: ICD-10-CM

## 2021-09-24 DIAGNOSIS — D50.9 IRON DEFICIENCY ANEMIA, UNSPECIFIED IRON DEFICIENCY ANEMIA TYPE: Primary | ICD-10-CM

## 2021-09-24 LAB — HGB BLD-MCNC: 8.3 G/DL (ref 11.7–15.7)

## 2021-09-24 PROCEDURE — 85018 HEMOGLOBIN: CPT

## 2021-09-24 PROCEDURE — 36415 COLL VENOUS BLD VENIPUNCTURE: CPT

## 2021-09-24 NOTE — TELEPHONE ENCOUNTER
Caller is  reporting that she understood that she was to have  Hgb check every 2 weeks; states she is currently at lab and the order there is . States se had  last Hgb done at an outside lab in [place].  Triage protocol reviewed    Contacted on call provider Dr. Stephens who will order  Hgb now   Order for Hgb placed   VMM left for patient to  go to lab now; call back if  problems   Roxanne Pleitez RN  FNA                   Reason for Disposition    [1] Follow-up call from patient regarding patient's clinical status AND [2] information urgent    Protocols used: PCP CALL - NO TRIAGE-A-

## 2021-09-25 ENCOUNTER — MYC MEDICAL ADVICE (OUTPATIENT)
Dept: PEDIATRICS | Facility: CLINIC | Age: 21
End: 2021-09-25

## 2021-09-25 NOTE — TELEPHONE ENCOUNTER
LVM for patient to call us back, just wanted to make sure she didn't need another order. Bridgett Garcia, SALIMA on 9/25/2021 at 9:15 AM

## 2021-10-15 ENCOUNTER — PATIENT OUTREACH (OUTPATIENT)
Dept: GASTROENTEROLOGY | Facility: CLINIC | Age: 21
End: 2021-10-15

## 2021-10-15 NOTE — TELEPHONE ENCOUNTER
Contacted [employer] infusion arrange for infusion for venofer   Also contacted Endoscopy scheduling to check on scheduling urgent colonoscopy with Dr Gupta

## 2021-10-20 NOTE — TELEPHONE ENCOUNTER
Contacted endoscopy scheduling to see if patient had been called.  Contacted x 1 this week and left a my chart message.     Left a message for patient that her iron infusions are approved  Also number to call to schedule infusions and colonoscopy   Also my chart message

## 2021-10-28 ENCOUNTER — PATIENT OUTREACH (OUTPATIENT)
Dept: GASTROENTEROLOGY | Facility: CLINIC | Age: 21
End: 2021-10-28
Payer: COMMERCIAL

## 2021-10-28 NOTE — TELEPHONE ENCOUNTER
Contacted patient to discuss next steps   Patient was recently seen by Dr Aliya Osei   Will route to Ms Reddy to see if proceeding with iron infusions or placing on hold

## 2021-11-02 ENCOUNTER — TELEPHONE (OUTPATIENT)
Dept: GASTROENTEROLOGY | Facility: CLINIC | Age: 21
End: 2021-11-02

## 2021-11-02 NOTE — TELEPHONE ENCOUNTER
Screening Questions  1. Are you active on mychart? Y    2. What insurance is in the chart? [employer]    2.  Ordering/Referring Provider: Barry    3. BMI 21.5    4. Do you have any Lung issues?  Yes-some SOB maybe from anemia or cold/bronchitis   If yes continue:   Do you use daily home oxygen? N   Do you have Pulmonary Hypertension? N   Do you have SEVERE asthma? N    5. Have you had a heart, lung, or liver transplant? N    6. Are you currently on dialysis or have chronic kidney disease? N    7. Have you had a stroke or Transient ischemic attack (TIA) within 6 months? N    8. In the past 6 months, have you had any heart related issues including cardiomyopathy or heart attack? N      If yes, did it require cardiac stenting or other implantable device?N      9. Do you have any implantable devices in your body (pacemaker, defib, LVAD)? N    10. Do you take nitroglycerin? If yes, how often? N    11. Are you currently taking any blood thinners?N    12. Are you a diabetic? N    13. (Females) Are you currently pregnant? N  If yes, how many weeks?      15. Are you taking any prescription pain medications on a routine schedule? N If yes, MAC sedation.    16. Do you have any chemical dependencies such as alcohol, street drugs, or methadone? N If yes, MAC sedation.    17. Do you have any history of post-traumatic stress syndrome, severe anxiety or history of psychosis? N    18. Do you transfer independently? Yes    19.  Do you have any issues with constipation? Yes-sometimes    20. Preferred Pharmacy for Pre Prescription [facility] [address]    Scheduling Details    Which Colonoscopy Prep was Sent?: Extended  Procedure Scheduled: Colon  Provider/Surgeon: Alonso  Date of Procedure: 1/5/22  [place] [facility]  Caller (Please ask for phone number if not scheduled by patient): Nuris      Sedation Type: CS  Conscious Sedation- Needs  for 6 hours after the procedure  MAC/General-Needs  for 24 hours after  procedure    Pre-op Required at [facility], [place], [place] and OR for MAC sedation:   (if yes advise patient they will need a pre-op prior to procedure)      Is patient on blood thinners? -N (If yes- inform patient to follow up with PCP or provider for follow up instructions)     Informed patient they will need an adult  Yes  Cannot take any type of public or medical transportation alone    Pre-Procedure Covid test to be completed at [facility] or Externally: 1/3/22 Sami    Confirmed Nurse will call to complete assessment Yes    Additional comments:

## 2021-11-03 NOTE — TELEPHONE ENCOUNTER
Contacted patient per Ms Reddy as patient is scheduled in [facility] due to her insurance  Will route to Ms Reddy

## 2021-11-04 ENCOUNTER — PATIENT OUTREACH (OUTPATIENT)
Dept: GASTROENTEROLOGY | Facility: CLINIC | Age: 21
End: 2021-11-04
Payer: COMMERCIAL

## 2021-11-04 DIAGNOSIS — D50.9 IRON DEFICIENCY ANEMIA: Primary | ICD-10-CM

## 2021-11-04 DIAGNOSIS — K63.3 ULCERATION OF INTESTINE: ICD-10-CM

## 2021-11-04 DIAGNOSIS — K90.829 SHORT GUT SYNDROME: ICD-10-CM

## 2021-11-04 NOTE — TELEPHONE ENCOUNTER
Patient is declining venofer infusion at this time. When asked why said the infusion are very costly  States the plan is to have hemoglobin redrawn next week and then further discussion about venofer  Patient also declined endoscopic procedures this week for the same reason  Discussed briefly with Ms Reddy   Will route to Dr. Osei and Ms. Reddy

## 2021-11-08 ENCOUNTER — ANCILLARY PROCEDURE (OUTPATIENT)
Dept: GENERAL RADIOLOGY | Facility: CLINIC | Age: 21
End: 2021-11-08
Attending: INTERNAL MEDICINE
Payer: COMMERCIAL

## 2021-11-08 ENCOUNTER — OFFICE VISIT (OUTPATIENT)
Dept: PEDIATRICS | Facility: CLINIC | Age: 21
End: 2021-11-08
Payer: COMMERCIAL

## 2021-11-08 VITALS
BODY MASS INDEX: 21.46 KG/M2 | WEIGHT: 125 LBS | TEMPERATURE: 98.3 F | DIASTOLIC BLOOD PRESSURE: 79 MMHG | HEART RATE: 93 BPM | OXYGEN SATURATION: 100 % | SYSTOLIC BLOOD PRESSURE: 122 MMHG

## 2021-11-08 DIAGNOSIS — R06.02 SOB (SHORTNESS OF BREATH): ICD-10-CM

## 2021-11-08 DIAGNOSIS — R06.02 SOB (SHORTNESS OF BREATH): Primary | ICD-10-CM

## 2021-11-08 DIAGNOSIS — R00.2 PALPITATIONS: ICD-10-CM

## 2021-11-08 DIAGNOSIS — D50.9 IRON DEFICIENCY ANEMIA, UNSPECIFIED IRON DEFICIENCY ANEMIA TYPE: ICD-10-CM

## 2021-11-08 DIAGNOSIS — R80.9 PROTEINURIA, UNSPECIFIED TYPE: ICD-10-CM

## 2021-11-08 LAB
ALBUMIN SERPL-MCNC: 2.9 G/DL (ref 3.4–5)
ALP SERPL-CCNC: 48 U/L (ref 40–150)
ALT SERPL W P-5'-P-CCNC: 21 U/L (ref 0–50)
ANION GAP SERPL CALCULATED.3IONS-SCNC: 5 MMOL/L (ref 3–14)
AST SERPL W P-5'-P-CCNC: 14 U/L (ref 0–45)
BILIRUB SERPL-MCNC: 0.2 MG/DL (ref 0.2–1.3)
BUN SERPL-MCNC: 6 MG/DL (ref 7–30)
CALCIUM SERPL-MCNC: 8.5 MG/DL (ref 8.5–10.1)
CHLORIDE BLD-SCNC: 110 MMOL/L (ref 94–109)
CO2 SERPL-SCNC: 23 MMOL/L (ref 20–32)
CREAT SERPL-MCNC: 0.89 MG/DL (ref 0.52–1.04)
DEPRECATED S PYO AG THROAT QL EIA: NEGATIVE
GFR SERPL CREATININE-BSD FRML MDRD: >90 ML/MIN/1.73M2
GLUCOSE BLD-MCNC: 81 MG/DL (ref 70–99)
GROUP A STREP BY PCR: NOT DETECTED
HGB BLD-MCNC: 10.7 G/DL (ref 11.7–15.7)
POTASSIUM BLD-SCNC: 3.4 MMOL/L (ref 3.4–5.3)
PROT SERPL-MCNC: 7 G/DL (ref 6.8–8.8)
SARS-COV-2 RNA RESP QL NAA+PROBE: NEGATIVE
SODIUM SERPL-SCNC: 138 MMOL/L (ref 133–144)
TSH SERPL DL<=0.005 MIU/L-ACNC: 2.22 MU/L (ref 0.4–4)

## 2021-11-08 PROCEDURE — U0005 INFEC AGEN DETEC AMPLI PROBE: HCPCS | Performed by: INTERNAL MEDICINE

## 2021-11-08 PROCEDURE — 36415 COLL VENOUS BLD VENIPUNCTURE: CPT | Performed by: INTERNAL MEDICINE

## 2021-11-08 PROCEDURE — 85018 HEMOGLOBIN: CPT | Performed by: INTERNAL MEDICINE

## 2021-11-08 PROCEDURE — 84443 ASSAY THYROID STIM HORMONE: CPT | Performed by: INTERNAL MEDICINE

## 2021-11-08 PROCEDURE — 99214 OFFICE O/P EST MOD 30 MIN: CPT | Performed by: INTERNAL MEDICINE

## 2021-11-08 PROCEDURE — 80053 COMPREHEN METABOLIC PANEL: CPT | Performed by: INTERNAL MEDICINE

## 2021-11-08 PROCEDURE — 93000 ELECTROCARDIOGRAM COMPLETE: CPT | Performed by: INTERNAL MEDICINE

## 2021-11-08 PROCEDURE — U0003 INFECTIOUS AGENT DETECTION BY NUCLEIC ACID (DNA OR RNA); SEVERE ACUTE RESPIRATORY SYNDROME CORONAVIRUS 2 (SARS-COV-2) (CORONAVIRUS DISEASE [COVID-19]), AMPLIFIED PROBE TECHNIQUE, MAKING USE OF HIGH THROUGHPUT TECHNOLOGIES AS DESCRIBED BY CMS-2020-01-R: HCPCS | Performed by: INTERNAL MEDICINE

## 2021-11-08 PROCEDURE — 86769 SARS-COV-2 COVID-19 ANTIBODY: CPT | Mod: 90 | Performed by: INTERNAL MEDICINE

## 2021-11-08 PROCEDURE — 99000 SPECIMEN HANDLING OFFICE-LAB: CPT | Performed by: INTERNAL MEDICINE

## 2021-11-08 PROCEDURE — 87651 STREP A DNA AMP PROBE: CPT | Performed by: INTERNAL MEDICINE

## 2021-11-08 PROCEDURE — 71046 X-RAY EXAM CHEST 2 VIEWS: CPT | Performed by: RADIOLOGY

## 2021-11-08 ASSESSMENT — ENCOUNTER SYMPTOMS: COUGH: 1

## 2021-11-08 NOTE — PROGRESS NOTES
Assessment & Plan     SOB (shortness of breath)  In past, has had bronchitis with URI, but this is different. Had febrile URI 5 weeks ago. Fevers resolved, but since then has had on and off nasal congestion, as well as shortness of breath. Feels as if she just can't get enough air, notices even at rest. Not exertional specifically, but does feel tired. Had COVID exposure 2 weeks ago in choir. No chest pain. Discussed possible causes. CXR normal. Is on OCP, but no chest pain and intermittent nature of symptoms makes this less likely. 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 Discussed that sob symptoms and cough can last several months after this type of infection. Discussed option to do nucleocapsid ab test, which she would like to do. Suspect her covid test today will be negative. If she does not have steady improvement, she will let me know.  - Symptomatic COVID-19 Virus (Coronavirus) by PCR Nose  - Streptococcus A Rapid Scr w Reflx to PCR - Lab Collect; Future  - Streptococcus A Rapid Scr w Reflx to PCR - Lab Collect  - Group A Streptococcus PCR Throat Swab  - Hemoglobin; Future  - EKG 12-lead complete w/read - Clinics  - XR Chest 2 Views; Future  - SARS-CoV-2 Nucleocapsid Total Ab; Future  - Comprehensive metabolic panel (BMP + Alb, Alk Phos, ALT, AST, Total. Bili, TP); Future  - TSH with free T4 reflex; Future  - Hemoglobin  - SARS-CoV-2 Nucleocapsid Total Ab  - Comprehensive metabolic panel (BMP + Alb, Alk Phos, ALT, AST, Total. Bili, TP)  - TSH with free T4 reflex    2. Palpitations  Episodes of heart pounding/racing after drinking heavily caffeinated drink on 2 occasions.     3. Iron deficiency anemia, unspecified iron deficiency anemia type  Due for recheck. Planning colonoscopy in Jan 2022.    4. Proteinuria, unspecified type  Collection canisters given today. Will likely collect over winter break.  - Protein  timed urine with Creat Ratio      38 minutes spent on the date of the encounter doing chart review, history and exam, documentation and further activities per the note       See Patient Instructions    No follow-ups on file.    Aliya Osei MD  [facility] [place] is a 21 year old who presents for the following health issues    Cough    History of Present Illness       She eats 0-1 servings of fruits and vegetables daily.She consumes 1 sweetened beverage(s) daily.She exercises with enough effort to increase her heart rate 20 to 29 minutes per day.  She exercises with enough effort to increase her heart rate 3 or less days per week. She is missing 1 dose(s) of medications per week.  She is not taking prescribed medications regularly due to remembering to take.       Acute Illness  Acute illness concerns: coughing, SOB   Onset/Duration: 5 weeks   Symptoms:  Fever: Back in September   Chills/Sweats: no  Headache (location?): no  Sinus Pressure: no  Conjunctivitis:  YES  Ear Pain: no  Rhinorrhea: YES  Congestion: YES  Sore Throat: no  Cough: YES w/sob  Wheeze: no  Decreased Appetite: no  Nausea: no  Vomiting: no  Diarrhea: no  Dysuria/Freq.: no  Dysuria or Hematuria: no  Fatigue/Achiness: no  Sick/Strep Exposure: strep  Therapies tried and outcome: Tylenol     Had a cold starting September 29th. [place] like just a cold with fever. Lasted for a week or two. Since then has had on and off URI symptoms, nasal congestion, but mostly shortness of breath. Did have covid test 4 days ago and was negative. 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     Has had bronchitis in the past. This doesn't feel the same. Feels like she can breathe normally, just can't get enough air. No chest pain. Notices usually shortness of breath after exertion.    Wonders if she might be allergic to caffeine. Twice in the last month has had coffee from a coffee  shop, and noticed 3 hours later she had racing heart and felt more short of breath.     Would like recheck hemoglobin.    Review of Systems   Respiratory: Positive for cough.       Constitutional, HEENT, cardiovascular, pulmonary, gi systems are negative, except as otherwise noted.      Objective    /79   Pulse 93   Temp 98.3  F (36.8  C) (Tympanic)   Wt 56.7 kg (125 lb)   SpO2 100%   BMI 21.46 kg/m    Body mass index is 21.46 kg/m .  Physical Exam   GENERAL: healthy, alert and no distress  NECK: no adenopathy, no asymmetry, masses, or scars and thyroid normal to palpation  RESP: lungs clear to auscultation - no rales, rhonchi or wheezes  CV: regular rate and rhythm, normal S1 S2, no S3 or S4, no murmur, click or rub, no peripheral edema and peripheral pulses strong  ABDOMEN: soft, nontender, no hepatosplenomegaly, no masses and bowel sounds normal  MS: no gross musculoskeletal defects noted, no edema    CXR - Reviewed and interpreted by me Normal- no infiltrates, effusions, pneumothoraces, cardiomegaly or masses  Results for orders placed or performed in visit on 11/08/21 (from the past 24 hour(s))   Streptococcus A Rapid Scr w Reflx to PCR - Lab Collect    Specimen: Throat; Swab   Result Value Ref Range    Group A Strep antigen Negative Negative   Group A Streptococcus PCR Throat Swab    Specimen: Throat; Swab   Result Value Ref Range    Group A strep by PCR Not Detected Not Detected    Narrative    The Xpert Xpress Strep A test, performed on the "[employer]  Instrument Systems, is a rapid, qualitative in vitro diagnostic test for the detection of Streptococcus pyogenes (Group A ß-hemolytic Streptococcus, Strep A) in throat swab specimens from patients with signs and symptoms of pharyngitis. The Xpert Xpress Strep A test can be used as an aid in the diagnosis of Group A Streptococcal pharyngitis. The assay is not intended to monitor treatment for Group A Streptococcus infections. The Xpert Xpress Strep A  test utilizes an automated real-time polymerase chain reaction (PCR) to detect Streptococcus pyogenes DNA.   Hemoglobin   Result Value Ref Range    Hemoglobin 10.7 (L) 11.7 - 15.7 g/dL    Narrative    Result confirmed by repeat test.   Comprehensive metabolic panel (BMP + Alb, Alk Phos, ALT, AST, Total. Bili, TP)   Result Value Ref Range    Sodium 138 133 - 144 mmol/L    Potassium 3.4 3.4 - 5.3 mmol/L    Chloride 110 (H) 94 - 109 mmol/L    Carbon Dioxide (CO2) 23 20 - 32 mmol/L    Anion Gap 5 3 - 14 mmol/L    Urea Nitrogen 6 (L) 7 - 30 mg/dL    Creatinine 0.89 0.52 - 1.04 mg/dL    Calcium 8.5 8.5 - 10.1 mg/dL    Glucose 81 70 - 99 mg/dL    Alkaline Phosphatase 48 40 - 150 U/L    AST 14 0 - 45 U/L    ALT 21 0 - 50 U/L    Protein Total 7.0 6.8 - 8.8 g/dL    Albumin 2.9 (L) 3.4 - 5.0 g/dL    Bilirubin Total 0.2 0.2 - 1.3 mg/dL    GFR Estimate >90 >60 mL/min/1.73m2   TSH with free T4 reflex   Result Value Ref Range    TSH 2.22 0.40 - 4.00 mU/L

## 2021-11-08 NOTE — PATIENT INSTRUCTIONS
If palpitations return (for now, stay away from caffeine), let me know.    If this does not slowly improve over the next 2 months, let me know.    2 week trial off all dairy.

## 2021-11-10 LAB — SARS-COV-2 AB SERPL QL IA: NEGATIVE

## 2021-11-14 ENCOUNTER — MYC MEDICAL ADVICE (OUTPATIENT)
Dept: PEDIATRICS | Facility: CLINIC | Age: 21
End: 2021-11-14
Payer: COMMERCIAL

## 2021-11-26 DIAGNOSIS — Z11.59 ENCOUNTER FOR SCREENING FOR OTHER VIRAL DISEASES: ICD-10-CM

## 2021-12-06 ENCOUNTER — MYC MEDICAL ADVICE (OUTPATIENT)
Dept: PEDIATRICS | Facility: CLINIC | Age: 21
End: 2021-12-06
Payer: COMMERCIAL

## 2021-12-06 DIAGNOSIS — R05.3 PERSISTENT COUGH FOR 3 WEEKS OR LONGER: Primary | ICD-10-CM

## 2021-12-06 RX ORDER — ALBUTEROL SULFATE 90 UG/1
1-2 AEROSOL, METERED RESPIRATORY (INHALATION) EVERY 4 HOURS PRN
Qty: 18 G | Refills: 0 | Status: SHIPPED | OUTPATIENT
Start: 2021-12-06 | End: 2023-03-15

## 2021-12-06 RX ORDER — AZITHROMYCIN 250 MG/1
TABLET, FILM COATED ORAL
Qty: 6 TABLET | Refills: 0 | Status: SHIPPED | OUTPATIENT
Start: 2021-12-06 | End: 2021-12-11

## 2021-12-06 NOTE — TELEPHONE ENCOUNTER
Please review pt's  message & advise. Pt was seen in clinic for SOB on 11/8/21.     Notes from 11/8/21:  SOB (shortness of breath)  In past, has had bronchitis with URI, but this is different. Had febrile URI 5 weeks ago. Fevers resolved, but since then has had on and off nasal congestion, as well as shortness of breath. Feels as if she just can't get enough air, notices even at rest. Not exertional specifically, but does feel tired. Had COVID exposure 2 weeks ago in choir. No chest pain. Discussed possible causes. CXR normal. Is on OCP, but no chest pain and intermittent nature of symptoms makes this less likely. 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 Discussed that sob symptoms and cough can last several months after this type of infection. Discussed option to do nucleocapsid ab test, which she would like to do. Suspect her covid test today will be negative. If she does not have steady improvement, she will let me know.    Yara, RN  [facility] (PAL)  [facility]

## 2021-12-07 NOTE — TELEPHONE ENCOUNTER
See mychart reply.   I think she needs to be seen for this. Can we get her in to see anyone while she is home on winter break? If she would like rxs, need to know where and please send as written.  Aliya Osei M.D.

## 2021-12-27 ENCOUNTER — TELEPHONE (OUTPATIENT)
Dept: GASTROENTEROLOGY | Facility: CLINIC | Age: 21
End: 2021-12-27

## 2021-12-27 DIAGNOSIS — K92.1 HEMATOCHEZIA: Primary | ICD-10-CM

## 2021-12-27 RX ORDER — BISACODYL 5 MG/1
TABLET, DELAYED RELEASE ORAL
Qty: 2 TABLET | Refills: 0 | Status: SHIPPED | OUTPATIENT
Start: 2021-12-27 | End: 2022-01-26

## 2021-12-27 NOTE — TELEPHONE ENCOUNTER
Attempted to contact patient regarding upcoming colonoscopy procedure on 1/5/22 for pre assessment questions. No answer.     Left message to return call to [phone] #2    Covid test scheduled: 1/3/22    Arrival time: 0630    Facility location: [facility]    Sedation type: CS    Indication for procedure: anemia     Anticoagulants: no.     Bowel prep recommendation: Extended prep d/t constipation    Extended script sent to [id number] - [place], [place] - [address] AT [address]. Prep instructions sent via [employer].    Laxmi Dahl RN

## 2021-12-29 NOTE — TELEPHONE ENCOUNTER
Patient returned call.     Pre assessment questions completed for upcoming colonoscopy procedure scheduled on 1/5/22    COVID test scheduled 1/3/22    Reviewed procedural arrival time 0630 and facility location [facility].    Designated  policy reviewed. Instructed to have someone stay 6 hours post procedure.     Patient denies constipation. States that they have always done with Miralax/Magnesium citrate/Dulcolax  With good results.     Reviewed Miralax instructions with patient. No fiber/iron supplements or foods that contain nuts/seeds 7 days prior to procedure.     Anticoagulation/blood thinners? no    Electronic implanted devices? no    Patient verbalized understanding and had no questions or concerns at this time.    Laxmi Dahl RN

## 2022-01-03 ENCOUNTER — LAB (OUTPATIENT)
Dept: LAB | Facility: CLINIC | Age: 22
End: 2022-01-03
Payer: COMMERCIAL

## 2022-01-03 DIAGNOSIS — Z11.59 ENCOUNTER FOR SCREENING FOR OTHER VIRAL DISEASES: ICD-10-CM

## 2022-01-03 PROCEDURE — U0005 INFEC AGEN DETEC AMPLI PROBE: HCPCS

## 2022-01-03 PROCEDURE — U0003 INFECTIOUS AGENT DETECTION BY NUCLEIC ACID (DNA OR RNA); SEVERE ACUTE RESPIRATORY SYNDROME CORONAVIRUS 2 (SARS-COV-2) (CORONAVIRUS DISEASE [COVID-19]), AMPLIFIED PROBE TECHNIQUE, MAKING USE OF HIGH THROUGHPUT TECHNOLOGIES AS DESCRIBED BY CMS-2020-01-R: HCPCS

## 2022-01-04 LAB — SARS-COV-2 RNA RESP QL NAA+PROBE: NEGATIVE

## 2022-01-05 ENCOUNTER — PATIENT OUTREACH (OUTPATIENT)
Dept: GASTROENTEROLOGY | Facility: CLINIC | Age: 22
End: 2022-01-05

## 2022-01-05 ENCOUNTER — HOSPITAL ENCOUNTER (OUTPATIENT)
Facility: AMBULATORY SURGERY CENTER | Age: 22
Discharge: HOME | End: 2022-01-05
Attending: INTERNAL MEDICINE | Admitting: INTERNAL MEDICINE
Payer: COMMERCIAL

## 2022-01-05 VITALS
TEMPERATURE: 98.1 F | HEART RATE: 75 BPM | DIASTOLIC BLOOD PRESSURE: 79 MMHG | WEIGHT: 130 LBS | OXYGEN SATURATION: 99 % | HEIGHT: 64 IN | SYSTOLIC BLOOD PRESSURE: 116 MMHG | BODY MASS INDEX: 22.2 KG/M2 | RESPIRATION RATE: 16 BRPM

## 2022-01-05 DIAGNOSIS — K63.3 ULCERATION OF INTESTINE: Primary | ICD-10-CM

## 2022-01-05 DIAGNOSIS — K90.829 SHORT GUT SYNDROME: ICD-10-CM

## 2022-01-05 LAB
COLONOSCOPY: NORMAL
HCG UR QL: NEGATIVE
INTERNAL QC OK POCT: NORMAL
POCT KIT EXPIRATION DATE: NORMAL
POCT KIT LOT NUMBER: NORMAL

## 2022-01-05 PROCEDURE — 81025 URINE PREGNANCY TEST: CPT | Performed by: PATHOLOGY

## 2022-01-05 PROCEDURE — 88305 TISSUE EXAM BY PATHOLOGIST: CPT | Mod: TC | Performed by: INTERNAL MEDICINE

## 2022-01-05 PROCEDURE — 88305 TISSUE EXAM BY PATHOLOGIST: CPT | Mod: 26 | Performed by: PATHOLOGY

## 2022-01-05 PROCEDURE — 45380 COLONOSCOPY AND BIOPSY: CPT

## 2022-01-05 RX ORDER — FENTANYL CITRATE 50 UG/ML
INJECTION, SOLUTION INTRAMUSCULAR; INTRAVENOUS PRN
Status: DISCONTINUED | OUTPATIENT
Start: 2022-01-05 | End: 2022-01-05 | Stop reason: HOSPADM

## 2022-01-05 RX ORDER — LIDOCAINE 40 MG/G
CREAM TOPICAL
Status: DISCONTINUED | OUTPATIENT
Start: 2022-01-05 | End: 2022-01-06 | Stop reason: HOSPADM

## 2022-01-05 RX ORDER — ONDANSETRON 2 MG/ML
4 INJECTION INTRAMUSCULAR; INTRAVENOUS
Status: DISCONTINUED | OUTPATIENT
Start: 2022-01-05 | End: 2022-01-06 | Stop reason: HOSPADM

## 2022-01-05 RX ORDER — SIMETHICONE
LIQUID (ML) MISCELLANEOUS PRN
Status: DISCONTINUED | OUTPATIENT
Start: 2022-01-05 | End: 2022-01-05 | Stop reason: HOSPADM

## 2022-01-05 ASSESSMENT — MIFFLIN-ST. JEOR: SCORE: 1334.68

## 2022-01-05 NOTE — PROGRESS NOTES
Colon and rectal referral placed   Per in basket from colon and rectal RN coordinator booked out to March but pt placed on move up if possoble       refer to colorectal completed   My chart message to patient message to Ms Redman     Question revision of IC anastomosis  Localized ulcer found at the ileocolonic                          anastomosis, likely ischemic. Some stenosis as well,                          but pediatric colonoscope able to pass without

## 2022-01-05 NOTE — H&P
Nuris Nolenoy  [id number]  female  21 year old      Reason for procedure/surgery: Anemia, hematochezia    Patient Active Problem List   Diagnosis     Other congenital anomalies of intestine     Proteinuria     Ulceration of intestine     Short gut syndrome     Iron deficiency anemia     Chronic rhinitis     Chronic eczema       Past Surgical History:    Past Surgical History:   Procedure Laterality Date     COLONOSCOPY  2012     COLONOSCOPY  2012     RESECTION ILEOCECAL  1999    ~ 20 cm of ileum as well as ileocecal valve resected in  period       Past Medical History:   Past Medical History:   Diagnosis Date     Anemia 10/16/2012     COPD (chronic obstructive pulmonary disease) (H)      History of blood transfusion      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, s/p resection     Short gut syndrome 10/16/2012    ~ 20 cm of ileum as well as ileocecal valve resected in  period     Ulceration of intestine 2012       Social History:   Social History     Tobacco Use     Smoking status: Never Smoker     Smokeless tobacco: Never Used     Tobacco comment: Non-smoking home   Substance Use Topics     Alcohol use: Yes       Family History:   Family History   Problem Relation Age of Onset     Cancer Sister         ovarian teratoma, s/p resection     Other Cancer Maternal Grandfather         skin cancer     Hypertension Paternal Grandmother      Hypertension Paternal Grandfather      Cardiovascular Paternal Grandfather         Valve condition     Prostate Cancer Paternal Grandfather      Kidney Disease Maternal Uncle         kidney stones       Allergies:   Allergies   Allergen Reactions     No Known Allergies        Active Medications:   Current Outpatient Medications   Medication Sig Dispense Refill     albuterol (PROAIR HFA/PROVENTIL HFA/VENTOLIN HFA) 108 (90 Base) MCG/ACT inhaler Inhale 1-2 puffs into the lungs every 4 hours as needed for shortness of breath /  dyspnea or wheezing 18 g 0     Cetirizine HCl (ZYRTEC ALLERGY PO) Take by mouth daily       Cyanocobalamin (VITAMIN B-12) 50 MCG TABS Take 100 mcg by mouth daily.       ferrous sulfate (PATRIC-IN-SOL) 75 (15 FE) MG/ML oral drops Take 0.2 mLs (3 mg) by mouth daily 100 mL 1     fexofenadine (ALLEGRA) 60 MG tablet Take 60 mg by mouth 2 times daily       fluticasone (FLONASE) 50 MCG/ACT nasal spray Spray 1-2 sprays into both nostrils daily 16 g 11     Loratadine-Pseudoephedrine (CLARITIN-D 24 HOUR PO)        Multiple Vitamins-Iron (MULTIVITAMIN/IRON PO) Take 1 tablet by mouth daily.       Probiotic Product (PRO-BIOTIC BLEND PO)        VENTOLIN  (90 Base) MCG/ACT inhaler INHALE 2 PUFFS PO Q 4 H PRF SOB OR WHZ  1     vitamin C (ASCORBIC ACID) 250 MG tablet Take 1 tablet (250 mg) by mouth daily 100 tablet 11     bisacodyl (DULCOLAX) 5 MG EC tablet Take as directed. One day prior to exam at 10:00am take 2 tablets 2 tablet 0     docusate sodium (COLACE) 100 MG capsule Take 1-2 capsules (100-200 mg) by mouth 2 times daily 120 capsule 11     Dupilumab 300 MG/2ML SOPN Inject 300 mg Subcutaneous every 14 days To start after 600 mg loading dose. 4 mL 11     FLUARIX QUADRIVALENT 0.5 ML injection ADM 0.5ML IM UTD       hydrocortisone 2.5 % cream To lips and face rash twice daily as needed for rash 30 g 1     ketoconazole (NIZORAL) 2 % external shampoo Use to shampoo twice weekly. Leave on 5 min then rinse. 120 mL 3     magnesium citrate solution Take as directed. Two days prior to exam drink 10oz bottle of magnesium citrate at 4:00pm 296 mL 0     mometasone (ELOCON) 0.1 % external solution Apply small amount topically to affected areas of scalp daily prn for up to 2 weeks. 60 mL 0     norgestim-eth estrad triphasic (TRINESSA, 28,) 0.18/0.215/0.25 MG-35 MCG tablet Take 1 tablet by mouth daily 84 tablet 4     polyethylene glycol (GOLYTELY) 236 g suspension Take as directed. One day before your exam fill the first container with  "water. Cover and shake until mixed well. At 3:00pm drink one 8oz glass every 10-15 minutes until half of the first container is empty. Store the remainder in the refrigerator. At 8:00pm drink the second half of the first container until it is gone. Before you go to bed mix the second container with water and put in refrigerator. Six hours before your check in time drink one 8oz glass every 10-15 minutes until half of container is empty. Discard the remainder of solution. 8000 mL 0     polyethylene glycol (MIRALAX) 17 GM/Dose powder 1 capful (17 g) in 8 oz of liquid 850 g 1     tacrolimus (PROTOPIC) 0.1 % external ointment Apply to face daily in rash areas 30 g 11     terconazole (TERAZOL 3) 80 MG vaginal suppository Place 1 suppository (80 mg) vaginally At Bedtime 3 suppository 1     triamcinolone (KENALOG) 0.1 % external ointment Apply to face rash twice daily for the next two weeks until clear then twice daily as needed. 60 g 1       Systemic Review:   CONSTITUTIONAL: NEGATIVE for fever, chills, change in weight  ENT/MOUTH: NEGATIVE for ear, mouth and throat problems  RESP: NEGATIVE for significant cough or SOB  CV: NEGATIVE for chest pain, palpitations or peripheral edema    Physical Examination:   Vital Signs: /79   Pulse 107   Temp 98.8  F (37.1  C) (Temporal)   Resp 18   Ht 1.626 m (5' 4\")   Wt 59 kg (130 lb)   SpO2 100%   BMI 22.31 kg/m    GENERAL: healthy, alert and no distress  NECK: no adenopathy, no asymmetry, masses, or scars  RESP: lungs clear to auscultation - no rales, rhonchi or wheezes  CV: regular rate and rhythm, normal S1 S2, no S3 or S4, no murmur, click or rub, no peripheral edema and peripheral pulses strong  ABDOMEN: soft, nontender, no hepatosplenomegaly, no masses and bowel sounds normal  MS: no gross musculoskeletal defects noted, no edema    ASA Classification: (I)  Normal healthy patient  Airway Exam: Mallampati Score: Class I (Complete visualization of soft palate)    Plan: " Appropriate to proceed as scheduled.      Moisés Gupta MD  1/5/2022    PCP:  Aliya Osei

## 2022-01-06 ENCOUNTER — TELEPHONE (OUTPATIENT)
Dept: SURGERY | Facility: CLINIC | Age: 22
End: 2022-01-06
Payer: COMMERCIAL

## 2022-01-06 LAB
PATH REPORT.COMMENTS IMP SPEC: NORMAL
PATH REPORT.FINAL DX SPEC: NORMAL
PATH REPORT.GROSS SPEC: NORMAL
PATH REPORT.MICROSCOPIC SPEC OTHER STN: NORMAL
PATH REPORT.RELEVANT HX SPEC: NORMAL
PHOTO IMAGE: NORMAL

## 2022-01-06 NOTE — TELEPHONE ENCOUNTER
LVM and sent mychart to schedule:  With: Dr. Lasha Angel, Dr. Slim Mcmahan, Dr. Julisa Avina, Dr. Frandy Mack, or Dr. Maldonado Barker   Referring Provider: Moisés Gupta   For / Appt Notes:  revision of IC anastomosis   Appt Type: UMP New   Appt Date/Time: next available

## 2022-01-07 NOTE — TELEPHONE ENCOUNTER
Diagnosis, Referred by & from: Revision of IC Abastomosis, referred by Dr. Moisés Gupta   Appt date: 1/26/2022   NOTES STATUS DETAILS   OFFICE NOTE from referring provider  [facility]:  1/5/22 - Referral from Dr. Anderson from Colonoscopy   OFFICE NOTE from other specialist   Received / [facility]:  9/8/21 - GI OV with LEN Delatorre  11/17/20 - GI OV with Dr. Tapia    [facility]:  8/16/19 - GI OV with Dr. Granda   DISCHARGE SUMMARY from hospital  N/A    DISCHARGE REPORT from the ER N/A    OPERATIVE REPORT  N/A    MEDICATION LIST Internal    LABS     ANAL PAP N/A    BIOPSIES/PATHOLOGY RELATED TO DIAGNOSIS Received / [facility]:  1/5/22 - Colon Biopsy (Case: [id number])    [facility]:  1/9/19 - Colon Biopsy (Case: [id number]   DIAGNOSTIC PROCEDURES     PFC TESTING (from the Pelvic floor center includes Manometry, PDNL, EMG, etc.) N/A    COLONOSCOPY Received / [facility]:  1/5/22 - Colonoscopy    [facility]:  1/9/19 - Colonoscopy   UPPER ENDOSCOPY (EGD) Received [facility]:  1/9/19 - EGD   FLEX SIGMOIDOSCOPY  N/A    ERCP N/A    IMAGING (DISC & REPORT)      CT  N/A    MRI Received / [facility]:  7/8/19 - MRI Abd/Pelvis    [facility]:  7/24/17 - MRI Enterography - In PACs   XRAY N/A    ULTRASOUND (ENDOANAL/ENDORECTAL) N/A      Records Requested  01/07/22    Facility  [facility]  Fax: [phone]   Outcome * 1/7/22 11:58 AM Faxed urg req to [facility] for images to be pushed to [place] PACs. - Miley    * 1/12/22 7:30 AM Images received from [facility] and attached to the patient in PACs. - Miley    * 1/20/22 7:28 AM Images received from [facility] rad and attached to the patient in PACs. - Miley

## 2022-01-17 NOTE — PROGRESS NOTES
Request to schedule removed from the therapy plan as the patient is not ready to schedule at this time.

## 2022-01-23 ASSESSMENT — ENCOUNTER SYMPTOMS
BLOOD IN STOOL: 0
BRUISES/BLEEDS EASILY: 0
COUGH: 1
VOMITING: 0
COUGH DISTURBING SLEEP: 1
SHORTNESS OF BREATH: 1
WHEEZING: 0
JAUNDICE: 0
DIARRHEA: 1
POSTURAL DYSPNEA: 1
TROUBLE SWALLOWING: 0
HYPOTENSION: 0
ABDOMINAL PAIN: 1
RECTAL PAIN: 0
HYPERTENSION: 0
NAUSEA: 1
ORTHOPNEA: 1
HEMOPTYSIS: 0
HEARTBURN: 0
SORE THROAT: 0
SYNCOPE: 0
CONSTIPATION: 1
SINUS PAIN: 0
SINUS CONGESTION: 1
LEG PAIN: 0
NECK MASS: 0
SLEEP DISTURBANCES DUE TO BREATHING: 1
PALPITATIONS: 1
TASTE DISTURBANCE: 0
SMELL DISTURBANCE: 0
EXERCISE INTOLERANCE: 0
HOARSE VOICE: 0
SNORES LOUDLY: 0
LIGHT-HEADEDNESS: 0
SPUTUM PRODUCTION: 1
BLOATING: 1
DYSPNEA ON EXERTION: 0
BOWEL INCONTINENCE: 0
SWOLLEN GLANDS: 0

## 2022-01-24 NOTE — PROGRESS NOTES
Colon and Rectal Surgery Clinic Note    RE: Nuris Reddy.  : 2000.  CHARLIE: 2022.    Reason for visit: Anemia, anastomotic ulcer and stricture    HPI: 20 yo F with Crohn;s disease presenting with anemia.   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   -Per Delfina Reddy and Dr. QUINTANA from [facility]  In , she presented with rectal bleeding and underwent colonoscopy which showed anastomotic ulcers.  There was question of IBD after this bleeding, and was maintained on sulfasalazine for many years.  She underwent colonoscopy in early , with biopsy of the anastomotic area showing some ileitis.  She also underwent follow-up MRI read which did not show any evidence of inflammation.  After this her sulfasalazine was discontinued.  -Her current GI symptoms include occasional abdominal cramping, bloating and constipation.  She denies any rectal bleeding, none since .   -Denies any dysphagia, odynophagia, nausea or vomiting.  With her bloating, does not have any obstructive symptoms.  Been taking iron tablets.  -While she has chronic anemia, her hgb was most pronounced this past August, when it was down in the 7s, whereas her baseline is 10-12.5. she was started on iron oral supplements, and most recent hgb on  was 10.7.  -She has not recently received dupilumab.  -She is feeling fatigued at this time.  -1-2 BM per day.    Last colonoscopy: 22:    - Localized ulcer found at the ileocolonic anastomosis, likely ischemic. Some stenosis as well, but pediatric colonoscope able to pass without resistance. Biopsied.   - Suspect that this her anemia and intermittent GI bleeding is from anastomosis.   - The examined portion of the ileum was normal. Biopsied.   - The entire examined colon is normal. Biopsied: right colon, left colon, rectum.    Medical history:  Past Medical History:   Diagnosis Date     Anemia 10/16/2012     COPD (chronic obstructive  pulmonary disease) (H)      History of blood transfusion      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, s/p resection     Short gut syndrome 10/16/2012    ~ 20 cm of ileum as well as ileocecal valve resected in  period     Ulceration of intestine 2012       Surgical history:  Past Surgical History:   Procedure Laterality Date     COLONOSCOPY  2012     COLONOSCOPY  2012     COLONOSCOPY N/A 2022    Procedure: COLONOSCOPY, WITH BIOPSY;  Surgeon: Moisés Gupta MD;  Location: [facility] OR     RESECTION ILEOCECAL  1999    ~ 20 cm of ileum as well as ileocecal valve resected in  period       Family history:  Family History   Problem Relation Age of Onset     Cancer Sister         ovarian teratoma, s/p resection     Other Cancer Maternal Grandfather         skin cancer     Hypertension Paternal Grandmother      Hypertension Paternal Grandfather      Cardiovascular Paternal Grandfather         Valve condition     Prostate Cancer Paternal Grandfather      Kidney Disease Maternal Uncle         kidney stones     No Hx of IBD or GI malignancy    Medications:  Current Outpatient Medications   Medication Sig Dispense Refill     albuterol (PROAIR HFA/PROVENTIL HFA/VENTOLIN HFA) 108 (90 Base) MCG/ACT inhaler Inhale 1-2 puffs into the lungs every 4 hours as needed for shortness of breath / dyspnea or wheezing 18 g 0     bisacodyl (DULCOLAX) 5 MG EC tablet Take as directed. One day prior to exam at 10:00am take 2 tablets 2 tablet 0     Cetirizine HCl (ZYRTEC ALLERGY PO) Take by mouth daily       Cyanocobalamin (VITAMIN B-12) 50 MCG TABS Take 100 mcg by mouth daily.       docusate sodium (COLACE) 100 MG capsule Take 1-2 capsules (100-200 mg) by mouth 2 times daily 120 capsule 11     Dupilumab 300 MG/2ML SOPN Inject 300 mg Subcutaneous every 14 days To start after 600 mg loading dose. 4 mL 11     ferrous sulfate (PATRIC-IN-SOL) 75 (15 FE) MG/ML oral drops Take 0.2 mLs (3 mg)  by mouth daily 100 mL 1     fexofenadine (ALLEGRA) 60 MG tablet Take 60 mg by mouth 2 times daily       FLUARIX QUADRIVALENT 0.5 ML injection ADM 0.5ML IM UTD       fluticasone (FLONASE) 50 MCG/ACT nasal spray Spray 1-2 sprays into both nostrils daily 16 g 11     hydrocortisone 2.5 % cream To lips and face rash twice daily as needed for rash 30 g 1     ketoconazole (NIZORAL) 2 % external shampoo Use to shampoo twice weekly. Leave on 5 min then rinse. 120 mL 3     Loratadine-Pseudoephedrine (CLARITIN-D 24 HOUR PO)        magnesium citrate solution Take as directed. Two days prior to exam drink 10oz bottle of magnesium citrate at 4:00pm 296 mL 0     mometasone (ELOCON) 0.1 % external solution Apply small amount topically to affected areas of scalp daily prn for up to 2 weeks. 60 mL 0     Multiple Vitamins-Iron (MULTIVITAMIN/IRON PO) Take 1 tablet by mouth daily.       norgestim-eth estrad triphasic (TRINESSA, 28,) 0.18/0.215/0.25 MG-35 MCG tablet Take 1 tablet by mouth daily 84 tablet 4     polyethylene glycol (GOLYTELY) 236 g suspension Take as directed. One day before your exam fill the first container with water. Cover and shake until mixed well. At 3:00pm drink one 8oz glass every 10-15 minutes until half of the first container is empty. Store the remainder in the refrigerator. At 8:00pm drink the second half of the first container until it is gone. Before you go to bed mix the second container with water and put in refrigerator. Six hours before your check in time drink one 8oz glass every 10-15 minutes until half of container is empty. Discard the remainder of solution. 8000 mL 0     polyethylene glycol (MIRALAX) 17 GM/Dose powder 1 capful (17 g) in 8 oz of liquid 850 g 1     Probiotic Product (PRO-BIOTIC BLEND PO)        tacrolimus (PROTOPIC) 0.1 % external ointment Apply to face daily in rash areas 30 g 11     terconazole (TERAZOL 3) 80 MG vaginal suppository Place 1 suppository (80 mg) vaginally At Bedtime 3  "suppository 1     triamcinolone (KENALOG) 0.1 % external ointment Apply to face rash twice daily for the next two weeks until clear then twice daily as needed. 60 g 1     VENTOLIN  (90 Base) MCG/ACT inhaler INHALE 2 PUFFS PO Q 4 H PRF SOB OR WHZ  1     vitamin C (ASCORBIC ACID) 250 MG tablet Take 1 tablet (250 mg) by mouth daily 100 tablet 11       Allergies:  Allergies   Allergen Reactions     No Known Allergies        Social history:   Social History     Tobacco Use     Smoking status: Never Smoker     Smokeless tobacco: Never Used     Tobacco comment: Non-smoking home   Substance Use Topics     Alcohol use: Yes     Marital status: single.    ROS:  A complete review of systems was performed with the patient and all systems negative except as per HPI.    Physical Examination:  /68 (BP Location: Left arm, Patient Position: Sitting, Cuff Size: Adult Regular)   Pulse 86   Temp 98.1  F (36.7  C) (Oral)   Ht 1.626 m (5' 4\")   Wt 57.9 kg (127 lb 11.2 oz)   SpO2 100%   BMI 21.92 kg/m    General: Well hydrated. No acute distress.  Abdomen: Soft, NT, ND. Transverse upper abdominal incision well healed.. No inguinal adenopathy palpated.    Laboratory values reviewed:  Recent Labs   Lab Test 11/08/21  0939 08/19/21  0858 08/10/21  1819   WBC  --   --  7.9   HGB 10.7*   < > 7.6*   PLT  --   --  589*   CR 0.89  --   --    ALBUMIN 2.9*  --   --    BILITOTAL 0.2  --   --    ALKPHOS 48  --   --    ALT 21  --   --    AST 14  --   --     < > = values in this interval not displayed.     Albumin 11/8: 2.9, Cr 0.9, Hgb 10.7    ASSESSMENT  1. Chronic anastomotic ulcer with stenosis  2. Anemia  3. COPD  4. Eczema  5. Rhinitis  6. Proteinuria  7. Malnutrition, albumin 2.9    PLAN: laparoscopic small bowel resection, possible exploratory laparotomy in May 6 after her semester has finished. I advised her that we would proceed sooner should her anemia or nutrition not improve or conversely deteriorate.  1. Based on " colonoscopy, appears that anastomosis is site of ulceration and stenosis secondary to ischemia. This is likely chronic and as such, I recommend surgical resection.  2. Begin iron infusions, continue weekly until surgery.  3. CT A/P with PO and IV contrast to evaluate anatomy in preparation for surgery. This can be done any time before surgery    For OR prep:  1. Preoperative labs: CBC, CMP, PTT/INR, Prealbumin. q monthly lab checks to monitor nutrition and anemia.  2. Mechanical bowel prep with oral antibiotics.  3. Hold these medications prior to surgery: dupilumab for 4 weeks prior to surgery.  4. Advised patient to begin protein shakes: Premier or Pure protein given high protein, low carb ratio for pre-operative rehab.      Risks, benefits, and alternatives of operative treatment were thoroughly discussed with the patient, she understands these well and agrees to proceed.    Time spent: 45 minutes.  >50% spent in discussing, counseling and coordinating care.    Frandy Mack M.D    Division of Colon and Rectal Surgery  [facility]    Referring Provider:  Referred Self, MD  No address on file     Primary Care Provider:  Aliya Osei

## 2022-01-26 ENCOUNTER — LAB (OUTPATIENT)
Dept: LAB | Facility: CLINIC | Age: 22
End: 2022-01-26
Payer: COMMERCIAL

## 2022-01-26 ENCOUNTER — OFFICE VISIT (OUTPATIENT)
Dept: SURGERY | Facility: CLINIC | Age: 22
End: 2022-01-26
Payer: COMMERCIAL

## 2022-01-26 ENCOUNTER — PRE VISIT (OUTPATIENT)
Dept: SURGERY | Facility: CLINIC | Age: 22
End: 2022-01-26

## 2022-01-26 VITALS
WEIGHT: 127.7 LBS | SYSTOLIC BLOOD PRESSURE: 122 MMHG | DIASTOLIC BLOOD PRESSURE: 68 MMHG | HEIGHT: 64 IN | OXYGEN SATURATION: 100 % | TEMPERATURE: 98.1 F | HEART RATE: 86 BPM | BODY MASS INDEX: 21.8 KG/M2

## 2022-01-26 DIAGNOSIS — Q43.8 OTHER CONGENITAL ANOMALIES OF INTESTINE: Primary | ICD-10-CM

## 2022-01-26 DIAGNOSIS — Q43.8 OTHER CONGENITAL ANOMALIES OF INTESTINE: ICD-10-CM

## 2022-01-26 LAB
ALBUMIN SERPL-MCNC: 3.6 G/DL (ref 3.4–5)
ALP SERPL-CCNC: 60 U/L (ref 40–150)
ALT SERPL W P-5'-P-CCNC: 34 U/L (ref 0–50)
ANION GAP SERPL CALCULATED.3IONS-SCNC: 7 MMOL/L (ref 3–14)
AST SERPL W P-5'-P-CCNC: 24 U/L (ref 0–45)
BASOPHILS # BLD AUTO: 0 10E3/UL (ref 0–0.2)
BASOPHILS NFR BLD AUTO: 1 %
BILIRUB SERPL-MCNC: 0.5 MG/DL (ref 0.2–1.3)
BUN SERPL-MCNC: 6 MG/DL (ref 7–30)
CALCIUM SERPL-MCNC: 8.9 MG/DL (ref 8.5–10.1)
CHLORIDE BLD-SCNC: 109 MMOL/L (ref 94–109)
CO2 SERPL-SCNC: 25 MMOL/L (ref 20–32)
CREAT SERPL-MCNC: 0.82 MG/DL (ref 0.52–1.04)
EOSINOPHIL # BLD AUTO: 0.4 10E3/UL (ref 0–0.7)
EOSINOPHIL NFR BLD AUTO: 7 %
ERYTHROCYTE [DISTWIDTH] IN BLOOD BY AUTOMATED COUNT: 14 % (ref 10–15)
GFR SERPL CREATININE-BSD FRML MDRD: >90 ML/MIN/1.73M2
GLUCOSE BLD-MCNC: 87 MG/DL (ref 70–99)
HCT VFR BLD AUTO: 34 % (ref 35–47)
HGB BLD-MCNC: 11 G/DL (ref 11.7–15.7)
IMM GRANULOCYTES # BLD: 0 10E3/UL
IMM GRANULOCYTES NFR BLD: 0 %
LYMPHOCYTES # BLD AUTO: 2.2 10E3/UL (ref 0.8–5.3)
LYMPHOCYTES NFR BLD AUTO: 35 %
MCH RBC QN AUTO: 28.1 PG (ref 26.5–33)
MCHC RBC AUTO-ENTMCNC: 32.4 G/DL (ref 31.5–36.5)
MCV RBC AUTO: 87 FL (ref 78–100)
MONOCYTES # BLD AUTO: 0.5 10E3/UL (ref 0–1.3)
MONOCYTES NFR BLD AUTO: 7 %
NEUTROPHILS # BLD AUTO: 3 10E3/UL (ref 1.6–8.3)
NEUTROPHILS NFR BLD AUTO: 50 %
NRBC # BLD AUTO: 0 10E3/UL
NRBC BLD AUTO-RTO: 0 /100
PLATELET # BLD AUTO: 450 10E3/UL (ref 150–450)
POTASSIUM BLD-SCNC: 4 MMOL/L (ref 3.4–5.3)
PREALB SERPL IA-MCNC: 31 MG/DL (ref 15–45)
PROT SERPL-MCNC: 7 G/DL (ref 6.8–8.8)
RBC # BLD AUTO: 3.92 10E6/UL (ref 3.8–5.2)
SODIUM SERPL-SCNC: 141 MMOL/L (ref 133–144)
WBC # BLD AUTO: 6.1 10E3/UL (ref 4–11)

## 2022-01-26 PROCEDURE — 36415 COLL VENOUS BLD VENIPUNCTURE: CPT | Performed by: PATHOLOGY

## 2022-01-26 PROCEDURE — 84134 ASSAY OF PREALBUMIN: CPT | Performed by: PATHOLOGY

## 2022-01-26 PROCEDURE — 99204 OFFICE O/P NEW MOD 45 MIN: CPT | Performed by: SURGERY

## 2022-01-26 PROCEDURE — 80053 COMPREHEN METABOLIC PANEL: CPT | Performed by: PATHOLOGY

## 2022-01-26 PROCEDURE — 85025 COMPLETE CBC W/AUTO DIFF WBC: CPT | Performed by: PATHOLOGY

## 2022-01-26 RX ORDER — ONDANSETRON 4 MG/1
4 TABLET, FILM COATED ORAL EVERY 6 HOURS
Qty: 3 TABLET | Refills: 0 | Status: ON HOLD | OUTPATIENT
Start: 2022-01-26 | End: 2022-05-23

## 2022-01-26 RX ORDER — POLYETHYLENE GLYCOL 3350 17 G/17G
238 POWDER, FOR SOLUTION ORAL SEE ADMIN INSTRUCTIONS
Qty: 14 PACKET | Refills: 0 | Status: ON HOLD | OUTPATIENT
Start: 2022-01-26 | End: 2022-05-23

## 2022-01-26 RX ORDER — NEOMYCIN SULFATE 500 MG/1
1000 TABLET ORAL EVERY 6 HOURS
Qty: 6 TABLET | Refills: 0 | Status: ON HOLD | OUTPATIENT
Start: 2022-01-26 | End: 2022-05-23

## 2022-01-26 RX ORDER — METRONIDAZOLE 500 MG/1
500 TABLET ORAL EVERY 6 HOURS
Qty: 3 TABLET | Refills: 0 | Status: ON HOLD | OUTPATIENT
Start: 2022-01-26 | End: 2022-05-23

## 2022-01-26 ASSESSMENT — MIFFLIN-ST. JEOR: SCORE: 1324.24

## 2022-01-26 ASSESSMENT — PAIN SCALES - GENERAL: PAINLEVEL: NO PAIN (0)

## 2022-01-26 NOTE — LETTER
2022       RE: Nuris VASQUEZ Reddy  [address] [place] [address]     Dear Colleague,    Thank you for referring your patient, Nuris Reddy, to the [facility] COLON AND RECTAL SURGERY CLINIC [place] at [facility]. Please see a copy of my visit note below.    Colon and Rectal Surgery Clinic Note    RE: Nuris Reddy.  : 2000.  CHARLIE: 2022.    Reason for visit: Anemia, anastomotic ulcer and stricture    HPI: 20 yo F with Crohn;s disease presenting with anemia.   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   -Per Delfina Reddy and Dr. QUINTANA from [facility]  In , she presented with rectal bleeding and underwent colonoscopy which showed anastomotic ulcers.  There was question of IBD after this bleeding, and was maintained on sulfasalazine for many years.  She underwent colonoscopy in early , with biopsy of the anastomotic area showing some ileitis.  She also underwent follow-up MRI read which did not show any evidence of inflammation.  After this her sulfasalazine was discontinued.  -Her current GI symptoms include occasional abdominal cramping, bloating and constipation.  She denies any rectal bleeding, none since .   -Denies any dysphagia, odynophagia, nausea or vomiting.  With her bloating, does not have any obstructive symptoms.  Been taking iron tablets.  -While she has chronic anemia, her hgb was most pronounced this past August, when it was down in the 7s, whereas her baseline is 10-12.5. she was started on iron oral supplements, and most recent hgb on  was 10.7.  -She has not recently received dupilumab.  -She is feeling fatigued at this time.  -1-2 BM per day.    Last colonoscopy: 22:    - Localized ulcer found at the ileocolonic anastomosis, likely ischemic. Some stenosis as well, but pediatric colonoscope able to pass without resistance. Biopsied.   -  Suspect that this her anemia and intermittent GI bleeding is from anastomosis.   - The examined portion of the ileum was normal. Biopsied.   - The entire examined colon is normal. Biopsied: right colon, left colon, rectum.    Medical history:  Past Medical History:   Diagnosis Date     Anemia 10/16/2012     COPD (chronic obstructive pulmonary disease) (H)      History of blood transfusion      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, s/p resection     Short gut syndrome 10/16/2012    ~ 20 cm of ileum as well as ileocecal valve resected in  period     Ulceration of intestine 2012       Surgical history:  Past Surgical History:   Procedure Laterality Date     COLONOSCOPY  2012     COLONOSCOPY  2012     COLONOSCOPY N/A 2022    Procedure: COLONOSCOPY, WITH BIOPSY;  Surgeon: Moisés Gupta MD;  Location: [facility] OR     RESECTION ILEOCECAL  1999    ~ 20 cm of ileum as well as ileocecal valve resected in  period       Family history:  Family History   Problem Relation Age of Onset     Cancer Sister         ovarian teratoma, s/p resection     Other Cancer Maternal Grandfather         skin cancer     Hypertension Paternal Grandmother      Hypertension Paternal Grandfather      Cardiovascular Paternal Grandfather         Valve condition     Prostate Cancer Paternal Grandfather      Kidney Disease Maternal Uncle         kidney stones     No Hx of IBD or GI malignancy    Medications:  Current Outpatient Medications   Medication Sig Dispense Refill     albuterol (PROAIR HFA/PROVENTIL HFA/VENTOLIN HFA) 108 (90 Base) MCG/ACT inhaler Inhale 1-2 puffs into the lungs every 4 hours as needed for shortness of breath / dyspnea or wheezing 18 g 0     bisacodyl (DULCOLAX) 5 MG EC tablet Take as directed. One day prior to exam at 10:00am take 2 tablets 2 tablet 0     Cetirizine HCl (ZYRTEC ALLERGY PO) Take by mouth daily       Cyanocobalamin (VITAMIN B-12) 50 MCG TABS Take  100 mcg by mouth daily.       docusate sodium (COLACE) 100 MG capsule Take 1-2 capsules (100-200 mg) by mouth 2 times daily 120 capsule 11     Dupilumab 300 MG/2ML SOPN Inject 300 mg Subcutaneous every 14 days To start after 600 mg loading dose. 4 mL 11     ferrous sulfate (PATRIC-IN-SOL) 75 (15 FE) MG/ML oral drops Take 0.2 mLs (3 mg) by mouth daily 100 mL 1     fexofenadine (ALLEGRA) 60 MG tablet Take 60 mg by mouth 2 times daily       FLUARIX QUADRIVALENT 0.5 ML injection ADM 0.5ML IM UTD       fluticasone (FLONASE) 50 MCG/ACT nasal spray Spray 1-2 sprays into both nostrils daily 16 g 11     hydrocortisone 2.5 % cream To lips and face rash twice daily as needed for rash 30 g 1     ketoconazole (NIZORAL) 2 % external shampoo Use to shampoo twice weekly. Leave on 5 min then rinse. 120 mL 3     Loratadine-Pseudoephedrine (CLARITIN-D 24 HOUR PO)        magnesium citrate solution Take as directed. Two days prior to exam drink 10oz bottle of magnesium citrate at 4:00pm 296 mL 0     mometasone (ELOCON) 0.1 % external solution Apply small amount topically to affected areas of scalp daily prn for up to 2 weeks. 60 mL 0     Multiple Vitamins-Iron (MULTIVITAMIN/IRON PO) Take 1 tablet by mouth daily.       norgestim-eth estrad triphasic (TRINESSA, 28,) 0.18/0.215/0.25 MG-35 MCG tablet Take 1 tablet by mouth daily 84 tablet 4     polyethylene glycol (GOLYTELY) 236 g suspension Take as directed. One day before your exam fill the first container with water. Cover and shake until mixed well. At 3:00pm drink one 8oz glass every 10-15 minutes until half of the first container is empty. Store the remainder in the refrigerator. At 8:00pm drink the second half of the first container until it is gone. Before you go to bed mix the second container with water and put in refrigerator. Six hours before your check in time drink one 8oz glass every 10-15 minutes until half of container is empty. Discard the remainder of solution. 8000 mL 0      "polyethylene glycol (MIRALAX) 17 GM/Dose powder 1 capful (17 g) in 8 oz of liquid 850 g 1     Probiotic Product (PRO-BIOTIC BLEND PO)        tacrolimus (PROTOPIC) 0.1 % external ointment Apply to face daily in rash areas 30 g 11     terconazole (TERAZOL 3) 80 MG vaginal suppository Place 1 suppository (80 mg) vaginally At Bedtime 3 suppository 1     triamcinolone (KENALOG) 0.1 % external ointment Apply to face rash twice daily for the next two weeks until clear then twice daily as needed. 60 g 1     VENTOLIN  (90 Base) MCG/ACT inhaler INHALE 2 PUFFS PO Q 4 H PRF SOB OR WHZ  1     vitamin C (ASCORBIC ACID) 250 MG tablet Take 1 tablet (250 mg) by mouth daily 100 tablet 11       Allergies:  Allergies   Allergen Reactions     No Known Allergies        Social history:   Social History     Tobacco Use     Smoking status: Never Smoker     Smokeless tobacco: Never Used     Tobacco comment: Non-smoking home   Substance Use Topics     Alcohol use: Yes     Marital status: single.    ROS:  A complete review of systems was performed with the patient and all systems negative except as per HPI.    Physical Examination:  /68 (BP Location: Left arm, Patient Position: Sitting, Cuff Size: Adult Regular)   Pulse 86   Temp 98.1  F (36.7  C) (Oral)   Ht 1.626 m (5' 4\")   Wt 57.9 kg (127 lb 11.2 oz)   SpO2 100%   BMI 21.92 kg/m    General: Well hydrated. No acute distress.  Abdomen: Soft, NT, ND. Transverse upper abdominal incision well healed.. No inguinal adenopathy palpated.    Laboratory values reviewed:  Recent Labs   Lab Test 11/08/21  0939 08/19/21  0858 08/10/21  1819   WBC  --   --  7.9   HGB 10.7*   < > 7.6*   PLT  --   --  589*   CR 0.89  --   --    ALBUMIN 2.9*  --   --    BILITOTAL 0.2  --   --    ALKPHOS 48  --   --    ALT 21  --   --    AST 14  --   --     < > = values in this interval not displayed.     Albumin 11/8: 2.9, Cr 0.9, Hgb 10.7    ASSESSMENT  1. Chronic anastomotic ulcer with stenosis  2. " Anemia  3. COPD  4. Eczema  5. Rhinitis  6. Proteinuria  7. Malnutrition, albumin 2.9    PLAN: laparoscopic small bowel resection, possible exploratory laparotomy in May 6 after her semester has finished. I advised her that we would proceed sooner should her anemia or nutrition not improve or conversely deteriorate.  1. Based on colonoscopy, appears that anastomosis is site of ulceration and stenosis secondary to ischemia. This is likely chronic and as such, I recommend surgical resection.  2. Begin iron infusions, continue weekly until surgery.  3. CT A/P with PO and IV contrast to evaluate anatomy in preparation for surgery. This can be done any time before surgery    For OR prep:  1. Preoperative labs: CBC, CMP, PTT/INR, Prealbumin. q monthly lab checks to monitor nutrition and anemia.  2. Mechanical bowel prep with oral antibiotics.  3. Hold these medications prior to surgery: dupilumab for 4 weeks prior to surgery.  4. Advised patient to begin protein shakes: Premier or Pure protein given high protein, low carb ratio for pre-operative rehab.      Risks, benefits, and alternatives of operative treatment were thoroughly discussed with the patient, she understands these well and agrees to proceed.    Time spent: 45 minutes.  >50% spent in discussing, counseling and coordinating care.    Frandy Mack M.D    Division of Colon and Rectal Surgery  [facility]    Referring Provider:  Referred Self, MD  No address on file     Primary Care Provider:  Aliya Osei      Again, thank you for allowing me to participate in the care of your patient.      Sincerely,    Frandy Mack MD

## 2022-01-26 NOTE — PATIENT INSTRUCTIONS
Follow up:  1. Labs today  2. Iron infusions until surgery   3. Monthly labs until surgery    Please call with any questions or concerns regarding your clinic visit today.    It is a pleasure being involved in your health care.    Contacts post-consultation depending on your need:    Radiology Appointments [phone]    Schedule Clinic Appointments [phone] # 1   M-F 7:30 - 5 pm    JULIANA Yin [phone]    Clinic Fax Number [phone]    Surgery Scheduling [phone]    My Chart is available 24 hours a day and is a secure way to access your records and communicate with your care team.  I strongly recommend signing up if you haven't already done so, if you are comfortable with computers.  If you would like to inquire about this or are having problems with My Chart access, you may call [phone] or go online at [email or web address].  Please allow at least 24 hours for a response and extra time on weekends and Holidays.

## 2022-01-26 NOTE — NURSING NOTE
"Chief Complaint   Patient presents with     New Patient     New consult for revision of ic anastomosis       Vitals:    01/26/22 0939   BP: 122/68   BP Location: Left arm   Patient Position: Sitting   Cuff Size: Adult Regular   Pulse: 86   Temp: 98.1  F (36.7  C)   TempSrc: Oral   SpO2: 100%   Weight: 127 lb 11.2 oz   Height: 5' 4\"       Body mass index is 21.92 kg/m .       Aaliyah Johnson CMA    "

## 2022-01-30 DIAGNOSIS — J30.89 SEASONAL ALLERGIC RHINITIS DUE TO OTHER ALLERGIC TRIGGER: ICD-10-CM

## 2022-02-01 ENCOUNTER — TELEPHONE (OUTPATIENT)
Dept: SURGERY | Facility: CLINIC | Age: 22
End: 2022-02-01
Payer: COMMERCIAL

## 2022-02-01 NOTE — TELEPHONE ENCOUNTER
Tier 2 Case Request received to schedule:    Patient Name: Nuris Reddy   MRN: [id number]   Case#: [id number]   Surgeon(s) and Role:      * Frandy Mack MD - Primary   Date requested: * No dates entered *   Location: [facility] OR   Procedure(s):   EXCISION, SMALL INTESTINE, LAPAROSCOPIC, POSSIBLE EXPLORATORY LAPAROTOMY (N/A)     Additional Instructions for the Case  Multi-surgeon case:  NO  Time in minutes:  240  Anesthesia: GEN  Prep: FULL W/ ABX  Pre-Op: PAC  Labs: YES  WOC: NO  Special equipment: NO  Special Instructions: CT A/P BEFORE SURGERY. DOES NOT MATTER WHEN  PLEASE SCHEDULE AFTER MAY 6, 2022 PER PATIENT.  HOLD dupilumab for 4 weeks prior to surgery    Schedule with Dea Pacheco NP 2-3 weeks after surgery.  Schedule with Surgeon 3-4 weeks after Dea Pacheco NP    On 2/1/2022:  Called patient, left voicemail message to call back regarding scheduling.

## 2022-02-01 NOTE — TELEPHONE ENCOUNTER
Routing refill request to provider for review/approval because:  Drug interaction warning    Tatiana Claire RN on 2/1/2022 at 10:38 AM

## 2022-02-02 RX ORDER — FLUTICASONE PROPIONATE 50 MCG
1-2 SPRAY, SUSPENSION (ML) NASAL DAILY
Qty: 16 G | Refills: 11 | Status: SHIPPED | OUTPATIENT
Start: 2022-02-02

## 2022-02-07 NOTE — TELEPHONE ENCOUNTER
"On 2/7/2022:     Spoke with patient via phone, completed the following scheduling in order to coordinate appointments around Spring Break, graduation, and other school-related activities, then sent Surgery Packet to patient via MyChart and Mail including related scheduling information and prep instructions:     Required: Yes, you will need a  18 years or older to drive you home from your procedure as well as stay with you for 24 hours after your procedure, if you are discharged the same day as your procedure.    Surgery: Excision, Small Intestine, Laparoscopic, possible Laparotomy    Date: Thursday May 19, 2022  Surgeon: Dr. Frandy Mack    Time: You will receive a call 1-3 days prior to your surgery with your finalized surgery and arrival time.     Location:     [facility] [address])      [place] [address]      [phone]   [email or web address]     Pre-surgical Preparation:    **HOLD Dupilumab for 4 weeks prior to surgery**  MiraLAX/Gatorade, Magnesium Citrate, Antibiotics, Zofran  - see \"Day Before Surgery\"  - obtain medications and ingredients in advance  - if you have diabetes or blood sugar concerns, please use Gatorade ZERO or Low Sugar, as per recommendation by your physician    Upcoming Related Appointments:     Pre-operative Imaging appointment:   CT Chest, Abdomen, Pelvis, WWO:   3/10/2022 at 10:10 AM check-in                                                 [facility] Lab / Imaging                                                 [address] [place] [address]    Pre-operative History & Physical:   Clinic appointment with Pre-operative Assessment Center (PAC): 5/9/2022 at 10:30 AM                                                 VIDEO VISIT  Pre-operative Lab appointment:   Lab draw appointment:    5/16/2022 at 10:15 AM               "                                   [facility]                                                 [address] [place] [address]    Pre-operative COVID-19 Test:   Pre-procedure asymptomatic COVID PCR   5/16/2022 at 10:30 AM                                                 [facility]                                                 [address] [place] [address]    Post operative appointment:  Clinic appointment with Dea Pacheco, NP: 6/3/2022 at 10:00 AM                                                                      [facility]                                                                      [address] [place] [address]    Post operative appointment:  Clinic appointment with Dr. Frandy Mack: 6/29/2022 at 10:00 AM                                                                      [facility](4[facility])                                                                      [address] [place] [address]

## 2022-02-08 NOTE — TELEPHONE ENCOUNTER
FUTURE VISIT INFORMATION      SURGERY INFORMATION:    Date: 22    Location: uu or    Surgeon:  Frandy Mack MD    Anesthesia Type:  general    Procedure: EXCISION, SMALL INTESTINE, LAPAROSCOPIC Possible LAPAROTOMY    Consult: ov     RECORDS REQUESTED FROM:        Primary Care Provider: Aliya Osei MD- [facility]    Most recent EKG+ Tracin21

## 2022-02-26 ENCOUNTER — LAB (OUTPATIENT)
Dept: LAB | Facility: CLINIC | Age: 22
End: 2022-02-26
Payer: COMMERCIAL

## 2022-02-26 DIAGNOSIS — Q43.8 OTHER CONGENITAL ANOMALIES OF INTESTINE: ICD-10-CM

## 2022-02-26 LAB
BASOPHILS # BLD AUTO: 0.1 10E3/UL (ref 0–0.2)
BASOPHILS NFR BLD AUTO: 1 %
EOSINOPHIL # BLD AUTO: 0.2 10E3/UL (ref 0–0.7)
EOSINOPHIL NFR BLD AUTO: 3 %
ERYTHROCYTE [DISTWIDTH] IN BLOOD BY AUTOMATED COUNT: 14.5 % (ref 10–15)
HCT VFR BLD AUTO: 35.3 % (ref 35–47)
HGB BLD-MCNC: 11.2 G/DL (ref 11.7–15.7)
LYMPHOCYTES # BLD AUTO: 1.9 10E3/UL (ref 0.8–5.3)
LYMPHOCYTES NFR BLD AUTO: 27 %
MCH RBC QN AUTO: 28.2 PG (ref 26.5–33)
MCHC RBC AUTO-ENTMCNC: 31.7 G/DL (ref 31.5–36.5)
MCV RBC AUTO: 89 FL (ref 78–100)
MONOCYTES # BLD AUTO: 0.6 10E3/UL (ref 0–1.3)
MONOCYTES NFR BLD AUTO: 8 %
NEUTROPHILS # BLD AUTO: 4.4 10E3/UL (ref 1.6–8.3)
NEUTROPHILS NFR BLD AUTO: 61 %
PLATELET # BLD AUTO: 469 10E3/UL (ref 150–450)
RBC # BLD AUTO: 3.97 10E6/UL (ref 3.8–5.2)
WBC # BLD AUTO: 7.1 10E3/UL (ref 4–11)

## 2022-02-26 PROCEDURE — 85610 PROTHROMBIN TIME: CPT

## 2022-02-26 PROCEDURE — 84134 ASSAY OF PREALBUMIN: CPT

## 2022-02-26 PROCEDURE — 85730 THROMBOPLASTIN TIME PARTIAL: CPT

## 2022-02-26 PROCEDURE — 80053 COMPREHEN METABOLIC PANEL: CPT

## 2022-02-26 PROCEDURE — 85025 COMPLETE CBC W/AUTO DIFF WBC: CPT

## 2022-02-26 PROCEDURE — 36415 COLL VENOUS BLD VENIPUNCTURE: CPT

## 2022-02-27 LAB
ALBUMIN SERPL-MCNC: 3.4 G/DL (ref 3.4–5)
ALP SERPL-CCNC: 70 U/L (ref 40–150)
ALT SERPL W P-5'-P-CCNC: 39 U/L (ref 0–50)
ANION GAP SERPL CALCULATED.3IONS-SCNC: 6 MMOL/L (ref 3–14)
APTT PPP: 33 SECONDS (ref 22–38)
AST SERPL W P-5'-P-CCNC: 22 U/L (ref 0–45)
BILIRUB SERPL-MCNC: 0.7 MG/DL (ref 0.2–1.3)
BUN SERPL-MCNC: 11 MG/DL (ref 7–30)
CALCIUM SERPL-MCNC: 8.8 MG/DL (ref 8.5–10.1)
CHLORIDE BLD-SCNC: 110 MMOL/L (ref 94–109)
CO2 SERPL-SCNC: 23 MMOL/L (ref 20–32)
CREAT SERPL-MCNC: 0.96 MG/DL (ref 0.52–1.04)
GFR SERPL CREATININE-BSD FRML MDRD: 85 ML/MIN/1.73M2
GLUCOSE BLD-MCNC: 78 MG/DL (ref 70–99)
INR PPP: 1.08 (ref 0.85–1.15)
POTASSIUM BLD-SCNC: 3.6 MMOL/L (ref 3.4–5.3)
PROT SERPL-MCNC: 7.1 G/DL (ref 6.8–8.8)
SODIUM SERPL-SCNC: 139 MMOL/L (ref 133–144)

## 2022-02-28 LAB — PREALB SERPL IA-MCNC: 26 MG/DL (ref 15–45)

## 2022-03-10 ENCOUNTER — ANCILLARY PROCEDURE (OUTPATIENT)
Dept: CT IMAGING | Facility: CLINIC | Age: 22
End: 2022-03-10
Attending: SURGERY
Payer: COMMERCIAL

## 2022-03-10 DIAGNOSIS — Q43.8 OTHER CONGENITAL ANOMALIES OF INTESTINE: ICD-10-CM

## 2022-03-10 PROCEDURE — 74177 CT ABD & PELVIS W/CONTRAST: CPT | Mod: GC | Performed by: RADIOLOGY

## 2022-03-10 RX ORDER — IOPAMIDOL 755 MG/ML
78 INJECTION, SOLUTION INTRAVASCULAR ONCE
Status: COMPLETED | OUTPATIENT
Start: 2022-03-10 | End: 2022-03-10

## 2022-03-10 RX ADMIN — IOPAMIDOL 78 ML: 755 INJECTION, SOLUTION INTRAVASCULAR at 10:47

## 2022-03-30 ENCOUNTER — TELEPHONE (OUTPATIENT)
Dept: SURGERY | Facility: CLINIC | Age: 22
End: 2022-03-30
Payer: COMMERCIAL

## 2022-04-09 DIAGNOSIS — Z11.59 ENCOUNTER FOR SCREENING FOR OTHER VIRAL DISEASES: Primary | ICD-10-CM

## 2022-04-16 ENCOUNTER — LAB (OUTPATIENT)
Dept: LAB | Facility: CLINIC | Age: 22
End: 2022-04-16
Payer: COMMERCIAL

## 2022-04-16 DIAGNOSIS — Q43.8 OTHER CONGENITAL ANOMALIES OF INTESTINE: ICD-10-CM

## 2022-04-16 LAB
BASOPHILS # BLD AUTO: 0.1 10E3/UL (ref 0–0.2)
BASOPHILS NFR BLD AUTO: 1 %
EOSINOPHIL # BLD AUTO: 0.5 10E3/UL (ref 0–0.7)
EOSINOPHIL NFR BLD AUTO: 6 %
ERYTHROCYTE [DISTWIDTH] IN BLOOD BY AUTOMATED COUNT: 13.6 % (ref 10–15)
HCT VFR BLD AUTO: 30.7 % (ref 35–47)
HGB BLD-MCNC: 9.8 G/DL (ref 11.7–15.7)
LYMPHOCYTES # BLD AUTO: 2.3 10E3/UL (ref 0.8–5.3)
LYMPHOCYTES NFR BLD AUTO: 30 %
MCH RBC QN AUTO: 26.7 PG (ref 26.5–33)
MCHC RBC AUTO-ENTMCNC: 31.9 G/DL (ref 31.5–36.5)
MCV RBC AUTO: 84 FL (ref 78–100)
MONOCYTES # BLD AUTO: 0.6 10E3/UL (ref 0–1.3)
MONOCYTES NFR BLD AUTO: 7 %
NEUTROPHILS # BLD AUTO: 4.4 10E3/UL (ref 1.6–8.3)
NEUTROPHILS NFR BLD AUTO: 56 %
PLATELET # BLD AUTO: 479 10E3/UL (ref 150–450)
RBC # BLD AUTO: 3.67 10E6/UL (ref 3.8–5.2)
WBC # BLD AUTO: 7.9 10E3/UL (ref 4–11)

## 2022-04-16 PROCEDURE — 85025 COMPLETE CBC W/AUTO DIFF WBC: CPT

## 2022-04-16 PROCEDURE — 80053 COMPREHEN METABOLIC PANEL: CPT

## 2022-04-16 PROCEDURE — 36415 COLL VENOUS BLD VENIPUNCTURE: CPT

## 2022-04-16 PROCEDURE — 84134 ASSAY OF PREALBUMIN: CPT

## 2022-04-17 LAB
ALBUMIN SERPL-MCNC: 3.5 G/DL (ref 3.4–5)
ALP SERPL-CCNC: 79 U/L (ref 40–150)
ALT SERPL W P-5'-P-CCNC: 42 U/L (ref 0–50)
ANION GAP SERPL CALCULATED.3IONS-SCNC: 5 MMOL/L (ref 3–14)
AST SERPL W P-5'-P-CCNC: 31 U/L (ref 0–45)
BILIRUB SERPL-MCNC: 0.5 MG/DL (ref 0.2–1.3)
BUN SERPL-MCNC: 10 MG/DL (ref 7–30)
CALCIUM SERPL-MCNC: 8.8 MG/DL (ref 8.5–10.1)
CHLORIDE BLD-SCNC: 108 MMOL/L (ref 94–109)
CO2 SERPL-SCNC: 25 MMOL/L (ref 20–32)
CREAT SERPL-MCNC: 0.89 MG/DL (ref 0.52–1.04)
GFR SERPL CREATININE-BSD FRML MDRD: >90 ML/MIN/1.73M2
GLUCOSE BLD-MCNC: 80 MG/DL (ref 70–99)
POTASSIUM BLD-SCNC: 3.8 MMOL/L (ref 3.4–5.3)
PROT SERPL-MCNC: 7 G/DL (ref 6.8–8.8)
SODIUM SERPL-SCNC: 138 MMOL/L (ref 133–144)

## 2022-04-18 LAB — PREALB SERPL IA-MCNC: 27 MG/DL (ref 15–45)

## 2022-04-24 ENCOUNTER — HEALTH MAINTENANCE LETTER (OUTPATIENT)
Age: 22
End: 2022-04-24

## 2022-05-05 ENCOUNTER — TEAM CONFERENCE (OUTPATIENT)
Dept: SURGERY | Facility: CLINIC | Age: 22
End: 2022-05-05
Payer: COMMERCIAL

## 2022-05-05 DIAGNOSIS — L30.9 CHRONIC ECZEMA: Primary | ICD-10-CM

## 2022-05-05 DIAGNOSIS — D50.9 IRON DEFICIENCY ANEMIA, UNSPECIFIED IRON DEFICIENCY ANEMIA TYPE: ICD-10-CM

## 2022-05-05 DIAGNOSIS — Q43.8 OTHER CONGENITAL ANOMALIES OF INTESTINE: ICD-10-CM

## 2022-05-05 NOTE — PROGRESS NOTES
COLON AND RECTAL SURGERY HUDDLE:    Patient was reviewed in preporation for their surgery the following was reviewed and has been completed:    Surgeon: Dr. Frandy Mack    Surgery & Date: 5/19     Last MD Note: reviewed    Anesthesia Type: General    Other Providers: No    PAC: Yes    WOC: No    Labs: Yes    Bowel Prep: Yes MiraLAX / Gatorade , Antibiotic and Magnesium Citrate    Packet: Yes    Imaging: Yes    Post-Op Appointments: Yes    COVID: Yes    Is patient on TPN?: No   If yes, I contacted the TPN pharmacist by paging the  pharmacy at [phone] or calling [phone]. I also contacted Chery HARRINGTON with inpatient colon and rectal team.     Pre op call complete: Yes

## 2022-05-09 ENCOUNTER — VIRTUAL VISIT (OUTPATIENT)
Dept: SURGERY | Facility: CLINIC | Age: 22
End: 2022-05-09
Payer: COMMERCIAL

## 2022-05-09 ENCOUNTER — ANESTHESIA EVENT (OUTPATIENT)
Dept: SURGERY | Facility: CLINIC | Age: 22
DRG: 331 | End: 2022-05-09
Payer: COMMERCIAL

## 2022-05-09 ENCOUNTER — PRE VISIT (OUTPATIENT)
Dept: SURGERY | Facility: CLINIC | Age: 22
End: 2022-05-09

## 2022-05-09 DIAGNOSIS — Z01.818 PREOP EXAMINATION: ICD-10-CM

## 2022-05-09 DIAGNOSIS — R06.02 SHORTNESS OF BREATH: ICD-10-CM

## 2022-05-09 DIAGNOSIS — D64.9 ANEMIA, UNSPECIFIED TYPE: Primary | ICD-10-CM

## 2022-05-09 DIAGNOSIS — R06.00 DYSPNEA: Primary | ICD-10-CM

## 2022-05-09 PROCEDURE — 99205 OFFICE O/P NEW HI 60 MIN: CPT | Mod: 95 | Performed by: NURSE PRACTITIONER

## 2022-05-09 ASSESSMENT — PAIN SCALES - GENERAL: PAINLEVEL: NO PAIN (0)

## 2022-05-09 NOTE — PROGRESS NOTES
Nuris is a 21 year old who is being evaluated via a billable video visit.      How would you like to obtain your AVS? MyChart  If the video visit is dropped, the invitation should be resent by: Text to cell phone: [phone]       HPI         Review of Systems         Objective    Vitals - Patient Reported  Pain Score: No Pain (0)        Physical Exam

## 2022-05-09 NOTE — PATIENT INSTRUCTIONS
Preparing for Your Surgery      Name:  Nuris Reddy   MRN:  [id number]   :  2000   Today's Date:  2022       Arriving for surgery:  Surgery date:  22  Arrival time:  5:30 am    Restrictions due to COVID 19       Effective 22 [facility] is implementing the following visitor policy:     1 person may accompany the patient through the Pre-Op process.      That same person may wait in the Surgery Waiting room, provided there is enough room to social distance         Inpatients are allowed 2 visitors per day for the duration of their stay.        Visitors must wear a mask.      Visitors must not be ill.      Visiting hours are 8 am to 8 pm.    [facility] [employer] parking is available for anyone with mobility limitations or disabilities.  ([place]  24 hours/ 7 days a week; [facility]  7 am- 3:30 pm, Mon- Fri)    Please come to:     [facility] [address]  [address] [place] [address]    -  Parking is available at the Patient Visitor Ramp on [facility].    -  When entering the hospital, you will be asked some Covid screening questions and directed to Patient Registration. Patient Registration will then direct you to the 3rd floor Surgery Waiting Room.  [phone]?     - ?If you are in need of directions, wheelchair or escort please stop at the Information Desk in the lobby.  Inform the information person that you are here for surgery; a wheelchair and escort to [address] will be provided.?       ENHANCED RECOVERY AFTER SURGERY     Begin hydrating yourself by drinking at least 8-10 glasses of clear liquids for 24 hours before surgery:      Suggested clear liquids:   Water    Clear Juices   Clear Broth   Non- carbonated beverages    (Crystal Light or Lavelle Aid)   Sodas    (Sprite, 7 up, ginger ale, seltzer)   Gatorade - no red or purple      We would like you to purchase a drink such as Gatorade (no red or purple) or Ensure  Clear (not the milkshake type).  Drink this before bedtime and on the way into the hospital, drink between 8-10 ounces or until you feel hydrated.        Keeping well hydrated leads to your veins being plump, you wake up faster, and you are less likely to be nauseated. Start drinking water as soon as you can after surgery and advance to clear liquids and food as tolerated.  IV fluids contain salt, drinking fluids will minimize the amount of IV fluids you need and decrease the amount of salt you get.                 The most common reason for the patient to be readmitted is dehydration. Staying hydrated after you go home from the hospital is very important.  Ensure or Ensure Clear are good options to keep you hydrated.       What can I eat or drink?  -  Follow [facility] instructions for starting Bowel prep and Clear Liquid diet.  -  You may have clear liquids until 2 hours before surgery. (Until 5:30 am)    Examples of clear liquids:  Water  Clear broth  Juices (apple, white grape, white cranberry  and cider) without pulp  Noncarbonated, powder based beverages  (lemonade and Lavelle-Aid)  Sodas (Sprite, 7-Up, ginger ale and seltzer)  Coffee or tea (without milk or cream)  Gatorade- no red or purple    -  No Alcohol for at least 24 hours before surgery     Which medicines can I take?  Hold Aspirin for 7 days before surgery.   Hold Multivitamins for 7 days before surgery. Take the last Multivitamin on 5-11-22, and then hold until surgery.  Hold Supplements for 7 days before surgery.  Hold Ibuprofen (Advil, Motrin) for 1 day before surgery--unless otherwise directed by surgeon.  Hold Naproxen (Aleve) for 4 days before surgery.    Patient reports she is not currently taking Dupilumab.    -  DO NOT take these medications the day of surgery:  Ferrous Sulfate, Probiotic, Vitamin B-12, Vitamin C,   Miralax- daily capful    -  PLEASE TAKE these medications the day of surgery:  Cetirizine (Zyrtec),   Albuterol inhaler if  needed  Fexofenadine (Allegra) if needed OR Loratadine-Pseudoephedrine (Claritin-D) if needed  Flonase nasal spray if needed  Acetaminophen (Tylenol) if needed    How do I prepare myself?  -  Bring Albuterol inhaler.  - Please take 2 showers before surgery using Scrubcare or Hibiclens soap.    Use this soap only from the neck to your toes.     Leave the soap on your skin for one minute--then rinse thoroughly.      You may use your own shampoo and conditioner; no other hair products.   - Please remove all jewelry and body piercings.  - No lotions, deodorants or fragrance.  - No makeup or fingernail polish.   - Bring your ID and insurance card.    -If you have a Deep Brain Stimulator, Spinal Cord Stimulator or any neuro stimulator device---you must bring the remote control to the hospital     - All patients are required to have a Covid-19 test within 4 days of surgery/procedure.      -Patients will be contacted by the [facility] scheduling team within 1 week of surgery to make an appointment.      - Patients may call the Scheduling team at [phone] if they have not been scheduled within 4 days of  surgery.      ALL PATIENTS GOING HOME THE SAME DAY OF SURGERY ARE REQUIRED TO HAVE A RESPONSIBLE ADULT TO DRIVE AND BE IN ATTENDANCE WITH THEM FOR 24 HOURS FOLLOWING SURGERY.      Questions or Concerns:    - For any questions regarding the day of surgery or your hospital stay, please contact the Pre Admission Nursing Office at [phone].       - If you have health changes between today and your surgery please call your surgeon.       For questions after surgery please call your surgeons office.

## 2022-05-09 NOTE — H&P (VIEW-ONLY)
Pre-Operative H & P     CC:  Preoperative exam to assess for increased cardiopulmonary risk while undergoing surgery and anesthesia.    Date of Encounter: 2022  Primary Care Physician:  Aliya Osei     Reason for visit:   Encounter Diagnoses   Name Primary?     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   She was seen by Dr. Mack on 2022 in the [facility] for evaluation of anemia. Through Dr. Mack s evaluation, she was diagnosed with Chronic anastomotic ulcer with stenosis.  Dr. Mack s counseled Ms. Reddy on her diagnosis and treatment options. Ms. Reddy presents to the PAC virtually today in preparation for the above procedure.      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        History is obtained from the patient and chart review    Hx of abnormal bleeding or anti-platelet use: denies    Menstrual history: Patient's last menstrual period was 2022.:      Past Medical History  Past Medical History:   Diagnosis Date     Anemia 10/16/2012     COPD (chronic obstructive pulmonary disease) (H)      History of  blood transfusion      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, s/p resection     Short gut syndrome 10/16/2012    ~ 20 cm of ileum as well as ileocecal valve resected in  period     Ulceration of intestine 2012       Past Surgical History  Past Surgical History:   Procedure Laterality Date     COLONOSCOPY  2012     COLONOSCOPY  2012     COLONOSCOPY N/A 2022    Procedure: COLONOSCOPY, WITH BIOPSY;  Surgeon: Moisés Gupta MD;  Location: [facility] OR     RESECTION ILEOCECAL  1999    ~ 20 cm of ileum as well as ileocecal valve resected in  period       Prior to Admission Medications  Current Outpatient Medications   Medication Sig Dispense Refill     albuterol (PROAIR HFA/PROVENTIL HFA/VENTOLIN HFA) 108 (90 Base) MCG/ACT inhaler Inhale 1-2 puffs into the lungs every 4 hours as needed for shortness of breath / dyspnea or wheezing 18 g 0     Cetirizine HCl (ZYRTEC ALLERGY PO) Take by mouth every morning       Cyanocobalamin (VITAMIN B-12) 50 MCG TABS Take 100 mcg by mouth every morning       ferrous sulfate (PATRIC-IN-SOL) 75 (15 FE) MG/ML oral drops Take 0.2 mLs (3 mg) by mouth daily (Patient taking differently: Take 3 mg by mouth every morning) 100 mL 1     fexofenadine (ALLEGRA) 60 MG tablet Take 60 mg by mouth 2 times daily as needed       fluticasone (FLONASE) 50 MCG/ACT nasal spray Spray 1-2 sprays into both nostrils daily (Patient taking differently: Spray 1-2 sprays into both nostrils daily as needed) 16 g 11     hydrocortisone 2.5 % cream To lips and face rash twice daily as needed for rash (Patient taking differently: as needed To lips and face rash twice daily as needed for rash) 30 g 1     ketoconazole (NIZORAL) 2 % external shampoo Use to shampoo twice weekly. Leave on 5 min then rinse. (Patient taking differently: as needed Use to shampoo twice weekly. Leave on 5 min then rinse.) 120 mL 3     mometasone (ELOCON) 0.1 % external solution Apply  small amount topically to affected areas of scalp daily prn for up to 2 weeks. (Patient taking differently: as needed Apply small amount topically to affected areas of scalp daily prn for up to 2 weeks.) 60 mL 0     Multiple Vitamins-Iron (MULTIVITAMIN/IRON PO) Take 1 tablet by mouth every morning       Probiotic Product (PRO-BIOTIC BLEND PO) Take by mouth as needed       tacrolimus (PROTOPIC) 0.1 % external ointment Apply to face daily in rash areas (Patient taking differently: as needed Apply to face daily in rash areas) 30 g 11     triamcinolone (KENALOG) 0.1 % external ointment Apply to face rash twice daily for the next two weeks until clear then twice daily as needed. (Patient taking differently: as needed Apply to face rash twice daily for the next two weeks until clear then twice daily as needed.) 60 g 1     vitamin C (ASCORBIC ACID) 250 MG tablet Take 1 tablet (250 mg) by mouth daily (Patient taking differently: Take 250 mg by mouth every morning) 100 tablet 11     docusate sodium (COLACE) 100 MG capsule Take 1-2 capsules (100-200 mg) by mouth 2 times daily (Patient not taking: Reported on 5/9/2022) 120 capsule 11     Dupilumab 300 MG/2ML SOPN Inject 300 mg Subcutaneous every 14 days To start after 600 mg loading dose. (Patient not taking: Reported on 5/9/2022) 4 mL 11     FLUARIX QUADRIVALENT 0.5 ML injection ADM 0.5ML IM UTD       Loratadine-Pseudoephedrine (CLARITIN-D 24 HOUR PO)        metroNIDAZOLE (FLAGYL) 500 MG tablet Take 1 tablet (500 mg) by mouth every 6 hours At 8:00 am, 2:00 pm, 8:00 pm the day prior to your surgery with neomycin and zofran. 3 tablet 0     neomycin (MYCIFRADIN) 500 MG tablet Take 2 tablets (1,000 mg) by mouth every 6 hours At 8:00 am, 2:00 pm, 8:00 pm the day prior to your surgery with flagyl and zofran. 6 tablet 0     ondansetron (ZOFRAN) 4 MG tablet Take 1 tablet (4 mg) by mouth every 6 hours At 8:00 am, 2:00 pm, 8:00 pm the day prior to your surgery with neomycin and flagyl.  "3 tablet 0     polyethylene glycol (MIRALAX) 17 g packet Take 238 g by mouth See Admin Instructions Starting at 4 pm night prior to surgery. Refer to \"Getting Ready for Surgery\" instructions. 14 packet 0     polyethylene glycol (MIRALAX) 17 GM/Dose powder 1 capful (17 g) in 8 oz of liquid 850 g 1     terconazole (TERAZOL 3) 80 MG vaginal suppository Place 1 suppository (80 mg) vaginally At Bedtime (Patient not taking: Reported on 5/9/2022) 3 suppository 1     VENTOLIN  (90 Base) MCG/ACT inhaler INHALE 2 PUFFS PO Q 4 H PRF SOB OR WHZ (Patient not taking: Reported on 5/9/2022)  1       Allergies  Allergies   Allergen Reactions     No Known Allergies        Social History  Social History     Socioeconomic History     Marital status: Single     Spouse name: Not on file     Number of children: Not on file     Years of education: Not on file     Highest education level: Some college, no degree   Occupational History     Not on file   Tobacco Use     Smoking status: Never Smoker     Smokeless tobacco: Never Used     Tobacco comment: Non-smoking home   Substance and Sexual Activity     Alcohol use: Yes     Comment: Social     Drug use: No     Sexual activity: Yes     Partners: Male     Birth control/protection: Condom, Pill   Other Topics Concern     Parent/sibling w/ CABG, MI or angioplasty before 65F 55M? No   Social History Narrative     Not on file     Social Determinants of Health     Financial Resource Strain: Not on file   Food Insecurity: Not on file   Transportation Needs: Not on file   Physical Activity: Not on file   Stress: Not on file   Social Connections: Not on file   Intimate Partner Violence: Not on file   Housing Stability: Not on file       Family History  Family History   Problem Relation Age of Onset     Cancer Sister         ovarian teratoma, s/p resection     Other Cancer Maternal Grandfather         skin cancer     Hypertension Paternal Grandmother      Hypertension Paternal Grandfather      " Cardiovascular Paternal Grandfather         Valve condition     Prostate Cancer Paternal Grandfather      Kidney Disease Maternal Uncle         kidney stones       Review of Systems  The complete review of systems is negative other than noted in the HPI or here.   Anesthesia Evaluation   Pt has had prior anesthetic.     History of anesthetic complications  - PONV.  Suffered from PONV with last colonoscopy 1/2022..    ROS/MED HX  ENT/Pulmonary: Comment: October 2021 (-) COVID but cough, congestion, body aches and dyspnea.  Has continued to have dyspnea upon waking up from sleep.  Intermittent occurrence typically will cluster a few days in a row and then will go without symptoms for a period time. Occurrence ~once every 1-2 weeks.  Correlates with allergies.  Denies any exertional symptoms.  Is able to play soccer.     CXR 11/2021.                                                                  IMPRESSION: There are no acute infiltrates. The cardiac silhouette is  not enlarged. Pulmonary vasculature is unremarkable.     CHARLEE GUZMAN MD     Reports bronchitis 1-2 X/year.     (+) allergic rhinitis,     Neurologic:  - neg neurologic ROS     Cardiovascular: Comment: Recent chest pain,  Seems to correlate with lower hemoglobins.   No regular issues with palpitations.   No lower extremity edema.    (-) taking anticoagulants/antiplatelets   METS/Exercise Tolerance: >4 METS Comment: Plays soccer.   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 Patient denies this.       Renal/Genitourinary:  - neg Renal ROS     Endo:  - neg endo ROS     Psychiatric/Substance Use:  - neg psychiatric ROS      Infectious Disease: Comment: Completed COVID vaccine and booster.  - neg infectious disease ROS     Malignancy:  - neg malignancy ROS     Other: Comment: Eczema/atopic dermatitis followed by dermatology.     (+) LMP: 4/24/22, ,         Virtual visit -  No vitals were obtained    Physical Exam  Constitutional: Awake, alert, cooperative, no apparent distress, and appears stated age.  Eyes: Pupils equal  HENT: Normocephalic  Respiratory: non labored breathing   Neurologic: Awake, alert, oriented to name, place and time.   Neuropsychiatric: Calm, cooperative. Normal affect.      Prior Labs/Diagnostic Studies   All labs and imaging personally reviewed   Lab Results   Component Value Date    WBC 7.9 04/16/2022    WBC 8.1 11/23/2020     Lab Results   Component Value Date    RBC 3.67 04/16/2022    RBC 3.96 11/23/2020     Lab Results   Component Value Date    HGB 9.8 04/16/2022    HGB 11.4 11/23/2020     Lab Results   Component Value Date    HCT 30.7 04/16/2022    HCT 34.7 11/23/2020     Lab Results   Component Value Date    MCV 84 04/16/2022    MCV 88 11/23/2020     Lab Results   Component Value Date    MCH 26.7 04/16/2022    MCH 28.8 11/23/2020     Lab Results   Component Value Date    MCHC 31.9 04/16/2022    MCHC 32.9 11/23/2020     Lab Results   Component Value Date    RDW 13.6 04/16/2022    RDW 12.9 11/23/2020     Lab Results   Component Value Date     04/16/2022     11/23/2020       Last Comprehensive Metabolic Panel:  Sodium   Date Value Ref Range Status   04/16/2022 138 133 - 144 mmol/L Final   08/07/2019 140 133 - 144 mmol/L Final     Potassium   Date Value Ref Range Status   04/16/2022 3.8 3.4 - 5.3 mmol/L Final   08/07/2019 3.8 3.4 - 5.3 mmol/L Final     Chloride   Date Value Ref Range Status   04/16/2022 108 94 - 109 mmol/L Final   08/07/2019 109 94 - 109 mmol/L Final     Carbon Dioxide   Date Value Ref Range Status   08/07/2019 27 20 - 32 mmol/L Final     Carbon Dioxide (CO2)   Date Value Ref  Range Status   04/16/2022 25 20 - 32 mmol/L Final     Anion Gap   Date Value Ref Range Status   04/16/2022 5 3 - 14 mmol/L Final   08/07/2019 4 3 - 14 mmol/L Final     Glucose   Date Value Ref Range Status   04/16/2022 80 70 - 99 mg/dL Final   08/07/2019 72 70 - 99 mg/dL Final     Urea Nitrogen   Date Value Ref Range Status   04/16/2022 10 7 - 30 mg/dL Final   08/07/2019 7 7 - 30 mg/dL Final     Creatinine   Date Value Ref Range Status   04/16/2022 0.89 0.52 - 1.04 mg/dL Final   08/07/2019 0.80 0.52 - 1.04 mg/dL Final     GFR Estimate   Date Value Ref Range Status   04/16/2022 >90 >60 mL/min/1.73m2 Final     Comment:     Effective December 21, 2021 eGFRcr in adults is calculated using the 2021 CKD-EPI creatinine equation which includes age and gender (Charlie lomas al., NEJ, DOI: 10.1056/CLQNat5742286)   08/07/2019 >90 >60 mL/min/[1.73_m2] Final     Comment:     Non  GFR Calc  Starting 12/18/2018, serum creatinine based estimated GFR (eGFR) will be   calculated using the Chronic Kidney Disease Epidemiology Collaboration   (CKD-EPI) equation.       Calcium   Date Value Ref Range Status   04/16/2022 8.8 8.5 - 10.1 mg/dL Final   08/07/2019 8.6 8.5 - 10.1 mg/dL Final       Lab Results   Component Value Date    AST 31 04/16/2022    AST 13 08/07/2019     Lab Results   Component Value Date    ALT 42 04/16/2022    ALT 21 08/07/2019     No results found for: BILICONJ   Lab Results   Component Value Date    BILITOTAL 0.5 04/16/2022    BILITOTAL 0.3 08/07/2019     Lab Results   Component Value Date    ALBUMIN 3.5 04/16/2022    ALBUMIN 3.3 08/07/2019     Lab Results   Component Value Date    PROTTOTAL 7.0 04/16/2022    PROTTOTAL 7.0 08/07/2019      Lab Results   Component Value Date    ALKPHOS 79 04/16/2022    ALKPHOS 54 08/07/2019       PROCEDURES   EKG  11/2021: SR       The patient's records and results personally reviewed by this provider.     Outside records reviewed from: Care  Everywhere      Assessment  Nuris Reddy is a 21 year old female seen as a PAC referral for risk assessment and optimization for anesthesia.    Plan/Recommendations  Pt will be optimized for the proposed procedure.  See below for details on the assessment, risk, and preoperative recommendations    NEUROLOGY  - No history of TIA, CVA or seizure    -Post Op delirium risk factors:  No risk identified    ENT    - Unable to do adequate airway exam 2/2 virtual visit.   Mallampati: Unable to assess  TM: Unable to assess    CARDIAC  - Denies known coronary artery disease.  Denies cardiac symptoms.  - METS (Metabolic Equivalents)>4RCRI-Low risk: Class 2 0.9% complication rate            Total Score: 1    RCRI: High Risk Surgery        PULMONARY  - Endorses symptom of intermittent dyspnea upon waking.     ~ Occurs a few days in a row and then will not occur for days- to a couple of weeks.  Does not occur with exertion. Able to play soccer without any symptoms.   ~ Seems to correlate with allergy exacerbations and when her Hgb is lower.    ~ Echo ordered to evaluate for cardiac structure and functions    ADDENDUM 5/16/22  SHELLEY  Echocardiogram with two-dimensional, color and spectral Doppler performed.  ______________________________________________________________________________  Interpretation Summary     No cardiac basis for dyspnea identified.  Global and regional left ventricular function is normal with an EF of 60-65%.  Right ventricular function, chamber size, wall motion, and thickness are  normal.  No significant valvular abnormalities were noted.  Left ventricular diastolic function is normal. Diastolic Doppler findings  (E/E' ratio and/or other parameters) suggest left ventricular filling  pressures are normal.  Previous study not available for comparison.   ~ Pulmonary consult for further evaluation   ~ Notified surgeon's office of the above  - Seasonal allergies   ~ okay to take medications (antihistamine and  "nasal spray) as prescribed DOS.   - Obstructive Sleep Apnea  No current risk of obstructive sleep apnea   ROMELIA Low Risk            Total Score: 0      - Reports h/o recurrent bronchitis ~1-2X/years since she went to college.   - Tobacco History    History   Smoking Status     Never Smoker   Smokeless Tobacco     Never Used     Comment: Non-smoking home       GI  -  Chronic anastomotic ulcer with stenosis in the setting of congenital ileal atresia with resection as a .    ~ above procedure planned.    ~ surgeon ordered pre-op labs.   - denies GERD  PONV High Risk  Total Score: 4           1 AN PONV: Pt is Female    1 AN PONV: Patient is not a current smoker    1 AN PONV: Patient has history of PONV    1 AN PONV: Intended Post Op Opioids        /RENAL  - Baseline Creatinine  0.89    ENDOCRINE    - BMI: Estimated body mass index is 21.92 kg/m  as calculated from the following:    Height as of 22: 1.626 m (5' 4\").    Weight as of 22: 57.9 kg (127 lb 11.2 oz).  Healthy Weight (BMI 18.5-24.9)  - No history of Diabetes Mellitus    HEME  VTE Low Risk 0.26%            Total Score: 0      - No history of abnormal bleeding or antiplatelet use.  - Chronic anemia   ~ taking oral iron   ~ did not start iron infusions   ~ Recommend perioperative use of blood conservation techniques intraoperatively and close monitoring for postoperative bleeding.  A type and screen has been ordered for this patient     DERM  - Eczema/atopic dermatitis   ~ hold Dupiluma X4 weeks prior to DOS per surgeon's note   ~ topical creams - instructed not to use DOS        Patient was discussed with Dr Marin    The patient is optimized for their procedure. AVS with information on surgery time/arrival time, meds and NPO status given by nursing staff. No further diagnostic testing indicated.    Please refer to the physical examination documented by the anesthesiologist in the anesthesia record on the day of surgery.    Video-Visit " Details    Type of service:  Video Visit    Patient verbally consented to video service today: YES    Video Start Time: 10:38  Video End Time (time video stopped): 11:05    Originating Location (pt. Location): Home    Distant Location (provider location):  [facility] PRE[facility]     Mode of Communication:  Video Conference via [employer]  On the day of service:     Prep time: 20 minutes  Visit time: 27 minutes  Documentation time: 20 minutes  ------------------------------------------  Total time: 67 minutes      KILLIAN Tracey CNP  Preoperative Assessment Center  [facility]  Clinic and Surgery Center  Phone: [phone]  Fax: [phone]

## 2022-05-09 NOTE — H&P
Pre-Operative H & P     CC:  Preoperative exam to assess for increased cardiopulmonary risk while undergoing surgery and anesthesia.    Date of Encounter: 2022  Primary Care Physician:  Aliya Osei     Reason for visit:   Encounter Diagnoses   Name Primary?     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   She was seen by Dr. Mack on 2022 in the [facility] for evaluation of anemia. Through Dr. Mack s evaluation, she was diagnosed with Chronic anastomotic ulcer with stenosis.  Dr. Mack s counseled Ms. Reddy on her diagnosis and treatment options. Ms. Reddy presents to the PAC virtually today in preparation for the above procedure.      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        History is obtained from the patient and chart review    Hx of abnormal bleeding or anti-platelet use: denies    Menstrual history: Patient's last menstrual period was 2022.:      Past Medical History  Past Medical History:   Diagnosis Date     Anemia 10/16/2012     COPD (chronic obstructive pulmonary disease) (H)      History of  blood transfusion      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, s/p resection     Short gut syndrome 10/16/2012    ~ 20 cm of ileum as well as ileocecal valve resected in  period     Ulceration of intestine 2012       Past Surgical History  Past Surgical History:   Procedure Laterality Date     COLONOSCOPY  2012     COLONOSCOPY  2012     COLONOSCOPY N/A 2022    Procedure: COLONOSCOPY, WITH BIOPSY;  Surgeon: Moisés Gupta MD;  Location: [facility] OR     RESECTION ILEOCECAL  1999    ~ 20 cm of ileum as well as ileocecal valve resected in  period       Prior to Admission Medications  Current Outpatient Medications   Medication Sig Dispense Refill     albuterol (PROAIR HFA/PROVENTIL HFA/VENTOLIN HFA) 108 (90 Base) MCG/ACT inhaler Inhale 1-2 puffs into the lungs every 4 hours as needed for shortness of breath / dyspnea or wheezing 18 g 0     Cetirizine HCl (ZYRTEC ALLERGY PO) Take by mouth every morning       Cyanocobalamin (VITAMIN B-12) 50 MCG TABS Take 100 mcg by mouth every morning       ferrous sulfate (PATRIC-IN-SOL) 75 (15 FE) MG/ML oral drops Take 0.2 mLs (3 mg) by mouth daily (Patient taking differently: Take 3 mg by mouth every morning) 100 mL 1     fexofenadine (ALLEGRA) 60 MG tablet Take 60 mg by mouth 2 times daily as needed       fluticasone (FLONASE) 50 MCG/ACT nasal spray Spray 1-2 sprays into both nostrils daily (Patient taking differently: Spray 1-2 sprays into both nostrils daily as needed) 16 g 11     hydrocortisone 2.5 % cream To lips and face rash twice daily as needed for rash (Patient taking differently: as needed To lips and face rash twice daily as needed for rash) 30 g 1     ketoconazole (NIZORAL) 2 % external shampoo Use to shampoo twice weekly. Leave on 5 min then rinse. (Patient taking differently: as needed Use to shampoo twice weekly. Leave on 5 min then rinse.) 120 mL 3     mometasone (ELOCON) 0.1 % external solution Apply  small amount topically to affected areas of scalp daily prn for up to 2 weeks. (Patient taking differently: as needed Apply small amount topically to affected areas of scalp daily prn for up to 2 weeks.) 60 mL 0     Multiple Vitamins-Iron (MULTIVITAMIN/IRON PO) Take 1 tablet by mouth every morning       Probiotic Product (PRO-BIOTIC BLEND PO) Take by mouth as needed       tacrolimus (PROTOPIC) 0.1 % external ointment Apply to face daily in rash areas (Patient taking differently: as needed Apply to face daily in rash areas) 30 g 11     triamcinolone (KENALOG) 0.1 % external ointment Apply to face rash twice daily for the next two weeks until clear then twice daily as needed. (Patient taking differently: as needed Apply to face rash twice daily for the next two weeks until clear then twice daily as needed.) 60 g 1     vitamin C (ASCORBIC ACID) 250 MG tablet Take 1 tablet (250 mg) by mouth daily (Patient taking differently: Take 250 mg by mouth every morning) 100 tablet 11     docusate sodium (COLACE) 100 MG capsule Take 1-2 capsules (100-200 mg) by mouth 2 times daily (Patient not taking: Reported on 5/9/2022) 120 capsule 11     Dupilumab 300 MG/2ML SOPN Inject 300 mg Subcutaneous every 14 days To start after 600 mg loading dose. (Patient not taking: Reported on 5/9/2022) 4 mL 11     FLUARIX QUADRIVALENT 0.5 ML injection ADM 0.5ML IM UTD       Loratadine-Pseudoephedrine (CLARITIN-D 24 HOUR PO)        metroNIDAZOLE (FLAGYL) 500 MG tablet Take 1 tablet (500 mg) by mouth every 6 hours At 8:00 am, 2:00 pm, 8:00 pm the day prior to your surgery with neomycin and zofran. 3 tablet 0     neomycin (MYCIFRADIN) 500 MG tablet Take 2 tablets (1,000 mg) by mouth every 6 hours At 8:00 am, 2:00 pm, 8:00 pm the day prior to your surgery with flagyl and zofran. 6 tablet 0     ondansetron (ZOFRAN) 4 MG tablet Take 1 tablet (4 mg) by mouth every 6 hours At 8:00 am, 2:00 pm, 8:00 pm the day prior to your surgery with neomycin and flagyl.  "3 tablet 0     polyethylene glycol (MIRALAX) 17 g packet Take 238 g by mouth See Admin Instructions Starting at 4 pm night prior to surgery. Refer to \"Getting Ready for Surgery\" instructions. 14 packet 0     polyethylene glycol (MIRALAX) 17 GM/Dose powder 1 capful (17 g) in 8 oz of liquid 850 g 1     terconazole (TERAZOL 3) 80 MG vaginal suppository Place 1 suppository (80 mg) vaginally At Bedtime (Patient not taking: Reported on 5/9/2022) 3 suppository 1     VENTOLIN  (90 Base) MCG/ACT inhaler INHALE 2 PUFFS PO Q 4 H PRF SOB OR WHZ (Patient not taking: Reported on 5/9/2022)  1       Allergies  Allergies   Allergen Reactions     No Known Allergies        Social History  Social History     Socioeconomic History     Marital status: Single     Spouse name: Not on file     Number of children: Not on file     Years of education: Not on file     Highest education level: Some college, no degree   Occupational History     Not on file   Tobacco Use     Smoking status: Never Smoker     Smokeless tobacco: Never Used     Tobacco comment: Non-smoking home   Substance and Sexual Activity     Alcohol use: Yes     Comment: Social     Drug use: No     Sexual activity: Yes     Partners: Male     Birth control/protection: Condom, Pill   Other Topics Concern     Parent/sibling w/ CABG, MI or angioplasty before 65F 55M? No   Social History Narrative     Not on file     Social Determinants of Health     Financial Resource Strain: Not on file   Food Insecurity: Not on file   Transportation Needs: Not on file   Physical Activity: Not on file   Stress: Not on file   Social Connections: Not on file   Intimate Partner Violence: Not on file   Housing Stability: Not on file       Family History  Family History   Problem Relation Age of Onset     Cancer Sister         ovarian teratoma, s/p resection     Other Cancer Maternal Grandfather         skin cancer     Hypertension Paternal Grandmother      Hypertension Paternal Grandfather      " Cardiovascular Paternal Grandfather         Valve condition     Prostate Cancer Paternal Grandfather      Kidney Disease Maternal Uncle         kidney stones       Review of Systems  The complete review of systems is negative other than noted in the HPI or here.   Anesthesia Evaluation   Pt has had prior anesthetic.     History of anesthetic complications  - PONV.  Suffered from PONV with last colonoscopy 1/2022..    ROS/MED HX  ENT/Pulmonary: Comment: October 2021 (-) COVID but cough, congestion, body aches and dyspnea.  Has continued to have dyspnea upon waking up from sleep.  Intermittent occurrence typically will cluster a few days in a row and then will go without symptoms for a period time. Occurrence ~once every 1-2 weeks.  Correlates with allergies.  Denies any exertional symptoms.  Is able to play soccer.     CXR 11/2021.                                                                  IMPRESSION: There are no acute infiltrates. The cardiac silhouette is  not enlarged. Pulmonary vasculature is unremarkable.     CHARLEE GUZMAN MD     Reports bronchitis 1-2 X/year.     (+) allergic rhinitis,     Neurologic:  - neg neurologic ROS     Cardiovascular: Comment: Recent chest pain,  Seems to correlate with lower hemoglobins.   No regular issues with palpitations.   No lower extremity edema.    (-) taking anticoagulants/antiplatelets   METS/Exercise Tolerance: >4 METS Comment: Plays soccer.   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 Patient denies this.       Renal/Genitourinary:  - neg Renal ROS     Endo:  - neg endo ROS     Psychiatric/Substance Use:  - neg psychiatric ROS      Infectious Disease: Comment: Completed COVID vaccine and booster.  - neg infectious disease ROS     Malignancy:  - neg malignancy ROS     Other: Comment: Eczema/atopic dermatitis followed by dermatology.     (+) LMP: 4/24/22, ,         Virtual visit -  No vitals were obtained    Physical Exam  Constitutional: Awake, alert, cooperative, no apparent distress, and appears stated age.  Eyes: Pupils equal  HENT: Normocephalic  Respiratory: non labored breathing   Neurologic: Awake, alert, oriented to name, place and time.   Neuropsychiatric: Calm, cooperative. Normal affect.      Prior Labs/Diagnostic Studies   All labs and imaging personally reviewed   Lab Results   Component Value Date    WBC 7.9 04/16/2022    WBC 8.1 11/23/2020     Lab Results   Component Value Date    RBC 3.67 04/16/2022    RBC 3.96 11/23/2020     Lab Results   Component Value Date    HGB 9.8 04/16/2022    HGB 11.4 11/23/2020     Lab Results   Component Value Date    HCT 30.7 04/16/2022    HCT 34.7 11/23/2020     Lab Results   Component Value Date    MCV 84 04/16/2022    MCV 88 11/23/2020     Lab Results   Component Value Date    MCH 26.7 04/16/2022    MCH 28.8 11/23/2020     Lab Results   Component Value Date    MCHC 31.9 04/16/2022    MCHC 32.9 11/23/2020     Lab Results   Component Value Date    RDW 13.6 04/16/2022    RDW 12.9 11/23/2020     Lab Results   Component Value Date     04/16/2022     11/23/2020       Last Comprehensive Metabolic Panel:  Sodium   Date Value Ref Range Status   04/16/2022 138 133 - 144 mmol/L Final   08/07/2019 140 133 - 144 mmol/L Final     Potassium   Date Value Ref Range Status   04/16/2022 3.8 3.4 - 5.3 mmol/L Final   08/07/2019 3.8 3.4 - 5.3 mmol/L Final     Chloride   Date Value Ref Range Status   04/16/2022 108 94 - 109 mmol/L Final   08/07/2019 109 94 - 109 mmol/L Final     Carbon Dioxide   Date Value Ref Range Status   08/07/2019 27 20 - 32 mmol/L Final     Carbon Dioxide (CO2)   Date Value Ref  Range Status   04/16/2022 25 20 - 32 mmol/L Final     Anion Gap   Date Value Ref Range Status   04/16/2022 5 3 - 14 mmol/L Final   08/07/2019 4 3 - 14 mmol/L Final     Glucose   Date Value Ref Range Status   04/16/2022 80 70 - 99 mg/dL Final   08/07/2019 72 70 - 99 mg/dL Final     Urea Nitrogen   Date Value Ref Range Status   04/16/2022 10 7 - 30 mg/dL Final   08/07/2019 7 7 - 30 mg/dL Final     Creatinine   Date Value Ref Range Status   04/16/2022 0.89 0.52 - 1.04 mg/dL Final   08/07/2019 0.80 0.52 - 1.04 mg/dL Final     GFR Estimate   Date Value Ref Range Status   04/16/2022 >90 >60 mL/min/1.73m2 Final     Comment:     Effective December 21, 2021 eGFRcr in adults is calculated using the 2021 CKD-EPI creatinine equation which includes age and gender (Charlie lomas al., NEJ, DOI: 10.1056/DMRPgk9530909)   08/07/2019 >90 >60 mL/min/[1.73_m2] Final     Comment:     Non  GFR Calc  Starting 12/18/2018, serum creatinine based estimated GFR (eGFR) will be   calculated using the Chronic Kidney Disease Epidemiology Collaboration   (CKD-EPI) equation.       Calcium   Date Value Ref Range Status   04/16/2022 8.8 8.5 - 10.1 mg/dL Final   08/07/2019 8.6 8.5 - 10.1 mg/dL Final       Lab Results   Component Value Date    AST 31 04/16/2022    AST 13 08/07/2019     Lab Results   Component Value Date    ALT 42 04/16/2022    ALT 21 08/07/2019     No results found for: BILICONJ   Lab Results   Component Value Date    BILITOTAL 0.5 04/16/2022    BILITOTAL 0.3 08/07/2019     Lab Results   Component Value Date    ALBUMIN 3.5 04/16/2022    ALBUMIN 3.3 08/07/2019     Lab Results   Component Value Date    PROTTOTAL 7.0 04/16/2022    PROTTOTAL 7.0 08/07/2019      Lab Results   Component Value Date    ALKPHOS 79 04/16/2022    ALKPHOS 54 08/07/2019       PROCEDURES   EKG  11/2021: SR       The patient's records and results personally reviewed by this provider.     Outside records reviewed from: Care  Everywhere      Assessment  Nuris Reddy is a 21 year old female seen as a PAC referral for risk assessment and optimization for anesthesia.    Plan/Recommendations  Pt will be optimized for the proposed procedure.  See below for details on the assessment, risk, and preoperative recommendations    NEUROLOGY  - No history of TIA, CVA or seizure    -Post Op delirium risk factors:  No risk identified    ENT    - Unable to do adequate airway exam 2/2 virtual visit.   Mallampati: Unable to assess  TM: Unable to assess    CARDIAC  - Denies known coronary artery disease.  Denies cardiac symptoms.  - METS (Metabolic Equivalents)>4RCRI-Low risk: Class 2 0.9% complication rate            Total Score: 1    RCRI: High Risk Surgery        PULMONARY  - Endorses symptom of intermittent dyspnea upon waking.     ~ Occurs a few days in a row and then will not occur for days- to a couple of weeks.  Does not occur with exertion. Able to play soccer without any symptoms.   ~ Seems to correlate with allergy exacerbations and when her Hgb is lower.    ~ Echo ordered to evaluate for cardiac structure and functions    ADDENDUM 5/16/22  [facility]  Echocardiogram with two-dimensional, color and spectral Doppler performed.  ______________________________________________________________________________  Interpretation Summary     No cardiac basis for dyspnea identified.  Global and regional left ventricular function is normal with an EF of 60-65%.  Right ventricular function, chamber size, wall motion, and thickness are  normal.  No significant valvular abnormalities were noted.  Left ventricular diastolic function is normal. Diastolic Doppler findings  (E/E' ratio and/or other parameters) suggest left ventricular filling  pressures are normal.  Previous study not available for comparison.   ~ Pulmonary consult for further evaluation   ~ Notified surgeon's office of the above  - Seasonal allergies   ~ okay to take medications (antihistamine and  "nasal spray) as prescribed DOS.   - Obstructive Sleep Apnea  No current risk of obstructive sleep apnea   ROMELIA Low Risk            Total Score: 0      - Reports h/o recurrent bronchitis ~1-2X/years since she went to college.   - Tobacco History    History   Smoking Status     Never Smoker   Smokeless Tobacco     Never Used     Comment: Non-smoking home       GI  -  Chronic anastomotic ulcer with stenosis in the setting of congenital ileal atresia with resection as a .    ~ above procedure planned.    ~ surgeon ordered pre-op labs.   - denies GERD  PONV High Risk  Total Score: 4           1 AN PONV: Pt is Female    1 AN PONV: Patient is not a current smoker    1 AN PONV: Patient has history of PONV    1 AN PONV: Intended Post Op Opioids        /RENAL  - Baseline Creatinine  0.89    ENDOCRINE    - BMI: Estimated body mass index is 21.92 kg/m  as calculated from the following:    Height as of 22: 1.626 m (5' 4\").    Weight as of 22: 57.9 kg (127 lb 11.2 oz).  Healthy Weight (BMI 18.5-24.9)  - No history of Diabetes Mellitus    HEME  VTE Low Risk 0.26%            Total Score: 0      - No history of abnormal bleeding or antiplatelet use.  - Chronic anemia   ~ taking oral iron   ~ did not start iron infusions   ~ Recommend perioperative use of blood conservation techniques intraoperatively and close monitoring for postoperative bleeding.  A type and screen has been ordered for this patient     DERM  - Eczema/atopic dermatitis   ~ hold Dupiluma X4 weeks prior to DOS per surgeon's note   ~ topical creams - instructed not to use DOS        Patient was discussed with Dr Marin    The patient is optimized for their procedure. AVS with information on surgery time/arrival time, meds and NPO status given by nursing staff. No further diagnostic testing indicated.    Please refer to the physical examination documented by the anesthesiologist in the anesthesia record on the day of surgery.    Video-Visit " Details    Type of service:  Video Visit    Patient verbally consented to video service today: YES    Video Start Time: 10:38  Video End Time (time video stopped): 11:05    Originating Location (pt. Location): Home    Distant Location (provider location):  [facility] PREOPER[facility]     Mode of Communication:  Video Conference via [employer]  On the day of service:     Prep time: 20 minutes  Visit time: 27 minutes  Documentation time: 20 minutes  ------------------------------------------  Total time: 67 minutes      KILLIAN Tracey CNP  Preoperative Assessment Center  [facility]  Clinic and Surgery Center  Phone: [phone]  Fax: [phone]

## 2022-05-10 ENCOUNTER — TELEPHONE (OUTPATIENT)
Dept: SURGERY | Facility: CLINIC | Age: 22
End: 2022-05-10
Payer: COMMERCIAL

## 2022-05-10 NOTE — TELEPHONE ENCOUNTER
Spoke with Nuris about pulmonary appointments.  Explained PAC is trying to get her scheduled for pulmonary appointment before surgery, but in the meantime have her scheduled for soonest available - 7/11/22 at 11:00 am CXR; 11:30 am PFT; 1:00 pm Dr. Palumbo.  Nuris expressed understanding.  Laxmi Ramon RN

## 2022-05-11 ENCOUNTER — TELEPHONE (OUTPATIENT)
Dept: SURGERY | Facility: CLINIC | Age: 22
End: 2022-05-11
Payer: COMMERCIAL

## 2022-05-11 NOTE — TELEPHONE ENCOUNTER
Spoke with Shayna,  for the pulmonary clinic.  This RN attempted to schedule the patient for a pulmonary consult prior to surgery on 5/19, however there are no availabilities.  Per Shayna, there is an emergency consult option in which the pulmonologist is paged and pulled out of the ICU for this discussion.   Shayna's recommendation was to call outside [facility] clinics to schedule.  This RN called the following: [place] - booked into June / [facility] - booked through early August / [facility] - booked through July 20th / [place] - booked through August   The patient is scheduled for July 11th, (soonest available at [facility]), for cxr, pft's and pulm doctor.  Will keep this appointment until otherwise advised by the PAC provider.  Laxmi Ramon RN

## 2022-05-11 NOTE — TELEPHONE ENCOUNTER
Updated Nuris of her Echo appointment on Monday, May 16th, 8:30 am at [facility].  Nuris expressed understanding.  Laxmi Ramon RN

## 2022-05-12 NOTE — TELEPHONE ENCOUNTER
RECORDS RECEIVED FROM: internal    DATE RECEIVED: 7/11/22   NOTES STATUS DETAILS   OFFICE NOTE from referring provider internal  Chichi Ramos APRN CNP   OFFICE NOTE from other specialist internal  11/8/21 [facility]   DISCHARGE SUMMARY from hospital     DISCHARGE REPORT from the ER     MEDICATION LIST internal     IMAGING  (NEED IMAGES AND REPORTS)     CT SCAN     CHEST XRAY (CXR) internal  Scheduled 7/11/22 11/8/21    TESTS     PULMONARY FUNCTION TESTING (PFT) internal  Scheduled 7/11/22         Grossly Intact

## 2022-05-16 ENCOUNTER — LAB (OUTPATIENT)
Dept: LAB | Facility: CLINIC | Age: 22
End: 2022-05-16

## 2022-05-16 ENCOUNTER — LAB (OUTPATIENT)
Dept: LAB | Facility: CLINIC | Age: 22
End: 2022-05-16
Payer: COMMERCIAL

## 2022-05-16 ENCOUNTER — ANCILLARY PROCEDURE (OUTPATIENT)
Dept: CARDIOLOGY | Facility: CLINIC | Age: 22
End: 2022-05-16
Attending: NURSE PRACTITIONER
Payer: COMMERCIAL

## 2022-05-16 DIAGNOSIS — Q43.8 OTHER CONGENITAL ANOMALIES OF INTESTINE: ICD-10-CM

## 2022-05-16 DIAGNOSIS — Z11.59 ENCOUNTER FOR SCREENING FOR OTHER VIRAL DISEASES: ICD-10-CM

## 2022-05-16 DIAGNOSIS — D50.9 IRON DEFICIENCY ANEMIA, UNSPECIFIED IRON DEFICIENCY ANEMIA TYPE: ICD-10-CM

## 2022-05-16 DIAGNOSIS — D64.9 ANEMIA, UNSPECIFIED TYPE: ICD-10-CM

## 2022-05-16 LAB
ABO/RH(D): NORMAL
ALBUMIN SERPL-MCNC: 3.4 G/DL (ref 3.4–5)
ALP SERPL-CCNC: 87 U/L (ref 40–150)
ALT SERPL W P-5'-P-CCNC: 31 U/L (ref 0–50)
ANION GAP SERPL CALCULATED.3IONS-SCNC: 8 MMOL/L (ref 3–14)
ANTIBODY SCREEN: NEGATIVE
APTT PPP: 31 SECONDS (ref 22–38)
AST SERPL W P-5'-P-CCNC: 19 U/L (ref 0–45)
BASOPHILS # BLD AUTO: 0.1 10E3/UL (ref 0–0.2)
BASOPHILS NFR BLD AUTO: 1 %
BILIRUB SERPL-MCNC: 0.4 MG/DL (ref 0.2–1.3)
BUN SERPL-MCNC: 8 MG/DL (ref 7–30)
CALCIUM SERPL-MCNC: 8.7 MG/DL (ref 8.5–10.1)
CHLORIDE BLD-SCNC: 111 MMOL/L (ref 94–109)
CO2 SERPL-SCNC: 23 MMOL/L (ref 20–32)
CREAT SERPL-MCNC: 0.76 MG/DL (ref 0.52–1.04)
EOSINOPHIL # BLD AUTO: 0.7 10E3/UL (ref 0–0.7)
EOSINOPHIL NFR BLD AUTO: 10 %
ERYTHROCYTE [DISTWIDTH] IN BLOOD BY AUTOMATED COUNT: 16 % (ref 10–15)
GFR SERPL CREATININE-BSD FRML MDRD: >90 ML/MIN/1.73M2
GLUCOSE BLD-MCNC: 89 MG/DL (ref 70–99)
HCT VFR BLD AUTO: 32.8 % (ref 35–47)
HGB BLD-MCNC: 10.2 G/DL (ref 11.7–15.7)
IMM GRANULOCYTES # BLD: 0 10E3/UL
IMM GRANULOCYTES NFR BLD: 0 %
INR PPP: 1.08 (ref 0.85–1.15)
LVEF ECHO: NORMAL
LYMPHOCYTES # BLD AUTO: 1.9 10E3/UL (ref 0.8–5.3)
LYMPHOCYTES NFR BLD AUTO: 28 %
MCH RBC QN AUTO: 26.3 PG (ref 26.5–33)
MCHC RBC AUTO-ENTMCNC: 31.1 G/DL (ref 31.5–36.5)
MCV RBC AUTO: 85 FL (ref 78–100)
MONOCYTES # BLD AUTO: 0.5 10E3/UL (ref 0–1.3)
MONOCYTES NFR BLD AUTO: 8 %
NEUTROPHILS # BLD AUTO: 3.7 10E3/UL (ref 1.6–8.3)
NEUTROPHILS NFR BLD AUTO: 53 %
NRBC # BLD AUTO: 0 10E3/UL
NRBC BLD AUTO-RTO: 0 /100
PLATELET # BLD AUTO: 413 10E3/UL (ref 150–450)
POTASSIUM BLD-SCNC: 3.7 MMOL/L (ref 3.4–5.3)
PROT SERPL-MCNC: 6.8 G/DL (ref 6.8–8.8)
RBC # BLD AUTO: 3.88 10E6/UL (ref 3.8–5.2)
SODIUM SERPL-SCNC: 142 MMOL/L (ref 133–144)
SPECIMEN EXPIRATION DATE: NORMAL
WBC # BLD AUTO: 6.9 10E3/UL (ref 4–11)

## 2022-05-16 PROCEDURE — 80053 COMPREHEN METABOLIC PANEL: CPT

## 2022-05-16 PROCEDURE — 85025 COMPLETE CBC W/AUTO DIFF WBC: CPT

## 2022-05-16 PROCEDURE — 93306 TTE W/DOPPLER COMPLETE: CPT | Performed by: INTERNAL MEDICINE

## 2022-05-16 PROCEDURE — 85610 PROTHROMBIN TIME: CPT

## 2022-05-16 PROCEDURE — 85730 THROMBOPLASTIN TIME PARTIAL: CPT

## 2022-05-16 PROCEDURE — 86850 RBC ANTIBODY SCREEN: CPT

## 2022-05-16 PROCEDURE — 86901 BLOOD TYPING SEROLOGIC RH(D): CPT

## 2022-05-16 PROCEDURE — U0003 INFECTIOUS AGENT DETECTION BY NUCLEIC ACID (DNA OR RNA); SEVERE ACUTE RESPIRATORY SYNDROME CORONAVIRUS 2 (SARS-COV-2) (CORONAVIRUS DISEASE [COVID-19]), AMPLIFIED PROBE TECHNIQUE, MAKING USE OF HIGH THROUGHPUT TECHNOLOGIES AS DESCRIBED BY CMS-2020-01-R: HCPCS

## 2022-05-16 PROCEDURE — U0005 INFEC AGEN DETEC AMPLI PROBE: HCPCS

## 2022-05-16 PROCEDURE — 36415 COLL VENOUS BLD VENIPUNCTURE: CPT

## 2022-05-16 PROCEDURE — 84134 ASSAY OF PREALBUMIN: CPT

## 2022-05-16 PROCEDURE — 86900 BLOOD TYPING SEROLOGIC ABO: CPT

## 2022-05-17 ENCOUNTER — TELEPHONE (OUTPATIENT)
Dept: PULMONOLOGY | Facility: CLINIC | Age: 22
End: 2022-05-17
Payer: COMMERCIAL

## 2022-05-17 LAB
PREALB SERPL IA-MCNC: 25 MG/DL (ref 15–45)
SARS-COV-2 RNA RESP QL NAA+PROBE: NEGATIVE

## 2022-05-17 NOTE — TELEPHONE ENCOUNTER
Spoke with Annamaria Marques RN, as received notice from JULIANA Bob, that patient is scheduled for surgery on 5/19 but needs clearance from pulm prior so likely would need a sooner appt than 7/11. Spoke with Annamaria and informed her that I would be able to move pt up to mid June as there was a cancellation, but per Annamaria, this may not even be needed as they are going ahead with the surgery. She requested writer reach out to pt to discuss whether patient still would like to be seen in the clinic (appears 7/11 appt needs to be rescheduled as provider is unavailable).     LVM with direct call back to discuss rescheduling appt with Dr. Palumbo to mid June (if she is still interested in seeing pulm). Will route to clinic coordinators to complete the rescheduling process.

## 2022-05-18 ENCOUNTER — TELEPHONE (OUTPATIENT)
Dept: PULMONOLOGY | Facility: CLINIC | Age: 22
End: 2022-05-18
Payer: COMMERCIAL

## 2022-05-18 NOTE — TELEPHONE ENCOUNTER
Pt returned call and states she would still like to see a pulmonologist, despite not needing clearance for surgery. Appt with Dr. Palumbo on 7/11 needed to be rescheduled as provider is unavailable, but able to move pt up for sooner appt on 6/15 with Dr. Palumbo. Testing also rearranged and details confirmed with pt.

## 2022-05-19 ENCOUNTER — HOSPITAL ENCOUNTER (INPATIENT)
Facility: CLINIC | Age: 22
LOS: 4 days | Discharge: HOME | DRG: 331 | End: 2022-05-23
Attending: SURGERY | Admitting: SURGERY
Payer: COMMERCIAL

## 2022-05-19 ENCOUNTER — DOCUMENTATION ONLY (OUTPATIENT)
Dept: OTHER | Facility: CLINIC | Age: 22
End: 2022-05-19
Payer: COMMERCIAL

## 2022-05-19 ENCOUNTER — ANESTHESIA (OUTPATIENT)
Dept: SURGERY | Facility: CLINIC | Age: 22
DRG: 331 | End: 2022-05-19
Payer: COMMERCIAL

## 2022-05-19 DIAGNOSIS — K28.9 ANASTOMOTIC ULCER: Primary | ICD-10-CM

## 2022-05-19 LAB — GLUCOSE BLDC GLUCOMTR-MCNC: 91 MG/DL (ref 70–99)

## 2022-05-19 PROCEDURE — 710N000010 HC RECOVERY PHASE 1, LEVEL 2, PER MIN: Performed by: SURGERY

## 2022-05-19 PROCEDURE — 360N000077 HC SURGERY LEVEL 4, PER MIN: Performed by: SURGERY

## 2022-05-19 PROCEDURE — 88307 TISSUE EXAM BY PATHOLOGIST: CPT | Mod: TC | Performed by: SURGERY

## 2022-05-19 PROCEDURE — 250N000011 HC RX IP 250 OP 636: Performed by: STUDENT IN AN ORGANIZED HEALTH CARE EDUCATION/TRAINING PROGRAM

## 2022-05-19 PROCEDURE — 88307 TISSUE EXAM BY PATHOLOGIST: CPT | Mod: 26 | Performed by: PATHOLOGY

## 2022-05-19 PROCEDURE — 120N000002 HC R&B MED SURG/OB UMMC

## 2022-05-19 PROCEDURE — 272N000001 HC OR GENERAL SUPPLY STERILE: Performed by: SURGERY

## 2022-05-19 PROCEDURE — 258N000003 HC RX IP 258 OP 636: Performed by: NURSE ANESTHETIST, CERTIFIED REGISTERED

## 2022-05-19 PROCEDURE — 44205 LAP COLECTOMY PART W/ILEUM: CPT | Mod: GC | Performed by: SURGERY

## 2022-05-19 PROCEDURE — 250N000009 HC RX 250: Performed by: NURSE ANESTHETIST, CERTIFIED REGISTERED

## 2022-05-19 PROCEDURE — 999N000141 HC STATISTIC PRE-PROCEDURE NURSING ASSESSMENT: Performed by: SURGERY

## 2022-05-19 PROCEDURE — 370N000017 HC ANESTHESIA TECHNICAL FEE, PER MIN: Performed by: SURGERY

## 2022-05-19 PROCEDURE — 0DBF4ZZ EXCISION OF RIGHT LARGE INTESTINE, PERCUTANEOUS ENDOSCOPIC APPROACH: ICD-10-PCS | Performed by: SURGERY

## 2022-05-19 PROCEDURE — 250N000011 HC RX IP 250 OP 636: Performed by: NURSE ANESTHETIST, CERTIFIED REGISTERED

## 2022-05-19 PROCEDURE — 250N000025 HC SEVOFLURANE, PER MIN: Performed by: SURGERY

## 2022-05-19 PROCEDURE — 250N000009 HC RX 250: Performed by: STUDENT IN AN ORGANIZED HEALTH CARE EDUCATION/TRAINING PROGRAM

## 2022-05-19 PROCEDURE — 250N000013 HC RX MED GY IP 250 OP 250 PS 637: Performed by: SURGERY

## 2022-05-19 PROCEDURE — 250N000011 HC RX IP 250 OP 636: Performed by: SURGERY

## 2022-05-19 PROCEDURE — 250N000011 HC RX IP 250 OP 636: Performed by: ANESTHESIOLOGY

## 2022-05-19 RX ORDER — METRONIDAZOLE 500 MG/100ML
500 INJECTION, SOLUTION INTRAVENOUS
Status: COMPLETED | OUTPATIENT
Start: 2022-05-19 | End: 2022-05-19

## 2022-05-19 RX ORDER — LIDOCAINE HYDROCHLORIDE 20 MG/ML
INJECTION, SOLUTION INFILTRATION; PERINEURAL PRN
Status: DISCONTINUED | OUTPATIENT
Start: 2022-05-19 | End: 2022-05-19

## 2022-05-19 RX ORDER — LIDOCAINE 40 MG/G
CREAM TOPICAL
Status: DISCONTINUED | OUTPATIENT
Start: 2022-05-19 | End: 2022-05-19

## 2022-05-19 RX ORDER — HYDROMORPHONE HCL IN WATER/PF 6 MG/30 ML
0.2 PATIENT CONTROLLED ANALGESIA SYRINGE INTRAVENOUS
Status: DISCONTINUED | OUTPATIENT
Start: 2022-05-19 | End: 2022-05-23 | Stop reason: HOSPADM

## 2022-05-19 RX ORDER — GABAPENTIN 300 MG/1
600 CAPSULE ORAL
Status: COMPLETED | OUTPATIENT
Start: 2022-05-19 | End: 2022-05-19

## 2022-05-19 RX ORDER — NALOXONE HYDROCHLORIDE 0.4 MG/ML
0.4 INJECTION, SOLUTION INTRAMUSCULAR; INTRAVENOUS; SUBCUTANEOUS
Status: DISCONTINUED | OUTPATIENT
Start: 2022-05-19 | End: 2022-05-23 | Stop reason: HOSPADM

## 2022-05-19 RX ORDER — HYDROMORPHONE HCL IN WATER/PF 6 MG/30 ML
0.4 PATIENT CONTROLLED ANALGESIA SYRINGE INTRAVENOUS
Status: DISCONTINUED | OUTPATIENT
Start: 2022-05-19 | End: 2022-05-23 | Stop reason: HOSPADM

## 2022-05-19 RX ORDER — OXYCODONE HYDROCHLORIDE 10 MG/1
10 TABLET ORAL EVERY 4 HOURS PRN
Status: DISCONTINUED | OUTPATIENT
Start: 2022-05-19 | End: 2022-05-21

## 2022-05-19 RX ORDER — FLUMAZENIL 0.1 MG/ML
0.2 INJECTION, SOLUTION INTRAVENOUS
Status: DISCONTINUED | OUTPATIENT
Start: 2022-05-19 | End: 2022-05-19

## 2022-05-19 RX ORDER — ACETAMINOPHEN 325 MG/1
975 TABLET ORAL ONCE
Status: COMPLETED | OUTPATIENT
Start: 2022-05-19 | End: 2022-05-19

## 2022-05-19 RX ORDER — HYDROMORPHONE HCL IN WATER/PF 6 MG/30 ML
0.2 PATIENT CONTROLLED ANALGESIA SYRINGE INTRAVENOUS EVERY 5 MIN PRN
Status: DISCONTINUED | OUTPATIENT
Start: 2022-05-19 | End: 2022-05-19 | Stop reason: HOSPADM

## 2022-05-19 RX ORDER — FENTANYL CITRATE 50 UG/ML
25 INJECTION, SOLUTION INTRAMUSCULAR; INTRAVENOUS EVERY 5 MIN PRN
Status: DISCONTINUED | OUTPATIENT
Start: 2022-05-19 | End: 2022-05-19 | Stop reason: HOSPADM

## 2022-05-19 RX ORDER — FLUTICASONE PROPIONATE 50 MCG
1-2 SPRAY, SUSPENSION (ML) NASAL DAILY
Status: DISCONTINUED | OUTPATIENT
Start: 2022-05-20 | End: 2022-05-23 | Stop reason: HOSPADM

## 2022-05-19 RX ORDER — METHOCARBAMOL 500 MG/1
500 TABLET, FILM COATED ORAL 4 TIMES DAILY
Status: DISCONTINUED | OUTPATIENT
Start: 2022-05-20 | End: 2022-05-23 | Stop reason: HOSPADM

## 2022-05-19 RX ORDER — LIDOCAINE 40 MG/G
CREAM TOPICAL
Status: DISCONTINUED | OUTPATIENT
Start: 2022-05-19 | End: 2022-05-23 | Stop reason: HOSPADM

## 2022-05-19 RX ORDER — PROPOFOL 10 MG/ML
INJECTION, EMULSION INTRAVENOUS PRN
Status: DISCONTINUED | OUTPATIENT
Start: 2022-05-19 | End: 2022-05-19

## 2022-05-19 RX ORDER — GABAPENTIN 100 MG/1
100 CAPSULE ORAL 3 TIMES DAILY
Status: DISCONTINUED | OUTPATIENT
Start: 2022-05-19 | End: 2022-05-23 | Stop reason: HOSPADM

## 2022-05-19 RX ORDER — ACETAMINOPHEN 500 MG
1000 TABLET ORAL EVERY 6 HOURS
Status: COMPLETED | OUTPATIENT
Start: 2022-05-19 | End: 2022-05-22

## 2022-05-19 RX ORDER — SODIUM CHLORIDE, SODIUM LACTATE, POTASSIUM CHLORIDE, CALCIUM CHLORIDE 600; 310; 30; 20 MG/100ML; MG/100ML; MG/100ML; MG/100ML
INJECTION, SOLUTION INTRAVENOUS CONTINUOUS PRN
Status: DISCONTINUED | OUTPATIENT
Start: 2022-05-19 | End: 2022-05-19

## 2022-05-19 RX ORDER — FENTANYL CITRATE 50 UG/ML
INJECTION, SOLUTION INTRAMUSCULAR; INTRAVENOUS PRN
Status: DISCONTINUED | OUTPATIENT
Start: 2022-05-19 | End: 2022-05-19

## 2022-05-19 RX ORDER — OXYCODONE HYDROCHLORIDE 5 MG/1
5 TABLET ORAL EVERY 4 HOURS PRN
Status: DISCONTINUED | OUTPATIENT
Start: 2022-05-19 | End: 2022-05-19 | Stop reason: HOSPADM

## 2022-05-19 RX ORDER — ONDANSETRON 4 MG/1
4 TABLET, ORALLY DISINTEGRATING ORAL EVERY 6 HOURS PRN
Status: DISCONTINUED | OUTPATIENT
Start: 2022-05-19 | End: 2022-05-23 | Stop reason: HOSPADM

## 2022-05-19 RX ORDER — SODIUM CHLORIDE, SODIUM LACTATE, POTASSIUM CHLORIDE, CALCIUM CHLORIDE 600; 310; 30; 20 MG/100ML; MG/100ML; MG/100ML; MG/100ML
INJECTION, SOLUTION INTRAVENOUS CONTINUOUS
Status: DISCONTINUED | OUTPATIENT
Start: 2022-05-19 | End: 2022-05-19 | Stop reason: HOSPADM

## 2022-05-19 RX ORDER — CEFAZOLIN SODIUM/WATER 2 G/20 ML
2 SYRINGE (ML) INTRAVENOUS
Status: COMPLETED | OUTPATIENT
Start: 2022-05-19 | End: 2022-05-19

## 2022-05-19 RX ORDER — BUPIVACAINE HYDROCHLORIDE 2.5 MG/ML
INJECTION, SOLUTION EPIDURAL; INFILTRATION; INTRACAUDAL
Status: COMPLETED | OUTPATIENT
Start: 2022-05-19 | End: 2022-05-19

## 2022-05-19 RX ORDER — ONDANSETRON 4 MG/1
4 TABLET, ORALLY DISINTEGRATING ORAL EVERY 30 MIN PRN
Status: DISCONTINUED | OUTPATIENT
Start: 2022-05-19 | End: 2022-05-19 | Stop reason: HOSPADM

## 2022-05-19 RX ORDER — DEXMEDETOMIDINE HYDROCHLORIDE 4 UG/ML
INJECTION, SOLUTION INTRAVENOUS
Status: COMPLETED | OUTPATIENT
Start: 2022-05-19 | End: 2022-05-19

## 2022-05-19 RX ORDER — DEXAMETHASONE SODIUM PHOSPHATE 4 MG/ML
INJECTION, SOLUTION INTRA-ARTICULAR; INTRALESIONAL; INTRAMUSCULAR; INTRAVENOUS; SOFT TISSUE PRN
Status: DISCONTINUED | OUTPATIENT
Start: 2022-05-19 | End: 2022-05-19

## 2022-05-19 RX ORDER — ENOXAPARIN SODIUM 100 MG/ML
40 INJECTION SUBCUTANEOUS EVERY 24 HOURS
Status: DISCONTINUED | OUTPATIENT
Start: 2022-05-20 | End: 2022-05-23 | Stop reason: HOSPADM

## 2022-05-19 RX ORDER — DEXAMETHASONE SODIUM PHOSPHATE 10 MG/ML
INJECTION, SOLUTION INTRAMUSCULAR; INTRAVENOUS
Status: COMPLETED | OUTPATIENT
Start: 2022-05-19 | End: 2022-05-19

## 2022-05-19 RX ORDER — FENTANYL CITRATE 50 UG/ML
25-50 INJECTION, SOLUTION INTRAMUSCULAR; INTRAVENOUS
Status: DISCONTINUED | OUTPATIENT
Start: 2022-05-19 | End: 2022-05-19

## 2022-05-19 RX ORDER — ONDANSETRON 2 MG/ML
INJECTION INTRAMUSCULAR; INTRAVENOUS PRN
Status: DISCONTINUED | OUTPATIENT
Start: 2022-05-19 | End: 2022-05-19

## 2022-05-19 RX ORDER — ALBUTEROL SULFATE 90 UG/1
1-2 AEROSOL, METERED RESPIRATORY (INHALATION) EVERY 4 HOURS PRN
Status: DISCONTINUED | OUTPATIENT
Start: 2022-05-19 | End: 2022-05-23 | Stop reason: HOSPADM

## 2022-05-19 RX ORDER — ONDANSETRON 2 MG/ML
4 INJECTION INTRAMUSCULAR; INTRAVENOUS EVERY 6 HOURS PRN
Status: DISCONTINUED | OUTPATIENT
Start: 2022-05-19 | End: 2022-05-23 | Stop reason: HOSPADM

## 2022-05-19 RX ORDER — ONDANSETRON 2 MG/ML
4 INJECTION INTRAMUSCULAR; INTRAVENOUS EVERY 30 MIN PRN
Status: DISCONTINUED | OUTPATIENT
Start: 2022-05-19 | End: 2022-05-19 | Stop reason: HOSPADM

## 2022-05-19 RX ORDER — GLYCOPYRROLATE 0.2 MG/ML
INJECTION, SOLUTION INTRAMUSCULAR; INTRAVENOUS PRN
Status: DISCONTINUED | OUTPATIENT
Start: 2022-05-19 | End: 2022-05-19

## 2022-05-19 RX ORDER — CEFAZOLIN SODIUM/WATER 2 G/20 ML
2 SYRINGE (ML) INTRAVENOUS SEE ADMIN INSTRUCTIONS
Status: DISCONTINUED | OUTPATIENT
Start: 2022-05-19 | End: 2022-05-19

## 2022-05-19 RX ORDER — NALOXONE HYDROCHLORIDE 0.4 MG/ML
0.2 INJECTION, SOLUTION INTRAMUSCULAR; INTRAVENOUS; SUBCUTANEOUS
Status: DISCONTINUED | OUTPATIENT
Start: 2022-05-19 | End: 2022-05-23 | Stop reason: HOSPADM

## 2022-05-19 RX ORDER — SODIUM CHLORIDE, SODIUM LACTATE, POTASSIUM CHLORIDE, CALCIUM CHLORIDE 600; 310; 30; 20 MG/100ML; MG/100ML; MG/100ML; MG/100ML
INJECTION, SOLUTION INTRAVENOUS CONTINUOUS
Status: DISCONTINUED | OUTPATIENT
Start: 2022-05-19 | End: 2022-05-20

## 2022-05-19 RX ORDER — ENOXAPARIN SODIUM 100 MG/ML
40 INJECTION SUBCUTANEOUS
Status: COMPLETED | OUTPATIENT
Start: 2022-05-19 | End: 2022-05-19

## 2022-05-19 RX ORDER — ONDANSETRON 2 MG/ML
4 INJECTION INTRAMUSCULAR; INTRAVENOUS ONCE
Status: DISCONTINUED | OUTPATIENT
Start: 2022-05-19 | End: 2022-05-19

## 2022-05-19 RX ORDER — OXYCODONE HYDROCHLORIDE 5 MG/1
5 TABLET ORAL EVERY 4 HOURS PRN
Status: DISCONTINUED | OUTPATIENT
Start: 2022-05-19 | End: 2022-05-23 | Stop reason: HOSPADM

## 2022-05-19 RX ADMIN — SUGAMMADEX 150 MG: 100 INJECTION, SOLUTION INTRAVENOUS at 17:50

## 2022-05-19 RX ADMIN — SODIUM CHLORIDE, POTASSIUM CHLORIDE, SODIUM LACTATE AND CALCIUM CHLORIDE: 600; 310; 30; 20 INJECTION, SOLUTION INTRAVENOUS at 15:18

## 2022-05-19 RX ADMIN — FENTANYL CITRATE 50 MCG: 50 INJECTION, SOLUTION INTRAMUSCULAR; INTRAVENOUS at 16:54

## 2022-05-19 RX ADMIN — FENTANYL CITRATE 100 MCG: 50 INJECTION, SOLUTION INTRAMUSCULAR; INTRAVENOUS at 15:28

## 2022-05-19 RX ADMIN — HYDROMORPHONE HYDROCHLORIDE 0.5 MG: 1 INJECTION, SOLUTION INTRAMUSCULAR; INTRAVENOUS; SUBCUTANEOUS at 17:31

## 2022-05-19 RX ADMIN — Medication 20 MG: at 16:00

## 2022-05-19 RX ADMIN — ONDANSETRON 4 MG: 2 INJECTION INTRAMUSCULAR; INTRAVENOUS at 18:51

## 2022-05-19 RX ADMIN — METRONIDAZOLE 500 MG: 500 INJECTION, SOLUTION INTRAVENOUS at 15:16

## 2022-05-19 RX ADMIN — BUPIVACAINE HYDROCHLORIDE 50 ML: 2.5 INJECTION, SOLUTION EPIDURAL; INFILTRATION; INTRACAUDAL; PERINEURAL at 14:45

## 2022-05-19 RX ADMIN — ENOXAPARIN SODIUM 40 MG: 40 INJECTION SUBCUTANEOUS at 06:39

## 2022-05-19 RX ADMIN — MIDAZOLAM 2 MG: 1 INJECTION INTRAMUSCULAR; INTRAVENOUS at 15:22

## 2022-05-19 RX ADMIN — Medication 50 MG: at 15:28

## 2022-05-19 RX ADMIN — SODIUM CHLORIDE, POTASSIUM CHLORIDE, SODIUM LACTATE AND CALCIUM CHLORIDE: 600; 310; 30; 20 INJECTION, SOLUTION INTRAVENOUS at 17:40

## 2022-05-19 RX ADMIN — Medication 2 G: at 15:30

## 2022-05-19 RX ADMIN — GABAPENTIN 100 MG: 100 CAPSULE ORAL at 21:12

## 2022-05-19 RX ADMIN — GABAPENTIN 600 MG: 300 CAPSULE ORAL at 06:39

## 2022-05-19 RX ADMIN — MIDAZOLAM 1 MG: 1 INJECTION INTRAMUSCULAR; INTRAVENOUS at 14:48

## 2022-05-19 RX ADMIN — FENTANYL CITRATE 25 MCG: 50 INJECTION, SOLUTION INTRAMUSCULAR; INTRAVENOUS at 14:48

## 2022-05-19 RX ADMIN — DEXAMETHASONE SODIUM PHOSPHATE 4 MG: 4 INJECTION, SOLUTION INTRA-ARTICULAR; INTRALESIONAL; INTRAMUSCULAR; INTRAVENOUS; SOFT TISSUE at 15:33

## 2022-05-19 RX ADMIN — Medication 50 MCG: at 14:45

## 2022-05-19 RX ADMIN — HYDROMORPHONE HYDROCHLORIDE 0.2 MG: 0.2 INJECTION, SOLUTION INTRAMUSCULAR; INTRAVENOUS; SUBCUTANEOUS at 18:47

## 2022-05-19 RX ADMIN — ACETAMINOPHEN 975 MG: 325 TABLET ORAL at 06:39

## 2022-05-19 RX ADMIN — Medication 10 MG: at 17:23

## 2022-05-19 RX ADMIN — PROPOFOL 110 MG: 10 INJECTION, EMULSION INTRAVENOUS at 15:28

## 2022-05-19 RX ADMIN — ONDANSETRON 4 MG: 2 INJECTION INTRAMUSCULAR; INTRAVENOUS at 15:34

## 2022-05-19 RX ADMIN — DEXAMETHASONE SODIUM PHOSPHATE 2 MG: 10 INJECTION, SOLUTION INTRAMUSCULAR; INTRAVENOUS at 14:45

## 2022-05-19 RX ADMIN — Medication 10 MG: at 16:45

## 2022-05-19 RX ADMIN — ACETAMINOPHEN 1000 MG: 500 TABLET ORAL at 21:12

## 2022-05-19 RX ADMIN — LIDOCAINE HYDROCHLORIDE 60 MG: 20 INJECTION, SOLUTION INFILTRATION; PERINEURAL at 15:28

## 2022-05-19 RX ADMIN — GLYCOPYRROLATE 0.2 MG: 0.2 INJECTION, SOLUTION INTRAMUSCULAR; INTRAVENOUS at 16:02

## 2022-05-19 RX ADMIN — OXYCODONE HYDROCHLORIDE 5 MG: 5 TABLET ORAL at 21:35

## 2022-05-19 ASSESSMENT — ACTIVITIES OF DAILY LIVING (ADL)
ADLS_ACUITY_SCORE: 20

## 2022-05-19 NOTE — ANESTHESIA PREPROCEDURE EVALUATION
Anesthesia Pre-Procedure Evaluation    Patient: Nuris Reddy   MRN: [id number] : 2000        Procedure : Procedure(s):  EXCISION, SMALL INTESTINE, LAPAROSCOPIC  Possible LAPAROTOMY          Past Medical History:   Diagnosis Date     Anemia 10/16/2012     COPD (chronic obstructive pulmonary disease) (H)      History of blood transfusion      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, s/p resection     Short gut syndrome 10/16/2012    ~ 20 cm of ileum as well as ileocecal valve resected in  period     Ulceration of intestine 2012     Uncomplicated asthma       Past Surgical History:   Procedure Laterality Date     COLONOSCOPY  2012     COLONOSCOPY  2012     COLONOSCOPY N/A 2022    Procedure: COLONOSCOPY, WITH BIOPSY;  Surgeon: Moisés Gupta MD;  Location: [facility] OR     RESECTION ILEOCECAL  1999    ~ 20 cm of ileum as well as ileocecal valve resected in  period      Allergies   Allergen Reactions     No Known Allergies       Social History     Tobacco Use     Smoking status: Never Smoker     Smokeless tobacco: Never Used     Tobacco comment: Non-smoking home   Substance Use Topics     Alcohol use: Yes     Comment: Social      Wt Readings from Last 1 Encounters:   22 56.5 kg (124 lb 9 oz)        Anesthesia Evaluation   Pt has had prior anesthetic.     History of anesthetic complications  - PONV.  Suffered from PONV with last colonoscopy 2022..    ROS/MED HX  ENT/Pulmonary: Comment: 2021 (-) COVID but cough, congestion, body aches and dyspnea.  Has continued to have dyspnea upon waking up from sleep.  Intermittent occurrence typically will cluster a few days in a row and then will go without symptoms for a period time. Occurrence ~once every 1-2 weeks.  Correlates with allergies.  Denies any exertional symptoms.  Is able to play soccer.     CXR 2021.                                                                   IMPRESSION: There are no acute infiltrates. The cardiac silhouette is  not enlarged. Pulmonary vasculature is unremarkable.     CHARLEE GUZMAN MD     Reports bronchitis 1-2 X/year.     (+) allergic rhinitis, asthma Treatment: Inhaler prn,      Neurologic:  - neg neurologic ROS     Cardiovascular: Comment: Recent chest pain,  Seems to correlate with lower hemoglobins.   No regular issues with palpitations.   No lower extremity edema.    (-) taking anticoagulants/antiplatelets   METS/Exercise Tolerance: >4 METS Comment: Plays soccer.   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 Patient denies this.       Renal/Genitourinary:  - neg Renal ROS     Endo:  - neg endo ROS     Psychiatric/Substance Use:  - neg psychiatric ROS     Infectious Disease: Comment: Completed COVID vaccine and booster.  - neg infectious disease ROS     Malignancy:  - neg malignancy ROS     Other: Comment: Eczema/atopic dermatitis followed by dermatology.     (+) LMP: 4/24/22, ,         Physical Exam    Airway  airway exam normal      Mallampati: I       Respiratory Devices and Support         Dental  no notable dental history         Cardiovascular   cardiovascular exam normal          Pulmonary   pulmonary exam normal                OUTSIDE LABS:  CBC:   Lab Results   Component Value Date    WBC 6.9 05/16/2022    WBC 7.9 04/16/2022    HGB 10.2 (L) 05/16/2022    HGB 9.8 (L) 04/16/2022    HCT 32.8 (L) 05/16/2022    HCT 30.7 (L) 04/16/2022     05/16/2022     (H) 04/16/2022     BMP:   Lab Results   Component Value Date     05/16/2022     04/16/2022    POTASSIUM 3.7 05/16/2022    POTASSIUM 3.8  04/16/2022    CHLORIDE 111 (H) 05/16/2022    CHLORIDE 108 04/16/2022    CO2 23 05/16/2022    CO2 25 04/16/2022    BUN 8 05/16/2022    BUN 10 04/16/2022    CR 0.76 05/16/2022    CR 0.89 04/16/2022    GLC 91 05/19/2022    GLC 89 05/16/2022     COAGS:   Lab Results   Component Value Date    PTT 31 05/16/2022    INR 1.08 05/16/2022     POC:   Lab Results   Component Value Date    HCG Negative 01/05/2022     HEPATIC:   Lab Results   Component Value Date    ALBUMIN 3.4 05/16/2022    PROTTOTAL 6.8 05/16/2022    ALT 31 05/16/2022    AST 19 05/16/2022    ALKPHOS 87 05/16/2022    BILITOTAL 0.4 05/16/2022     OTHER:   Lab Results   Component Value Date    SHEELA 8.7 05/16/2022    PHOS 4.3 10/16/2012    LIPASE 58 07/23/2018    TSH 2.22 11/08/2021    T4 0.98 08/10/2021    CRP <2.9 08/07/2019    SED 15 08/07/2019       Anesthesia Plan    ASA Status:  2   NPO Status:  Will be NPO Appropriate at ... 5/19/2022 11:00 AM   Anesthesia Type: General.     - Airway: ETT   Induction: Intravenous.   Maintenance: Balanced.        Consents    Anesthesia Plan(s) and associated risks, benefits, and realistic alternatives discussed. Questions answered and patient/representative(s) expressed understanding.    - Discussed:     - Discussed with:  Patient      - Extended Intubation/Ventilatory Support Discussed: No.      - Patient is DNR/DNI Status: No    Use of blood products discussed: No .     Postoperative Care    Pain management: Multi-modal analgesia, IV analgesics.   PONV prophylaxis: Ondansetron (or other 5HT-3), Dexamethasone or Solumedrol     Comments:    Other Comments: Patient consented for potential rescue TAP block if necessary in recovery            Harry Porter MD

## 2022-05-19 NOTE — OP NOTE
[facility] Colorectal Surgery Operative Report  May 19, 2022    PREOPERATIVE DIAGNOSIS:  1. Chronic anastomotic ulcer with stenosis  2. Anemia  3. COPD  4. Eczema  5. Rhinitis  6. Proteinuria    POSTOPERATIVE DIAGNOSIS:   1. Chronic anastomotic ulcer with stenosis  2. Anemia  3. COPD  4. Eczema  5. Rhinitis  6. Proteinuria    PROCEDURE:  1. Laparoscopic right colectomy.    ANESTHESIA: General endotracheal anesthesia plus local anesthesia.    SURGEON:  Frandy Mack M.D.    ASSISTANT(S): PATRICIA Weaver Fellow    INDICATIONS FOR PROCEDURE  Nuris Reddy is a 21 year old female who as an infant underwent an exploratory laparotomy for ileal atresia with an end to end anastomosis. She recently underwent a colonoscopy for anemia which revealed an anastomotic ulcer and stenosis concerning for the cause of her anemia. I recommended anastomotic resection. I thoroughly discussed the risks, benefits, and alternatives of operative treatment with the patient and she agreed to proceed.    General risks related to abdominal surgery were reviewed with the patient. These include, but are not limited to, death, myocardial infarction, pneumonia, urinary tract infection, deep venous thrombosis with or without pulmonary embolus, abdominal infection from bowel injury or abscess, fistula, anastomotic leak that may require reoperation and a stoma, ureteral injury, bowel obstruction, wound infection, and bleeding.    OPERATIVE PROCEDURE: After obtaining informed consent, the patient was brought to the operating room and placed in the supine position. Appropriate preoperative mechanical and chemical deep venous thrombosis prophylaxis, as well as preoperative prophylactic parenteral antibiotics were given. General endotracheal anesthesia was gently induced. Bilateral lower extremity pneumatic compression devices were applied and all pressure points were cushioned. A Clark catheter was inserted without difficulty. The abdomen was then  "preped and draped in the standard sterile fashion. After a \"time-out\" was performed, a 12mm supraumbilical incision was made and a 12mm trocar was inserted in an open fashion. Pneumoperitoneum was established with CO2 without difficulty. A 10mm 30 degree angle laparoscope was then used to explore the peritoneal cavity. No evidence of bleeding, bowel injury or metastatic disease was visualized. Additional trocars were placed at the suprapubic area (5mm) and left lower quadrant (5mm), under direct vision. The small bowel and omentum were then displaced towards the left side of the peritoneal cavity and the right colon was tented caudally and laterally. There were some mild adhesions in the right lower quadrant where her prior anastomosis was readily seen. Thus, the terminal ileum, ileocolic anastomosis, ascending colon and proximal transverse colon were mobilized using a lateral to medial technique and the mesocolon was divided with a Ligusure device up to the required bowel segments for our resection and anastomosis.     After obtaining sufficient mobilization of the colon to perform our resection, a 5-cm Pfannenstiel extraction incision was made and a wound protector was placed. The terminal ileum, ascending colon and proximal transverse colon were brought through the incision. The greater omentum up to our proposed area of colonic transection was dissected and divided with the Ligasure device. Healthy small bowel proximal to the prior anastomosis was identified, and a mesenteric window made. The bowel was divided with a GREG 75 mm blue load stapler. Distally, an area of healthy ascending colon was identified, a mesenteric window made, and the colon was also transected with a 75 mm GREG blue load stapler. The corresponding mesenteric was then divided with the ligasure device. A terminal branch of the ileum was identified and bleeding. This was oversewn with 2-0 vicryl in figure of 8 fashion. The specimen was then " taken to the back table and opened which confirmation of resection of the anastomosis with a 1 cm ulcer, with a surrounding area of fibrosis. THe specimen was then sent to pathology.     The terminal ileum and ascendingcolon were aligned, making sure to not incorporate mesentery or adjacent tissues, and a stapled side-to-side functional end-to-end ileo-ileal anastomosis was made with a GREG 75 surgical stapler. The anastomosis was evaluated through the common enterotomy and no bleeding was visualized. After this, a TA 90 surgical stapler was used to transect the remaining colon and terminal ileum, making sure the GREG staple lines were not aligned. The anastomosis appeared to be well vascularized, widely patent, and without tension or torsion. Areas of bleeding at the anastomosis were reinforced with 3-0 PDS, along with at the end of the common staple line.    The bowel was then returned to the peritoneal cavity. The peritoneal cavity was irrigated and suctioned. There was no evidence of bleeding or bowel injury. All trocars were removed with no signs of bleeding. The pfannenstiel extraction incision fascia was re-approximated with running 0 PDS sutures. Pneumoperitoneum was re-established. The abdomen appeared hemostatic without any evidence of succus. The abdomen was desufflated. The supraumbilical Rod port site was closed with 0 vicryl. Instrument, sponge, and needle counts were all correct, as reported to me, at this time. Wounds were irrigated and dried, and hemostasis was corroborated. Skin incisions were re-approximated with running subcuticular 4-0 Monocryl sutures and Dermabond was applied. The patient tolerated the procedure well.    COMPLICATIONS: none.    ESTIMATED BLOOD LOSS: 50 mL.    URINE OUTPUT: 200 mL    REPLACEMENT:     - Crystalloid: 1000 mL.     - Colloid: -0 mL.     - Blood products: 0.    SPECIMEN(S): ileocolic anastomosis.    OPERATIVE COUNT: Complete.    OPERATIVE FINDINGS:   1. Prior  ileocolic anastomosis with some fibrosis. Resected and opened on back table which identification of 1 cm ulcer at prior anastomosis.  2. Creation of side to side functional end to end ileocolic anastomosis.    Frandy Mack MD  Division of Colon and Rectal Surgery  [facility]  [phone]

## 2022-05-19 NOTE — ANESTHESIA PROCEDURE NOTES
Airway       Patient location during procedure: OR       Procedure Start/Stop Times: 5/19/2022 3:30 PM  Staff -        CRNA: Laxmi Espinoza APRN CRNA       Performed By: CRNA  Consent for Airway        Urgency: elective  Indications and Patient Condition       Indications for airway management: yulia-procedural       Induction type:intravenous       Mask difficulty assessment: 1 - vent by mask    Final Airway Details       Final airway type: endotracheal airway       Successful airway: ETT - single and Oral  Endotracheal Airway Details        ETT size (mm): 6.5       Cuffed: yes       Successful intubation technique: direct laryngoscopy       DL Blade Type: MAC 3       Grade View of Cords: 1       Adjucts: stylet       Position: Right       Measured from: lips       Secured at (cm): 21       Bite block used: None    Post intubation assessment        Placement verified by: capnometry, equal breath sounds and chest rise        Number of attempts at approach: 1       Secured with: pink tape       Ease of procedure: easy       Dentition: Intact and Unchanged    Medication(s) Administered   Medication Administration Time: 5/19/2022 3:30 PM

## 2022-05-19 NOTE — ANESTHESIA PROCEDURE NOTES
TAP Procedure Note    Pre-Procedure   Staff -        Anesthesiologist:  Dudley Riggins MD       Resident/Fellow: Maddy Poole MD       Performed By: resident       Location: pre-op       Procedure Start/Stop Times: 5/19/2022 2:45 PM and 5/19/2022 2:55 PM       Pre-Anesthestic Checklist: patient identified, IV checked, site marked, risks and benefits discussed, informed consent, monitors and equipment checked, pre-op evaluation, at physician/surgeon's request and post-op pain management  Timeout:       Correct Patient: Yes        Correct Procedure: Yes        Correct Site: Yes        Correct Position: Yes        Correct Laterality: Yes        Site Marked: Yes  Procedure Documentation  Procedure: TAP       Diagnosis: POST OPERATIVE PAIN       Laterality: bilateral       Patient Position: supine       Patient Prep/Sterile Barriers: sterile gloves, mask       Skin prep: Chloraprep       Needle Type: short bevel       Needle Gauge: 21.        Needle Length (millimeters): 110        Ultrasound guided       1. Ultrasound was used to identify targeted nerve, plexus, vascular marker, or fascial plane and place a needle adjacent to it in real-time.       2. Ultrasound was used to visualize the spread of anesthetic in close proximity to the above referenced structure.       3. A permanent image is entered into the patient's record.    Assessment/Narrative         The placement was negative for: blood aspirated, painful injection and site bleeding       Paresthesias: No.       Bolus given via needle..        Secured via.        Insertion/Infusion Method: Single Shot       Complications: none       Injection made incrementally with aspirations every 5 mL.    Medication(s) Administered   Bupivacaine 0.25% PF (Infiltration) - Infiltration   50 mL - 5/19/2022 2:45:00 PM  Dexmedetomidine 4 mcg/mL (Perineural) - Perineural   50 mcg - 5/19/2022 2:45:00 PM  Dexamethasone 10 mg/mL PF (Perineural) - Perineural   2  mg - 5/19/2022 2:45:00 PM  Medication Administration Time: 5/19/2022 2:45 PM

## 2022-05-19 NOTE — ANESTHESIA CARE TRANSFER NOTE
Patient: Nuris Reddy    Procedure: Procedure(s):  EXCISION, SMALL INTESTINE, LAPAROSCOPIC       Diagnosis: Other congenital anomalies of intestine [Q43.8]  Diagnosis Additional Information: No value filed.    Anesthesia Type:   General     Note:    Oropharynx: oropharynx clear of all foreign objects and spontaneously breathing  Level of Consciousness: awake  Oxygen Supplementation: nasal cannula  Level of Supplemental Oxygen (L/min / FiO2): 2  Independent Airway: airway patency satisfactory and stable  Dentition: dentition unchanged  Vital Signs Stable: post-procedure vital signs reviewed and stable  Report to RN Given: handoff report given  Patient transferred to: PACU    Handoff Report: Identifed the Patient, Identified the Reponsible Provider, Reviewed the pertinent medical history, Discussed the surgical course, Reviewed Intra-OP anesthesia mangement and issues during anesthesia, Set expectations for post-procedure period and Allowed opportunity for questions and acknowledgement of understanding      Vitals:  Vitals Value Taken Time   /71 05/19/22 1804   Temp     Pulse 78 05/19/22 1805   Resp 17 05/19/22 1805   SpO2 100 % 05/19/22 1805   Vitals shown include unvalidated device data.    Electronically Signed By: KILLIAN Lobo CRNA  May 19, 2022  6:07 PM   Add Progress Note...

## 2022-05-19 NOTE — BRIEF OP NOTE
[facility]    Brief Operative Note    Pre-operative diagnosis: Other congenital anomalies of intestine [Q43.8]  Post-operative diagnosis anastomotic ulcer/stricture    Procedure: Procedure(s):  EXCISION, SMALL INTESTINE, LAPAROSCOPIC  Surgeon: Surgeon(s) and Role:     * Frandy Mack MD - Primary  Anesthesia: General   Estimated Blood Loss: Less than 50 ml    Drains: None  Specimens:   ID Type Source Tests Collected by Time Destination   1 : Ileocolic anastomosis Tissue Other SURGICAL PATHOLOGY EXAM Frandy Mack MD 5/19/2022  5:02 PM      Findings:   ulcer at prior ileocolic anastomosis .  Complications: None.  Implants: * No implants in log *      Susanne Bustamante MD  Colon and Rectal Surgery Fellow

## 2022-05-20 ENCOUNTER — APPOINTMENT (OUTPATIENT)
Dept: GENERAL RADIOLOGY | Facility: CLINIC | Age: 22
DRG: 331 | End: 2022-05-20
Attending: PHYSICIAN ASSISTANT
Payer: COMMERCIAL

## 2022-05-20 ENCOUNTER — APPOINTMENT (OUTPATIENT)
Dept: OCCUPATIONAL THERAPY | Facility: CLINIC | Age: 22
DRG: 331 | End: 2022-05-20
Attending: SURGERY
Payer: COMMERCIAL

## 2022-05-20 LAB
ANION GAP SERPL CALCULATED.3IONS-SCNC: 7 MMOL/L (ref 3–14)
BUN SERPL-MCNC: 8 MG/DL (ref 7–30)
CALCIUM SERPL-MCNC: 8.6 MG/DL (ref 8.5–10.1)
CHLORIDE BLD-SCNC: 109 MMOL/L (ref 94–109)
CO2 SERPL-SCNC: 24 MMOL/L (ref 20–32)
CREAT SERPL-MCNC: 0.76 MG/DL (ref 0.52–1.04)
ERYTHROCYTE [DISTWIDTH] IN BLOOD BY AUTOMATED COUNT: 16 % (ref 10–15)
GFR SERPL CREATININE-BSD FRML MDRD: >90 ML/MIN/1.73M2
GLUCOSE BLD-MCNC: 101 MG/DL (ref 70–99)
GLUCOSE BLDC GLUCOMTR-MCNC: 91 MG/DL (ref 70–99)
GLUCOSE BLDC GLUCOMTR-MCNC: 97 MG/DL (ref 70–99)
HCT VFR BLD AUTO: 30.9 % (ref 35–47)
HGB BLD-MCNC: 9.5 G/DL (ref 11.7–15.7)
MAGNESIUM SERPL-MCNC: 1.9 MG/DL (ref 1.6–2.3)
MCH RBC QN AUTO: 25.7 PG (ref 26.5–33)
MCHC RBC AUTO-ENTMCNC: 30.7 G/DL (ref 31.5–36.5)
MCV RBC AUTO: 84 FL (ref 78–100)
PLATELET # BLD AUTO: 388 10E3/UL (ref 150–450)
POTASSIUM BLD-SCNC: 3.8 MMOL/L (ref 3.4–5.3)
RBC # BLD AUTO: 3.7 10E6/UL (ref 3.8–5.2)
SODIUM SERPL-SCNC: 140 MMOL/L (ref 133–144)
WBC # BLD AUTO: 12.5 10E3/UL (ref 4–11)

## 2022-05-20 PROCEDURE — 999N000127 HC STATISTIC PERIPHERAL IV START W US GUIDANCE

## 2022-05-20 PROCEDURE — 93005 ELECTROCARDIOGRAM TRACING: CPT

## 2022-05-20 PROCEDURE — 250N000013 HC RX MED GY IP 250 OP 250 PS 637: Performed by: SURGERY

## 2022-05-20 PROCEDURE — 74018 RADEX ABDOMEN 1 VIEW: CPT | Mod: 26 | Performed by: RADIOLOGY

## 2022-05-20 PROCEDURE — 74018 RADEX ABDOMEN 1 VIEW: CPT

## 2022-05-20 PROCEDURE — 120N000002 HC R&B MED SURG/OB UMMC

## 2022-05-20 PROCEDURE — 97165 OT EVAL LOW COMPLEX 30 MIN: CPT | Mod: GO

## 2022-05-20 PROCEDURE — 85027 COMPLETE CBC AUTOMATED: CPT | Performed by: SURGERY

## 2022-05-20 PROCEDURE — 97535 SELF CARE MNGMENT TRAINING: CPT | Mod: GO

## 2022-05-20 PROCEDURE — 97530 THERAPEUTIC ACTIVITIES: CPT | Mod: GO

## 2022-05-20 PROCEDURE — 71045 X-RAY EXAM CHEST 1 VIEW: CPT | Mod: 26 | Performed by: RADIOLOGY

## 2022-05-20 PROCEDURE — 83735 ASSAY OF MAGNESIUM: CPT | Performed by: SURGERY

## 2022-05-20 PROCEDURE — 250N000011 HC RX IP 250 OP 636: Performed by: SURGERY

## 2022-05-20 PROCEDURE — 71045 X-RAY EXAM CHEST 1 VIEW: CPT

## 2022-05-20 PROCEDURE — 93010 ELECTROCARDIOGRAM REPORT: CPT | Performed by: INTERNAL MEDICINE

## 2022-05-20 PROCEDURE — 999N000147 HC STATISTIC PT IP EVAL DEFER

## 2022-05-20 PROCEDURE — 36415 COLL VENOUS BLD VENIPUNCTURE: CPT | Performed by: SURGERY

## 2022-05-20 PROCEDURE — 80048 BASIC METABOLIC PNL TOTAL CA: CPT | Performed by: SURGERY

## 2022-05-20 PROCEDURE — 258N000003 HC RX IP 258 OP 636: Performed by: PHYSICIAN ASSISTANT

## 2022-05-20 RX ORDER — CHOLECALCIFEROL (VITAMIN D3) 125 MCG
6000 CAPSULE ORAL 3 TIMES DAILY PRN
Status: DISCONTINUED | OUTPATIENT
Start: 2022-05-20 | End: 2022-05-23 | Stop reason: HOSPADM

## 2022-05-20 RX ORDER — SODIUM CHLORIDE, SODIUM LACTATE, POTASSIUM CHLORIDE, CALCIUM CHLORIDE 600; 310; 30; 20 MG/100ML; MG/100ML; MG/100ML; MG/100ML
INJECTION, SOLUTION INTRAVENOUS CONTINUOUS
Status: DISCONTINUED | OUTPATIENT
Start: 2022-05-20 | End: 2022-05-20

## 2022-05-20 RX ADMIN — ACETAMINOPHEN 1000 MG: 500 TABLET ORAL at 02:58

## 2022-05-20 RX ADMIN — OXYCODONE HYDROCHLORIDE 5 MG: 5 TABLET ORAL at 21:05

## 2022-05-20 RX ADMIN — METHOCARBAMOL 500 MG: 500 TABLET ORAL at 11:50

## 2022-05-20 RX ADMIN — GABAPENTIN 100 MG: 100 CAPSULE ORAL at 20:20

## 2022-05-20 RX ADMIN — ENOXAPARIN SODIUM 40 MG: 40 INJECTION SUBCUTANEOUS at 11:50

## 2022-05-20 RX ADMIN — METHOCARBAMOL 500 MG: 500 TABLET ORAL at 20:20

## 2022-05-20 RX ADMIN — METHOCARBAMOL 500 MG: 500 TABLET ORAL at 15:46

## 2022-05-20 RX ADMIN — GABAPENTIN 100 MG: 100 CAPSULE ORAL at 14:14

## 2022-05-20 RX ADMIN — FLUTICASONE PROPIONATE 2 SPRAY: 50 SPRAY, METERED NASAL at 08:28

## 2022-05-20 RX ADMIN — SODIUM CHLORIDE, POTASSIUM CHLORIDE, SODIUM LACTATE AND CALCIUM CHLORIDE: 600; 310; 30; 20 INJECTION, SOLUTION INTRAVENOUS at 10:34

## 2022-05-20 RX ADMIN — OXYCODONE HYDROCHLORIDE 5 MG: 5 TABLET ORAL at 11:50

## 2022-05-20 RX ADMIN — METHOCARBAMOL 500 MG: 500 TABLET ORAL at 08:29

## 2022-05-20 RX ADMIN — ACETAMINOPHEN 1000 MG: 500 TABLET ORAL at 21:05

## 2022-05-20 RX ADMIN — ACETAMINOPHEN 1000 MG: 500 TABLET ORAL at 14:14

## 2022-05-20 RX ADMIN — OXYCODONE HYDROCHLORIDE 5 MG: 5 TABLET ORAL at 02:59

## 2022-05-20 RX ADMIN — OXYCODONE HYDROCHLORIDE 5 MG: 5 TABLET ORAL at 16:38

## 2022-05-20 RX ADMIN — ACETAMINOPHEN 1000 MG: 500 TABLET ORAL at 08:28

## 2022-05-20 RX ADMIN — GABAPENTIN 100 MG: 100 CAPSULE ORAL at 08:29

## 2022-05-20 ASSESSMENT — ACTIVITIES OF DAILY LIVING (ADL)
ADLS_ACUITY_SCORE: 20
ADLS_ACUITY_SCORE: 24
ADLS_ACUITY_SCORE: 24
ADLS_ACUITY_SCORE: 20
ADLS_ACUITY_SCORE: 24
ADLS_ACUITY_SCORE: 20
ADLS_ACUITY_SCORE: 20
DEPENDENT_IADLS:: INDEPENDENT
ADLS_ACUITY_SCORE: 20
ADLS_ACUITY_SCORE: 20

## 2022-05-20 NOTE — PLAN OF CARE
7C PT: cx/defer: Physical Therapy: Orders received. Chart reviewed and discussed with care team.? Physical Therapy not indicated due to patient ambulating well post op, has support from family at discharge. Anticipate all needs can be met by one discipline..? Defer discharge recommendations to OT.? Will complete orders.

## 2022-05-20 NOTE — PROVIDER NOTIFICATION
Provider notified (name/pager): DELMY ROMERO  Time initial page sent: [phone]  Reason for notification: Pt is reporting a click in her right ribs when she breath in and out. VSS. Please come and assess.  Response from provider: Awaiting response.     Paged DELMY ROMERO again at [phone].    Pt is still reporting the click with breathing. VSS, lungs are clear victorino. Please come and see pt.

## 2022-05-20 NOTE — PROGRESS NOTES
COLON & RECTAL SURGERY  PROGRESS NOTE    SUBJECTIVE:    Feeling well this am, had thoughtful questions about procedure. No c/o pain this AM. Has been tolerating water without nausea and vomiting. No gas or BMs yet. Afebrile.     OBJECTIVE:  Temp:  [97.16  F (36.2  C)-98.1  F (36.7  C)] 97.9  F (36.6  C)  Pulse:  [] 68  Resp:  [8-18] 10  BP: ()/(49-76) 102/49  SpO2:  [100 %] 100 %    Intake/Output:  PO 30    / 800     Physical Exam:   GENERAL:  Awake, alert, no acute distress, laying comfortably in bed  CV: No extra work of breathing appreciated ORA  Abd: soft, non-tender, non-distended, incisions c/d/i secured with skin glue  EXTREMITIES: warm and well perfused    LABS:  All labs reviewed.       ASSESSMENT/PLAN:  - Advance to LRD today  - F/u with labs and if ok remove arreola  - Multimodal pain control, Tylenol, Gabapentin, Dilaudid and Oxycodone  - Lovenox 40qd  - LR at 60mL/hr    Disposition: Pending toleration of LRD, pain well controlled on PO pain medications and ROBFDat [place], MS4       Addendum:  Pt was seen and examined independent of the medical student.     - agree with note above.      Cindy Bustos PA-C  Colon and Rectal Surgery     Seen and discussed with Dr. Bustamante  The above plan of care was performed and communicated to me by Dr. Barker.

## 2022-05-20 NOTE — PROGRESS NOTES
05/20/22 0942   Quick Adds   Type of Visit Initial Occupational Therapy Evaluation   Living Environment   People in Home sibling(s);parent(s)   Current Living Arrangements house   Home Accessibility stairs to enter home;stairs within home   Number of Stairs, Main Entrance 2   Stair Railings, Main Entrance none   Number of Stairs, Within Home, Primary greater than 10 stairs   Stair Railings, Within Home, Primary railings safe and in good condition   Transportation Anticipated family or friend will provide   Living Environment Comments Pt lives in a 2 story house + a basement with her parents and siblings. Pt has to go up a flight of stairs to access bed and bath. Pt has both tub/shower combo and walk-in.   Self-Care   Usual Activity Tolerance excellent   Current Activity Tolerance good   Regular Exercise Yes   Activity/Exercise Type walking;other (see comments)  (soccer)   Exercise Amount/Frequency daily   Equipment Currently Used at Home none   Fall history within last six months no   Activity/Exercise/Self-Care Comment Pt is IND with all ADLs and mobility at baseline. Pt is active at baseline with walking on campus and playing soccer.   Instrumental Activities of Daily Living (IADL)   Previous Responsibilities meal prep;housekeeping;laundry;shopping;medication management;finances;driving;work;school   IADL Comments Pt is IND with all IADLs at baseline. Pt works various jobs, babysitting and as a PCA. Pt is also a student. Parents able to assist w/ IADLs upon discharge.   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  No immediate concerns, no indications for intervention overnight.  Continue with plan as outlined previously, remainder of cares per primary team.   Existing Precautions/Restrictions abdominal   Left Upper Extremity (Weight-bearing Status) other (see comments)  (10#)   Right Upper Extremity (Weight-bearing Status) other (see comments)  (10#)   Left Lower Extremity (Weight-bearing Status) full weight-bearing (FWB)   Right Lower Extremity (Weight-bearing Status) full weight-bearing (FWB)   General Observations and Info Pt sitting up in chair w/ mom present, agreeable to therapy.   Cognitive Status Examination   Orientation Status orientation to person, place and time   Affect/Mental Status (Cognitive) WNL   Follows Commands WNL   Visual Perception   Visual Impairment/Limitations corrective lenses for distance   Sensory   Sensory Quick Adds No deficits were identified   Sensory Comments denies n/t   Pain Assessment   Patient Currently in Pain Yes, see Vital Sign flowsheet   Integumentary/Edema   Integumentary/Edema no deficits were identifed   Posture   Posture not impaired   Range of Motion Comprehensive   General Range of Motion bilateral upper extremity ROM WNL   Strength Comprehensive (MMT)   Comment, General Manual Muscle Testing (MMT) Assessment generalized weakness post-op   Muscle Tone Assessment   Muscle Tone Quick Adds No deficits were identified   Coordination   Upper Extremity Coordination No deficits were identified   Gross Motor Coordination No deficits were identified   Fine Motor Coordination No deficits were identified   Bed Mobility   Bed Mobility supine-sit;sit-supine   Supine-Sit [place] (Bed Mobility) verbal cues;supervision   Sit-Supine [place] (Bed Mobility) verbal cues;contact guard   Assistive Device (Bed Mobility) bed rails   Comment (Bed Mobility) HOB raised   Transfers   Transfers sit-stand  transfer   Sit-Stand Transfer   Sit-Stand [place] (Transfers) supervision   Balance   Balance Assessment no deficits were identified   Activities of Daily Living   BADL Assessment/Intervention lower body dressing;bathing;toileting   Bathing Assessment/Intervention   [place] Level (Bathing) verbal cues;minimum assist (75% patient effort)   Lower Body Dressing Assessment/Training   [place] Level (Lower Body Dressing) moderate assist (50% patient effort)   Grooming Assessment/Training   [place] Level (Grooming) set up;supervision   Toileting   [place] Level (Toileting) verbal cues;supervision   Clinical Impression   Criteria for Skilled Therapeutic Interventions Met (OT) Yes, treatment indicated   OT Diagnosis impaired ADLs with new post-surgical precautions   Influenced by the following impairments activity tolerance, pain   OT Problem List-Impairments impacting ADL problems related to;activity tolerance impaired;pain;post-surgical precautions   Identified Performance Deficits dressing, toileting, bathing, functional mobility   Planned Therapy Interventions (OT) ADL retraining;IADL retraining;bed mobility training;transfer training;progressive activity/exercise   Clinical Decision Making Complexity (OT) low complexity   Anticipated Equipment Needs Upon Discharge (OT)   (TBD)   Risk & Benefits of therapy have been explained evaluation/treatment results reviewed;care plan/treatment goals reviewed;risks/benefits reviewed;current/potential barriers reviewed;participants voiced agreement with care plan;participants included;patient;mother   Clinical Impression Comments Pt will benefit from skilled OT while IP to progress activity tolerance and independence with I/ADLs within precautions.   OT Discharge Planning   OT Discharge Recommendation (DC Rec) home with assist   OT Rationale for DC Rec Pt doing well POD1, ambulating w/ SBA. Anticipate once medically ready for d/c, pt safe to return home w/ A  from family as needed to adhere to precautions.   OT Brief overview of current status SBA; encourage ambulation in hallway   Total Evaluation Time (Minutes)   Total Evaluation Time (Minutes) 5   OT Goals   Therapy Frequency (OT) Daily   OT Predicted Duration/Target Date for Goal Attainment 05/23/22   OT Goals Lower Body Dressing;Toilet Transfer/Toileting;OT Goal 1;Aerobic Activity   OT: Lower Body Dressing Supervision/stand-by assist;within precautions;including set-up/clothing retrieval   OT: Toilet Transfer/Toileting Independent;toilet transfer;cleaning and garment management;within precautions   OT: Perform aerobic activity with stable cardiovascular response continuous activity;20 minutes;ambulation   OT: Goal 1 Pt will ascend/descend 16 steps with SBA within precautions.

## 2022-05-20 NOTE — PLAN OF CARE
"Patient is alert and oriented x4. VSS on 2 L oxygen overnight. Clark in place with adequate urine output. Ambulated in the halls once, tolerated it well. Pain well controlled with scheduled tylenol and prn oxycodone. Tolerating clear liquid diet without nausea. LR infusing at 60 mL/hr via R arm PIV. Abd incision CDI. SCDs in place. Slept between cares. Continue with plan of care.    /57 (BP Location: Left arm)   Pulse 69   Temp 98.1  F (36.7  C) (Temporal)   Resp 18   Ht 1.626 m (5' 4\")   Wt 56.5 kg (124 lb 9 oz)   LMP 05/19/2022   SpO2 100%   BMI 21.38 kg/m      "

## 2022-05-20 NOTE — PROGRESS NOTES
"CRS update    Pt reports shortness of breath, right rib pain (which she has had before, pt describes it as \"it feels like something gets caught on my rib and I need to take a breath to get it to pass over.\"  Has had this happen before but intermittently.  Now this sensation happens with every breath.  Also reports some dizziness.  Does report burping.  The above intensified after getting up to move.  Also feels tremulous.     Discussed could be CO2 insufflation, distended stomach, atelectatic lungs, somewhat pleuritic, less likely DVT/PE.    BPs 110-130/70-80  HR was 70s  100% on RA.  Does dip down 70-80s but recovers within a few seconds.   Mild tremulousness.  A little flushed.   Abdomen is distended compared to baseline.  Tympanic whit along epigastrum  Incisions are clean and dry.     POD# 1 Lap right colectomy    - CXR and AXR  - EKG  - restart IVF @ 50  - NPO for now  - do recommend continued ambulating as able  - check orthostatics later today    Cindy Bustos PA-C  Colon and Rectal Surgery     Discussed with Fellow, Dr. Bustamante.       "

## 2022-05-20 NOTE — CONSULTS
Care Management Initial Consult    General Information  Assessment completed with: Patient,    Type of CM/SW Visit: Initial Assessment    Primary Care Provider verified and updated as needed: Yes   Readmission within the last 30 days: no previous admission in last 30 days      Reason for Consult: discharge planning  Advance Care Planning: Advance Care Planning Reviewed: no concerns identified          Communication Assessment  Patient's communication style: spoken language (English or Bilingual)    Hearing Difficulty or Deaf: no   Wear Glasses or Blind: yes    Cognitive  Cognitive/Neuro/Behavioral: WDL  Level of Consciousness: alert  Arousal Level: opens eyes spontaneously  Orientation: oriented x 4  Mood/Behavior: calm, cooperative  Best Language: 0 - No aphasia  Speech: clear, spontaneous    Living Environment:   People in home: parent(s), sibling(s)     Current living Arrangements: house      Able to return to prior arrangements: yes       Family/Social Support:  Care provided by: self  Provides care for: no one  Marital Status: Single             Description of Support System: Supportive, Involved    Support Assessment: Adequate family and caregiver support, Adequate social supports    Current Resources:   Patient receiving home care services: No     Community Resources: None  Equipment currently used at home: none  Supplies currently used at home: None    Employment/Financial:  Employment Status:          Financial Concerns: insurance, none, No concerns identified           Lifestyle & Psychosocial Needs:  Social Determinants of Health     Tobacco Use: Low Risk      Smoking Tobacco Use: Never Smoker     Smokeless Tobacco Use: Never Used   Alcohol Use: Not on file   Financial Resource Strain: Not on file   Food Insecurity: Not on file   Transportation Needs: Not on file   Physical Activity: Not on file   Stress: Not on file   Social Connections: Not on file   Intimate Partner Violence: Not on file   Depression:  Not at risk     PHQ-2 Score: 0   Housing Stability: Not on file       Functional Status:  Prior to admission patient needed assistance:   Dependent ADLs:: Independent  Dependent IADLs:: Independent       Mental Health Status:  Mental Health Status: No Current Concerns       Chemical Dependency Status:                Values/Beliefs:  Spiritual, Cultural Beliefs, Holiness Practices, Values that affect care: no               Additional Information:  D: Plan of care discussed with Medical Team at Interdisciplinary Rounds, plan for patient to discharge 1-2 days.   I/A: Chart reviewed; met with patient at bedside to confirm home support, living arrangements and transportation, no RNCC discharge needs identified.   P: Care Coordinator will remain available for discharge needs that may arise.      Jenna Heart, RN  Float RN Care Coordinator  Unit RNCC pager: [phone]     For Weekend & Holiday on call RN Care Coordinator:  (Tasks: Home care, home infusion, medical equipment/oxygen, transportation, IMM & MOON forms, etc.)     Text Paging in [employer] Smart Web is the preferred method of contact for these teams     [place] [facility] [place] (0800-1630) Saturday & Sunday; (0800-1630)  Recognized Holidays  Pager #1: [phone] Units: 4A, 4C, 4E, 5A & 5B   Pager #2: [phone] Units: 6A, 6B, 6C, 6D  Pager #3: [phone] Units: 7A, 7B, 7C, 7D & 5C   Pager #4: [phone] Units: 5 Ortho, 8A, 10 ICU, & [facility]      For Weekend & Holiday on call Social Work:  (Tasks: TCU, transportation, Hospice, adjustment to illness counseling, Health Care Directives, Child Protection and Domestic Violence concerns, Vulnerable Adult, IMM forms, etc.)     Text Paging in [employer] Smart Web is the preferred method of contact for these teams    [place] (0800 - 1630) Saturday and Sunday  Pager: [phone] Units: 4A, 4C, 4E, 5A and 5B   Pager: [phone] Units: 6A, 6B, 6C, 6D   Pager: [phone]: 7A, 7B, 7C, 7D, and 5C       [facility] (7290-2326) Saturday and Sunday  Units: 5 Ortho, 8A, and 10 ICU   Pager: [phone]   ______________________________________________     After hours for all units everyday- (only the  is available after hours until midnight)  Pager [phone]

## 2022-05-20 NOTE — PLAN OF CARE
Assumed cares from [id number]. Beginning of shift reporting chest pain and SOB, team notified, VSS at this time. EKG and xray of abdomen and chest obtained, no notable findings at this time. Capnography in place, A&Ox4, up with SBA in room. PIV CDI and saline locked. Abdomen soft, rounded, tender. Bowel sounds hypoactive. Passing flatus, no stool this shift. Clark removed, voiding spontaneously without difficulty. Denying nausea, diet upgraded to low fiber, reporting intermittent abdominal pain managed with schedule pain regimen and PRN Oxycodone x2. Continue with POC.

## 2022-05-20 NOTE — TELEPHONE ENCOUNTER
RECORDS RECEIVED FROM: internal    DATE RECEIVED: 6.15.22    NOTES STATUS DETAILS   OFFICE NOTE from referring provider internal  Chichi Ramos APRN CNP   OFFICE NOTE from other specialist internal  11/8/21 [facility]   DISCHARGE SUMMARY from hospital     DISCHARGE REPORT from the ER     MEDICATION LIST internal     IMAGING  (NEED IMAGES AND REPORTS)     CT SCAN     CHEST XRAY (CXR) internal  Scheduled 6.15.22    TESTS     PULMONARY FUNCTION TESTING (PFT) internal    Scheduled 6.15.22

## 2022-05-20 NOTE — PROGRESS NOTES
"SURGERY POST-OP CHECK NOTE  05/19/2022 11:14 PM    Patient: Nuris Reddy  MRN: [id number]    Subjective  No acute events postoperatively. Pain well controlled. Denies nausea, vomiting, chest pain, shortness of breath. Endorses feeling very fatigued, but is already able to ambulate small distances without difficulty.  Patient reports a \"clicking\" sound during deep breathes without pain or discomfort. No other complaints.      Objective  Temp:  [97.16  F (36.2  C)-98.8  F (37.1  C)] 97.6  F (36.4  C)  Pulse:  [] 83  Resp:  [8-18] 12  BP: ()/(50-90) 113/57  SpO2:  [100 %] 100 %    General: AAOx4, NAD, lying in bed, appears comfortable  CV: RRR, no murmurs, clear s1 + s2 w/o s3/s4  Pulm: CTAB, breathing comfortably on 2L NC, pleural rub over R lung field, equal chest rise, no retractions, no accessory muscle use  Abd: soft, scaphoid, appropriately tender to palpation w/o guarding or rebound tenderness, non-distended, no fluid wave, incisions c/d/i secured with skin-glue  Extremities: warm, well-perfused without edema  Neuro: facial movement grossly symmetric, moving all extremities spontaneously without apparent deficit    Labs:  Recent Labs   Lab 05/16/22  0748   WBC 6.9   RBC 3.88   HGB 10.2*   HCT 32.8*   MCV 85   MCH 26.3*   MCHC 31.1*   RDW 16.0*          Recent Labs   Lab 05/19/22  0632 05/16/22  0748   NA  --  142   POTASSIUM  --  3.7   CHLORIDE  --  111*   CO2  --  23   BUN  --  8   CR  --  0.76   GLC 91 89   SHEELA  --  8.7       Recent Labs   Lab 05/16/22  0748   AST 19   ALT 31   ALKPHOS 87   BILITOTAL 0.4   ALBUMIN 3.4   INR 1.08     Assessment/Plan  Nuris Reddy is a 21 year old female with 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  No immediate concerns, no indications for intervention overnight.  " Continue with plan as outlined previously, remainder of cares per primary team.      Barrett Vasquez MD, RONI  PGY1 General Surgery

## 2022-05-20 NOTE — ANESTHESIA POSTPROCEDURE EVALUATION
Patient: Nuris Reddy    Procedure: Procedure(s):  EXCISION, SMALL INTESTINE, LAPAROSCOPIC       Anesthesia Type:  General    Note:  Disposition: Admission   Postop Pain Control: Uneventful            Sign Out: Well controlled pain   PONV: No   Neuro/Psych: Uneventful            Sign Out: Acceptable/Baseline neuro status   Airway/Respiratory: Uneventful            Sign Out: Acceptable/Baseline resp. status   CV/Hemodynamics: Uneventful            Sign Out: Acceptable CV status; No obvious hypovolemia; No obvious fluid overload   Other NRE: NONE   DID A NON-ROUTINE EVENT OCCUR? No           Last vitals:  Vitals Value Taken Time   BP 95/67 05/19/22 1930   Temp 36.5  C (97.7  F) 05/19/22 1900   Pulse 94 05/19/22 1931   Resp 22 05/19/22 1931   SpO2 100 % 05/19/22 1940   Vitals shown include unvalidated device data.    Electronically Signed By: Tameka Rose MD  May 19, 2022  7:54 PM

## 2022-05-21 ENCOUNTER — APPOINTMENT (OUTPATIENT)
Dept: OCCUPATIONAL THERAPY | Facility: CLINIC | Age: 22
DRG: 331 | End: 2022-05-21
Attending: SURGERY
Payer: COMMERCIAL

## 2022-05-21 LAB
ATRIAL RATE - MUSE: 71 BPM
DIASTOLIC BLOOD PRESSURE - MUSE: NORMAL MMHG
INTERPRETATION ECG - MUSE: NORMAL
P AXIS - MUSE: 61 DEGREES
PR INTERVAL - MUSE: 126 MS
QRS DURATION - MUSE: 82 MS
QT - MUSE: 402 MS
QTC - MUSE: 436 MS
R AXIS - MUSE: 38 DEGREES
SYSTOLIC BLOOD PRESSURE - MUSE: NORMAL MMHG
T AXIS - MUSE: 29 DEGREES
VENTRICULAR RATE- MUSE: 71 BPM

## 2022-05-21 PROCEDURE — 97530 THERAPEUTIC ACTIVITIES: CPT | Mod: GO

## 2022-05-21 PROCEDURE — 250N000013 HC RX MED GY IP 250 OP 250 PS 637: Performed by: SURGERY

## 2022-05-21 PROCEDURE — 97535 SELF CARE MNGMENT TRAINING: CPT | Mod: GO

## 2022-05-21 PROCEDURE — 250N000011 HC RX IP 250 OP 636: Performed by: SURGERY

## 2022-05-21 PROCEDURE — 250N000013 HC RX MED GY IP 250 OP 250 PS 637: Performed by: STUDENT IN AN ORGANIZED HEALTH CARE EDUCATION/TRAINING PROGRAM

## 2022-05-21 PROCEDURE — 120N000002 HC R&B MED SURG/OB UMMC

## 2022-05-21 PROCEDURE — 250N000013 HC RX MED GY IP 250 OP 250 PS 637

## 2022-05-21 RX ORDER — CETIRIZINE HYDROCHLORIDE 5 MG/1
5 TABLET ORAL DAILY
Status: DISCONTINUED | OUTPATIENT
Start: 2022-05-21 | End: 2022-05-23 | Stop reason: HOSPADM

## 2022-05-21 RX ADMIN — METHOCARBAMOL 500 MG: 500 TABLET ORAL at 16:10

## 2022-05-21 RX ADMIN — ACETAMINOPHEN 1000 MG: 500 TABLET ORAL at 14:32

## 2022-05-21 RX ADMIN — METHOCARBAMOL 500 MG: 500 TABLET ORAL at 20:09

## 2022-05-21 RX ADMIN — GABAPENTIN 100 MG: 100 CAPSULE ORAL at 20:09

## 2022-05-21 RX ADMIN — OXYCODONE HYDROCHLORIDE 10 MG: 10 TABLET ORAL at 06:26

## 2022-05-21 RX ADMIN — ACETAMINOPHEN 1000 MG: 500 TABLET ORAL at 09:58

## 2022-05-21 RX ADMIN — FLUTICASONE PROPIONATE 1 SPRAY: 50 SPRAY, METERED NASAL at 20:14

## 2022-05-21 RX ADMIN — BENZOCAINE AND MENTHOL 1 LOZENGE: 15; 3.6 LOZENGE ORAL at 10:03

## 2022-05-21 RX ADMIN — BENZOCAINE AND MENTHOL 1 LOZENGE: 15; 3.6 LOZENGE ORAL at 20:21

## 2022-05-21 RX ADMIN — LACTASE TAB 3000 UNIT 6000 UNITS: 3000 TAB at 16:11

## 2022-05-21 RX ADMIN — GABAPENTIN 100 MG: 100 CAPSULE ORAL at 08:00

## 2022-05-21 RX ADMIN — LACTASE TAB 3000 UNIT 6000 UNITS: 3000 TAB at 10:03

## 2022-05-21 RX ADMIN — ONDANSETRON 4 MG: 4 TABLET, ORALLY DISINTEGRATING ORAL at 06:26

## 2022-05-21 RX ADMIN — ACETAMINOPHEN 1000 MG: 500 TABLET ORAL at 05:14

## 2022-05-21 RX ADMIN — OXYCODONE HYDROCHLORIDE 5 MG: 5 TABLET ORAL at 12:10

## 2022-05-21 RX ADMIN — ENOXAPARIN SODIUM 40 MG: 40 INJECTION SUBCUTANEOUS at 12:10

## 2022-05-21 RX ADMIN — ACETAMINOPHEN 1000 MG: 500 TABLET ORAL at 22:39

## 2022-05-21 RX ADMIN — OXYCODONE HYDROCHLORIDE 5 MG: 5 TABLET ORAL at 22:39

## 2022-05-21 RX ADMIN — OXYCODONE HYDROCHLORIDE 5 MG: 5 TABLET ORAL at 02:19

## 2022-05-21 RX ADMIN — CETIRIZINE HYDROCHLORIDE 5 MG: 5 TABLET ORAL at 14:32

## 2022-05-21 RX ADMIN — METHOCARBAMOL 500 MG: 500 TABLET ORAL at 12:10

## 2022-05-21 RX ADMIN — METHOCARBAMOL 500 MG: 500 TABLET ORAL at 08:00

## 2022-05-21 RX ADMIN — ONDANSETRON 4 MG: 4 TABLET, ORALLY DISINTEGRATING ORAL at 18:44

## 2022-05-21 RX ADMIN — GABAPENTIN 100 MG: 100 CAPSULE ORAL at 14:32

## 2022-05-21 ASSESSMENT — ACTIVITIES OF DAILY LIVING (ADL)
ADLS_ACUITY_SCORE: 24
ADLS_ACUITY_SCORE: 24
ADLS_ACUITY_SCORE: 22
ADLS_ACUITY_SCORE: 22
ADLS_ACUITY_SCORE: 24
ADLS_ACUITY_SCORE: 22
ADLS_ACUITY_SCORE: 24
ADLS_ACUITY_SCORE: 22
ADLS_ACUITY_SCORE: 24
ADLS_ACUITY_SCORE: 22

## 2022-05-21 NOTE — PLAN OF CARE
VSS. Voids spontaneously, adequate output, pt having menses. Passing gas, no BM. Tolerating low fiber diet. Up ad ioana. PIV -SL. Abdominal lap sites liquid bandaged, CARROLL. Some redness under umbilicus lap site, marked. Pain managed with scheduled robaxin and tylenol and prn oxycodone x3. Nausea managed with prn zofran x1. Continue plan of care.

## 2022-05-21 NOTE — PROGRESS NOTES
Colorectal Surgery Progress Note  May 21, 2022    S:    O: Temp:  [97.1  F (36.2  C)-98.5  F (36.9  C)] 97.7  F (36.5  C)  Pulse:  [63-82] 63  Resp:  [11-16] 16  BP: ()/(51-81) 99/51  SpO2:  [96 %-100 %] 96 %  Gen: NAD  Resp: NLB  CV: RRR  Abd: soft, nontender, nondistended, incisions clean  Ext: wwp    A/P: 20 yo F now POD 2 s/p lap right ileocolic resection of prior anastomosis, remains on a stable postop course.  -Continue LRD, multimodal pain control  -DVT ppx, OOB, IS  -Likely discharge home tomorrow.      Frandy Mack MD  Division of Colon and Rectal Surgery  [facility]  [phone]

## 2022-05-21 NOTE — PLAN OF CARE
Occupational Therapy Discharge Summary    Reason for therapy discharge:    All goals and outcomes met, no further needs identified.    Progress towards therapy goal(s). See goals on Care Plan in [facility] electronic health record for goal details.  Goals met    Therapy recommendation(s):    Recommend assist PRN w/ heavy ADL/IADLs

## 2022-05-22 LAB
GLUCOSE BLDC GLUCOMTR-MCNC: 99 MG/DL (ref 70–99)
HOLD SPECIMEN: NORMAL
PLATELET # BLD AUTO: 428 10E3/UL (ref 150–450)

## 2022-05-22 PROCEDURE — 250N000013 HC RX MED GY IP 250 OP 250 PS 637: Performed by: STUDENT IN AN ORGANIZED HEALTH CARE EDUCATION/TRAINING PROGRAM

## 2022-05-22 PROCEDURE — 250N000013 HC RX MED GY IP 250 OP 250 PS 637: Performed by: SURGERY

## 2022-05-22 PROCEDURE — 85049 AUTOMATED PLATELET COUNT: CPT | Performed by: SURGERY

## 2022-05-22 PROCEDURE — 120N000002 HC R&B MED SURG/OB UMMC

## 2022-05-22 PROCEDURE — 250N000011 HC RX IP 250 OP 636: Performed by: SURGERY

## 2022-05-22 PROCEDURE — 250N000013 HC RX MED GY IP 250 OP 250 PS 637

## 2022-05-22 PROCEDURE — 36415 COLL VENOUS BLD VENIPUNCTURE: CPT | Performed by: SURGERY

## 2022-05-22 RX ORDER — ACETAMINOPHEN 325 MG/1
975 TABLET ORAL 3 TIMES DAILY
Status: DISCONTINUED | OUTPATIENT
Start: 2022-05-22 | End: 2022-05-23 | Stop reason: HOSPADM

## 2022-05-22 RX ORDER — ACETAMINOPHEN 325 MG/1
975 TABLET ORAL 3 TIMES DAILY
Status: DISCONTINUED | OUTPATIENT
Start: 2022-05-22 | End: 2022-05-22

## 2022-05-22 RX ADMIN — GABAPENTIN 100 MG: 100 CAPSULE ORAL at 08:41

## 2022-05-22 RX ADMIN — METHOCARBAMOL 500 MG: 500 TABLET ORAL at 15:26

## 2022-05-22 RX ADMIN — ACETAMINOPHEN 1000 MG: 500 TABLET ORAL at 06:21

## 2022-05-22 RX ADMIN — METHOCARBAMOL 500 MG: 500 TABLET ORAL at 12:43

## 2022-05-22 RX ADMIN — ACETAMINOPHEN 1000 MG: 500 TABLET ORAL at 12:43

## 2022-05-22 RX ADMIN — ACETAMINOPHEN 1000 MG: 500 TABLET ORAL at 18:20

## 2022-05-22 RX ADMIN — METHOCARBAMOL 500 MG: 500 TABLET ORAL at 20:21

## 2022-05-22 RX ADMIN — ACETAMINOPHEN 325MG 975 MG: 325 TABLET ORAL at 22:32

## 2022-05-22 RX ADMIN — METHOCARBAMOL 500 MG: 500 TABLET ORAL at 08:41

## 2022-05-22 RX ADMIN — ENOXAPARIN SODIUM 40 MG: 40 INJECTION SUBCUTANEOUS at 12:43

## 2022-05-22 RX ADMIN — CETIRIZINE HYDROCHLORIDE 5 MG: 5 TABLET ORAL at 08:41

## 2022-05-22 RX ADMIN — GABAPENTIN 100 MG: 100 CAPSULE ORAL at 15:26

## 2022-05-22 RX ADMIN — GABAPENTIN 100 MG: 100 CAPSULE ORAL at 20:21

## 2022-05-22 ASSESSMENT — ACTIVITIES OF DAILY LIVING (ADL)
ADLS_ACUITY_SCORE: 22

## 2022-05-22 NOTE — PLAN OF CARE
VSS. Voids spontaneously, pt having menses. Passing gas, BM yesterday. Tolerating low fiber diet. Up ad ioana. PIV -SL. Abdominal lap sites liquid bandaged, CARROLL. Some ecchymosis under umbilicus lap site, marked. Pain managed with scheduled robaxin and tylenol and prn oxycodone x1. Continue plan of care. Plan for discharge today.

## 2022-05-22 NOTE — PLAN OF CARE
Assumed cares from [id number]. A&Ox4, UAL in halls. PIV CDI and saline locked. Reporting intermittent nausea managed with PRN Zofran x1. Tolerating small amounts of low fiber diet, PRN Lactaid administered as needed. Moderate abdominal pain managed with scheduled pain regimen and PRN Oxycodone x1. Throat lozenge administered x1.Abdomen soft, rounded, tender. Lap sites x3 CARROLL and closed with liquid bandage, ecchymosis to yulia incisions. Bowel sounds normoactive. Passing flatus, One bowel movement this shift per Pt. Report. Voiding spontaneously without difficulty.  Continue with POC.

## 2022-05-22 NOTE — PROGRESS NOTES
Colorectal Surgery Progress Note  May 22, 2022    S: Feeling better today, less distended, tolerating diet. Passing flatus and had BM yesterday. Only using tylenol for pain.    O: Temp:  [97.5  F (36.4  C)-99.2  F (37.3  C)] 98.3  F (36.8  C)  Pulse:  [69-91] 69  Resp:  [16-20] 17  BP: ()/(46-67) 91/46  SpO2:  [98 %-99 %] 99 %  Gen: NAD  Resp: NLB  CV: RRR  Abd: soft, nontender, nondistended, incisions clean  Ext: wwp    A/P: 20 yo F now POD 3 s/p lap right ileocolic resection of prior anastomosis, remains on a stable postop course.  -Continue LRD, multimodal pain control  -DVT ppx, OOB, IS  -Likely discharge home tomorrow.      Frandy Mack MD  Division of Colon and Rectal Surgery  [facility]  [phone]

## 2022-05-23 VITALS
BODY MASS INDEX: 21.27 KG/M2 | OXYGEN SATURATION: 100 % | WEIGHT: 124.56 LBS | RESPIRATION RATE: 16 BRPM | HEIGHT: 64 IN | SYSTOLIC BLOOD PRESSURE: 109 MMHG | TEMPERATURE: 98.5 F | HEART RATE: 81 BPM | DIASTOLIC BLOOD PRESSURE: 59 MMHG

## 2022-05-23 PROCEDURE — 250N000013 HC RX MED GY IP 250 OP 250 PS 637

## 2022-05-23 PROCEDURE — 250N000013 HC RX MED GY IP 250 OP 250 PS 637: Performed by: STUDENT IN AN ORGANIZED HEALTH CARE EDUCATION/TRAINING PROGRAM

## 2022-05-23 PROCEDURE — 250N000011 HC RX IP 250 OP 636: Performed by: SURGERY

## 2022-05-23 PROCEDURE — 250N000013 HC RX MED GY IP 250 OP 250 PS 637: Performed by: SURGERY

## 2022-05-23 RX ORDER — OMEPRAZOLE
20 KIT
Status: DISCONTINUED | OUTPATIENT
Start: 2022-05-23 | End: 2022-05-23 | Stop reason: HOSPADM

## 2022-05-23 RX ORDER — METOCLOPRAMIDE HYDROCHLORIDE 5 MG/ML
5 INJECTION INTRAMUSCULAR; INTRAVENOUS ONCE
Status: COMPLETED | OUTPATIENT
Start: 2022-05-23 | End: 2022-05-23

## 2022-05-23 RX ORDER — CALCIUM GLUCONATE 20 MG/ML
1 INJECTION, SOLUTION INTRAVENOUS ONCE
Status: COMPLETED | OUTPATIENT
Start: 2022-05-23 | End: 2022-05-23

## 2022-05-23 RX ORDER — ACETAMINOPHEN 325 MG/1
975 TABLET ORAL EVERY 8 HOURS PRN
Qty: 30 TABLET | Refills: 0 | Status: SHIPPED | OUTPATIENT
Start: 2022-05-23 | End: 2023-01-20

## 2022-05-23 RX ORDER — POLYETHYLENE GLYCOL 3350 17 G/17G
17 POWDER, FOR SOLUTION ORAL ONCE
Status: COMPLETED | OUTPATIENT
Start: 2022-05-23 | End: 2022-05-23

## 2022-05-23 RX ADMIN — ACETAMINOPHEN 325MG 975 MG: 325 TABLET ORAL at 14:25

## 2022-05-23 RX ADMIN — GABAPENTIN 100 MG: 100 CAPSULE ORAL at 14:25

## 2022-05-23 RX ADMIN — OMEPRAZOLE 20 MG: KIT at 10:23

## 2022-05-23 RX ADMIN — ONDANSETRON 4 MG: 4 TABLET, ORALLY DISINTEGRATING ORAL at 06:31

## 2022-05-23 RX ADMIN — METOCLOPRAMIDE HYDROCHLORIDE 5 MG: 5 INJECTION INTRAMUSCULAR; INTRAVENOUS at 10:24

## 2022-05-23 RX ADMIN — LACTASE TAB 3000 UNIT 6000 UNITS: 3000 TAB at 15:55

## 2022-05-23 RX ADMIN — FLUTICASONE PROPIONATE 1 SPRAY: 50 SPRAY, METERED NASAL at 08:19

## 2022-05-23 RX ADMIN — ACETAMINOPHEN 325MG 975 MG: 325 TABLET ORAL at 08:18

## 2022-05-23 RX ADMIN — METHOCARBAMOL 500 MG: 500 TABLET ORAL at 08:19

## 2022-05-23 RX ADMIN — ENOXAPARIN SODIUM 40 MG: 40 INJECTION SUBCUTANEOUS at 12:02

## 2022-05-23 RX ADMIN — METHOCARBAMOL 500 MG: 500 TABLET ORAL at 11:59

## 2022-05-23 RX ADMIN — CETIRIZINE HYDROCHLORIDE 5 MG: 5 TABLET ORAL at 08:19

## 2022-05-23 RX ADMIN — GABAPENTIN 100 MG: 100 CAPSULE ORAL at 08:20

## 2022-05-23 RX ADMIN — POLYETHYLENE GLYCOL 3350 17 G: 17 POWDER, FOR SOLUTION ORAL at 11:59

## 2022-05-23 RX ADMIN — CALCIUM GLUCONATE 1 G: 20 INJECTION, SOLUTION INTRAVENOUS at 10:33

## 2022-05-23 ASSESSMENT — ACTIVITIES OF DAILY LIVING (ADL)
ADLS_ACUITY_SCORE: 22

## 2022-05-23 NOTE — PROGRESS NOTES
COLON & RECTAL SURGERY  PROGRESS NOTE    SUBJECTIVE:    Having some nausea this am. Is having BMs and passing gas. Eating small amounts of food but liquids are going down well without vomiting. No c/o pain this AM. She has been going on walks      OBJECTIVE:  Temp:  [97.8  F (36.6  C)] 97.8  F (36.6  C)  Pulse:  [75] 75  Resp:  [20] 20  BP: (111)/(74) 111/74  SpO2:  [100 %] 100 %    Intake/Output:    UOP 2x  Stool 1x    Physical Exam:   GENERAL:  Awake, alert, no acute distress, laying comfortably in bed  CV: No extra work of breathing appreciated ORA  Abd: soft, non-tender, non-distended, incisions c/d/i secured with skin glue  EXTREMITIES: warm and well perfused    LABS:  All labs reviewed.       ASSESSMENT/PLAN:  - Continue LRD  - Multimodal pain control, Tylenol, Gabapentin, Dilaudid and Oxycodone  - Lovenox 40qd    Disposition: Pending toleration of LRD, pain well controlled on PO pain medications and ROBF. Possibly discharge later today vs tomorrow    Danuta Mcneill, MS4         ---------------     I was present with the medical student who participated in the service and in the documentation of the note.  I have verified the history and personally performed the physical exam and medical decision making. Addenda have been made to the note as appropriate.  I agree with the assessment and plan of care as documented in the note.    Bar Jeronimo MD  Surgery Resident  PGY1

## 2022-05-23 NOTE — DISCHARGE SUMMARY
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]  Anastomotic ulcer [K28.9]  Anemia  COPD  Eczema  Rhinitis  Proteinuria          Procedures:   Laparoscopic excision, small intestine             Consultations:   OCCUPATIONAL THERAPY ADULT IP CONSULT  PHYSICAL THERAPY ADULT IP CONSULT  CARE MANAGEMENT / SOCIAL WORK IP CONSULT  NURSING TO CONSULT FOR VASCULAR ACCESS CARE IP CONSULT         Imaging Studies:     Results for orders placed or performed during the hospital encounter of 05/19/22   XR Chest Port 1 View    Narrative    Chest one view portable    HISTORY: Shortness of breath right-sided rib pain and burping    COMPARISON STUDY: 11/8/2021    FINDINGS: Cardiac silhouette is not enlarged. Trace pneumoperitoneum.  Lungs are clear.      Impression    IMPRESSION: Clear lungs. Pneumoperitoneum from recent surgery    LACEY CASTILLO MD         SYSTEM ID:  [id number]   XR Abdomen Port 1 View    Narrative    EXAMINATION:  XR ABDOMEN PORT 1 VIEWS 5/20/2022 11:12 AM     COMPARISON: CT 3/10/2022    HISTORY: s/p lap right colectomy for anastomotic ulcer   stenosis.  w/ acute SOB, right rib pain, burping.  please assess for  stomach and intestinal dilation    TECHNIQUE: Supine frontal view of the abdomen.    FINDINGS: Mild gaseous distention of small and large bowel. No  pneumatosis or portal venous gas. Anastomotic staple line in the right  abdomen. The lung bases are clear.       Impression    IMPRESSION: Mild gaseous distention of small and large bowel  suggesting adynamic ileus. No significant gastric distention.    I have personally reviewed the examination and initial interpretation  and I agree with the findings.    ADRY CAPPS DO         SYSTEM ID:  [id number]              Medications Prior to Admission:     Medications Prior to Admission   Medication Sig Dispense Refill Last Dose     albuterol (PROAIR HFA/PROVENTIL HFA/VENTOLIN HFA) 108 (90 Base) MCG/ACT inhaler Inhale 1-2 puffs into the lungs every 4 hours as needed for shortness of breath / dyspnea or wheezing 18 g 0 5/19/2022 at Unknown time     Cetirizine HCl (ZYRTEC ALLERGY PO) Take by mouth every morning   5/19/2022 at Unknown time     Cyanocobalamin (VITAMIN B-12) 50 MCG TABS Take 100 mcg by mouth every morning   Past Week at Unknown time     docusate sodium (COLACE) 100 MG capsule Take 1-2 capsules (100-200 mg) by mouth 2 times daily 120 capsule 11 More than a month at Unknown time     Dupilumab 300 MG/2ML SOPN Inject 300 mg Subcutaneous every 14 days To start after 600 mg loading dose. 4 mL 11 More than a month at Unknown time     ferrous sulfate (PATRIC-IN-SOL) 75 (15 FE) MG/ML oral drops Take 0.2 mLs (3 mg) by mouth daily (Patient taking differently: Take 3 mg by mouth every morning) 100 mL 1 Past Week at Unknown time     fexofenadine (ALLEGRA) 60 MG tablet Take 60 mg by mouth 2 times daily as needed   More than a month at Unknown time     FLUARIX QUADRIVALENT 0.5 ML injection ADM 0.5ML IM UTD   More than a month at Unknown time     fluticasone (FLONASE) 50 MCG/ACT nasal spray Spray 1-2 sprays into both nostrils daily (Patient taking differently: Spray 1-2 sprays into both nostrils daily as needed) 16 g 11 Past Month at Unknown time     hydrocortisone 2.5 % cream To lips and face rash twice daily as needed for rash (Patient taking differently: as needed To lips and face rash twice daily as needed for rash) 30 g 1 More than a  "month at Unknown time     ketoconazole (NIZORAL) 2 % external shampoo Use to shampoo twice weekly. Leave on 5 min then rinse. (Patient taking differently: as needed Use to shampoo twice weekly. Leave on 5 min then rinse.) 120 mL 3 More than a month at Unknown time     Loratadine-Pseudoephedrine (CLARITIN-D 24 HOUR PO)    Past Week at Unknown time     metroNIDAZOLE (FLAGYL) 500 MG tablet Take 1 tablet (500 mg) by mouth every 6 hours At 8:00 am, 2:00 pm, 8:00 pm the day prior to your surgery with neomycin and zofran. 3 tablet 0 More than a month at Unknown time     mometasone (ELOCON) 0.1 % external solution Apply small amount topically to affected areas of scalp daily prn for up to 2 weeks. (Patient taking differently: as needed Apply small amount topically to affected areas of scalp daily prn for up to 2 weeks.) 60 mL 0 Past Week at Unknown time     Multiple Vitamins-Iron (MULTIVITAMIN/IRON PO) Take 1 tablet by mouth every morning   Past Week at Unknown time     neomycin (MYCIFRADIN) 500 MG tablet Take 2 tablets (1,000 mg) by mouth every 6 hours At 8:00 am, 2:00 pm, 8:00 pm the day prior to your surgery with flagyl and zofran. 6 tablet 0 5/18/2022 at Unknown time     ondansetron (ZOFRAN) 4 MG tablet Take 1 tablet (4 mg) by mouth every 6 hours At 8:00 am, 2:00 pm, 8:00 pm the day prior to your surgery with neomycin and flagyl. 3 tablet 0 5/18/2022 at Unknown time     polyethylene glycol (MIRALAX) 17 g packet Take 238 g by mouth See Admin Instructions Starting at 4 pm night prior to surgery. Refer to \"Getting Ready for Surgery\" instructions. 14 packet 0 5/18/2022 at Unknown time     polyethylene glycol (MIRALAX) 17 GM/Dose powder 1 capful (17 g) in 8 oz of liquid 850 g 1 5/18/2022 at Unknown time     Probiotic Product (PRO-BIOTIC BLEND PO) Take by mouth as needed   Past Week at Unknown time     tacrolimus (PROTOPIC) 0.1 % external ointment Apply to face daily in rash areas (Patient taking differently: as needed Apply " to face daily in rash areas) 30 g 11 5/18/2022 at Unknown time     terconazole (TERAZOL 3) 80 MG vaginal suppository Place 1 suppository (80 mg) vaginally At Bedtime 3 suppository 1 More than a month at Unknown time     triamcinolone (KENALOG) 0.1 % external ointment Apply to face rash twice daily for the next two weeks until clear then twice daily as needed. (Patient taking differently: as needed Apply to face rash twice daily for the next two weeks until clear then twice daily as needed.) 60 g 1 More than a month at Unknown time     VENTOLIN  (90 Base) MCG/ACT inhaler   1 More than a month at Unknown time     vitamin C (ASCORBIC ACID) 250 MG tablet Take 1 tablet (250 mg) by mouth daily (Patient taking differently: Take 250 mg by mouth every morning) 100 tablet 11 Past Week at Unknown time              Discharge Medications:     Current Discharge Medication List      CONTINUE these medications which have NOT CHANGED    Details   !! albuterol (PROAIR HFA/PROVENTIL HFA/VENTOLIN HFA) 108 (90 Base) MCG/ACT inhaler Inhale 1-2 puffs into the lungs every 4 hours as needed for shortness of breath / dyspnea or wheezing  Qty: 18 g, Refills: 0    Comments: Pharmacy may dispense brand covered by insurance (Proair, or proventil or ventolin or generic albuterol inhaler)  Associated Diagnoses: Persistent cough for 3 weeks or longer      Cetirizine HCl (ZYRTEC ALLERGY PO) Take by mouth every morning    Associated Diagnoses: Routine infant or child health check; SOBOE (shortness of breath on exertion); Iron deficiency anemia; Proteinuria      Cyanocobalamin (VITAMIN B-12) 50 MCG TABS Take 100 mcg by mouth every morning    Associated Diagnoses: Hematochezia      docusate sodium (COLACE) 100 MG capsule Take 1-2 capsules (100-200 mg) by mouth 2 times daily  Qty: 120 capsule, Refills: 11    Associated Diagnoses: Iron deficiency anemia due to chronic blood loss      Dupilumab 300 MG/2ML SOPN Inject 300 mg Subcutaneous every 14  days To start after 600 mg loading dose.  Qty: 4 mL, Refills: 11    Associated Diagnoses: Intrinsic atopic dermatitis      ferrous sulfate (PATRIC-IN-SOL) 75 (15 FE) MG/ML oral drops Take 0.2 mLs (3 mg) by mouth daily  Qty: 100 mL, Refills: 1    Associated Diagnoses: Iron deficiency anemia due to chronic blood loss      fexofenadine (ALLEGRA) 60 MG tablet Take 60 mg by mouth 2 times daily as needed      FLUARIX QUADRIVALENT 0.5 ML injection ADM 0.5ML IM UTD      fluticasone (FLONASE) 50 MCG/ACT nasal spray Spray 1-2 sprays into both nostrils daily  Qty: 16 g, Refills: 11    Associated Diagnoses: Seasonal allergic rhinitis due to other allergic trigger      hydrocortisone 2.5 % cream To lips and face rash twice daily as needed for rash  Qty: 30 g, Refills: 1    Associated Diagnoses: Intrinsic atopic dermatitis      ketoconazole (NIZORAL) 2 % external shampoo Use to shampoo twice weekly. Leave on 5 min then rinse.  Qty: 120 mL, Refills: 3    Associated Diagnoses: Intrinsic atopic dermatitis      Loratadine-Pseudoephedrine (CLARITIN-D 24 HOUR PO)       metroNIDAZOLE (FLAGYL) 500 MG tablet Take 1 tablet (500 mg) by mouth every 6 hours At 8:00 am, 2:00 pm, 8:00 pm the day prior to your surgery with neomycin and zofran.  Qty: 3 tablet, Refills: 0    Associated Diagnoses: Other congenital anomalies of intestine      mometasone (ELOCON) 0.1 % external solution Apply small amount topically to affected areas of scalp daily prn for up to 2 weeks.  Qty: 60 mL, Refills: 0    Comments: Patient needs office visit for further refills.  Associated Diagnoses: Eczema, unspecified type      Multiple Vitamins-Iron (MULTIVITAMIN/IRON PO) Take 1 tablet by mouth every morning    Associated Diagnoses: Proteinuria      neomycin (MYCIFRADIN) 500 MG tablet Take 2 tablets (1,000 mg) by mouth every 6 hours At 8:00 am, 2:00 pm, 8:00 pm the day prior to your surgery with flagyl and zofran.  Qty: 6 tablet, Refills: 0    Associated Diagnoses: Other  "congenital anomalies of intestine      ondansetron (ZOFRAN) 4 MG tablet Take 1 tablet (4 mg) by mouth every 6 hours At 8:00 am, 2:00 pm, 8:00 pm the day prior to your surgery with neomycin and flagyl.  Qty: 3 tablet, Refills: 0    Associated Diagnoses: Other congenital anomalies of intestine      !! polyethylene glycol (MIRALAX) 17 g packet Take 238 g by mouth See Admin Instructions Starting at 4 pm night prior to surgery. Refer to \"Getting Ready for Surgery\" instructions.  Qty: 14 packet, Refills: 0    Associated Diagnoses: Other congenital anomalies of intestine      !! polyethylene glycol (MIRALAX) 17 GM/Dose powder 1 capful (17 g) in 8 oz of liquid  Qty: 850 g, Refills: 1    Associated Diagnoses: Iron deficiency anemia due to chronic blood loss      Probiotic Product (PRO-BIOTIC BLEND PO) Take by mouth as needed      tacrolimus (PROTOPIC) 0.1 % external ointment Apply to face daily in rash areas  Qty: 30 g, Refills: 11    Associated Diagnoses: Intrinsic atopic dermatitis      terconazole (TERAZOL 3) 80 MG vaginal suppository Place 1 suppository (80 mg) vaginally At Bedtime  Qty: 3 suppository, Refills: 1    Associated Diagnoses: Yeast vaginitis      triamcinolone (KENALOG) 0.1 % external ointment Apply to face rash twice daily for the next two weeks until clear then twice daily as needed.  Qty: 60 g, Refills: 1    Associated Diagnoses: Intrinsic atopic dermatitis      !! VENTOLIN  (90 Base) MCG/ACT inhaler Refills: 1      vitamin C (ASCORBIC ACID) 250 MG tablet Take 1 tablet (250 mg) by mouth daily  Qty: 100 tablet, Refills: 11    Associated Diagnoses: Iron deficiency anemia due to chronic blood loss       !! - Potential duplicate medications found. Please discuss with provider.                   Brief History of Illness:   Patient is a 21 year old female with a past medical history of ileal atresia as a  s/p resection.   She was seen by Dr. Mack on 2022 in Clinic for evaluation of " "anemia. And was diagnosed with Chronic anastomotic ulcer with stenosis. Now s/p lap small bowel resection on 05/19.             Hospital Course:   Post-operatively pt was gently fluid resuscitated.  Clark was removed and pt was able to void spontaneously without difficulty. On POD1 pt complained of R rib pain that she had felt before, SOB and a clicking in the R side of her chest. Work up was negative for any acute pathology, patient likely with costochondritis. Tolerated further advancements in diet. Pt had eventual return of bowel function and was able to tolerate small amounts of a low fiber diet.     Pt was seen by PT/OT prior to discharge and deemed fit for return home.   Post-operative pain was controlled with scheduled tylenol, gabapentin.      Patient is to follow up in the Colon and Rectal Surgery Clinic in 2-3 week with Dea Pacheco NP and then with Dr. Mack in 2-3 weeks after.          Day of Discharge Physical Exam:   Blood pressure 106/60, pulse 70, temperature 98.5  F (36.9  C), temperature source Temporal, resp. rate 16, height 1.626 m (5' 4\"), weight 56.5 kg (124 lb 9 oz), last menstrual period 05/19/2022, SpO2 100 %, not currently breastfeeding.    Physical Exam:   GENERAL:  Awake, alert, no acute distress, laying comfortably in bed  CV: No extra work of breathing appreciated ORA  Abd: soft, non-tender, non-distended, incisions c/d/i secured with skin glue  EXTREMITIES: warm and well perfused         Final Pathology Result:   Pending at time of discharge           Discharge Instructions and Follow-Up:     No discharge procedures on file.         Home Health Care:     Does not need home health care.            Discharge Disposition:     Discharged to Home       Condition at discharge: Stable    Pt was seen and discussed with Dr. Mack on 5/23/22      BENITEZ Vidales      ---------------     I was present with the medical student who participated in the service and in the " documentation of the note.  I have verified the history and personally performed the physical exam and medical decision making. Addenda have been made to the note as appropriate.  I agree with the assessment and plan of care as documented in the note.    Bar Jeronimo MD  Surgery Resident  PGY1

## 2022-05-23 NOTE — PLAN OF CARE
Assumed cares from 0028-0819. A&Ox4, UAL in halls. PIV CDI and saline locked. Denied nausea, tolerating small amounts of low fiber diet. Moderate abdominal pain managed with scheduled pain regimen. Abdomen soft, rounded, tender. Lap sites x3 CARROLL and closed with liquid bandage, ecchymosis to yulia incisions. Bowel sounds hypoactive. Passing flatus, One bowel movement this shift. Voiding spontaneously without difficulty.  Continue with POC.

## 2022-05-23 NOTE — PLAN OF CARE
VSS. Voids spontaneously, pt having menses. Passing gas, BM yesterday. Tolerating low fiber diet. Up ad ioana. PIV -SL. Abdominal lap sites liquid bandaged, CARROLL. Pain managed with scheduled robaxin and tylenol. Nausea managed with prn po zofran x1. Continue plan of care. Plan for discharge today.

## 2022-05-24 ENCOUNTER — PATIENT OUTREACH (OUTPATIENT)
Dept: CARE COORDINATION | Facility: CLINIC | Age: 22
End: 2022-05-24
Payer: COMMERCIAL

## 2022-05-24 DIAGNOSIS — Z71.89 OTHER SPECIFIED COUNSELING: ICD-10-CM

## 2022-05-24 NOTE — PROGRESS NOTES
Clinic Care Coordination Contact  [facility]/Voicemail       Clinical Data: Care Coordinator Outreach  Outreach attempted x 1.  Left message on patient's voicemail with call back information and requested return call.  Plan: Care Coordinator will try to reach patient again in 1-2 business days.    .Roselyn GOLD Community Health Worker  Clinic Care Coordination  [facility]  Phone: [phone]

## 2022-05-25 ENCOUNTER — PATIENT OUTREACH (OUTPATIENT)
Dept: SURGERY | Facility: CLINIC | Age: 22
End: 2022-05-25
Payer: COMMERCIAL

## 2022-05-25 LAB
PATH REPORT.COMMENTS IMP SPEC: NORMAL
PATH REPORT.COMMENTS IMP SPEC: NORMAL
PATH REPORT.FINAL DX SPEC: NORMAL
PATH REPORT.GROSS SPEC: NORMAL
PATH REPORT.MICROSCOPIC SPEC OTHER STN: NORMAL
PATH REPORT.RELEVANT HX SPEC: NORMAL
PHOTO IMAGE: NORMAL

## 2022-05-25 NOTE — PROGRESS NOTES
Clinic Care Coordination Contact  [facility]/Voicemail       Clinical Data: Care Coordinator Outreach  Outreach attempted x 2.  Left message on patient's voicemail with call back information and requested return call.  Plan:  Care Coordinator will do no further outreaches at this time.    Roselyn GOLD Community Health Worker  Clinic Care Coordination  [facility]  Phone: [phone]

## 2022-05-25 NOTE — PROGRESS NOTES
Post Op Note     Called to check on patient postoperatively after hospital discharge.     Patient is s/p small bowel resection with Dr. Frandy Mack-discharged on 5/23      Pain is well controlled with tylenol    Patient is eating and drinking normally. Patient is on a low fiber diet.  Encouraged patient to drink 8-10 glasses of water a day.     Patient is passing flats, is having loose bowel movements.    Patient is voiding normally and urine is light in color.    Patient is not set up with home care.    Patient Denies nausea and vomiting.    Patient Denies any fevers or chills.    Patient's incision is CDI. Patient reminded NOT to remove any dressings over their incisions.     Patient is on a activity restriction. Lifting 10 pounds for 6 weeks.     Patient Denies needing any forms completed.     Follow up is set up with Chery HARRINGTON on 6/15 and with Dr. Frandy Mack on 7/14.   Encouraged the patient to contact the clinic in the meantime with any fevers, chills, nausea, vomiting, increased colostomy output, no colostomy output, dizziness, lightheadedness, uncontrolled pain, changes to the incisions, or with any questions or concerns.    Patient's questions were answered to their stated satisfaction and they are in agreement with this plan.    JULIANA Yin [phone]  Colon & Rectal Surgery Clinic  [facility]

## 2022-05-31 ENCOUNTER — OFFICE VISIT (OUTPATIENT)
Dept: PEDIATRICS | Facility: CLINIC | Age: 22
End: 2022-05-31
Payer: COMMERCIAL

## 2022-05-31 VITALS
HEART RATE: 76 BPM | BODY MASS INDEX: 21.11 KG/M2 | SYSTOLIC BLOOD PRESSURE: 104 MMHG | DIASTOLIC BLOOD PRESSURE: 60 MMHG | RESPIRATION RATE: 16 BRPM | TEMPERATURE: 98.1 F | OXYGEN SATURATION: 100 % | WEIGHT: 123 LBS

## 2022-05-31 DIAGNOSIS — Z09 HOSPITAL DISCHARGE FOLLOW-UP: Primary | ICD-10-CM

## 2022-05-31 DIAGNOSIS — K63.3 ULCERATION OF INTESTINE: ICD-10-CM

## 2022-05-31 DIAGNOSIS — Q43.8 OTHER CONGENITAL ANOMALIES OF INTESTINE: ICD-10-CM

## 2022-05-31 PROCEDURE — 99495 TRANSJ CARE MGMT MOD F2F 14D: CPT | Performed by: INTERNAL MEDICINE

## 2022-05-31 ASSESSMENT — PAIN SCALES - GENERAL: PAINLEVEL: MILD PAIN (2)

## 2022-05-31 NOTE — PROGRESS NOTES
Assessment & Plan     Hospital discharge follow-up    Other congenital anomalies of intestine  Ulceration of intestine  Congenital ileal atresia, s/p resection. Had planned revision for ulceration at the anastamosis.  Healing well, waiting to advance diet after surgical follow up later this week. Now working to maintain hydration, after some lightheadedness over the weekend.      Ok to start iron drops, careful of constipation.  Wait on multivitamin until after seen by surgery.                        Return in about 2 weeks (around 6/14/2022) for Follow up, as scheduled.    Jane Whalen MD  [facility] LALITHA Lawler is a 21 year old who presents for the following health issues     History of Present Illness       Reason for visit:  Post operation    She eats 0-1 servings of fruits and vegetables daily.She consumes 3 sweetened beverage(s) daily.She exercises with enough effort to increase her heart rate 9 or less minutes per day.  She exercises with enough effort to increase her heart rate 3 or less days per week. She is missing 2 dose(s) of medications per week.  She is not taking prescribed medications regularly due to remembering to take.         Hospital Follow-up Visit:    Hospital/Nursing Home/IP Rehab Facility: [facility]  Date of Admission: 5/19/2022  Date of Discharge: 5/23/2022  Reason(s) for Admission: Anastomotic Ulcer      Was your hospitalization related to COVID-19? No   Problems taking medications regularly:  None  Medication changes since discharge: None  Problems adhering to non-medication therapy:  None    Summary of hospitalization:  [facility] hospital discharge summary reviewed  Diagnostic Tests/Treatments reviewed.  Follow up needed: none  Other Healthcare Providers Involved in Patient s Care:         Surgical follow-up appointment - tomorrow  Update since discharge: improved.       Post Discharge Medication  Reconciliation: discharge medications reconciled, continue medications without change.  Plan of care communicated with patient            On low fiber diet for now. Will follow up with colon surgery. Doing well, improving daily.     Bowel movements very loose, but regular.     Iron drops, MV pill. Wondering when she can re-start the supplements.     Felt a little bit dizzy over the weekend - was concerned she could be dehydrated. Temp was normal. Felt much better after eating and drinking something.       Review of Systems   Constitutional, HEENT, cardiovascular, pulmonary, gi and gu systems are negative, except as otherwise noted.      Objective    LMP 05/19/2022   There is no height or weight on file to calculate BMI.  Physical Exam   GENERAL: healthy, alert and no distress  EYES: Eyes grossly normal to inspection, PERRL and conjunctivae and sclerae normal  NECK: no adenopathy, no asymmetry, masses, or scars and thyroid normal to palpation  RESP: lungs clear to auscultation - no rales, rhonchi or wheezes  CV: regular rate and rhythm, normal S1 S2, no S3 or S4, no murmur, click or rub, no peripheral edema and peripheral pulses strong  ABDOMEN: soft, nontender, without hepatosplenomegaly or masses, bowel sounds normal and well-healed incisions  MS: no gross musculoskeletal defects noted, no edema  SKIN: no suspicious lesions or rashes  NEURO: Normal strength and tone, mentation intact and speech normal  PSYCH: mentation appears normal, affect normal/bright

## 2022-05-31 NOTE — PATIENT INSTRUCTIONS
Re-start your iron drops now or when you are ready (re-start at every other day and even take a break if you have constipation).     Wait on the multivitamin untl you see surgery clinic.

## 2022-06-10 NOTE — PROGRESS NOTES
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  Colonoscopy showed anastomotic ulcer with stenosis who is now status post Laparoscopic right colectomy on 2022 with Dr. Mack.      Interval history: reports minimal pain.  No longer taking pain medications.  Pt does report bloating and at times feels backed up with some cramping.  Bloating has been happening since surgery although a little worse last few days since she had gone camping and probably didn't eat as well while camping.      Bloating typically happens at night and does not seem food dependent.  Denies nausea in general.  However pt notes that there a spot just superior to her lower incision that seems to bulge a little and if her pants happen to lay right on top of that spot she may get some intermittent nausea from that.  Once she readjusts her pants the nausea will eventually and slowly resolve.  Pt is passing daily BMs and gas.  Most of the time soft and pasty to diarrhea.  Does have some diarrhea at baseline even before surgery.  When she feels backed up she may have 1 BM that day, otherwise when not backed up she can have about 3-4 stools per day.  Her appetite otherwise is back to normal and feels she is eating they way she was prior to surgery.  Pt notes that prior to surgery she could usually correlate her bloating to dairy, however now it seems random.  Pt also reports some dizziness with standing for which she has had previously but seems a little worse.  Pt asking whether she can restart iron supplements and multivitamins.       Assessment/Plan:  21 year old female with PMH of ileal atresia as an infant s/p resection of distal 20cm of  small bowel including ICV as an infant who has developed acute on chronic anemia with known anastomotic ulcers with stenosis now status post Laparoscopic right colectomy on 2022.  Pt is generally recovering well, though experiencing bloating.      - continue LRD for another 2  weeks.  When re-introducing fiber, do so slowly over several days.    - stay well hydrated  - OK to start some miralax as needed for constipation  - monitor incisions.  Continue to maintain full activity restrictions for the full 6 weeks   - OK to restart multivitamins.  Wait another week to restart iron until this current backed up feels subsides but otherwise ok to restart iron from CRS perspective.  Would start slowly a few times per week and slowly increase.   - Will Hgb and BMP and Mg today.  (Reviewed labs)    - called pt to discuss labs - reiterated the above recommendations.  Recommended that even after reintroducing a little fiber and little more activity to do a moderate level of both until seen by Dr. Mack in July.  Pt reports that her pulmonary clinic appt was fine but has received a cardiology referral for her heart palpitations.        Medical history:  Past Medical History:   Diagnosis Date     Anemia 10/16/2012     COPD (chronic obstructive pulmonary disease) (H)      History of blood transfusion      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, s/p resection     Short gut syndrome 10/16/2012    ~ 20 cm of ileum as well as ileocecal valve resected in  period     Ulceration of intestine 2012     Uncomplicated asthma        Surgical history:  Past Surgical History:   Procedure Laterality Date     COLONOSCOPY  2012     COLONOSCOPY  2012     COLONOSCOPY N/A 2022    Procedure: COLONOSCOPY, WITH BIOPSY;  Surgeon: Moisés Gupta MD;  Location: [facility] OR     LAPAROSCOPIC RESECTION SMALL BOWEL N/A 2022    Procedure: EXCISION, SMALL INTESTINE, LAPAROSCOPIC;  Surgeon:  Frandy Mack MD;  Location: [facility] OR     LAPAROSCOPIC RESECTION SMALL BOWEL  2022    Procedure: ;  Surgeon: Frandy Mack MD;  Location: [facility] OR     RESECTION ILEOCECAL  1999    ~ 20 cm of ileum as well as ileocecal valve resected in  period       Problem list:  Patient Active Problem List    Diagnosis Date Noted     Anastomotic ulcer 2022     Priority: Medium     Anemia, unspecified type 2022     Priority: Medium     Chronic eczema 2019     Priority: Medium     Chronic rhinitis 2016     Priority: Medium     Iron deficiency anemia 2013     Priority: Medium     Short gut syndrome 10/16/2012     Priority: Medium     Ulceration of intestine 2012     Priority: Medium     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 diagnosed due to hematochezia.       Proteinuria 2012     Priority: Medium     Felt consistent with benign intermittent proteinuria or postural proteinuria. Yearly urinalysis and prot/cr ratio. Contact nephrology if the prot/cr is >0.2 or if there are any abnormalities noted in the urinalysis.         Other congenital anomalies of intestine      Priority: Medium     congenital ileal atresia, s/p resection         Medications:  Current Outpatient Medications   Medication Sig Dispense Refill     acetaminophen (TYLENOL) 325 MG tablet Take 3 tablets (975 mg) by mouth every 8 hours as needed for mild pain 30 tablet 0     albuterol (PROAIR HFA/PROVENTIL HFA/VENTOLIN HFA) 108 (90 Base) MCG/ACT inhaler Inhale 1-2 puffs into the lungs every 4 hours as needed for shortness of breath / dyspnea or wheezing 18 g 0     Cetirizine HCl (ZYRTEC ALLERGY PO) Take by mouth every morning       Cyanocobalamin (VITAMIN B-12) 50 MCG TABS Take 100 mcg by mouth every morning       docusate sodium (COLACE) 100 MG capsule Take 1-2 capsules (100-200 mg) by mouth 2 times daily 120 capsule 11     Dupilumab 300 MG/2ML SOPN Inject 300 mg Subcutaneous every 14 days To start  after 600 mg loading dose. 4 mL 11     ferrous sulfate (PATRIC-IN-SOL) 75 (15 FE) MG/ML oral drops Take 0.2 mLs (3 mg) by mouth daily (Patient taking differently: Take 3 mg by mouth every morning) 100 mL 1     fexofenadine (ALLEGRA) 60 MG tablet Take 60 mg by mouth 2 times daily as needed       FLUARIX QUADRIVALENT 0.5 ML injection ADM 0.5ML IM UTD       fluticasone (FLONASE) 50 MCG/ACT nasal spray Spray 1-2 sprays into both nostrils daily (Patient taking differently: Spray 1-2 sprays into both nostrils daily as needed) 16 g 11     hydrocortisone 2.5 % cream To lips and face rash twice daily as needed for rash (Patient taking differently: as needed To lips and face rash twice daily as needed for rash) 30 g 1     ketoconazole (NIZORAL) 2 % external shampoo Use to shampoo twice weekly. Leave on 5 min then rinse. (Patient taking differently: as needed Use to shampoo twice weekly. Leave on 5 min then rinse.) 120 mL 3     Loratadine-Pseudoephedrine (CLARITIN-D 24 HOUR PO)        mometasone (ELOCON) 0.1 % external solution Apply small amount topically to affected areas of scalp daily prn for up to 2 weeks. (Patient taking differently: as needed Apply small amount topically to affected areas of scalp daily prn for up to 2 weeks.) 60 mL 0     Multiple Vitamins-Iron (MULTIVITAMIN/IRON PO) Take 1 tablet by mouth every morning       Probiotic Product (PRO-BIOTIC BLEND PO) Take by mouth as needed       tacrolimus (PROTOPIC) 0.1 % external ointment Apply to face daily in rash areas (Patient taking differently: as needed Apply to face daily in rash areas) 30 g 11     terconazole (TERAZOL 3) 80 MG vaginal suppository Place 1 suppository (80 mg) vaginally At Bedtime 3 suppository 1     triamcinolone (KENALOG) 0.1 % external ointment Apply to face rash twice daily for the next two weeks until clear then twice daily as needed. (Patient taking differently: as needed Apply to face rash twice daily for the next two weeks until clear then  "twice daily as needed.) 60 g 1     VENTOLIN  (90 Base) MCG/ACT inhaler   1     vitamin C (ASCORBIC ACID) 250 MG tablet Take 1 tablet (250 mg) by mouth daily (Patient taking differently: Take 250 mg by mouth every morning) 100 tablet 11       Allergies:  Allergies   Allergen Reactions     No Known Allergies        Family history:  Family History   Problem Relation Age of Onset     Cancer Sister         ovarian teratoma, s/p resection     Other Cancer Maternal Grandfather         skin cancer     Hypertension Paternal Grandmother      Hypertension Paternal Grandfather      Cardiovascular Paternal Grandfather         Valve condition     Prostate Cancer Paternal Grandfather      Kidney Disease Maternal Uncle         kidney stones       Social history:  Social History     Tobacco Use     Smoking status: Never Smoker     Smokeless tobacco: Never Used     Tobacco comment: Non-smoking home   Substance Use Topics     Alcohol use: Yes     Comment: Social     Marital status: single.  Occupation:     Nursing Notes:   Génesis Arreguin  6/15/2022  9:32 AM  Signed  No chief complaint on file.      Vitals:    06/15/22 0931   BP: 112/71   BP Location: Left arm   Patient Position: Sitting   Cuff Size: Adult Regular   Pulse: 75   SpO2: 100%   Weight: 120 lb   Height: 5' 4\"       Body mass index is 20.6 kg/m .    Génesis Arreguin CMA         Physical Examination:  LMP 05/19/2022   General: NAD  HEENT: NCAT  Abdomen: soft, non-distended.  All incisions are healing well.   The inferior pfannenstiel incision is healing well.  There is a ridge of expected scarring.  No defect with palpated.  No hernia.  Along the superior aspect of entire incision there is a fullness present.    Extremities: moving well  Perianal external examination:  N/A    Digital rectal examination: Was deferred.    Anoscopy: Was deferred.    Procedures:  SHANTAL Bustos PA-C  Colon and Rectal Surgery  [facility]      Total face " to face time was 30 minutes, >50% counseling.  This is a postoperative visit

## 2022-06-15 ENCOUNTER — PRE VISIT (OUTPATIENT)
Dept: PULMONOLOGY | Facility: CLINIC | Age: 22
End: 2022-06-15

## 2022-06-15 ENCOUNTER — LAB (OUTPATIENT)
Dept: LAB | Facility: CLINIC | Age: 22
End: 2022-06-15
Payer: COMMERCIAL

## 2022-06-15 ENCOUNTER — OFFICE VISIT (OUTPATIENT)
Dept: PULMONOLOGY | Facility: CLINIC | Age: 22
End: 2022-06-15
Attending: NURSE PRACTITIONER
Payer: COMMERCIAL

## 2022-06-15 ENCOUNTER — ANCILLARY PROCEDURE (OUTPATIENT)
Dept: GENERAL RADIOLOGY | Facility: CLINIC | Age: 22
End: 2022-06-15
Attending: INTERNAL MEDICINE
Payer: COMMERCIAL

## 2022-06-15 ENCOUNTER — OFFICE VISIT (OUTPATIENT)
Dept: SURGERY | Facility: CLINIC | Age: 22
End: 2022-06-15
Payer: COMMERCIAL

## 2022-06-15 VITALS
DIASTOLIC BLOOD PRESSURE: 71 MMHG | HEIGHT: 64 IN | HEART RATE: 75 BPM | BODY MASS INDEX: 20.49 KG/M2 | OXYGEN SATURATION: 100 % | SYSTOLIC BLOOD PRESSURE: 112 MMHG | WEIGHT: 120 LBS

## 2022-06-15 VITALS
WEIGHT: 124 LBS | BODY MASS INDEX: 21.17 KG/M2 | SYSTOLIC BLOOD PRESSURE: 125 MMHG | OXYGEN SATURATION: 99 % | DIASTOLIC BLOOD PRESSURE: 74 MMHG | HEIGHT: 64 IN | HEART RATE: 76 BPM

## 2022-06-15 DIAGNOSIS — R06.00 DYSPNEA: ICD-10-CM

## 2022-06-15 DIAGNOSIS — Z09 FOLLOW-UP EXAMINATION AFTER COLORECTAL SURGERY: ICD-10-CM

## 2022-06-15 DIAGNOSIS — Z09 FOLLOW-UP EXAMINATION AFTER COLORECTAL SURGERY: Primary | ICD-10-CM

## 2022-06-15 DIAGNOSIS — R06.02 SHORTNESS OF BREATH: ICD-10-CM

## 2022-06-15 DIAGNOSIS — Q43.8 OTHER CONGENITAL ANOMALIES OF INTESTINE: ICD-10-CM

## 2022-06-15 DIAGNOSIS — I49.3 FREQUENT PVCS: Primary | ICD-10-CM

## 2022-06-15 LAB
ALBUMIN SERPL-MCNC: 3.5 G/DL (ref 3.4–5)
ALP SERPL-CCNC: 81 U/L (ref 40–150)
ALT SERPL W P-5'-P-CCNC: 42 U/L (ref 0–50)
ANION GAP SERPL CALCULATED.3IONS-SCNC: 6 MMOL/L (ref 3–14)
AST SERPL W P-5'-P-CCNC: 28 U/L (ref 0–45)
BASOPHILS # BLD AUTO: 0.1 10E3/UL (ref 0–0.2)
BASOPHILS NFR BLD AUTO: 1 %
BILIRUB SERPL-MCNC: 0.4 MG/DL (ref 0.2–1.3)
BUN SERPL-MCNC: 11 MG/DL (ref 7–30)
CALCIUM SERPL-MCNC: 8.9 MG/DL (ref 8.5–10.1)
CHLORIDE BLD-SCNC: 106 MMOL/L (ref 94–109)
CO2 SERPL-SCNC: 27 MMOL/L (ref 20–32)
CREAT SERPL-MCNC: 0.78 MG/DL (ref 0.52–1.04)
EOSINOPHIL # BLD AUTO: 0.4 10E3/UL (ref 0–0.7)
EOSINOPHIL NFR BLD AUTO: 6 %
ERYTHROCYTE [DISTWIDTH] IN BLOOD BY AUTOMATED COUNT: 15.3 % (ref 10–15)
GFR SERPL CREATININE-BSD FRML MDRD: >90 ML/MIN/1.73M2
GLUCOSE BLD-MCNC: 87 MG/DL (ref 70–99)
HCT VFR BLD AUTO: 33.9 % (ref 35–47)
HGB BLD-MCNC: 10.5 G/DL (ref 11.7–15.7)
IMM GRANULOCYTES # BLD: 0 10E3/UL
IMM GRANULOCYTES NFR BLD: 0 %
LYMPHOCYTES # BLD AUTO: 1.5 10E3/UL (ref 0.8–5.3)
LYMPHOCYTES NFR BLD AUTO: 24 %
MAGNESIUM SERPL-MCNC: 2 MG/DL (ref 1.6–2.3)
MCH RBC QN AUTO: 24.7 PG (ref 26.5–33)
MCHC RBC AUTO-ENTMCNC: 31 G/DL (ref 31.5–36.5)
MCV RBC AUTO: 80 FL (ref 78–100)
MONOCYTES # BLD AUTO: 0.4 10E3/UL (ref 0–1.3)
MONOCYTES NFR BLD AUTO: 7 %
NEUTROPHILS # BLD AUTO: 3.9 10E3/UL (ref 1.6–8.3)
NEUTROPHILS NFR BLD AUTO: 62 %
NRBC # BLD AUTO: 0 10E3/UL
NRBC BLD AUTO-RTO: 0 /100
PLATELET # BLD AUTO: 478 10E3/UL (ref 150–450)
POTASSIUM BLD-SCNC: 3.4 MMOL/L (ref 3.4–5.3)
PREALB SERPL IA-MCNC: 29 MG/DL (ref 15–45)
PROT SERPL-MCNC: 7.2 G/DL (ref 6.8–8.8)
RBC # BLD AUTO: 4.25 10E6/UL (ref 3.8–5.2)
SODIUM SERPL-SCNC: 139 MMOL/L (ref 133–144)
WBC # BLD AUTO: 6.2 10E3/UL (ref 4–11)

## 2022-06-15 PROCEDURE — 85025 COMPLETE CBC W/AUTO DIFF WBC: CPT | Performed by: PATHOLOGY

## 2022-06-15 PROCEDURE — 83735 ASSAY OF MAGNESIUM: CPT | Performed by: PATHOLOGY

## 2022-06-15 PROCEDURE — 94726 PLETHYSMOGRAPHY LUNG VOLUMES: CPT | Performed by: INTERNAL MEDICINE

## 2022-06-15 PROCEDURE — 99205 OFFICE O/P NEW HI 60 MIN: CPT | Mod: 25 | Performed by: INTERNAL MEDICINE

## 2022-06-15 PROCEDURE — 94729 DIFFUSING CAPACITY: CPT | Performed by: INTERNAL MEDICINE

## 2022-06-15 PROCEDURE — 99024 POSTOP FOLLOW-UP VISIT: CPT | Performed by: PHYSICIAN ASSISTANT

## 2022-06-15 PROCEDURE — 36415 COLL VENOUS BLD VENIPUNCTURE: CPT | Performed by: PATHOLOGY

## 2022-06-15 PROCEDURE — 84134 ASSAY OF PREALBUMIN: CPT | Performed by: PATHOLOGY

## 2022-06-15 PROCEDURE — 93005 ELECTROCARDIOGRAM TRACING: CPT

## 2022-06-15 PROCEDURE — 71046 X-RAY EXAM CHEST 2 VIEWS: CPT | Performed by: RADIOLOGY

## 2022-06-15 PROCEDURE — G0463 HOSPITAL OUTPT CLINIC VISIT: HCPCS | Mod: 25

## 2022-06-15 PROCEDURE — 94375 RESPIRATORY FLOW VOLUME LOOP: CPT | Performed by: INTERNAL MEDICINE

## 2022-06-15 PROCEDURE — 80053 COMPREHEN METABOLIC PANEL: CPT | Performed by: PATHOLOGY

## 2022-06-15 ASSESSMENT — PAIN SCALES - GENERAL
PAINLEVEL: MODERATE PAIN (4)
PAINLEVEL: MILD PAIN (2)

## 2022-06-15 ASSESSMENT — ENCOUNTER SYMPTOMS
NAUSEA: 0
WHEEZING: 1
LEG PAIN: 0
LIGHT-HEADEDNESS: 0
ABDOMINAL PAIN: 0
HYPERTENSION: 0
BOWEL INCONTINENCE: 0
HYPOTENSION: 0
POSTURAL DYSPNEA: 1
POOR WOUND HEALING: 0
BRUISES/BLEEDS EASILY: 0
HEMOPTYSIS: 0
DYSPNEA ON EXERTION: 0
BLOOD IN STOOL: 0
VOMITING: 0
SHORTNESS OF BREATH: 1
JAUNDICE: 0
SNORES LOUDLY: 0
DECREASED LIBIDO: 0
SWOLLEN GLANDS: 0
HOT FLASHES: 0
ORTHOPNEA: 1
SPUTUM PRODUCTION: 1
SLEEP DISTURBANCES DUE TO BREATHING: 1
BLOATING: 1
COUGH DISTURBING SLEEP: 1
SYNCOPE: 0
CONSTIPATION: 0
SKIN CHANGES: 0
NAIL CHANGES: 0
DIARRHEA: 1
EXERCISE INTOLERANCE: 0
COUGH: 0
HEARTBURN: 0
RECTAL PAIN: 0
PALPITATIONS: 1

## 2022-06-15 NOTE — NURSING NOTE
"No chief complaint on file.      Vitals:    06/15/22 0931   BP: 112/71   BP Location: Left arm   Patient Position: Sitting   Cuff Size: Adult Regular   Pulse: 75   SpO2: 100%   Weight: 120 lb   Height: 5' 4\"       Body mass index is 20.6 kg/m .    Génesis Arreguin CMA    "

## 2022-06-15 NOTE — PROGRESS NOTES
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  Colonoscopy showed anastomotic ulcer with stenosis. She is now status post Laparoscopic right colectomy on 2022.     Interval history: Doing well no complaints, tolerating diet.      Assessment/Plan:  21 year old female no 7 weeks s/p laparoscopic right hemicolectomy, recovering appropriately.  -No further dietary or activity restrictions. May follow up as needed.    PLEASE SEE NOTE BELOW FOR PHYSICAL EXAMINATION, REVIEW OF SYSTEMS, AND OTHER HISTORY.    Frandy Mack MD  Division of Colon and Rectal Surgery   [facility]  [phone]    -------------------------------------------------------------------------------------------------------------------    Medical history:  Past Medical History:   Diagnosis Date     Anemia 10/16/2012     COPD (chronic obstructive pulmonary disease) (H)      History of blood transfusion      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, s/p resection     Short gut syndrome 10/16/2012    ~ 20 cm of ileum as well as ileocecal valve resected in  period     Ulceration of intestine 2012     Uncomplicated asthma        Surgical history:  Past Surgical History:   Procedure Laterality Date     COLONOSCOPY  2012     COLONOSCOPY  2012     COLONOSCOPY N/A 2022    Procedure: COLONOSCOPY, WITH BIOPSY;  Surgeon: Moisés Gupta MD;  Location: [facility] OR     LAPAROSCOPIC RESECTION SMALL BOWEL N/A 2022    Procedure: EXCISION, SMALL INTESTINE, LAPAROSCOPIC;  Surgeon:  Frandy Mack MD;  Location: [facility] OR     LAPAROSCOPIC RESECTION SMALL BOWEL  2022    Procedure: ;  Surgeon: Frandy Mack MD;  Location: [facility] OR     RESECTION ILEOCECAL  1999    ~ 20 cm of ileum as well as ileocecal valve resected in  period       Problem list:  Patient Active Problem List    Diagnosis Date Noted     Anastomotic ulcer 2022     Priority: Medium     Anemia, unspecified type 2022     Priority: Medium     Chronic eczema 2019     Priority: Medium     Chronic rhinitis 2016     Priority: Medium     Iron deficiency anemia 2013     Priority: Medium     Short gut syndrome 10/16/2012     Priority: Medium     Ulceration of intestine 2012     Priority: Medium     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 diagnosed due to hematochezia.       Proteinuria 2012     Priority: Medium     Felt consistent with benign intermittent proteinuria or postural proteinuria. Yearly urinalysis and prot/cr ratio. Contact nephrology if the prot/cr is >0.2 or if there are any abnormalities noted in the urinalysis.         Other congenital anomalies of intestine      Priority: Medium     congenital ileal atresia, s/p resection         Medications:  Current Outpatient Medications   Medication Sig Dispense Refill     acetaminophen (TYLENOL) 325 MG tablet Take 3 tablets (975 mg) by mouth every 8 hours as needed for mild pain 30 tablet 0     albuterol (PROAIR HFA/PROVENTIL HFA/VENTOLIN HFA) 108 (90 Base) MCG/ACT inhaler Inhale 1-2 puffs into the lungs every 4 hours as needed for shortness of breath / dyspnea or wheezing 18 g 0     Cetirizine HCl (ZYRTEC ALLERGY PO) Take by mouth every morning       Cyanocobalamin (VITAMIN B-12) 50 MCG TABS Take 100 mcg by mouth every morning       docusate sodium (COLACE) 100 MG capsule Take 1-2 capsules (100-200 mg) by mouth 2 times daily 120 capsule 11     Dupilumab 300 MG/2ML SOPN Inject 300 mg Subcutaneous every 14 days To start  after 600 mg loading dose. 4 mL 11     ferrous sulfate (PATRIC-IN-SOL) 75 (15 FE) MG/ML oral drops Take 0.2 mLs (3 mg) by mouth daily (Patient taking differently: Take 3 mg by mouth every morning) 100 mL 1     fexofenadine (ALLEGRA) 60 MG tablet Take 60 mg by mouth 2 times daily as needed       FLUARIX QUADRIVALENT 0.5 ML injection ADM 0.5ML IM UTD       fluticasone (FLONASE) 50 MCG/ACT nasal spray Spray 1-2 sprays into both nostrils daily (Patient taking differently: Spray 1-2 sprays into both nostrils daily as needed) 16 g 11     fluticasone-vilanterol (BREO ELLIPTA) 200-25 MCG/INH inhaler Inhale 1 puff into the lungs daily 60 each 3     hydrocortisone 2.5 % cream To lips and face rash twice daily as needed for rash (Patient taking differently: as needed To lips and face rash twice daily as needed for rash) 30 g 1     ketoconazole (NIZORAL) 2 % external shampoo Use to shampoo twice weekly. Leave on 5 min then rinse. (Patient taking differently: as needed Use to shampoo twice weekly. Leave on 5 min then rinse.) 120 mL 3     Loratadine-Pseudoephedrine (CLARITIN-D 24 HOUR PO)        mometasone (ELOCON) 0.1 % external solution Apply small amount topically to affected areas of scalp daily prn for up to 2 weeks. (Patient taking differently: as needed Apply small amount topically to affected areas of scalp daily prn for up to 2 weeks.) 60 mL 0     Multiple Vitamins-Iron (MULTIVITAMIN/IRON PO) Take 1 tablet by mouth every morning       Probiotic Product (PRO-BIOTIC BLEND PO) Take by mouth as needed       tacrolimus (PROTOPIC) 0.1 % external ointment Apply to face daily in rash areas (Patient taking differently: as needed Apply to face daily in rash areas) 30 g 11     terconazole (TERAZOL 3) 80 MG vaginal suppository Place 1 suppository (80 mg) vaginally At Bedtime 3 suppository 1     triamcinolone (KENALOG) 0.1 % external ointment Apply to face rash twice daily for the next two weeks until clear then twice daily as needed.  (Patient taking differently: as needed Apply to face rash twice daily for the next two weeks until clear then twice daily as needed.) 60 g 1     VENTOLIN  (90 Base) MCG/ACT inhaler   1     vitamin C (ASCORBIC ACID) 250 MG tablet Take 1 tablet (250 mg) by mouth daily (Patient taking differently: Take 250 mg by mouth every morning) 100 tablet 11       Allergies:  Allergies   Allergen Reactions     No Known Allergies        Family history:  Family History   Problem Relation Age of Onset     Cancer Sister         ovarian teratoma, s/p resection     Other Cancer Maternal Grandfather         skin cancer     Hypertension Paternal Grandmother      Hypertension Paternal Grandfather      Cardiovascular Paternal Grandfather         Valve condition     Prostate Cancer Paternal Grandfather      Kidney Disease Maternal Uncle         kidney stones       Social history:  Social History     Socioeconomic History     Marital status: Single     Spouse name: Not on file     Number of children: Not on file     Years of education: Not on file     Highest education level: Some college, no degree   Occupational History     Not on file   Tobacco Use     Smoking status: Never Smoker     Smokeless tobacco: Never Used     Tobacco comment: Non-smoking home   Substance and Sexual Activity     Alcohol use: Yes     Comment: Social     Drug use: No     Sexual activity: Yes     Partners: Male     Birth control/protection: Condom, Pill   Other Topics Concern     Parent/sibling w/ CABG, MI or angioplasty before 65F 55M? No   Social History Narrative     Not on file     Social Determinants of Health     Financial Resource Strain: Not on file   Food Insecurity: Not on file   Transportation Needs: Not on file   Physical Activity: Not on file   Stress: Not on file   Social Connections: Not on file   Intimate Partner Violence: Not on file   Housing Stability: Not on file       Physical Examination:  /71 (BP Location: Left arm, Patient  "Position: Sitting, Cuff Size: Adult Regular)   Pulse 85   Ht 1.626 m (5' 4\")   Wt 56.2 kg (124 lb)   LMP 06/15/2022 (Exact Date)   SpO2 100%   BMI 21.28 kg/m    General: NAD  Abdomen: soft, NTND, incisions well healed.  Perianal external examination:  deferred  "

## 2022-06-15 NOTE — PROGRESS NOTES
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    She reports no respiratory issues until she went to [employer] for college.  While she was there she experienced multiple episodes of bronchitis each winter (2-3).  She also noted increased allergic reaction to her dog on her breaks from college which consisted of a rash, itchy skin, itchy eyes, rhinorrhea, and increased sneezing but no respiratory symptoms.  She then developed a respiratory infection in 2021 which persisted for several weeks.  Eventually she was treated with a course of antibiotics from her primary care provider with resolution of the baseline dyspnea and cough, but her episodic dyspnea persisted.  The episodic dyspnea occurs at random times such as when walking, occasionally at rest, and frequently when falling asleep.  It has occurred as often as nightly, but more recently has been only 3-4 nights a week.  Her primary concern today is palpitations and shortness of breath which make it difficult to fall asleep.  She also frequently wakes up short of breath.  She does not note any coughing or wheezing during these episodes.  She also experiences chest tightness and palpitations, as well as lightheadedness with these episodes.  She notes that she has had 2 severe episodes of palpitations and lightheadedness, possibly associated with caffeine intake.  During the first episode she presented to the ED for she was monitored and discharged.  The second episode she did not present for  care.          Exposure History:  Tobacco:  Never  Other inhaled substances (Vaping, hookah. Marijuana): Never   Occupation:  Student  Pets: Dog, [place]. Has had for several years.           Past Medical History:     Past Medical History:   Diagnosis Date     Anemia 10/16/2012     COPD (chronic obstructive pulmonary disease) (H)      History of blood transfusion      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, s/p resection     Short gut syndrome 10/16/2012    ~ 20 cm of ileum as well as ileocecal valve resected in  period     Ulceration of intestine 2012     Uncomplicated asthma              Past Surgical History:      Past Surgical History:   Procedure Laterality Date     COLONOSCOPY  2012     COLONOSCOPY  2012     COLONOSCOPY N/A 2022    Procedure: COLONOSCOPY, WITH BIOPSY;  Surgeon: Moisés Gupta MD;  Location: [facility] OR     LAPAROSCOPIC RESECTION SMALL BOWEL N/A 2022    Procedure: EXCISION, SMALL INTESTINE, LAPAROSCOPIC;  Surgeon: Frandy Mack MD;  Location: [facility] OR     LAPAROSCOPIC RESECTION SMALL BOWEL  2022    Procedure: ;  Surgeon: Frandy Mack MD;  Location: [facility] OR     RESECTION ILEOCECAL  1999    ~ 20 cm of ileum as well as ileocecal valve resected in  period             Social History:     Social History     Socioeconomic History     Marital status: Single     Spouse name: Not on file     Number of children: Not on file     Years of education: Not on file     Highest education level: Some college, no degree   Occupational History     Not on file   Tobacco Use     Smoking status: Never Smoker     Smokeless tobacco: Never Used     Tobacco comment: Non-smoking home   Substance and Sexual Activity     Alcohol use: Yes     Comment: Social     Drug use: No     Sexual activity: Yes     Partners: Male     Birth control/protection: Condom, Pill   Other Topics Concern     Parent/sibling w/ CABG, MI or angioplasty before 65F 55M? No    Social History Narrative     Not on file     Social Determinants of Health     Financial Resource Strain: Not on file   Food Insecurity: Not on file   Transportation Needs: Not on file   Physical Activity: Not on file   Stress: Not on file   Social Connections: Not on file   Intimate Partner Violence: Not on file   Housing Stability: Not on file            Family History:     Family History   Problem Relation Age of Onset     Cancer Sister         ovarian teratoma, s/p resection     Other Cancer Maternal Grandfather         skin cancer     Hypertension Paternal Grandmother      Hypertension Paternal Grandfather      Cardiovascular Paternal Grandfather         Valve condition     Prostate Cancer Paternal Grandfather      Kidney Disease Maternal Uncle         kidney stones             Immunizations:     Immunization History   Administered Date(s) Administered     COVID-19,PF,Pfizer (12+ Yrs) 03/30/2021, 04/20/2021, 08/31/2021     DTAP (<7y) 2000, 2000, 02/01/2000, 04/02/2001, 09/02/2005     HEPA 09/28/2016     HPV 09/28/2016     HPV9 12/15/2017, 07/23/2018     HepA-ped 2 Dose 09/28/2016     HepB 05/09/2000, 07/20/2000, 10/04/2000     Hib (PRP-T) 1999, 1999, 02/01/2000, 04/02/2001     Hpv, Unspecified  09/28/2016     Influenza Vaccine IM > 6 months Valent IIV4 (Alfuria,Fluzone) 01/27/2016, 09/28/2016, 12/15/2017     MMR 10/04/2000, 09/02/2005     Meningococcal (Menactra ) 07/18/2012, 01/27/2016     Meningococcal (Menveo ) 07/18/2012     Pneumococcal (PCV 7) 12/13/2000, 04/02/2001     Poliovirus, inactivated (IPV) 2000, 2000, 05/09/2000, 09/02/2005     TDAP Vaccine (Boostrix) 07/18/2012     Tdap (Adacel,Boostrix) 07/18/2012     Varicella 12/13/2000, 07/18/2012             Allergies:     Allergies   Allergen Reactions     No Known Allergies              Medications:   acetaminophen (TYLENOL) 325 MG tablet, Take 3 tablets (975 mg) by mouth every 8 hours as needed for mild  pain  albuterol (PROAIR HFA/PROVENTIL HFA/VENTOLIN HFA) 108 (90 Base) MCG/ACT inhaler, Inhale 1-2 puffs into the lungs every 4 hours as needed for shortness of breath / dyspnea or wheezing  Cetirizine HCl (ZYRTEC ALLERGY PO), Take by mouth every morning  Cyanocobalamin (VITAMIN B-12) 50 MCG TABS, Take 100 mcg by mouth every morning  docusate sodium (COLACE) 100 MG capsule, Take 1-2 capsules (100-200 mg) by mouth 2 times daily  Dupilumab 300 MG/2ML SOPN, Inject 300 mg Subcutaneous every 14 days To start after 600 mg loading dose.  ferrous sulfate (PATRIC-IN-SOL) 75 (15 FE) MG/ML oral drops, Take 0.2 mLs (3 mg) by mouth daily (Patient taking differently: Take 3 mg by mouth every morning)  fexofenadine (ALLEGRA) 60 MG tablet, Take 60 mg by mouth 2 times daily as needed  FLUARIX QUADRIVALENT 0.5 ML injection, ADM 0.5ML IM UTD  fluticasone (FLONASE) 50 MCG/ACT nasal spray, Spray 1-2 sprays into both nostrils daily (Patient taking differently: Spray 1-2 sprays into both nostrils daily as needed)  hydrocortisone 2.5 % cream, To lips and face rash twice daily as needed for rash (Patient taking differently: as needed To lips and face rash twice daily as needed for rash)  ketoconazole (NIZORAL) 2 % external shampoo, Use to shampoo twice weekly. Leave on 5 min then rinse. (Patient taking differently: as needed Use to shampoo twice weekly. Leave on 5 min then rinse.)  Loratadine-Pseudoephedrine (CLARITIN-D 24 HOUR PO),   mometasone (ELOCON) 0.1 % external solution, Apply small amount topically to affected areas of scalp daily prn for up to 2 weeks. (Patient taking differently: as needed Apply small amount topically to affected areas of scalp daily prn for up to 2 weeks.)  Multiple Vitamins-Iron (MULTIVITAMIN/IRON PO), Take 1 tablet by mouth every morning  Probiotic Product (PRO-BIOTIC BLEND PO), Take by mouth as needed  tacrolimus (PROTOPIC) 0.1 % external ointment, Apply to face daily in rash areas (Patient taking differently: as  "needed Apply to face daily in rash areas)  terconazole (TERAZOL 3) 80 MG vaginal suppository, Place 1 suppository (80 mg) vaginally At Bedtime  triamcinolone (KENALOG) 0.1 % external ointment, Apply to face rash twice daily for the next two weeks until clear then twice daily as needed. (Patient taking differently: as needed Apply to face rash twice daily for the next two weeks until clear then twice daily as needed.)  VENTOLIN  (90 Base) MCG/ACT inhaler,   vitamin C (ASCORBIC ACID) 250 MG tablet, Take 1 tablet (250 mg) by mouth daily (Patient taking differently: Take 250 mg by mouth every morning)    No current facility-administered medications on file prior to visit.           Review of Systems:     Review of Systems   Respiratory: Positive for sputum production, shortness of breath and wheezing. Negative for cough and hemoptysis.    Cardiovascular: Positive for chest pain, palpitations and orthopnea.   Gastrointestinal: Positive for diarrhea. Negative for abdominal pain, blood in stool, constipation, heartburn, melena, nausea and vomiting.   Skin: Positive for itching and rash.   Endo/Heme/Allergies: Does not bruise/bleed easily.              Exam:   /74 (BP Location: Right arm)   Pulse 76   Ht 1.626 m (5' 4\")   Wt 56.2 kg (124 lb)   LMP 05/19/2022   SpO2 99%   BMI 21.28 kg/m      Physical Exam  Vitals and nursing note reviewed.   Constitutional:       Appearance: She is normal weight.   Cardiovascular:      Comments: Frequent PVCs for approximately the first 10 seconds of auscultation, then normal rhythm with possible split S2 though it was difficult to appreciate.  Pulmonary:      Effort: Pulmonary effort is normal.      Breath sounds: Normal breath sounds. No wheezing, rhonchi or rales.   Musculoskeletal:         General: Normal range of motion.   Skin:     General: Skin is warm and dry.   Neurological:      General: No focal deficit present.      Mental Status: She is alert and oriented to " person, place, and time.       Fingernails without clubbing         Data:     PFT: 6/15/2022    The FVC, FEV1, and FEV1/FVC ratio are within normal limits.  The lung volumes are within normal limits.  The diffusion capacity is within normal limits.  The spirometry does not meet ATS criteria so results should be interpreted with caution.      IMPRESSION:  Normal spirometry, lung volumes, and diffusion capacity, however spirometry does not meet ATS criteria so results should be reviewed with caution.      EKG 6/15/2022  Normal sinus rhythm      Recent Results (from the past 24 hour(s))   XR Chest 2 Views    Narrative    Chest 2 views    INDICATION: Dyspnea    COMPARISON: 5/20/2022    FINDINGS: Heart size and shape appear normal. Lungs and pulmonary  vascularity appear normal. Bony structures appear grossly intact.      Impression    IMPRESSION: Negative    CHUCKY TRIANA MD         SYSTEM ID:  [id number]       Chest x-ray 5/20/2022  Clear lungs. Pneumoperitoneum from recent surgery        All studies listed here were independently reviewed and interpreted by me today.          Assessment and Plan:     ## Palpitations  ## Episodic dyspnea  ## Episodic lightheadedness  ## Possible asthma  She had no respiratory issues until she moved away to college when she began experiencing 2-3 episodes of bronchitis each winter.  Then in October 2022 she developed a respiratory infection which persisted for 2 weeks and required antibiotics to clear.  Following that time she is fully recovered except has recurrent episodes of dyspnea.  These episodes are interesting in that they only occur at rest, when falling asleep, or when walking at a normal pace.  She is able to run and play soccer without difficulty.  She has not had any wheezing or coughing.  She does have a somewhat atopic phenotype with eczema and some seasonal allergies.  She does note that she intermittently has allergic type reaction to her dog with rash, sneezing,  rhinorrhea, and itchy eyes however she does not think that she has dyspnea during these times.  Her nocturnal and early morning dyspnea could be consistent with asthma as it does tend to be worse over these times, however the rest of her symptoms are quite atypical for asthma.  She does have a history of anemia secondary to anastomotic ulcer in her GI tract, however this would not explain dyspnea that only occurs at rest or mild exertion and not with hard exertion such as running and playing soccer.  She has had multiple episodes of palpitations associated with lightheadedness and dyspnea, and thinks that some of her dyspneic episodes may have been associated with a feeling of an abnormal heartbeat.  She had frequent PVCs and a possible split S2 on my exam today associated with a sensation of dyspnea.  We attempted to capture this on EKG, however the episode ended by the time we got the EKG leads attached.    In summary, I feel her symptoms are most likely cardiac in origin, though mild asthma is also a consideration.  We discussed options including watchful waiting, methacholine challenge, and trial of medication.  She opted for a trial of medications we will start Breo Ellipta to determine if this improves her symptoms.  Given her frequent PVCs and cardiac symptoms, I advised her to stop albuterol for the time being.  I will also refer her to cardiology for further evaluation.  She would likely benefit from a cardiac monitoring device.    -Trial of Breo Ellipta  -Cardiology referral          Return to clinic: 3 months    I personally spent 66 minutes on the date of the encounter doing chart review, history and exam, documentation and further activities per the note.    Farooq Palumbo M.D.  Pulmonary & Critical Care  Pager: Click Here to page      The above note was dictated using voice recognition software and may include typographical errors. Please contact the author for any clarifications.

## 2022-06-15 NOTE — LETTER
6/15/2022       RE: Nuris Reddy  [address] [place] [address]     Dear Colleague,    Thank you for referring your patient, Nuris Reddy, to the [facility] COL[facility] [place] [facility]. Please see a copy of my visit note below.    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  Colonoscopy showed anastomotic ulcer with stenosis who is now status post Laparoscopic right colectomy on 2022 with Dr. Mack.      Interval history: reports minimal pain.  No longer taking pain medications.  Pt does report bloating and at times feels backed up with some cramping.  Bloating has been happening since surgery although a little worse last few days since she had gone camping and probably didn't eat as well while camping.      Bloating typically happens at night and does not seem food dependent.  Denies nausea in general.  However pt notes that there a spot just superior to her lower incision that seems to bulge a little and if her pants happen to lay right on top of that spot she may get some intermittent nausea from that.  Once she readjusts her pants the nausea will eventually and slowly resolve.  Pt is passing daily BMs and gas.  Most of the time soft and pasty to diarrhea.  Does have some diarrhea at baseline even before surgery.  When she feels backed up she may have 1 BM that day, otherwise when not backed up she can have about 3-4 stools per day.  Her appetite otherwise is back to normal and feels she is eating they way she was prior to surgery.  Pt notes that prior to surgery she could usually correlate her  bloating to dairy, however now it seems random.  Pt also reports some dizziness with standing for which she has had previously but seems a little worse.  Pt asking whether she can restart iron supplements and multivitamins.       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  Pt is generally recovering well, though experiencing bloating.      - continue LRD for another 2  weeks.  When re-introducing fiber, do so slowly over several days.    - stay well hydrated  - OK to start some miralax as needed for constipation  - monitor incisions.  Continue to maintain full activity restrictions for the full 6 weeks   - OK to restart multivitamins.  Wait another week to restart iron until this current backed up feels subsides but otherwise ok to restart iron from CRS perspective.  Would start slowly a few times per week and slowly increase.   - Will Hgb and BMP and Mg today.  (Reviewed labs)    - called pt to discuss labs - reiterated the above recommendations.  Recommended that even after reintroducing a little fiber and little more activity to do a moderate level of both until seen by Dr. Mack in July.  Pt reports that her pulmonary clinic appt was fine but has received a cardiology referral for her heart palpitations.        Medical history:  Past Medical History:   Diagnosis Date     Anemia 10/16/2012     COPD (chronic obstructive pulmonary disease) (H)      History of blood transfusion      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, s/p resection     Short gut syndrome 10/16/2012    ~ 20 cm of ileum as well as ileocecal valve resected in  period     Ulceration of intestine 2012     Uncomplicated asthma        Surgical history:  Past  Surgical History:   Procedure Laterality Date     COLONOSCOPY  2012     COLONOSCOPY  2012     COLONOSCOPY N/A 2022    Procedure: COLONOSCOPY, WITH BIOPSY;  Surgeon: Moisés Gupta MD;  Location: [facility] OR     LAPAROSCOPIC RESECTION SMALL BOWEL N/A 2022    Procedure: EXCISION, SMALL INTESTINE, LAPAROSCOPIC;  Surgeon: Frandy Mack MD;  Location: [facility] OR     LAPAROSCOPIC RESECTION SMALL BOWEL  2022    Procedure: ;  Surgeon: Frandy Mack MD;  Location: [facility] OR     RESECTION ILEOCECAL  1999    ~ 20 cm of ileum as well as ileocecal valve resected in  period       Problem list:  Patient Active Problem List    Diagnosis Date Noted     Anastomotic ulcer 2022     Priority: Medium     Anemia, unspecified type 2022     Priority: Medium     Chronic eczema 2019     Priority: Medium     Chronic rhinitis 2016     Priority: Medium     Iron deficiency anemia 2013     Priority: Medium     Short gut syndrome 10/16/2012     Priority: Medium     Ulceration of intestine 2012     Priority: Medium     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 diagnosed due to hematochezia.       Proteinuria 2012     Priority: Medium     Felt consistent with benign intermittent proteinuria or postural proteinuria. Yearly urinalysis and prot/cr ratio. Contact nephrology if the prot/cr is >0.2 or if there are any abnormalities noted in the urinalysis.         Other congenital anomalies of intestine      Priority: Medium     congenital ileal atresia, s/p resection         Medications:  Current Outpatient Medications   Medication Sig Dispense Refill     acetaminophen (TYLENOL) 325 MG tablet Take 3 tablets (975 mg) by mouth every 8 hours as needed for mild pain 30 tablet 0     albuterol (PROAIR HFA/PROVENTIL HFA/VENTOLIN HFA) 108 (90 Base) MCG/ACT inhaler Inhale 1-2 puffs into the lungs every 4 hours as needed for shortness of breath / dyspnea or wheezing 18 g 0      Cetirizine HCl (ZYRTEC ALLERGY PO) Take by mouth every morning       Cyanocobalamin (VITAMIN B-12) 50 MCG TABS Take 100 mcg by mouth every morning       docusate sodium (COLACE) 100 MG capsule Take 1-2 capsules (100-200 mg) by mouth 2 times daily 120 capsule 11     Dupilumab 300 MG/2ML SOPN Inject 300 mg Subcutaneous every 14 days To start after 600 mg loading dose. 4 mL 11     ferrous sulfate (PATRIC-IN-SOL) 75 (15 FE) MG/ML oral drops Take 0.2 mLs (3 mg) by mouth daily (Patient taking differently: Take 3 mg by mouth every morning) 100 mL 1     fexofenadine (ALLEGRA) 60 MG tablet Take 60 mg by mouth 2 times daily as needed       FLUARIX QUADRIVALENT 0.5 ML injection ADM 0.5ML IM UTD       fluticasone (FLONASE) 50 MCG/ACT nasal spray Spray 1-2 sprays into both nostrils daily (Patient taking differently: Spray 1-2 sprays into both nostrils daily as needed) 16 g 11     hydrocortisone 2.5 % cream To lips and face rash twice daily as needed for rash (Patient taking differently: as needed To lips and face rash twice daily as needed for rash) 30 g 1     ketoconazole (NIZORAL) 2 % external shampoo Use to shampoo twice weekly. Leave on 5 min then rinse. (Patient taking differently: as needed Use to shampoo twice weekly. Leave on 5 min then rinse.) 120 mL 3     Loratadine-Pseudoephedrine (CLARITIN-D 24 HOUR PO)        mometasone (ELOCON) 0.1 % external solution Apply small amount topically to affected areas of scalp daily prn for up to 2 weeks. (Patient taking differently: as needed Apply small amount topically to affected areas of scalp daily prn for up to 2 weeks.) 60 mL 0     Multiple Vitamins-Iron (MULTIVITAMIN/IRON PO) Take 1 tablet by mouth every morning       Probiotic Product (PRO-BIOTIC BLEND PO) Take by mouth as needed       tacrolimus (PROTOPIC) 0.1 % external ointment Apply to face daily in rash areas (Patient taking differently: as needed Apply to face daily in rash areas) 30 g 11     terconazole (TERAZOL 3) 80 MG  "vaginal suppository Place 1 suppository (80 mg) vaginally At Bedtime 3 suppository 1     triamcinolone (KENALOG) 0.1 % external ointment Apply to face rash twice daily for the next two weeks until clear then twice daily as needed. (Patient taking differently: as needed Apply to face rash twice daily for the next two weeks until clear then twice daily as needed.) 60 g 1     VENTOLIN  (90 Base) MCG/ACT inhaler   1     vitamin C (ASCORBIC ACID) 250 MG tablet Take 1 tablet (250 mg) by mouth daily (Patient taking differently: Take 250 mg by mouth every morning) 100 tablet 11       Allergies:  Allergies   Allergen Reactions     No Known Allergies        Family history:  Family History   Problem Relation Age of Onset     Cancer Sister         ovarian teratoma, s/p resection     Other Cancer Maternal Grandfather         skin cancer     Hypertension Paternal Grandmother      Hypertension Paternal Grandfather      Cardiovascular Paternal Grandfather         Valve condition     Prostate Cancer Paternal Grandfather      Kidney Disease Maternal Uncle         kidney stones       Social history:  Social History     Tobacco Use     Smoking status: Never Smoker     Smokeless tobacco: Never Used     Tobacco comment: Non-smoking home   Substance Use Topics     Alcohol use: Yes     Comment: Social     Marital status: single.  Occupation:     Nursing Notes:   Génesis Arreguin  6/15/2022  9:32 AM  Signed  No chief complaint on file.      Vitals:    06/15/22 0931   BP: 112/71   BP Location: Left arm   Patient Position: Sitting   Cuff Size: Adult Regular   Pulse: 75   SpO2: 100%   Weight: 120 lb   Height: 5' 4\"       Body mass index is 20.6 kg/m .    Génesis Arreguin CMA         Physical Examination:  LMP 05/19/2022   General: NAD  HEENT: NCAT  Abdomen: soft, non-distended.  All incisions are healing well.   The inferior pfannenstiel incision is healing well.  There is a ridge of expected scarring.  No defect with palpated.  No " hernia.  Along the superior aspect of entire incision there is a fullness present.    Extremities: moving well  Perianal external examination:  N/A    Digital rectal examination: Was deferred.    Anoscopy: Was deferred.    Procedures:  SHANTAL Bustos PA-C  Colon and Rectal Surgery  [facility]      Total face to face time was 30 minutes, >50% counseling.  This is a postoperative visit

## 2022-06-15 NOTE — LETTER
6/15/2022         RE: Nuris Reddy  [address] [place] [address]        Dear Colleague,    Thank you for referring your patient, Nuris Reddy, to the [facility] [place]. Please see a copy of my visit note below.              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    She reports no respiratory issues until she went to [employer] for college.  While she was there she experienced multiple episodes of bronchitis each winter (2-3).  She also noted increased allergic reaction to her dog on her breaks from college which consisted of a rash, itchy skin, itchy eyes, rhinorrhea, and increased sneezing but no respiratory symptoms.  She then developed a respiratory infection in 2021 which persisted for several weeks.  Eventually she was treated with a course of antibiotics from her primary care provider with resolution of the baseline dyspnea and cough, but her episodic dyspnea persisted.  The episodic dyspnea occurs at random times such as when walking, occasionally at rest, and frequently when falling asleep.  It has occurred as often as nightly, but more recently has been only 3-4 nights a week.  Her primary concern today is palpitations and shortness of breath which make it difficult to fall asleep.  She also frequently wakes up short of breath.  She does not note any coughing or wheezing during these episodes.  She also experiences chest tightness and palpitations, as well as lightheadedness with  these episodes.  She notes that she has had 2 severe episodes of palpitations and lightheadedness, possibly associated with caffeine intake.  During the first episode she presented to the ED for she was monitored and discharged.  The second episode she did not present for care.          Exposure History:  Tobacco:  Never  Other inhaled substances (Vaping, hookah. Marijuana): Never   Occupation:  Student  Pets: Dog, [place]. Has had for several years.           Past Medical History:     Past Medical History:   Diagnosis Date     Anemia 10/16/2012     COPD (chronic obstructive pulmonary disease) (H)      History of blood transfusion      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, s/p resection     Short gut syndrome 10/16/2012    ~ 20 cm of ileum as well as ileocecal valve resected in  period     Ulceration of intestine 2012     Uncomplicated asthma              Past Surgical History:      Past Surgical History:   Procedure Laterality Date     COLONOSCOPY  2012     COLONOSCOPY  2012     COLONOSCOPY N/A 2022    Procedure: COLONOSCOPY, WITH BIOPSY;  Surgeon: Moisés Gupta MD;  Location: [facility] OR     LAPAROSCOPIC RESECTION SMALL BOWEL N/A 2022    Procedure: EXCISION, SMALL INTESTINE, LAPAROSCOPIC;  Surgeon: Frandy Mack MD;  Location: [facility] OR     LAPAROSCOPIC RESECTION SMALL BOWEL  2022    Procedure: ;  Surgeon: Frandy Mack MD;  Location: [facility] OR     RESECTION ILEOCECAL  1999    ~ 20 cm of ileum as well as ileocecal valve resected in  period             Social History:     Social History     Socioeconomic History     Marital status: Single     Spouse name: Not on file     Number of children: Not on file     Years of education: Not on file     Highest education level: Some college, no degree   Occupational History     Not on file   Tobacco Use     Smoking status: Never Smoker     Smokeless tobacco: Never Used     Tobacco comment:  Non-smoking home   Substance and Sexual Activity     Alcohol use: Yes     Comment: Social     Drug use: No     Sexual activity: Yes     Partners: Male     Birth control/protection: Condom, Pill   Other Topics Concern     Parent/sibling w/ CABG, MI or angioplasty before 65F 55M? No   Social History Narrative     Not on file     Social Determinants of Health     Financial Resource Strain: Not on file   Food Insecurity: Not on file   Transportation Needs: Not on file   Physical Activity: Not on file   Stress: Not on file   Social Connections: Not on file   Intimate Partner Violence: Not on file   Housing Stability: Not on file            Family History:     Family History   Problem Relation Age of Onset     Cancer Sister         ovarian teratoma, s/p resection     Other Cancer Maternal Grandfather         skin cancer     Hypertension Paternal Grandmother      Hypertension Paternal Grandfather      Cardiovascular Paternal Grandfather         Valve condition     Prostate Cancer Paternal Grandfather      Kidney Disease Maternal Uncle         kidney stones             Immunizations:     Immunization History   Administered Date(s) Administered     COVID-19,PF,Pfizer (12+ Yrs) 03/30/2021, 04/20/2021, 08/31/2021     DTAP (<7y) 2000, 2000, 02/01/2000, 04/02/2001, 09/02/2005     HEPA 09/28/2016     HPV 09/28/2016     HPV9 12/15/2017, 07/23/2018     HepA-ped 2 Dose 09/28/2016     HepB 05/09/2000, 07/20/2000, 10/04/2000     Hib (PRP-T) 1999, 1999, 02/01/2000, 04/02/2001     Hpv, Unspecified  09/28/2016     Influenza Vaccine IM > 6 months Valent IIV4 (Alfuria,Fluzone) 01/27/2016, 09/28/2016, 12/15/2017     MMR 10/04/2000, 09/02/2005     Meningococcal (Menactra ) 07/18/2012, 01/27/2016     Meningococcal (Menveo ) 07/18/2012     Pneumococcal (PCV 7) 12/13/2000, 04/02/2001     Poliovirus, inactivated (IPV) 1999, 1999, 05/09/2000, 09/02/2005     TDAP Vaccine (Boostrix) 07/18/2012     Tdap  (Adacel,Boostrix) 07/18/2012     Varicella 12/13/2000, 07/18/2012             Allergies:     Allergies   Allergen Reactions     No Known Allergies              Medications:   acetaminophen (TYLENOL) 325 MG tablet, Take 3 tablets (975 mg) by mouth every 8 hours as needed for mild pain  albuterol (PROAIR HFA/PROVENTIL HFA/VENTOLIN HFA) 108 (90 Base) MCG/ACT inhaler, Inhale 1-2 puffs into the lungs every 4 hours as needed for shortness of breath / dyspnea or wheezing  Cetirizine HCl (ZYRTEC ALLERGY PO), Take by mouth every morning  Cyanocobalamin (VITAMIN B-12) 50 MCG TABS, Take 100 mcg by mouth every morning  docusate sodium (COLACE) 100 MG capsule, Take 1-2 capsules (100-200 mg) by mouth 2 times daily  Dupilumab 300 MG/2ML SOPN, Inject 300 mg Subcutaneous every 14 days To start after 600 mg loading dose.  ferrous sulfate (PATRIC-IN-SOL) 75 (15 FE) MG/ML oral drops, Take 0.2 mLs (3 mg) by mouth daily (Patient taking differently: Take 3 mg by mouth every morning)  fexofenadine (ALLEGRA) 60 MG tablet, Take 60 mg by mouth 2 times daily as needed  FLUARIX QUADRIVALENT 0.5 ML injection, ADM 0.5ML IM UTD  fluticasone (FLONASE) 50 MCG/ACT nasal spray, Spray 1-2 sprays into both nostrils daily (Patient taking differently: Spray 1-2 sprays into both nostrils daily as needed)  hydrocortisone 2.5 % cream, To lips and face rash twice daily as needed for rash (Patient taking differently: as needed To lips and face rash twice daily as needed for rash)  ketoconazole (NIZORAL) 2 % external shampoo, Use to shampoo twice weekly. Leave on 5 min then rinse. (Patient taking differently: as needed Use to shampoo twice weekly. Leave on 5 min then rinse.)  Loratadine-Pseudoephedrine (CLARITIN-D 24 HOUR PO),   mometasone (ELOCON) 0.1 % external solution, Apply small amount topically to affected areas of scalp daily prn for up to 2 weeks. (Patient taking differently: as needed Apply small amount topically to affected areas of scalp daily prn for  "up to 2 weeks.)  Multiple Vitamins-Iron (MULTIVITAMIN/IRON PO), Take 1 tablet by mouth every morning  Probiotic Product (PRO-BIOTIC BLEND PO), Take by mouth as needed  tacrolimus (PROTOPIC) 0.1 % external ointment, Apply to face daily in rash areas (Patient taking differently: as needed Apply to face daily in rash areas)  terconazole (TERAZOL 3) 80 MG vaginal suppository, Place 1 suppository (80 mg) vaginally At Bedtime  triamcinolone (KENALOG) 0.1 % external ointment, Apply to face rash twice daily for the next two weeks until clear then twice daily as needed. (Patient taking differently: as needed Apply to face rash twice daily for the next two weeks until clear then twice daily as needed.)  VENTOLIN  (90 Base) MCG/ACT inhaler,   vitamin C (ASCORBIC ACID) 250 MG tablet, Take 1 tablet (250 mg) by mouth daily (Patient taking differently: Take 250 mg by mouth every morning)    No current facility-administered medications on file prior to visit.           Review of Systems:     Review of Systems   Respiratory: Positive for sputum production, shortness of breath and wheezing. Negative for cough and hemoptysis.    Cardiovascular: Positive for chest pain, palpitations and orthopnea.   Gastrointestinal: Positive for diarrhea. Negative for abdominal pain, blood in stool, constipation, heartburn, melena, nausea and vomiting.   Skin: Positive for itching and rash.   Endo/Heme/Allergies: Does not bruise/bleed easily.              Exam:   /74 (BP Location: Right arm)   Pulse 76   Ht 1.626 m (5' 4\")   Wt 56.2 kg (124 lb)   LMP 05/19/2022   SpO2 99%   BMI 21.28 kg/m      Physical Exam  Vitals and nursing note reviewed.   Constitutional:       Appearance: She is normal weight.   Cardiovascular:      Comments: Frequent PVCs for approximately the first 10 seconds of auscultation, then normal rhythm with possible split S2 though it was difficult to appreciate.  Pulmonary:      Effort: Pulmonary effort is normal. "      Breath sounds: Normal breath sounds. No wheezing, rhonchi or rales.   Musculoskeletal:         General: Normal range of motion.   Skin:     General: Skin is warm and dry.   Neurological:      General: No focal deficit present.      Mental Status: She is alert and oriented to person, place, and time.       Fingernails without clubbing         Data:     PFT: 6/15/2022    The FVC, FEV1, and FEV1/FVC ratio are within normal limits.  The lung volumes are within normal limits.  The diffusion capacity is within normal limits.  The spirometry does not meet ATS criteria so results should be interpreted with caution.      IMPRESSION:  Normal spirometry, lung volumes, and diffusion capacity, however spirometry does not meet ATS criteria so results should be reviewed with caution.      EKG 6/15/2022  Normal sinus rhythm      Recent Results (from the past 24 hour(s))   XR Chest 2 Views    Narrative    Chest 2 views    INDICATION: Dyspnea    COMPARISON: 5/20/2022    FINDINGS: Heart size and shape appear normal. Lungs and pulmonary  vascularity appear normal. Bony structures appear grossly intact.      Impression    IMPRESSION: Negative    CHUCKY TRIANA MD         SYSTEM ID:  [id number]       Chest x-ray 5/20/2022  Clear lungs. Pneumoperitoneum from recent surgery        All studies listed here were independently reviewed and interpreted by me today.          Assessment and Plan:     ## Palpitations  ## Episodic dyspnea  ## Episodic lightheadedness  ## Possible asthma  She had no respiratory issues until she moved away to college when she began experiencing 2-3 episodes of bronchitis each winter.  Then in October 2022 she developed a respiratory infection which persisted for 2 weeks and required antibiotics to clear.  Following that time she is fully recovered except has recurrent episodes of dyspnea.  These episodes are interesting in that they only occur at rest, when falling asleep, or when walking at a normal pace.   She is able to run and play soccer without difficulty.  She has not had any wheezing or coughing.  She does have a somewhat atopic phenotype with eczema and some seasonal allergies.  She does note that she intermittently has allergic type reaction to her dog with rash, sneezing, rhinorrhea, and itchy eyes however she does not think that she has dyspnea during these times.  Her nocturnal and early morning dyspnea could be consistent with asthma as it does tend to be worse over these times, however the rest of her symptoms are quite atypical for asthma.  She does have a history of anemia secondary to anastomotic ulcer in her GI tract, however this would not explain dyspnea that only occurs at rest or mild exertion and not with hard exertion such as running and playing soccer.  She has had multiple episodes of palpitations associated with lightheadedness and dyspnea, and thinks that some of her dyspneic episodes may have been associated with a feeling of an abnormal heartbeat.  She had frequent PVCs and a possible split S2 on my exam today associated with a sensation of dyspnea.  We attempted to capture this on EKG, however the episode ended by the time we got the EKG leads attached.    In summary, I feel her symptoms are most likely cardiac in origin, though mild asthma is also a consideration.  We discussed options including watchful waiting, methacholine challenge, and trial of medication.  She opted for a trial of medications we will start Breo Ellipta to determine if this improves her symptoms.  Given her frequent PVCs and cardiac symptoms, I advised her to stop albuterol for the time being.  I will also refer her to cardiology for further evaluation.  She would likely benefit from a cardiac monitoring device.    -Trial of Breo Ellipta  -Cardiology referral          Return to clinic: 3 months    I personally spent 66 minutes on the date of the encounter doing chart review, history and exam, documentation and  further activities per the note.    Farooq Palumbo M.D.  Pulmonary & Critical Care  Pager: Click Here to page      The above note was dictated using voice recognition software and may include typographical errors. Please contact the author for any clarifications.

## 2022-06-15 NOTE — NURSING NOTE
Chief Complaint   Patient presents with     New Patient     New pt      Vitals were taken and medications were reconciled.     Carmen Woo RMA  11:48 AM

## 2022-06-16 LAB
ATRIAL RATE - MUSE: 94 BPM
DIASTOLIC BLOOD PRESSURE - MUSE: NORMAL MMHG
DLCOCOR-%PRED-PRE: 118 %
DLCOCOR-PRE: 26.32 ML/MIN/MMHG
DLCOUNC-%PRED-PRE: 106 %
DLCOUNC-PRE: 23.64 ML/MIN/MMHG
DLCOUNC-PRED: 22.19 ML/MIN/MMHG
ERV-%PRED-PRE: 87 %
ERV-PRE: 1.27 L
ERV-PRED: 1.45 L
EXPTIME-PRE: 6.05 SEC
FEF2575-%PRED-PRE: 131 %
FEF2575-PRE: 5.13 L/SEC
FEF2575-PRED: 3.89 L/SEC
FEFMAX-%PRED-PRE: 113 %
FEFMAX-PRE: 7.82 L/SEC
FEFMAX-PRED: 6.87 L/SEC
FEV1-%PRED-PRE: 113 %
FEV1-PRE: 3.76 L
FEV1FEV6-PRE: 92 %
FEV1FEV6-PRED: 87 %
FEV1FVC-PRE: 92 %
FEV1FVC-PRED: 88 %
FEV1SVC-PRE: 92 %
FEV1SVC-PRED: 82 %
FIFMAX-PRE: 5.41 L/SEC
FRCPLETH-%PRED-PRE: 121 %
FRCPLETH-PRE: 3.23 L
FRCPLETH-PRED: 2.67 L
FVC-%PRED-PRE: 107 %
FVC-PRE: 4.09 L
FVC-PRED: 3.81 L
IC-%PRED-PRE: 108 %
IC-PRE: 2.83 L
IC-PRED: 2.62 L
INTERPRETATION ECG - MUSE: NORMAL
P AXIS - MUSE: 75 DEGREES
PR INTERVAL - MUSE: 132 MS
QRS DURATION - MUSE: 78 MS
QT - MUSE: 374 MS
QTC - MUSE: 467 MS
R AXIS - MUSE: 40 DEGREES
RVPLETH-%PRED-PRE: 145 %
RVPLETH-PRE: 1.96 L
RVPLETH-PRED: 1.34 L
SYSTOLIC BLOOD PRESSURE - MUSE: NORMAL MMHG
T AXIS - MUSE: 48 DEGREES
TLCPLETH-%PRED-PRE: 122 %
TLCPLETH-PRE: 6.06 L
TLCPLETH-PRED: 4.94 L
VA-%PRED-PRE: 115 %
VA-PRE: 5.41 L
VC-%PRED-PRE: 100 %
VC-PRE: 4.1 L
VC-PRED: 4.07 L
VENTRICULAR RATE- MUSE: 94 BPM

## 2022-06-28 ENCOUNTER — OFFICE VISIT (OUTPATIENT)
Dept: PEDIATRICS | Facility: CLINIC | Age: 22
End: 2022-06-28
Payer: COMMERCIAL

## 2022-06-28 VITALS
DIASTOLIC BLOOD PRESSURE: 60 MMHG | RESPIRATION RATE: 18 BRPM | HEART RATE: 94 BPM | OXYGEN SATURATION: 99 % | SYSTOLIC BLOOD PRESSURE: 108 MMHG | BODY MASS INDEX: 20.74 KG/M2 | TEMPERATURE: 97.5 F | WEIGHT: 120.8 LBS

## 2022-06-28 DIAGNOSIS — R30.0 DYSURIA: ICD-10-CM

## 2022-06-28 DIAGNOSIS — N89.8 VAGINAL ITCHING: Primary | ICD-10-CM

## 2022-06-28 LAB
ALBUMIN UR-MCNC: NEGATIVE MG/DL
APPEARANCE UR: CLEAR
BILIRUB UR QL STRIP: NEGATIVE
CLUE CELLS: ABNORMAL
COLOR UR AUTO: YELLOW
GLUCOSE UR STRIP-MCNC: NEGATIVE MG/DL
HGB UR QL STRIP: NEGATIVE
KETONES UR STRIP-MCNC: NEGATIVE MG/DL
LEUKOCYTE ESTERASE UR QL STRIP: NEGATIVE
NITRATE UR QL: NEGATIVE
PH UR STRIP: 5.5 [PH] (ref 5–7)
SP GR UR STRIP: 1.01 (ref 1–1.03)
TRICHOMONAS, WET PREP: ABNORMAL
UROBILINOGEN UR STRIP-ACNC: 0.2 E.U./DL
WBC'S/HIGH POWER FIELD, WET PREP: ABNORMAL
YEAST, WET PREP: ABNORMAL

## 2022-06-28 PROCEDURE — 87491 CHLMYD TRACH DNA AMP PROBE: CPT | Performed by: PHYSICIAN ASSISTANT

## 2022-06-28 PROCEDURE — 81003 URINALYSIS AUTO W/O SCOPE: CPT | Performed by: PHYSICIAN ASSISTANT

## 2022-06-28 PROCEDURE — 87591 N.GONORRHOEAE DNA AMP PROB: CPT | Performed by: PHYSICIAN ASSISTANT

## 2022-06-28 PROCEDURE — 87210 SMEAR WET MOUNT SALINE/INK: CPT | Performed by: PHYSICIAN ASSISTANT

## 2022-06-28 PROCEDURE — 99213 OFFICE O/P EST LOW 20 MIN: CPT | Performed by: PHYSICIAN ASSISTANT

## 2022-06-28 ASSESSMENT — PAIN SCALES - GENERAL: PAINLEVEL: NO PAIN (0)

## 2022-06-28 NOTE — PROGRESS NOTES
Assessment & Plan     Vaginal itching  Symptoms resolving. Cultures pending.  - UA Macro with Reflex to Micro and Culture - lab collect; Future  - Wet prep - lab collect; Future  - NEISSERIA GONORRHOEA PCR; Future  - CHLAMYDIA TRACHOMATIS PCR; Future  - UA Macro with Reflex to Micro and Culture - lab collect  - Wet prep - lab collect  - NEISSERIA GONORRHOEA PCR  - CHLAMYDIA TRACHOMATIS PCR    Dysuria  UC pending. Await culture prior to treatment given lack of symptoms.   - Urine Culture Aerobic Bacterial - lab collect; Future    CHENCHO Perez [facility] LALITHA Lawler is a 21 year old presenting for the following health issues:  STD (Check/)      STD    History of Present Illness       Reason for visit:  There was a little blood in my urine and burning when I peed on Tue night and Wed. I have also had some ichiness.  Symptom onset:  3-7 days ago  Symptoms include:  Blood in urine (2 times on Tue night), burning when peeing, some ichiness, some lower back pain Wed night.  Symptom intensity:  Mild  Symptom progression:  Improving  Had these symptoms before:  No    She eats 2-3 servings of fruits and vegetables daily.She consumes 3 sweetened beverage(s) daily.She exercises with enough effort to increase her heart rate 9 or less minutes per day.  She exercises with enough effort to increase her heart rate 3 or less days per week. She is missing 2 dose(s) of medications per week.  She is not taking prescribed medications regularly due to remembering to take.       Genitourinary - Female  Onset/Duration: 1 week ago  Description:   Painful urination (Dysuria): YES- burning            Frequency: no  Blood in urine (Hematuria): YES  Delay in urine (Hesitency): YES- happened yesterday  Intensity: mild  Progression of Symptoms:  improving  Accompanying Signs & Symptoms:  Fever/chills: no  Flank pain: YES- on last Wednesday night only   Nausea and vomiting: no  Vaginal symptoms:  itching  Abdominal/Pelvic Pain: YES- discomfort on the left side   History:   History of frequent UTI s: no  History of kidney stones: no  Sexually Active: YES  Possibility of pregnancy: No  Precipitating or alleviating factors: after sex it hurt to urinate   Therapies tried and outcome: Cranberry juice prn (contraindicated in Coumadin patients)  Review of Systems   Constitutional, HEENT, cardiovascular, pulmonary, gi and gu systems are negative, except as otherwise noted.      Objective    /60 (BP Location: Right arm, Patient Position: Sitting, Cuff Size: Adult Regular)   Pulse 94   Temp 97.5  F (36.4  C) (Temporal)   Resp 18   Wt 54.8 kg (120 lb 12.8 oz)   LMP 06/15/2022 (Exact Date)   SpO2 99%   BMI 20.74 kg/m    Body mass index is 20.74 kg/m .  Physical Exam   GENERAL: healthy, alert and no distress  ABDOMEN: soft, nontender    Results for orders placed or performed in visit on 06/28/22   UA Macro with Reflex to Micro and Culture - lab collect     Status: Normal    Specimen: Urine, Clean Catch   Result Value Ref Range    Color Urine Yellow Colorless, Straw, Light Yellow, Yellow    Appearance Urine Clear Clear    Glucose Urine Negative Negative mg/dL    Bilirubin Urine Negative Negative    Ketones Urine Negative Negative mg/dL    Specific Gravity Urine 1.010 1.003 - 1.035    Blood Urine Negative Negative    pH Urine 5.5 5.0 - 7.0    Protein Albumin Urine Negative Negative mg/dL    Urobilinogen Urine 0.2 0.2, 1.0 E.U./dL    Nitrite Urine Negative Negative    Leukocyte Esterase Urine Negative Negative    Narrative    Microscopic not indicated   Wet prep - lab collect     Status: Abnormal    Specimen: Vagina; Swab   Result Value Ref Range    Trichomonas Absent Absent    Yeast Absent Absent    Clue Cells Absent Absent    WBCs/high power field 2+ (A) None

## 2022-06-30 LAB
C TRACH DNA SPEC QL NAA+PROBE: NEGATIVE
N GONORRHOEA DNA SPEC QL NAA+PROBE: NEGATIVE

## 2022-07-11 ENCOUNTER — PRE VISIT (OUTPATIENT)
Dept: PULMONOLOGY | Facility: CLINIC | Age: 22
End: 2022-07-11

## 2022-07-12 ENCOUNTER — TELEPHONE (OUTPATIENT)
Dept: DERMATOLOGY | Facility: CLINIC | Age: 22
End: 2022-07-12

## 2022-07-12 NOTE — TELEPHONE ENCOUNTER
Called patient 7/12/22 at about 9:55am to discuss her Dupixent. Liaison was triggered to reinitiate a prior authorization for her Dupixent, but learned that she no longer has insurance. Called patient to ask if she still needs her dupixent and would like to pursue a Free Drug Program (which may require a follow-up visit with Dr. Tena), or needs other resources. Left voicemail message with liaison name and number.    Jenna Ware Cp[facility] [email or web address]  Phone: [phone]  Fax: [phone]

## 2022-07-14 ENCOUNTER — OFFICE VISIT (OUTPATIENT)
Dept: SURGERY | Facility: CLINIC | Age: 22
End: 2022-07-14
Payer: COMMERCIAL

## 2022-07-14 ENCOUNTER — MYC MEDICAL ADVICE (OUTPATIENT)
Dept: PEDIATRICS | Facility: CLINIC | Age: 22
End: 2022-07-14

## 2022-07-14 VITALS
WEIGHT: 124 LBS | DIASTOLIC BLOOD PRESSURE: 71 MMHG | OXYGEN SATURATION: 100 % | HEIGHT: 64 IN | BODY MASS INDEX: 21.17 KG/M2 | HEART RATE: 85 BPM | SYSTOLIC BLOOD PRESSURE: 120 MMHG

## 2022-07-14 DIAGNOSIS — Z09 FOLLOW-UP EXAMINATION AFTER COLORECTAL SURGERY: Primary | ICD-10-CM

## 2022-07-14 DIAGNOSIS — J31.0 CHRONIC RHINITIS: Primary | ICD-10-CM

## 2022-07-14 PROCEDURE — 99024 POSTOP FOLLOW-UP VISIT: CPT | Performed by: SURGERY

## 2022-07-14 ASSESSMENT — PAIN SCALES - GENERAL: PAINLEVEL: NO PAIN (0)

## 2022-07-14 NOTE — LETTER
2022     RE: Nuris Reddy  [address] [place] [address]     Dear Colleague,    Thank you for referring your patient, Nuris Reddy, to the [facility]IN[facility] [place] at [facility]. Please see a copy of my visit note below.    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  Colonoscopy showed anastomotic ulcer with stenosis. She is now status post Laparoscopic right colectomy on 2022.     Interval history: Doing well no complaints, tolerating diet.      Assessment/Plan:  21 year old female no 7 weeks s/p laparoscopic right hemicolectomy, recovering appropriately.  -No further dietary or activity restrictions. May follow up as needed.    PLEASE SEE NOTE BELOW FOR PHYSICAL EXAMINATION, REVIEW OF SYSTEMS, AND OTHER HISTORY.    Frandy Mack MD  Division of Colon and Rectal Surgery   [facility]  [phone]    -------------------------------------------------------------------------------------------------------------------    Medical history:  Past Medical History:   Diagnosis Date     Anemia 10/16/2012     COPD (chronic obstructive pulmonary disease) (H)      History of blood transfusion      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, s/p resection     Short gut syndrome 10/16/2012    ~ 20 cm of ileum as well as ileocecal valve resected in  period     Ulceration of intestine 2012     Uncomplicated asthma        Surgical history:  Past Surgical  History:   Procedure Laterality Date     COLONOSCOPY  2012     COLONOSCOPY  2012     COLONOSCOPY N/A 2022    Procedure: COLONOSCOPY, WITH BIOPSY;  Surgeon: Moisés Gupta MD;  Location: [facility] OR     LAPAROSCOPIC RESECTION SMALL BOWEL N/A 2022    Procedure: EXCISION, SMALL INTESTINE, LAPAROSCOPIC;  Surgeon: Frandy Mack MD;  Location: [facility] OR     LAPAROSCOPIC RESECTION SMALL BOWEL  2022    Procedure: ;  Surgeon: Frandy Mack MD;  Location: [facility] OR     RESECTION ILEOCECAL  1999    ~ 20 cm of ileum as well as ileocecal valve resected in  period       Problem list:  Patient Active Problem List    Diagnosis Date Noted     Anastomotic ulcer 2022     Priority: Medium     Anemia, unspecified type 2022     Priority: Medium     Chronic eczema 2019     Priority: Medium     Chronic rhinitis 2016     Priority: Medium     Iron deficiency anemia 2013     Priority: Medium     Short gut syndrome 10/16/2012     Priority: Medium     Ulceration of intestine 2012     Priority: Medium     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 diagnosed due to hematochezia.       Proteinuria 2012     Priority: Medium     Felt consistent with benign intermittent proteinuria or postural proteinuria. Yearly urinalysis and prot/cr ratio. Contact nephrology if the prot/cr is >0.2 or if there are any abnormalities noted in the urinalysis.         Other congenital anomalies of intestine      Priority: Medium     congenital ileal atresia, s/p resection         Medications:  Current Outpatient Medications   Medication Sig Dispense Refill     acetaminophen (TYLENOL) 325 MG tablet Take 3 tablets (975 mg) by mouth every 8 hours as needed for mild pain 30 tablet 0     albuterol (PROAIR HFA/PROVENTIL HFA/VENTOLIN HFA) 108 (90 Base) MCG/ACT inhaler Inhale 1-2 puffs into the lungs every 4 hours as needed for shortness of breath / dyspnea or wheezing 18 g 0     Cetirizine  HCl (ZYRTEC ALLERGY PO) Take by mouth every morning       Cyanocobalamin (VITAMIN B-12) 50 MCG TABS Take 100 mcg by mouth every morning       docusate sodium (COLACE) 100 MG capsule Take 1-2 capsules (100-200 mg) by mouth 2 times daily 120 capsule 11     Dupilumab 300 MG/2ML SOPN Inject 300 mg Subcutaneous every 14 days To start after 600 mg loading dose. 4 mL 11     ferrous sulfate (PATRIC-IN-SOL) 75 (15 FE) MG/ML oral drops Take 0.2 mLs (3 mg) by mouth daily (Patient taking differently: Take 3 mg by mouth every morning) 100 mL 1     fexofenadine (ALLEGRA) 60 MG tablet Take 60 mg by mouth 2 times daily as needed       FLUARIX QUADRIVALENT 0.5 ML injection ADM 0.5ML IM UTD       fluticasone (FLONASE) 50 MCG/ACT nasal spray Spray 1-2 sprays into both nostrils daily (Patient taking differently: Spray 1-2 sprays into both nostrils daily as needed) 16 g 11     fluticasone-vilanterol (BREO ELLIPTA) 200-25 MCG/INH inhaler Inhale 1 puff into the lungs daily 60 each 3     hydrocortisone 2.5 % cream To lips and face rash twice daily as needed for rash (Patient taking differently: as needed To lips and face rash twice daily as needed for rash) 30 g 1     ketoconazole (NIZORAL) 2 % external shampoo Use to shampoo twice weekly. Leave on 5 min then rinse. (Patient taking differently: as needed Use to shampoo twice weekly. Leave on 5 min then rinse.) 120 mL 3     Loratadine-Pseudoephedrine (CLARITIN-D 24 HOUR PO)        mometasone (ELOCON) 0.1 % external solution Apply small amount topically to affected areas of scalp daily prn for up to 2 weeks. (Patient taking differently: as needed Apply small amount topically to affected areas of scalp daily prn for up to 2 weeks.) 60 mL 0     Multiple Vitamins-Iron (MULTIVITAMIN/IRON PO) Take 1 tablet by mouth every morning       Probiotic Product (PRO-BIOTIC BLEND PO) Take by mouth as needed       tacrolimus (PROTOPIC) 0.1 % external ointment Apply to face daily in rash areas (Patient taking  differently: as needed Apply to face daily in rash areas) 30 g 11     terconazole (TERAZOL 3) 80 MG vaginal suppository Place 1 suppository (80 mg) vaginally At Bedtime 3 suppository 1     triamcinolone (KENALOG) 0.1 % external ointment Apply to face rash twice daily for the next two weeks until clear then twice daily as needed. (Patient taking differently: as needed Apply to face rash twice daily for the next two weeks until clear then twice daily as needed.) 60 g 1     VENTOLIN  (90 Base) MCG/ACT inhaler   1     vitamin C (ASCORBIC ACID) 250 MG tablet Take 1 tablet (250 mg) by mouth daily (Patient taking differently: Take 250 mg by mouth every morning) 100 tablet 11       Allergies:  Allergies   Allergen Reactions     No Known Allergies        Family history:  Family History   Problem Relation Age of Onset     Cancer Sister         ovarian teratoma, s/p resection     Other Cancer Maternal Grandfather         skin cancer     Hypertension Paternal Grandmother      Hypertension Paternal Grandfather      Cardiovascular Paternal Grandfather         Valve condition     Prostate Cancer Paternal Grandfather      Kidney Disease Maternal Uncle         kidney stones       Social history:  Social History     Socioeconomic History     Marital status: Single     Spouse name: Not on file     Number of children: Not on file     Years of education: Not on file     Highest education level: Some college, no degree   Occupational History     Not on file   Tobacco Use     Smoking status: Never Smoker     Smokeless tobacco: Never Used     Tobacco comment: Non-smoking home   Substance and Sexual Activity     Alcohol use: Yes     Comment: Social     Drug use: No     Sexual activity: Yes     Partners: Male     Birth control/protection: Condom, Pill   Other Topics Concern     Parent/sibling w/ CABG, MI or angioplasty before 65F 55M? No   Social History Narrative     Not on file     Social Determinants of Health     Financial  "Resource Strain: Not on file   Food Insecurity: Not on file   Transportation Needs: Not on file   Physical Activity: Not on file   Stress: Not on file   Social Connections: Not on file   Intimate Partner Violence: Not on file   Housing Stability: Not on file       Physical Examination:  /71 (BP Location: Left arm, Patient Position: Sitting, Cuff Size: Adult Regular)   Pulse 85   Ht 1.626 m (5' 4\")   Wt 56.2 kg (124 lb)   LMP 06/15/2022 (Exact Date)   SpO2 100%   BMI 21.28 kg/m    General: NAD  Abdomen: soft, NTND, incisions well healed.  Perianal external examination:  deferred    Again, thank you for allowing me to participate in the care of your patient.      Sincerely,    Frandy Mack MD  "

## 2022-07-14 NOTE — NURSING NOTE
"Chief Complaint   Patient presents with     Post-op Visit       Vitals:    07/14/22 1444   BP: 120/71   BP Location: Left arm   Patient Position: Sitting   Cuff Size: Adult Regular   Pulse: 85   SpO2: 100%   Weight: 124 lb   Height: 5' 4\"       Body mass index is 21.28 kg/m .    Génesis Arreguin CMA    "

## 2022-07-14 NOTE — TELEPHONE ENCOUNTER
"Please review mychart message. Patient requesting referral for \"my chronic stuffy nose\". Patient has seen Pulmonology for SOB and persistent URIs discussed. Patient was started on trial of Breo Ellipta. Please advise if ENT referral appropriate as well. Pended potential referral. Please advise.  Thanks!  Katelyn HAAS RN, BSN    "

## 2022-08-02 ENCOUNTER — ANCILLARY PROCEDURE (OUTPATIENT)
Dept: CARDIOLOGY | Facility: CLINIC | Age: 22
End: 2022-08-02
Attending: INTERNAL MEDICINE
Payer: COMMERCIAL

## 2022-08-02 VITALS
WEIGHT: 120 LBS | DIASTOLIC BLOOD PRESSURE: 68 MMHG | OXYGEN SATURATION: 100 % | BODY MASS INDEX: 20.49 KG/M2 | SYSTOLIC BLOOD PRESSURE: 107 MMHG | HEART RATE: 77 BPM | HEIGHT: 64 IN

## 2022-08-02 DIAGNOSIS — R00.2 PALPITATIONS: ICD-10-CM

## 2022-08-02 DIAGNOSIS — R06.02 SHORTNESS OF BREATH: ICD-10-CM

## 2022-08-02 DIAGNOSIS — R00.2 PALPITATIONS: Primary | ICD-10-CM

## 2022-08-02 DIAGNOSIS — D50.0 IRON DEFICIENCY ANEMIA DUE TO CHRONIC BLOOD LOSS: ICD-10-CM

## 2022-08-02 PROCEDURE — 93242 EXT ECG>48HR<7D RECORDING: CPT

## 2022-08-02 PROCEDURE — 93244 EXT ECG>48HR<7D REV&INTERPJ: CPT | Performed by: INTERNAL MEDICINE

## 2022-08-02 PROCEDURE — G0463 HOSPITAL OUTPT CLINIC VISIT: HCPCS

## 2022-08-02 PROCEDURE — 99203 OFFICE O/P NEW LOW 30 MIN: CPT | Mod: 25 | Performed by: INTERNAL MEDICINE

## 2022-08-02 ASSESSMENT — PAIN SCALES - GENERAL: PAINLEVEL: NO PAIN (0)

## 2022-08-02 NOTE — NURSING NOTE
No medication changes today.     7 day ziopatch placed today in clinic. Stress Echo scheduled for 8/8.     Follow up with Dr. Simons as needed.     Edu Liang, RN   Cardiology Nurse Coordinator

## 2022-08-02 NOTE — LETTER
2022      RE: Nuris VASQUEZ Reddy  [address] [place] [address]       Dear Colleague,    Thank you for the opportunity to participate in the care of your patient, Nuris Reddy, at the [facility] HEART [facility]IN[facility]. Please see a copy of my visit note below.    I am delighted to see Nuris Reddy in consultation.The primary encounter diagnosis was Palpitations. Diagnoses of Shortness of breath and Iron deficiency anemia due to chronic blood loss were also pertinent to this visit.   As you know, the patient is a 21 year old  female. She   has a past medical history of Anemia (10/16/2012), COPD (chronic obstructive pulmonary disease) (H), History of blood transfusion, Other congenital anomalies of intestine, Palpitations, Short gut syndrome (10/16/2012), Shortness of breath, Ulceration of intestine (2012), and Uncomplicated asthma..    On this visit, the patient states that she has palpitations and dyspnea.  Her lung exam was normal.  The patient denies chest pressure/discomfort, near-syncope, syncope, orthopnea, paroxysmal nocturnal dyspnea and lower extermity edema.    The patient's cardiovascular risk factors include reactive airway disease.    The following portions of the patient's history were reviewed and updated as appropriate: allergies, current medications, past family history, past medical history, past social history, past surgical history, and the problem list.    PMH: The patient's past medical history includes:    Past Medical History:   Diagnosis Date     Anemia 10/16/2012     COPD (chronic obstructive pulmonary disease) (H)      History of blood transfusion      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, s/p resection     Palpitations      Short gut syndrome 10/16/2012    ~ 20 cm of ileum as well as ileocecal valve resected in  period     Shortness of breath       Ulceration of intestine 2012     Uncomplicated asthma       Past Surgical History:   Procedure Laterality Date     COLONOSCOPY  2012     COLONOSCOPY  2012     COLONOSCOPY N/A 2022    Procedure: COLONOSCOPY, WITH BIOPSY;  Surgeon: Moisés Gupta MD;  Location: [facility] OR     LAPAROSCOPIC RESECTION SMALL BOWEL N/A 2022    Procedure: EXCISION, SMALL INTESTINE, LAPAROSCOPIC;  Surgeon: Frandy Mack MD;  Location: [facility]     LAPAROSCOPIC RESECTION SMALL BOWEL  2022    Procedure: ;  Surgeon: Frandy Mack MD;  Location: [facility] OR     RESECTION ILEOCECAL  1999    ~ 20 cm of ileum as well as ileocecal valve resected in  period       The patient's medications as of the current encounter are:     Current Outpatient Medications   Medication Sig Dispense Refill     Cetirizine HCl (ZYRTEC ALLERGY PO) Take by mouth every morning       Cyanocobalamin (VITAMIN B-12) 50 MCG TABS Take 100 mcg by mouth every morning       dupilumab (DUPIXENT) 300 MG/2ML prefilled pen Inject 4 mLs (600 mg) Subcutaneous See Admin Instructions for 1 dose 4 mL 0     dupilumab (DUPIXENT) 300 MG/2ML prefilled pen Inject 2 mLs (300 mg) Subcutaneous every 14 days 4 mL 2     ferrous sulfate (PATRIC-IN-SOL) 75 (15 FE) MG/ML oral drops Take 0.2 mLs (3 mg) by mouth daily (Patient taking differently: Take 3 mg by mouth every morning) 100 mL 1     fexofenadine (ALLEGRA) 60 MG tablet Take 60 mg by mouth 2 times daily as needed       fluticasone (FLONASE) 50 MCG/ACT nasal spray Spray 1-2 sprays into both nostrils daily (Patient taking differently: Spray 1-2 sprays into both nostrils daily as needed) 16 g 11     fluticasone-vilanterol (BREO ELLIPTA) 200-25 MCG/INH inhaler Inhale 1 puff into the lungs daily 60 each 3     hydrocortisone 2.5 % cream To lips and face rash twice daily as needed for rash (Patient taking differently: as needed To lips and face rash twice daily as needed for rash) 30 g 1     ketoconazole (NIZORAL) 2  % external shampoo Use to shampoo twice weekly. Leave on 5 min then rinse. (Patient taking differently: as needed Use to shampoo twice weekly. Leave on 5 min then rinse.) 120 mL 3     methylcellulose (CITRUCEL) 500 MG TABS tablet Take 500 mg by mouth daily       mometasone (ELOCON) 0.1 % external solution Apply small amount topically to affected areas of scalp daily prn for up to 2 weeks. (Patient taking differently: as needed Apply small amount topically to affected areas of scalp daily prn for up to 2 weeks.) 60 mL 0     Multiple Vitamins-Iron (MULTIVITAMIN/IRON PO) Take 1 tablet by mouth every morning       Probiotic Product (PRO-BIOTIC BLEND PO) Take by mouth as needed       tacrolimus (PROTOPIC) 0.1 % external ointment Apply to face daily in rash areas (Patient taking differently: as needed Apply to face daily in rash areas) 30 g 11     terconazole (TERAZOL 3) 80 MG vaginal suppository Place 1 suppository (80 mg) vaginally At Bedtime 3 suppository 1     triamcinolone (KENALOG) 0.1 % external ointment Apply to face rash twice daily for the next two weeks until clear then twice daily as needed for up to 2 weeks. 30 g 1     acetaminophen (TYLENOL) 325 MG tablet Take 3 tablets (975 mg) by mouth every 8 hours as needed for mild pain (Patient not taking: Reported on 8/2/2022) 30 tablet 0     albuterol (PROAIR HFA/PROVENTIL HFA/VENTOLIN HFA) 108 (90 Base) MCG/ACT inhaler Inhale 1-2 puffs into the lungs every 4 hours as needed for shortness of breath / dyspnea or wheezing (Patient not taking: Reported on 8/2/2022) 18 g 0     docusate sodium (COLACE) 100 MG capsule Take 1-2 capsules (100-200 mg) by mouth 2 times daily (Patient not taking: Reported on 8/2/2022) 120 capsule 11     Dupilumab 300 MG/2ML SOPN Inject 300 mg Subcutaneous every 14 days To start after 600 mg loading dose. (Patient not taking: Reported on 8/2/2022) 4 mL 11     FLUARIX QUADRIVALENT 0.5 ML injection ADM 0.5ML IM UTD (Patient not taking: Reported on  8/2/2022)       Loratadine-Pseudoephedrine (CLARITIN-D 24 HOUR PO)  (Patient not taking: Reported on 8/2/2022)       VENTOLIN  (90 Base) MCG/ACT inhaler  (Patient not taking: Reported on 8/2/2022)  1     vitamin C (ASCORBIC ACID) 250 MG tablet Take 1 tablet (250 mg) by mouth daily (Patient not taking: Reported on 8/2/2022) 100 tablet 11       Labs:     Office Visit on 06/28/2022   Component Date Value Ref Range Status     Color Urine 06/28/2022 Yellow  Colorless, Straw, Light Yellow, Yellow Final     Appearance Urine 06/28/2022 Clear  Clear Final     Glucose Urine 06/28/2022 Negative  Negative mg/dL Final     Bilirubin Urine 06/28/2022 Negative  Negative Final     Ketones Urine 06/28/2022 Negative  Negative mg/dL Final     Specific Gravity Urine 06/28/2022 1.010  1.003 - 1.035 Final     Blood Urine 06/28/2022 Negative  Negative Final     pH Urine 06/28/2022 5.5  5.0 - 7.0 Final     Protein Albumin Urine 06/28/2022 Negative  Negative mg/dL Final     Urobilinogen Urine 06/28/2022 0.2  0.2, 1.0 E.U./dL Final     Nitrite Urine 06/28/2022 Negative  Negative Final     Leukocyte Esterase Urine 06/28/2022 Negative  Negative Final     Trichomonas 06/28/2022 Absent  Absent Final     Yeast 06/28/2022 Absent  Absent Final     Clue Cells 06/28/2022 Absent  Absent Final     WBCs/high power field 06/28/2022 2+ (A) None Final     Neisseria gonorrhoeae 06/28/2022 Negative  Negative Final    Negative for N. gonorrhoeae rRNA by transcription mediated amplification. A negative result by transcription mediated amplification does not preclude the presence of C. trachomatis infection because results are dependent on proper and adequate collection, absence of inhibitors and sufficient rRNA to be detected.     Chlamydia trachomatis 06/28/2022 Negative  Negative Final    A negative result by transcription mediated amplification does not preclude the presence of C. trachomatis infection because results are dependent on proper and  adequate collection, absence of inhibitors and sufficient rRNA to be detected.       Allergies:    Allergies   Allergen Reactions     No Known Allergies        Family History:   Family History   Problem Relation Age of Onset     No Known Problems Mother      No Known Problems Father      Other Cancer Maternal Grandfather         skin cancer     Hypertension Paternal Grandmother      Hypertension Paternal Grandfather      Cardiovascular Paternal Grandfather         Valve condition     Prostate Cancer Paternal Grandfather      No Known Problems Brother      No Known Problems Sister      No Known Problems Sister      Kidney Disease Maternal Uncle         kidney stones       Psychosocial history:  reports that she has never smoked. She has never used smokeless tobacco. She reports current alcohol use. She reports that she does not use drugs.    Review of systems: negative, negative for, exertional chest pain or pressure, paroxysmal nocturnal dyspnea, orthopnea, lower extremity edema, syncope or near-syncope and claudication    In addition,   General: No change in weight, sleep or appetite.  Normal energy.  No fever or chills  Eyes: Negative for vision changes or eye problems  ENT: No problems with ears, nose or throat.  No difficulty swallowing.  Resp: No coughing, wheezing or shortness of breath  GI: No nausea, vomiting,  heartburn, abdominal pain, diarrhea, constipation or change in bowel habits. Recent surgery for ulcer  : No urinary frequency or dysuria, bladder or kidney problems  Musculoskeletal: No significant muscle or joint pains  Neurologic: No headaches, numbness, tingling, weakness, problems with balance or coordination  Psychiatric: No problems with anxiety, depression or mental health  Heme/immune/allergy: No history of bleeding or clotting problems or anemia.  Seasonal allergies  Endocrine: No history of thyroid disease, diabetes or other endocrine disorders  Skin: No rashes,worrisome lesions or skin  "problems  Vascular:  No claudication, lifestyle limiting or otherwise; no ischemic rest pain; no non-healing ulcers. No weakness, No loss of sensation        Physical examination  Vitals: /68 (BP Location: Right arm, Patient Position: Chair, Cuff Size: Adult Regular)   Pulse 77   Ht 1.633 m (5' 4.29\")   Wt 54.4 kg (120 lb)   LMP 06/15/2022 (Exact Date)   SpO2 100%   BMI 20.41 kg/m    BMI= Body mass index is 20.41 kg/m .    In general, the patient is a pleasant female in no apparent distress.    HEENT: Normiocephalic and atraumatic.  PERRLA.  EOMI.  Sclerae white, not injected.  Nares clear.  Pharynx without erythema or exudate.  Dentition intact.    Neck: No adenopathy.  No thyromegaly. Carotids +2/2 bilaterally without bruits.  No jugular venous distension.   Heart:  The PMI is in the 5th ICS in the midclavicular line. There is no heave. Regular rate and rhythm. Normal S1, S2 splits physiologically. No murmur, rub, click, or gallop.    Lungs: Clear to asculation.  No ronchi, wheezes, rales.  No dullness to percussion.   Abdomen: Soft, nontender, nondistended. No organomegaly. No AAA.  No bruits.   Extremities: No clubbing, cyanosis, or edema. The pulses were intact bilaterally.   Neurological: The neurological examination reveal a patient who was oriented to person, place, and time.  The remainder of the examination was nonfocal.    Cardiac tests include:    Echo - normal EF  ECG - normal sinus, normal axis, intervals, nonspecific ST changes.    Assessment and Plan    1. Palpitations - likely benign with no family history of sudden death  - check TSH  - check ziopatch  2, Dyspnea - EF normal  - check stress test  - anemia may play a role    The patient is to return  prn. The patient understood the treatment plan as outlined above.  There were no barriers to learning.      Sourav Simons MD     "

## 2022-08-02 NOTE — PATIENT INSTRUCTIONS
"Cardiology Providers you saw during your visit:  Dr. Simons    Medication changes: None    Follow up:   Labs today or this week  7 day ziopatch heart monitor  Stress Echocardiogram on Monday August 8 at 11:00 AM, please check in to the [facility] Waiting Room at 10:45 AM    Stress Echocardiogram  No alcohol, smoking or caffeine 12 hours before the procedure.  Nothing by mouth 3 hours before the start of the procedure.      Follow the American Heart Association Diet and Lifestyle recommendations:  Limit saturated fat, trans fat, sodium, red meat, sweets and sugar-sweetened beverages. If you choose to eat red meat, compare labels and select the leanest cuts available.  Aim for at least 150 minutes of moderate physical activity or 75 minutes of vigorous physical activity - or an equal combination of both - each week.    If you have any questions, contact  Edu Liang RN. We are encouraging the use of Swiftype to communicate with your HealthCare Provider     To contact the [facility], please call, [phone]  To schedule an appointment or to leave a message for your Care Team Press #1  If you are a physician calling for another physician Press #2  For Billing Press #3  For Medical Records Press #4\"    "

## 2022-08-02 NOTE — PROGRESS NOTES
"Per Dr. Simons, patient to have 7 day Zio monitor placed.  Diagnosis: Palpitations [R00.2] and shortness of breath [R06.02]  Monitor placed: {YES / NO:633919::\"Yes\"}  Patient Instructed: {YES / NO:198503::\"Yes\"}  Patient verbalized understanding: {YES / NO:407709::\"Yes\"}  Holter # [id number]       "

## 2022-08-02 NOTE — PROGRESS NOTES
I am delighted to see Nuris Reddy in consultation.The primary encounter diagnosis was Palpitations. Diagnoses of Shortness of breath and Iron deficiency anemia due to chronic blood loss were also pertinent to this visit.   As you know, the patient is a 21 year old  female. She   has a past medical history of Anemia (10/16/2012), COPD (chronic obstructive pulmonary disease) (H), History of blood transfusion, Other congenital anomalies of intestine, Palpitations, Short gut syndrome (10/16/2012), Shortness of breath, Ulceration of intestine (2012), and Uncomplicated asthma..    On this visit, the patient states that she has palpitations and dyspnea.  Her lung exam was normal.  The patient denies chest pressure/discomfort, near-syncope, syncope, orthopnea, paroxysmal nocturnal dyspnea and lower extermity edema.    The patient's cardiovascular risk factors include reactive airway disease.    The following portions of the patient's history were reviewed and updated as appropriate: allergies, current medications, past family history, past medical history, past social history, past surgical history, and the problem list.    PMH: The patient's past medical history includes:    Past Medical History:   Diagnosis Date     Anemia 10/16/2012     COPD (chronic obstructive pulmonary disease) (H)      History of blood transfusion      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, s/p resection     Palpitations      Short gut syndrome 10/16/2012    ~ 20 cm of ileum as well as ileocecal valve resected in  period     Shortness of breath      Ulceration of intestine 2012     Uncomplicated asthma       Past Surgical History:   Procedure Laterality Date     COLONOSCOPY  2012     COLONOSCOPY  2012     COLONOSCOPY N/A 2022    Procedure: COLONOSCOPY, WITH BIOPSY;  Surgeon: Moisés Gupta MD;  Location: [facility] OR     LAPAROSCOPIC RESECTION SMALL BOWEL N/A 2022     Procedure: EXCISION, SMALL INTESTINE, LAPAROSCOPIC;  Surgeon: Frandy Mack MD;  Location: [facility] OR     LAPAROSCOPIC RESECTION SMALL BOWEL  2022    Procedure: ;  Surgeon: Frandy Mack MD;  Location: [facility] OR     RESECTION ILEOCECAL  1999    ~ 20 cm of ileum as well as ileocecal valve resected in  period       The patient's medications as of the current encounter are:     Current Outpatient Medications   Medication Sig Dispense Refill     Cetirizine HCl (ZYRTEC ALLERGY PO) Take by mouth every morning       Cyanocobalamin (VITAMIN B-12) 50 MCG TABS Take 100 mcg by mouth every morning       dupilumab (DUPIXENT) 300 MG/2ML prefilled pen Inject 4 mLs (600 mg) Subcutaneous See Admin Instructions for 1 dose 4 mL 0     dupilumab (DUPIXENT) 300 MG/2ML prefilled pen Inject 2 mLs (300 mg) Subcutaneous every 14 days 4 mL 2     ferrous sulfate (PATRIC-IN-SOL) 75 (15 FE) MG/ML oral drops Take 0.2 mLs (3 mg) by mouth daily (Patient taking differently: Take 3 mg by mouth every morning) 100 mL 1     fexofenadine (ALLEGRA) 60 MG tablet Take 60 mg by mouth 2 times daily as needed       fluticasone (FLONASE) 50 MCG/ACT nasal spray Spray 1-2 sprays into both nostrils daily (Patient taking differently: Spray 1-2 sprays into both nostrils daily as needed) 16 g 11     fluticasone-vilanterol (BREO ELLIPTA) 200-25 MCG/INH inhaler Inhale 1 puff into the lungs daily 60 each 3     hydrocortisone 2.5 % cream To lips and face rash twice daily as needed for rash (Patient taking differently: as needed To lips and face rash twice daily as needed for rash) 30 g 1     ketoconazole (NIZORAL) 2 % external shampoo Use to shampoo twice weekly. Leave on 5 min then rinse. (Patient taking differently: as needed Use to shampoo twice weekly. Leave on 5 min then rinse.) 120 mL 3     methylcellulose (CITRUCEL) 500 MG TABS tablet Take 500 mg by mouth daily       mometasone (ELOCON) 0.1 % external solution Apply small amount topically to affected  areas of scalp daily prn for up to 2 weeks. (Patient taking differently: as needed Apply small amount topically to affected areas of scalp daily prn for up to 2 weeks.) 60 mL 0     Multiple Vitamins-Iron (MULTIVITAMIN/IRON PO) Take 1 tablet by mouth every morning       Probiotic Product (PRO-BIOTIC BLEND PO) Take by mouth as needed       tacrolimus (PROTOPIC) 0.1 % external ointment Apply to face daily in rash areas (Patient taking differently: as needed Apply to face daily in rash areas) 30 g 11     terconazole (TERAZOL 3) 80 MG vaginal suppository Place 1 suppository (80 mg) vaginally At Bedtime 3 suppository 1     triamcinolone (KENALOG) 0.1 % external ointment Apply to face rash twice daily for the next two weeks until clear then twice daily as needed for up to 2 weeks. 30 g 1     acetaminophen (TYLENOL) 325 MG tablet Take 3 tablets (975 mg) by mouth every 8 hours as needed for mild pain (Patient not taking: Reported on 8/2/2022) 30 tablet 0     albuterol (PROAIR HFA/PROVENTIL HFA/VENTOLIN HFA) 108 (90 Base) MCG/ACT inhaler Inhale 1-2 puffs into the lungs every 4 hours as needed for shortness of breath / dyspnea or wheezing (Patient not taking: Reported on 8/2/2022) 18 g 0     docusate sodium (COLACE) 100 MG capsule Take 1-2 capsules (100-200 mg) by mouth 2 times daily (Patient not taking: Reported on 8/2/2022) 120 capsule 11     Dupilumab 300 MG/2ML SOPN Inject 300 mg Subcutaneous every 14 days To start after 600 mg loading dose. (Patient not taking: Reported on 8/2/2022) 4 mL 11     FLUARIX QUADRIVALENT 0.5 ML injection ADM 0.5ML IM UTD (Patient not taking: Reported on 8/2/2022)       Loratadine-Pseudoephedrine (CLARITIN-D 24 HOUR PO)  (Patient not taking: Reported on 8/2/2022)       VENTOLIN  (90 Base) MCG/ACT inhaler  (Patient not taking: Reported on 8/2/2022)  1     vitamin C (ASCORBIC ACID) 250 MG tablet Take 1 tablet (250 mg) by mouth daily (Patient not taking: Reported on 8/2/2022) 100 tablet 11        Labs:     Office Visit on 06/28/2022   Component Date Value Ref Range Status     Color Urine 06/28/2022 Yellow  Colorless, Straw, Light Yellow, Yellow Final     Appearance Urine 06/28/2022 Clear  Clear Final     Glucose Urine 06/28/2022 Negative  Negative mg/dL Final     Bilirubin Urine 06/28/2022 Negative  Negative Final     Ketones Urine 06/28/2022 Negative  Negative mg/dL Final     Specific Gravity Urine 06/28/2022 1.010  1.003 - 1.035 Final     Blood Urine 06/28/2022 Negative  Negative Final     pH Urine 06/28/2022 5.5  5.0 - 7.0 Final     Protein Albumin Urine 06/28/2022 Negative  Negative mg/dL Final     Urobilinogen Urine 06/28/2022 0.2  0.2, 1.0 E.U./dL Final     Nitrite Urine 06/28/2022 Negative  Negative Final     Leukocyte Esterase Urine 06/28/2022 Negative  Negative Final     Trichomonas 06/28/2022 Absent  Absent Final     Yeast 06/28/2022 Absent  Absent Final     Clue Cells 06/28/2022 Absent  Absent Final     WBCs/high power field 06/28/2022 2+ (A) None Final     Neisseria gonorrhoeae 06/28/2022 Negative  Negative Final    Negative for N. gonorrhoeae rRNA by transcription mediated amplification. A negative result by transcription mediated amplification does not preclude the presence of C. trachomatis infection because results are dependent on proper and adequate collection, absence of inhibitors and sufficient rRNA to be detected.     Chlamydia trachomatis 06/28/2022 Negative  Negative Final    A negative result by transcription mediated amplification does not preclude the presence of C. trachomatis infection because results are dependent on proper and adequate collection, absence of inhibitors and sufficient rRNA to be detected.       Allergies:    Allergies   Allergen Reactions     No Known Allergies        Family History:   Family History   Problem Relation Age of Onset     No Known Problems Mother      No Known Problems Father      Other Cancer Maternal Grandfather         skin cancer      "Hypertension Paternal Grandmother      Hypertension Paternal Grandfather      Cardiovascular Paternal Grandfather         Valve condition     Prostate Cancer Paternal Grandfather      No Known Problems Brother      No Known Problems Sister      No Known Problems Sister      Kidney Disease Maternal Uncle         kidney stones       Psychosocial history:  reports that she has never smoked. She has never used smokeless tobacco. She reports current alcohol use. She reports that she does not use drugs.    Review of systems: negative, negative for, exertional chest pain or pressure, paroxysmal nocturnal dyspnea, orthopnea, lower extremity edema, syncope or near-syncope and claudication    In addition,   General: No change in weight, sleep or appetite.  Normal energy.  No fever or chills  Eyes: Negative for vision changes or eye problems  ENT: No problems with ears, nose or throat.  No difficulty swallowing.  Resp: No coughing, wheezing or shortness of breath  GI: No nausea, vomiting,  heartburn, abdominal pain, diarrhea, constipation or change in bowel habits. Recent surgery for ulcer  : No urinary frequency or dysuria, bladder or kidney problems  Musculoskeletal: No significant muscle or joint pains  Neurologic: No headaches, numbness, tingling, weakness, problems with balance or coordination  Psychiatric: No problems with anxiety, depression or mental health  Heme/immune/allergy: No history of bleeding or clotting problems or anemia.  Seasonal allergies  Endocrine: No history of thyroid disease, diabetes or other endocrine disorders  Skin: No rashes,worrisome lesions or skin problems  Vascular:  No claudication, lifestyle limiting or otherwise; no ischemic rest pain; no non-healing ulcers. No weakness, No loss of sensation        Physical examination  Vitals: /68 (BP Location: Right arm, Patient Position: Chair, Cuff Size: Adult Regular)   Pulse 77   Ht 1.633 m (5' 4.29\")   Wt 54.4 kg (120 lb)   LMP " 06/15/2022 (Exact Date)   SpO2 100%   BMI 20.41 kg/m    BMI= Body mass index is 20.41 kg/m .    In general, the patient is a pleasant female in no apparent distress.    HEENT: Normiocephalic and atraumatic.  PERRLA.  EOMI.  Sclerae white, not injected.  Nares clear.  Pharynx without erythema or exudate.  Dentition intact.    Neck: No adenopathy.  No thyromegaly. Carotids +2/2 bilaterally without bruits.  No jugular venous distension.   Heart:  The PMI is in the 5th ICS in the midclavicular line. There is no heave. Regular rate and rhythm. Normal S1, S2 splits physiologically. No murmur, rub, click, or gallop.    Lungs: Clear to asculation.  No ronchi, wheezes, rales.  No dullness to percussion.   Abdomen: Soft, nontender, nondistended. No organomegaly. No AAA.  No bruits.   Extremities: No clubbing, cyanosis, or edema. The pulses were intact bilaterally.   Neurological: The neurological examination reveal a patient who was oriented to person, place, and time.  The remainder of the examination was nonfocal.    Cardiac tests include:    Echo - normal EF  ECG - normal sinus, normal axis, intervals, nonspecific ST changes.    Assessment and Plan    1. Palpitations - likely benign with no family history of sudden death  - check TSH  - check ziopatch  2, Dyspnea - EF normal  - check stress test  - anemia may play a role    The patient is to return  prn. The patient understood the treatment plan as outlined above.  There were no barriers to learning.      Sourav Simons MD

## 2022-08-02 NOTE — NURSING NOTE
Chief Complaint   Patient presents with     New Patient     New Cardiology- NEW cardiology referral     Vitals were taken and medications reconciled.    MICHELLE Torres  7:24 AM

## 2022-08-05 ENCOUNTER — MYC MEDICAL ADVICE (OUTPATIENT)
Dept: PEDIATRICS | Facility: CLINIC | Age: 22
End: 2022-08-05

## 2022-08-05 DIAGNOSIS — K64.4 EXTERNAL HEMORRHOIDS: Primary | ICD-10-CM

## 2022-08-11 ENCOUNTER — HOSPITAL ENCOUNTER (OUTPATIENT)
Dept: CARDIOLOGY | Facility: CLINIC | Age: 22
Discharge: HOME | End: 2022-08-11
Attending: INTERNAL MEDICINE | Admitting: INTERNAL MEDICINE
Payer: COMMERCIAL

## 2022-08-11 DIAGNOSIS — R00.2 PALPITATIONS: ICD-10-CM

## 2022-08-11 DIAGNOSIS — R06.02 SHORTNESS OF BREATH: ICD-10-CM

## 2022-08-11 PROCEDURE — 93350 STRESS TTE ONLY: CPT | Mod: 26 | Performed by: STUDENT IN AN ORGANIZED HEALTH CARE EDUCATION/TRAINING PROGRAM

## 2022-08-11 PROCEDURE — 93018 CV STRESS TEST I&R ONLY: CPT | Performed by: STUDENT IN AN ORGANIZED HEALTH CARE EDUCATION/TRAINING PROGRAM

## 2022-08-11 PROCEDURE — 999N000208 ECHO STRESS ECHOCARDIOGRAM

## 2022-08-11 PROCEDURE — 93016 CV STRESS TEST SUPVJ ONLY: CPT | Performed by: STUDENT IN AN ORGANIZED HEALTH CARE EDUCATION/TRAINING PROGRAM

## 2022-08-11 PROCEDURE — 93321 DOPPLER ECHO F-UP/LMTD STD: CPT | Mod: 26 | Performed by: STUDENT IN AN ORGANIZED HEALTH CARE EDUCATION/TRAINING PROGRAM

## 2022-08-11 PROCEDURE — 255N000002 HC RX 255 OP 636: Performed by: STUDENT IN AN ORGANIZED HEALTH CARE EDUCATION/TRAINING PROGRAM

## 2022-08-11 PROCEDURE — C8928 TTE W OR W/O FOL W/CON,STRES: HCPCS

## 2022-08-11 PROCEDURE — 93325 DOPPLER ECHO COLOR FLOW MAPG: CPT | Mod: 26 | Performed by: STUDENT IN AN ORGANIZED HEALTH CARE EDUCATION/TRAINING PROGRAM

## 2022-08-11 PROCEDURE — 93321 DOPPLER ECHO F-UP/LMTD STD: CPT | Mod: TC

## 2022-08-11 RX ADMIN — PERFLUTREN 5 ML: 6.52 INJECTION, SUSPENSION INTRAVENOUS at 10:21

## 2022-08-19 ENCOUNTER — TRANSFERRED RECORDS (OUTPATIENT)
Dept: HEALTH INFORMATION MANAGEMENT | Facility: CLINIC | Age: 22
End: 2022-08-19

## 2022-08-22 ENCOUNTER — TELEPHONE (OUTPATIENT)
Dept: DERMATOLOGY | Facility: CLINIC | Age: 22
End: 2022-08-22

## 2022-08-22 NOTE — TELEPHONE ENCOUNTER
[facility] Prior Authorization Team   Phone: [phone]  Fax: [phone]    PA Initiation    Medication: Dupixent  Insurance Company: [employer] [phone] Fax [phone]  Pharmacy Filling the Rx: [facility] - [place] [address]  Filling Pharmacy Phone: [phone]  Filling Pharmacy Fax: [phone]  Start Date: 8/22/2022          Prior Authorization Approval    Authorization Effective Date: 7/23/2022  Authorization Expiration Date: 12/20/2022  Medication: Dupixent  Approved Dose/Quantity: 4ml/28 days  Reference #: [id number]   Insurance Company: [employer] [phone] Fax [phone]  Expected CoPay: $0     CoPay Card Available: Yes    Foundation Assistance Needed:    Which Pharmacy is filling the prescription (Not needed for infusion/clinic administered): [facility] - [place] [address]  Pharmacy Notified: Yes  Patient Notified: Yes

## 2022-08-26 ENCOUNTER — OFFICE VISIT (OUTPATIENT)
Dept: URGENT CARE | Facility: URGENT CARE | Age: 22
End: 2022-08-26
Payer: COMMERCIAL

## 2022-08-26 VITALS
SYSTOLIC BLOOD PRESSURE: 136 MMHG | RESPIRATION RATE: 20 BRPM | OXYGEN SATURATION: 100 % | DIASTOLIC BLOOD PRESSURE: 68 MMHG | TEMPERATURE: 99.7 F | HEART RATE: 101 BPM

## 2022-08-26 DIAGNOSIS — R06.02 SOB (SHORTNESS OF BREATH): Primary | ICD-10-CM

## 2022-08-26 LAB
ANION GAP SERPL CALCULATED.3IONS-SCNC: 1 MMOL/L (ref 3–14)
BUN SERPL-MCNC: 7 MG/DL (ref 7–30)
CALCIUM SERPL-MCNC: 9 MG/DL (ref 8.5–10.1)
CHLORIDE BLD-SCNC: 105 MMOL/L (ref 94–109)
CO2 SERPL-SCNC: 27 MMOL/L (ref 20–32)
CREAT SERPL-MCNC: 0.8 MG/DL (ref 0.52–1.04)
ERYTHROCYTE [DISTWIDTH] IN BLOOD BY AUTOMATED COUNT: 18.7 % (ref 10–15)
GFR SERPL CREATININE-BSD FRML MDRD: >90 ML/MIN/1.73M2
GLUCOSE BLD-MCNC: 105 MG/DL (ref 70–99)
HCT VFR BLD AUTO: 33.9 % (ref 35–47)
HGB BLD-MCNC: 11.1 G/DL (ref 11.7–15.7)
MCH RBC QN AUTO: 26.1 PG (ref 26.5–33)
MCHC RBC AUTO-ENTMCNC: 32.7 G/DL (ref 31.5–36.5)
MCV RBC AUTO: 80 FL (ref 78–100)
PLATELET # BLD AUTO: 433 10E3/UL (ref 150–450)
POTASSIUM BLD-SCNC: 3.7 MMOL/L (ref 3.4–5.3)
RBC # BLD AUTO: 4.25 10E6/UL (ref 3.8–5.2)
SODIUM SERPL-SCNC: 133 MMOL/L (ref 133–144)
WBC # BLD AUTO: 6.9 10E3/UL (ref 4–11)

## 2022-08-26 PROCEDURE — 93000 ELECTROCARDIOGRAM COMPLETE: CPT | Performed by: PHYSICIAN ASSISTANT

## 2022-08-26 PROCEDURE — 99214 OFFICE O/P EST MOD 30 MIN: CPT | Performed by: PHYSICIAN ASSISTANT

## 2022-08-26 PROCEDURE — 36415 COLL VENOUS BLD VENIPUNCTURE: CPT | Performed by: PHYSICIAN ASSISTANT

## 2022-08-26 PROCEDURE — 80048 BASIC METABOLIC PNL TOTAL CA: CPT | Performed by: PHYSICIAN ASSISTANT

## 2022-08-26 PROCEDURE — 85027 COMPLETE CBC AUTOMATED: CPT | Performed by: PHYSICIAN ASSISTANT

## 2022-08-26 PROCEDURE — 84443 ASSAY THYROID STIM HORMONE: CPT | Performed by: PHYSICIAN ASSISTANT

## 2022-08-26 NOTE — PROGRESS NOTES
Assessment & Plan     1. SOB (shortness of breath)  22-year-old female presents to clinic for evaluation of shortness of breath.  On examination she appears well.  Vital signs are stable.  She is not tachycardic or hypoxic or tachypneic.  EKG is normal sinus rhythm.  Offered chest x-ray, patient Clines that she has had several of these with her history of shortness of breath.  No laboratory evidence of leukocytosis, renal insufficiency or electrolyte abnormalities. Hemoglobin is 11.1, which is GREATER that it has been in the past 4 months.   Low suspicion for PE as she has not had chest pain, pleurisy, leg pain/swelling travel etc. She is PERC negative.  I suspect that she has some intolerance to caffeine as her 3 episodes of SOB have all occurred after drinking espresso at a local coffee shops.   Advised her to drink Decaf coffee.   Discussed indications for follow-up including increased SOB, chest pain, hemoptysis, weakness, dizziness or lightheadedness.   - EKG 12-lead complete w/read - Clinics  - CBC with platelets; Future  - Basic metabolic panel  (Ca, Cl, CO2, Creat, Gluc, K, Na, BUN); Future  - TSH with free T4 reflex; Future  - CBC with platelets  - Basic metabolic panel  (Ca, Cl, CO2, Creat, Gluc, K, Na, BUN)  - TSH with free T4 reflex        Return in about 3 days (around 8/29/2022), or if symptoms worsen or fail to improve.    Diagnosis and treatment plan was reviewed with patient and/or family.   We went over any labs or imaging. Discussed worsening symptoms or little to no relief despite treatment plan to follow-up with PCP or return to clinic.  Patient verbalizes understanding. All questions were addressed and answered.     Jane Hartley PA-C  [facility]    CHIEF COMPLAINT:   Chief Complaint   Patient presents with     Shortness of Breath     Today, drank a mocha started getting lightheaded, heart racing, jittery-saw cardiology 8/2 and wore heart monitor-normal      Mark Lawler is a 22 year old female who presents to clinic today for evaluation of lightheadedness and shortness of breath. Patient reports that she was having coffee with her friend, and began to feel lightheaded. Shortly after she started to feel jittery, tachycardia and shaking hands.   She reports that she ate breakfast this AM. She has had a workup and seen pulmonology and Cardiology for her shortness of breath.   Denies having recent travel or surgery. No leg pain or swelling. No chest pain, pleurisy or hemoptysis.  She has had SOB in the past and this feels similar.       Past Medical History:   Diagnosis Date     Anemia 10/16/2012     COPD (chronic obstructive pulmonary disease) (H)      History of blood transfusion      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, s/p resection     Palpitations      Short gut syndrome 10/16/2012    ~ 20 cm of ileum as well as ileocecal valve resected in  period     Shortness of breath      Ulceration of intestine 2012     Uncomplicated asthma      Past Surgical History:   Procedure Laterality Date     COLONOSCOPY  2012     COLONOSCOPY  2012     COLONOSCOPY N/A 2022    Procedure: COLONOSCOPY, WITH BIOPSY;  Surgeon: Moisés Gupta MD;  Location: [facility] OR     LAPAROSCOPIC RESECTION SMALL BOWEL N/A 2022    Procedure: EXCISION, SMALL INTESTINE, LAPAROSCOPIC;  Surgeon: Frandy Mack MD;  Location: [facility]     LAPAROSCOPIC RESECTION SMALL BOWEL  2022    Procedure: ;  Surgeon: Frandy Mack MD;  Location: [facility] OR     RESECTION ILEOCECAL  1999    ~ 20 cm of ileum as well as ileocecal valve resected in  period     Social History     Tobacco Use     Smoking status: Never Smoker     Smokeless tobacco: Never Used     Tobacco comment: Non-smoking home   Substance Use Topics     Alcohol use: Yes     Comment: Social     Current Outpatient Medications   Medication     Cetirizine HCl (ZYRTEC  ALLERGY PO)     Cyanocobalamin (VITAMIN B-12) 50 MCG TABS     dupilumab (DUPIXENT) 300 MG/2ML prefilled pen     fluticasone-vilanterol (BREO ELLIPTA) 200-25 MCG/INH inhaler     hydrocortisone 2.5 % cream     methylcellulose (CITRUCEL) 500 MG TABS tablet     Multiple Vitamins-Iron (MULTIVITAMIN/IRON PO)     Probiotic Product (PRO-BIOTIC BLEND PO)     tacrolimus (PROTOPIC) 0.1 % external ointment     acetaminophen (TYLENOL) 325 MG tablet     albuterol (PROAIR HFA/PROVENTIL HFA/VENTOLIN HFA) 108 (90 Base) MCG/ACT inhaler     docusate sodium (COLACE) 100 MG capsule     dupilumab (DUPIXENT) 300 MG/2ML prefilled pen     Dupilumab 300 MG/2ML SOPN     ferrous sulfate (PATRIC-IN-SOL) 75 (15 FE) MG/ML oral drops     fexofenadine (ALLEGRA) 60 MG tablet     FLUARIX QUADRIVALENT 0.5 ML injection     fluticasone (FLONASE) 50 MCG/ACT nasal spray     ketoconazole (NIZORAL) 2 % external shampoo     Loratadine-Pseudoephedrine (CLARITIN-D 24 HOUR PO)     mometasone (ELOCON) 0.1 % external solution     terconazole (TERAZOL 3) 80 MG vaginal suppository     triamcinolone (KENALOG) 0.1 % external ointment     VENTOLIN  (90 Base) MCG/ACT inhaler     vitamin C (ASCORBIC ACID) 250 MG tablet     No current facility-administered medications for this visit.     Allergies   Allergen Reactions     No Known Allergies        10 point ROS of systems were all negative except for pertinent positives noted in my HPI.      Exam:   /68 (BP Location: Right arm, Patient Position: Sitting, Cuff Size: Adult Regular)   Pulse 101   Temp 99.7  F (37.6  C) (Tympanic)   Resp 20   SpO2 100%   Constitutional: healthy, alert and no distress  Head: Normocephalic, atraumatic.  Eyes: conjunctiva clear, no drainage  ENT: TMs clear and shiny victorino, nasal mucosa pink and moist, throat without tonsillar hypertrophy or erythema  Neck: neck is supple, no cervical lymphadenopathy or nuchal rigidity  Cardiovascular: RRR  Respiratory: CTA bilaterally, no rhonchi or  rales  Gastrointestinal: soft and nontender  Skin: no rashes  Neurologic: Speech clear, gait normal. Moves all extremities.    Results for orders placed or performed in visit on 08/26/22   CBC with platelets     Status: Abnormal   Result Value Ref Range    WBC Count 6.9 4.0 - 11.0 10e3/uL    RBC Count 4.25 3.80 - 5.20 10e6/uL    Hemoglobin 11.1 (L) 11.7 - 15.7 g/dL    Hematocrit 33.9 (L) 35.0 - 47.0 %    MCV 80 78 - 100 fL    MCH 26.1 (L) 26.5 - 33.0 pg    MCHC 32.7 31.5 - 36.5 g/dL    RDW 18.7 (H) 10.0 - 15.0 %    Platelet Count 433 150 - 450 10e3/uL    Narrative    Reviewed, ok with previous.   Basic metabolic panel  (Ca, Cl, CO2, Creat, Gluc, K, Na, BUN)     Status: Abnormal   Result Value Ref Range    Sodium 133 133 - 144 mmol/L    Potassium 3.7 3.4 - 5.3 mmol/L    Chloride 105 94 - 109 mmol/L    Carbon Dioxide (CO2) 27 20 - 32 mmol/L    Anion Gap 1 (L) 3 - 14 mmol/L    Urea Nitrogen 7 7 - 30 mg/dL    Creatinine 0.80 0.52 - 1.04 mg/dL    Calcium 9.0 8.5 - 10.1 mg/dL    Glucose 105 (H) 70 - 99 mg/dL    GFR Estimate >90 >60 mL/min/1.73m2       EKG -- normal sinus rhythm.

## 2022-08-27 LAB — TSH SERPL DL<=0.005 MIU/L-ACNC: 1.91 MU/L (ref 0.4–4)

## 2022-08-31 ENCOUNTER — OFFICE VISIT (OUTPATIENT)
Dept: SURGERY | Facility: CLINIC | Age: 22
End: 2022-08-31
Payer: COMMERCIAL

## 2022-08-31 VITALS
WEIGHT: 123.9 LBS | OXYGEN SATURATION: 100 % | HEIGHT: 64 IN | DIASTOLIC BLOOD PRESSURE: 75 MMHG | SYSTOLIC BLOOD PRESSURE: 111 MMHG | BODY MASS INDEX: 21.15 KG/M2 | HEART RATE: 70 BPM

## 2022-08-31 DIAGNOSIS — K64.4 ANAL SKIN TAG: Primary | ICD-10-CM

## 2022-08-31 PROCEDURE — 99213 OFFICE O/P EST LOW 20 MIN: CPT | Performed by: NURSE PRACTITIONER

## 2022-08-31 ASSESSMENT — PAIN SCALES - GENERAL: PAINLEVEL: NO PAIN (0)

## 2022-08-31 NOTE — NURSING NOTE
"Chief Complaint   Patient presents with     Hemorrhoids       Vitals:    08/31/22 1056   BP: 111/75   BP Location: Left arm   Patient Position: Sitting   Cuff Size: Adult Regular   Pulse: 70   SpO2: 100%   Weight: 123 lb 14.4 oz   Height: 5' 4\"       Body mass index is 21.27 kg/m .    Génesis Arreguin CMA    "

## 2022-08-31 NOTE — LETTER
"2022       RE: Nuris Reddy  [address] [place] [address]     Dear Colleague,    Thank you for referring your patient, Nuris Reddy, to the [facility] COLON AND RECTAL SURGERY CLINIC [place] [facility]. Please see a copy of my visit note below.    Colon and Rectal Surgery Follow-Up Clinic Note    RE: Nuris Reddy  : 2000  CHARLIE: 2022    Nuris Reddy is a very pleasant 22 year old female here for anal skin tag.    Interval history: Nuris reports having this anal skin tag for many years. She has trouble maintaining hygiene because of it and has occasional irritation. She reports 1-3 loose bowel movements daily. She recently started a daily fiber supplement. She saw a GI clinic in [place] who recommended surgical excision, and she presents to us today because she is established with Dr. Mack and would like to proceed with him.    Physical Examination: Exam was chaperoned by Dea Pacheco NP   /75 (BP Location: Left arm, Patient Position: Sitting, Cuff Size: Adult Regular)   Pulse 70   Ht 5' 4\"   Wt 123 lb 14.4 oz   SpO2 100%   BMI 21.27 kg/m    General: alert, oriented, in no acute distress, sitting comfortably  HEENT: moist mucous membranes  Perianal external examination:  Perianal skin: Intact with no excoriation or lichenification.  Lesions: No evidence of an external lesion, nodularity, or induration in the perianal region.  Eversion of buttocks: There was not evidence of an anal fissure. Details: N/A.  Skin tags or external hemorrhoids: Yes: small wide based anal skin tag at the posterior perianal region.    Assessment/Plan: 22 year old female with an anal skin tag. We discussed the risks vs benefits of surgical excision, including pain, bleeding, infection, and recurrent skin tag. Discussed that we cannot promise a good cosmetic result and that she could get a skin tag from surgery " dyan Lawler voiced understanding and agreed to proceed with the procedure. Plan for an examination under anesthesia with excision of anal skin tag with Dr. Mack. Patient's questions were answered to her stated satisfaction and she is in agreement with this plan.     Medical history:  Past Medical History:   Diagnosis Date     Anemia 10/16/2012     COPD (chronic obstructive pulmonary disease) (H)      History of blood transfusion      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, s/p resection     Palpitations      Short gut syndrome 10/16/2012    ~ 20 cm of ileum as well as ileocecal valve resected in  period     Shortness of breath      Ulceration of intestine 2012     Uncomplicated asthma        Surgical history:  Past Surgical History:   Procedure Laterality Date     COLONOSCOPY  2012     COLONOSCOPY  2012     COLONOSCOPY N/A 2022    Procedure: COLONOSCOPY, WITH BIOPSY;  Surgeon: Moisés Gupta MD;  Location: [facility] OR     LAPAROSCOPIC RESECTION SMALL BOWEL N/A 2022    Procedure: EXCISION, SMALL INTESTINE, LAPAROSCOPIC;  Surgeon: Frandy Mack MD;  Location: [facility] OR     LAPAROSCOPIC RESECTION SMALL BOWEL  2022    Procedure: ;  Surgeon: Frandy Mack MD;  Location: [facility] OR     RESECTION ILEOCECAL  1999    ~ 20 cm of ileum as well as ileocecal valve resected in  period       Problem list:    Patient Active Problem List    Diagnosis Date Noted     Shortness of breath 2022     Priority: Medium     Palpitations 2022     Priority: Medium     Anastomotic ulcer 2022     Priority: Medium     Anemia, unspecified type 2022     Priority: Medium     Chronic eczema 2019     Priority: Medium     Chronic rhinitis 2016     Priority: Medium     Iron deficiency anemia 2013     Priority: Medium     Short gut syndrome 10/16/2012     Priority: Medium     Ulceration of intestine 2012     Priority:  Medium     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 diagnosed due to hematochezia.       Proteinuria 07/18/2012     Priority: Medium     Felt consistent with benign intermittent proteinuria or postural proteinuria. Yearly urinalysis and prot/cr ratio. Contact nephrology if the prot/cr is >0.2 or if there are any abnormalities noted in the urinalysis.         Other congenital anomalies of intestine      Priority: Medium     congenital ileal atresia, s/p resection         Medications:  Current Outpatient Medications   Medication Sig Dispense Refill     Cetirizine HCl (ZYRTEC ALLERGY PO) Take by mouth every morning       Cyanocobalamin (VITAMIN B-12) 50 MCG TABS Take 100 mcg by mouth every morning       fluticasone (FLONASE) 50 MCG/ACT nasal spray Spray 1-2 sprays into both nostrils daily 16 g 11     fluticasone-vilanterol (BREO ELLIPTA) 200-25 MCG/INH inhaler Inhale 1 puff into the lungs daily 60 each 3     methylcellulose (CITRUCEL) 500 MG TABS tablet Take 500 mg by mouth daily       Multiple Vitamins-Iron (MULTIVITAMIN/IRON PO) Take 1 tablet by mouth every morning       Probiotic Product (PRO-BIOTIC BLEND PO) Take by mouth as needed       tacrolimus (PROTOPIC) 0.1 % external ointment Apply to face daily in rash areas (Patient taking differently: as needed Apply to face daily in rash areas) 30 g 11     acetaminophen (TYLENOL) 325 MG tablet Take 3 tablets (975 mg) by mouth every 8 hours as needed for mild pain (Patient not taking: No sig reported) 30 tablet 0     albuterol (PROAIR HFA/PROVENTIL HFA/VENTOLIN HFA) 108 (90 Base) MCG/ACT inhaler Inhale 1-2 puffs into the lungs every 4 hours as needed for shortness of breath / dyspnea or wheezing (Patient not taking: No sig reported) 18 g 0     docusate sodium (COLACE) 100 MG capsule Take 1-2 capsules (100-200 mg) by mouth 2 times daily (Patient not taking: No sig reported) 120 capsule 11     dupilumab (DUPIXENT) 300 MG/2ML prefilled pen Inject 4 mLs (600 mg)  Subcutaneous See Admin Instructions for 1 dose (Patient not taking: No sig reported) 4 mL 0     dupilumab (DUPIXENT) 300 MG/2ML prefilled pen Inject 2 mLs (300 mg) Subcutaneous every 14 days (Patient not taking: Reported on 8/31/2022) 4 mL 2     Dupilumab 300 MG/2ML SOPN Inject 300 mg Subcutaneous every 14 days To start after 600 mg loading dose. (Patient not taking: No sig reported) 4 mL 11     ferrous sulfate (PATRIC-IN-SOL) 75 (15 FE) MG/ML oral drops Take 0.2 mLs (3 mg) by mouth daily (Patient not taking: No sig reported) 100 mL 1     fexofenadine (ALLEGRA) 60 MG tablet Take 60 mg by mouth 2 times daily as needed (Patient not taking: No sig reported)       FLUARIX QUADRIVALENT 0.5 ML injection ADM 0.5ML IM UTD (Patient not taking: No sig reported)       hydrocortisone 2.5 % cream To lips and face rash twice daily as needed for rash (Patient not taking: Reported on 8/31/2022) 30 g 1     ketoconazole (NIZORAL) 2 % external shampoo Use to shampoo twice weekly. Leave on 5 min then rinse. (Patient not taking: No sig reported) 120 mL 3     Loratadine-Pseudoephedrine (CLARITIN-D 24 HOUR PO)  (Patient not taking: No sig reported)       mometasone (ELOCON) 0.1 % external solution Apply small amount topically to affected areas of scalp daily prn for up to 2 weeks. (Patient not taking: No sig reported) 60 mL 0     terconazole (TERAZOL 3) 80 MG vaginal suppository Place 1 suppository (80 mg) vaginally At Bedtime (Patient not taking: No sig reported) 3 suppository 1     triamcinolone (KENALOG) 0.1 % external ointment Apply to face rash twice daily for the next two weeks until clear then twice daily as needed for up to 2 weeks. (Patient not taking: No sig reported) 30 g 1     VENTOLIN  (90 Base) MCG/ACT inhaler  (Patient not taking: No sig reported)  1     vitamin C (ASCORBIC ACID) 250 MG tablet Take 1 tablet (250 mg) by mouth daily (Patient not taking: No sig reported) 100 tablet 11       Allergies:  Allergies   Allergen  "Reactions     No Known Allergies        Family history:  Family History   Problem Relation Age of Onset     No Known Problems Mother      No Known Problems Father      Other Cancer Maternal Grandfather         skin cancer     Hypertension Paternal Grandmother      Hypertension Paternal Grandfather      Cardiovascular Paternal Grandfather         Valve condition     Prostate Cancer Paternal Grandfather      No Known Problems Brother      No Known Problems Sister      No Known Problems Sister      Kidney Disease Maternal Uncle         kidney stones       Social history:  Social History     Tobacco Use     Smoking status: Never Smoker     Smokeless tobacco: Never Used     Tobacco comment: Non-smoking home   Substance Use Topics     Alcohol use: Yes     Comment: Social     Marital status: single.    Nursing Notes:   Génesis Arreguin  8/31/2022 10:59 AM  Signed  Chief Complaint   Patient presents with     Hemorrhoids       Vitals:    08/31/22 1056   BP: 111/75   BP Location: Left arm   Patient Position: Sitting   Cuff Size: Adult Regular   Pulse: 70   SpO2: 100%   Weight: 123 lb 14.4 oz   Height: 5' 4\"       Body mass index is 21.27 kg/m .    Génesis Arreguin CMA        15 minutes spent on the date of encounter (excluding time performing procedures) performing chart review, history and exam, documentation and further activities as noted above.     -----------------------------------------------------  Kenya Mckay PA-C  Colon and Rectal Surgery  [facility]    Attestation with edits by Dea Cardona APRN CNP at 8/31/2022 12:48 PM:  Patient seen today, 08/31/22, with LEN Kenyon.  I agree with the dictation and have made any necessary changes.     KILLIAN Souza, NP-C  Colon and Rectal Surgery  [facility]  "

## 2022-08-31 NOTE — PROGRESS NOTES
"Colon and Rectal Surgery Follow-Up Clinic Note    RE: Nuris Reddy  : 2000  CHARLIE: 2022    Nuris Reddy is a very pleasant 22 year old female here for anal skin tag.    Interval history: Nuris reports having this anal skin tag for many years. She has trouble maintaining hygiene because of it and has occasional irritation. She reports 1-3 loose bowel movements daily. She recently started a daily fiber supplement. She saw a GI clinic in [place] who recommended surgical excision, and she presents to us today because she is established with Dr. Mack and would like to proceed with him.    Physical Examination: Exam was chaperoned by Dea Pacheco NP   /75 (BP Location: Left arm, Patient Position: Sitting, Cuff Size: Adult Regular)   Pulse 70   Ht 5' 4\"   Wt 123 lb 14.4 oz   SpO2 100%   BMI 21.27 kg/m    General: alert, oriented, in no acute distress, sitting comfortably  HEENT: moist mucous membranes  Perianal external examination:  Perianal skin: Intact with no excoriation or lichenification.  Lesions: No evidence of an external lesion, nodularity, or induration in the perianal region.  Eversion of buttocks: There was not evidence of an anal fissure. Details: N/A.  Skin tags or external hemorrhoids: Yes: small wide based anal skin tag at the posterior perianal region.    Assessment/Plan: 22 year old female with an anal skin tag. We discussed the risks vs benefits of surgical excision, including pain, bleeding, infection, and recurrent skin tag. Discussed that we cannot promise a good cosmetic result and that she could get a skin tag from surgery itself. Nuris voiced understanding and agreed to proceed with the procedure. Plan for an examination under anesthesia with excision of anal skin tag with Dr. Mack. Patient's questions were answered to her stated satisfaction and she is in agreement with this plan.     Medical history:  Past Medical History:   Diagnosis " Date    Anemia 10/16/2012    COPD (chronic obstructive pulmonary disease) (H)     History of blood transfusion     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, s/p resection    Palpitations     Short gut syndrome 10/16/2012    ~ 20 cm of ileum as well as ileocecal valve resected in  period    Shortness of breath     Ulceration of intestine 2012    Uncomplicated asthma        Surgical history:  Past Surgical History:   Procedure Laterality Date    COLONOSCOPY  2012    COLONOSCOPY  2012    COLONOSCOPY N/A 2022    Procedure: COLONOSCOPY, WITH BIOPSY;  Surgeon: Moisés Gupta MD;  Location: [facility] OR    LAPAROSCOPIC RESECTION SMALL BOWEL N/A 2022    Procedure: EXCISION, SMALL INTESTINE, LAPAROSCOPIC;  Surgeon: Frandy Mack MD;  Location: [facility] OR    LAPAROSCOPIC RESECTION SMALL BOWEL  2022    Procedure: ;  Surgeon: Frandy Mack MD;  Location: [facility] OR    RESECTION ILEOCECAL  1999    ~ 20 cm of ileum as well as ileocecal valve resected in  period       Problem list:    Patient Active Problem List    Diagnosis Date Noted    Shortness of breath 2022     Priority: Medium    Palpitations 2022     Priority: Medium    Anastomotic ulcer 2022     Priority: Medium    Anemia, unspecified type 2022     Priority: Medium    Chronic eczema 2019     Priority: Medium    Chronic rhinitis 2016     Priority: Medium    Iron deficiency anemia 2013     Priority: Medium    Short gut syndrome 10/16/2012     Priority: Medium    Ulceration of intestine 2012     Priority: Medium     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 diagnosed due to hematochezia.      Proteinuria 2012     Priority: Medium     Felt consistent with benign intermittent proteinuria or postural proteinuria. Yearly urinalysis and prot/cr ratio. Contact nephrology if the prot/cr is >0.2 or if there are any abnormalities noted in the  urinalysis.        Other congenital anomalies of intestine      Priority: Medium     congenital ileal atresia, s/p resection         Medications:  Current Outpatient Medications   Medication Sig Dispense Refill    Cetirizine HCl (ZYRTEC ALLERGY PO) Take by mouth every morning      Cyanocobalamin (VITAMIN B-12) 50 MCG TABS Take 100 mcg by mouth every morning      fluticasone (FLONASE) 50 MCG/ACT nasal spray Spray 1-2 sprays into both nostrils daily 16 g 11    fluticasone-vilanterol (BREO ELLIPTA) 200-25 MCG/INH inhaler Inhale 1 puff into the lungs daily 60 each 3    methylcellulose (CITRUCEL) 500 MG TABS tablet Take 500 mg by mouth daily      Multiple Vitamins-Iron (MULTIVITAMIN/IRON PO) Take 1 tablet by mouth every morning      Probiotic Product (PRO-BIOTIC BLEND PO) Take by mouth as needed      tacrolimus (PROTOPIC) 0.1 % external ointment Apply to face daily in rash areas (Patient taking differently: as needed Apply to face daily in rash areas) 30 g 11    acetaminophen (TYLENOL) 325 MG tablet Take 3 tablets (975 mg) by mouth every 8 hours as needed for mild pain (Patient not taking: No sig reported) 30 tablet 0    albuterol (PROAIR HFA/PROVENTIL HFA/VENTOLIN HFA) 108 (90 Base) MCG/ACT inhaler Inhale 1-2 puffs into the lungs every 4 hours as needed for shortness of breath / dyspnea or wheezing (Patient not taking: No sig reported) 18 g 0    docusate sodium (COLACE) 100 MG capsule Take 1-2 capsules (100-200 mg) by mouth 2 times daily (Patient not taking: No sig reported) 120 capsule 11    dupilumab (DUPIXENT) 300 MG/2ML prefilled pen Inject 4 mLs (600 mg) Subcutaneous See Admin Instructions for 1 dose (Patient not taking: No sig reported) 4 mL 0    dupilumab (DUPIXENT) 300 MG/2ML prefilled pen Inject 2 mLs (300 mg) Subcutaneous every 14 days (Patient not taking: Reported on 8/31/2022) 4 mL 2    Dupilumab 300 MG/2ML SOPN Inject 300 mg Subcutaneous every 14 days To start after 600 mg loading dose. (Patient not taking:  No sig reported) 4 mL 11    ferrous sulfate (PATRIC-IN-SOL) 75 (15 FE) MG/ML oral drops Take 0.2 mLs (3 mg) by mouth daily (Patient not taking: No sig reported) 100 mL 1    fexofenadine (ALLEGRA) 60 MG tablet Take 60 mg by mouth 2 times daily as needed (Patient not taking: No sig reported)      FLUARIX QUADRIVALENT 0.5 ML injection ADM 0.5ML IM UTD (Patient not taking: No sig reported)      hydrocortisone 2.5 % cream To lips and face rash twice daily as needed for rash (Patient not taking: Reported on 8/31/2022) 30 g 1    ketoconazole (NIZORAL) 2 % external shampoo Use to shampoo twice weekly. Leave on 5 min then rinse. (Patient not taking: No sig reported) 120 mL 3    Loratadine-Pseudoephedrine (CLARITIN-D 24 HOUR PO)  (Patient not taking: No sig reported)      mometasone (ELOCON) 0.1 % external solution Apply small amount topically to affected areas of scalp daily prn for up to 2 weeks. (Patient not taking: No sig reported) 60 mL 0    terconazole (TERAZOL 3) 80 MG vaginal suppository Place 1 suppository (80 mg) vaginally At Bedtime (Patient not taking: No sig reported) 3 suppository 1    triamcinolone (KENALOG) 0.1 % external ointment Apply to face rash twice daily for the next two weeks until clear then twice daily as needed for up to 2 weeks. (Patient not taking: No sig reported) 30 g 1    VENTOLIN  (90 Base) MCG/ACT inhaler  (Patient not taking: No sig reported)  1    vitamin C (ASCORBIC ACID) 250 MG tablet Take 1 tablet (250 mg) by mouth daily (Patient not taking: No sig reported) 100 tablet 11       Allergies:  Allergies   Allergen Reactions    No Known Allergies        Family history:  Family History   Problem Relation Age of Onset    No Known Problems Mother     No Known Problems Father     Other Cancer Maternal Grandfather         skin cancer    Hypertension Paternal Grandmother     Hypertension Paternal Grandfather     Cardiovascular Paternal Grandfather         Valve condition    Prostate Cancer  "Paternal Grandfather     No Known Problems Brother     No Known Problems Sister     No Known Problems Sister     Kidney Disease Maternal Uncle         kidney stones       Social history:  Social History     Tobacco Use    Smoking status: Never Smoker    Smokeless tobacco: Never Used    Tobacco comment: Non-smoking home   Substance Use Topics    Alcohol use: Yes     Comment: Social     Marital status: single.    Nursing Notes:   Génesis Arreguin  8/31/2022 10:59 AM  Signed  Chief Complaint   Patient presents with    Hemorrhoids       Vitals:    08/31/22 1056   BP: 111/75   BP Location: Left arm   Patient Position: Sitting   Cuff Size: Adult Regular   Pulse: 70   SpO2: 100%   Weight: 123 lb 14.4 oz   Height: 5' 4\"       Body mass index is 21.27 kg/m .    Génesis Arreguin CMA        15 minutes spent on the date of encounter (excluding time performing procedures) performing chart review, history and exam, documentation and further activities as noted above.     -----------------------------------------------------  Kenya Mckay PA-C  Colon and Rectal Surgery  [facility]  "

## 2022-09-01 ENCOUNTER — OFFICE VISIT (OUTPATIENT)
Dept: OTOLARYNGOLOGY | Facility: CLINIC | Age: 22
End: 2022-09-01
Attending: INTERNAL MEDICINE
Payer: COMMERCIAL

## 2022-09-01 DIAGNOSIS — J31.0 CHRONIC RHINITIS: ICD-10-CM

## 2022-09-01 DIAGNOSIS — R09.81 NASAL CONGESTION: Primary | ICD-10-CM

## 2022-09-01 PROCEDURE — 99243 OFF/OP CNSLTJ NEW/EST LOW 30: CPT | Performed by: OTOLARYNGOLOGY

## 2022-09-01 NOTE — PROGRESS NOTES
HPI: This patient is a 21yo F who presents for evaluation of nasal congestion at the request of Dr. Osei. The patient reports nasal congestion and clear rhinorrhea. There have not really been episodes of high fever, localized sinus pain, tooth pain, and pururlent drainage. Has a prescription for nasal steroids, and used for several months without much improvement; not using any nasal saline. Denies dental grinding/clenching. Has asthma, eczema. On Dupixent.     Past medical history, surgical history, social history, family history, medications, and allergies have been reviewed with the patient and are documented above.    Review of Systems: a 10-system review was performed. Pertinent positives are noted in the HPI and on a separate scanned document in the chart.    PHYSICAL EXAMINATION:  GEN: no acute distress, normocephalic  EYES: extraocular movements are intact, pupils are equal and round. Sclera clear.   EARS: auricles are normally formed. The external auditory canals are clear with minimal to no cerumen. Tympanic membranes are intact bilaterally with no signs of infection, effusion, retractions, or perforations.  NOSE: anterior nares are patent. There are no masses or lesions. The septum is non-obstructing.  OC/OP: clear, dentition is in good repair. The tongue and palate are fully mobile and symmetric. No masses or lesions.  NECK: soft and supple. No lymphadenopathy or masses. Airway is midline.  NEURO: CN VII and XII symmetric. alert and oriented. No spontaneous nystagmus. Gait is normal.  PULM: breathing comfortably on room air, normal chest expansion with respiration  CARDS: no cyanosis or clubbing. Normal carotid pulses.    MEDICAL DECISION-MAKING: This patient is a 21yo F with allergic rhinitis and congestion on a background of asthma and eczema already on Dupixent. Will order CT to assess the sinuses and will refer to Allergy.

## 2022-09-01 NOTE — LETTER
9/1/2022         RE: Nuris Reddy  [address] [place] [address]        Dear Colleague,    Thank you for referring your patient, Nuris Reddy, to the [facility]. Please see a copy of my visit note below.    HPI: This patient is a 23yo F who presents for evaluation of nasal congestion at the request of Dr. Osei. The patient reports nasal congestion and clear rhinorrhea. There have not really been episodes of high fever, localized sinus pain, tooth pain, and pururlent drainage. Has a prescription for nasal steroids, and used for several months without much improvement; not using any nasal saline. Denies dental grinding/clenching. Has asthma, eczema. On Dupixent.     Past medical history, surgical history, social history, family history, medications, and allergies have been reviewed with the patient and are documented above.    Review of Systems: a 10-system review was performed. Pertinent positives are noted in the HPI and on a separate scanned document in the chart.    PHYSICAL EXAMINATION:  GEN: no acute distress, normocephalic  EYES: extraocular movements are intact, pupils are equal and round. Sclera clear.   EARS: auricles are normally formed. The external auditory canals are clear with minimal to no cerumen. Tympanic membranes are intact bilaterally with no signs of infection, effusion, retractions, or perforations.  NOSE: anterior nares are patent. There are no masses or lesions. The septum is non-obstructing.  OC/OP: clear, dentition is in good repair. The tongue and palate are fully mobile and symmetric. No masses or lesions.  NECK: soft and supple. No lymphadenopathy or masses. Airway is midline.  NEURO: CN VII and XII symmetric. alert and oriented. No spontaneous nystagmus. Gait is normal.  PULM: breathing comfortably on room air, normal chest expansion with respiration  CARDS: no cyanosis or clubbing. Normal carotid pulses.    MEDICAL  DECISION-MAKING: This patient is a 23yo F with allergic rhinitis and congestion on a background of asthma and eczema already on Dupixent. Will order CT to assess the sinuses and will refer to Allergy.          Again, thank you for allowing me to participate in the care of your patient.        Sincerely,        Geraldine Peck MD

## 2022-09-12 ENCOUNTER — HOSPITAL ENCOUNTER (OUTPATIENT)
Dept: CT IMAGING | Facility: CLINIC | Age: 22
Discharge: HOME | End: 2022-09-12
Attending: OTOLARYNGOLOGY | Admitting: OTOLARYNGOLOGY
Payer: COMMERCIAL

## 2022-09-12 DIAGNOSIS — J31.0 CHRONIC RHINITIS: ICD-10-CM

## 2022-09-12 DIAGNOSIS — R09.81 NASAL CONGESTION: ICD-10-CM

## 2022-09-12 PROCEDURE — 70486 CT MAXILLOFACIAL W/O DYE: CPT

## 2022-09-13 ENCOUNTER — OFFICE VISIT (OUTPATIENT)
Dept: PULMONOLOGY | Facility: CLINIC | Age: 22
End: 2022-09-13
Attending: INTERNAL MEDICINE
Payer: COMMERCIAL

## 2022-09-13 VITALS — HEART RATE: 78 BPM | DIASTOLIC BLOOD PRESSURE: 69 MMHG | SYSTOLIC BLOOD PRESSURE: 106 MMHG | OXYGEN SATURATION: 98 %

## 2022-09-13 DIAGNOSIS — R06.02 SHORTNESS OF BREATH: Primary | ICD-10-CM

## 2022-09-13 PROCEDURE — G0463 HOSPITAL OUTPT CLINIC VISIT: HCPCS

## 2022-09-13 PROCEDURE — 99213 OFFICE O/P EST LOW 20 MIN: CPT | Performed by: INTERNAL MEDICINE

## 2022-09-13 ASSESSMENT — ENCOUNTER SYMPTOMS
SHORTNESS OF BREATH: 1
CLAUDICATION: 0
COUGH: 0
SPUTUM PRODUCTION: 0
PALPITATIONS: 0
ORTHOPNEA: 0
HEMOPTYSIS: 0
PND: 0
WHEEZING: 0

## 2022-09-13 ASSESSMENT — PAIN SCALES - GENERAL: PAINLEVEL: NO PAIN (0)

## 2022-09-13 NOTE — NURSING NOTE
Chief Complaint   Patient presents with     Follow Up     Follow up appt      Vitals were taken and medications were reconciled.     Carmen Woo RMA  11:53 AM

## 2022-09-13 NOTE — PROGRESS NOTES
Pulmonology Clinic Follow up Visit       Nuris Reddy MRN# [id number]   YOB: 2000 Age: 22 year old     Date of Visit: 9/13/2022 follow-up episodic dyspnea    Reason for Visit: Follow-up intermittent dyspnea          Assessment and Plan:         ## Episodic dyspnea  ## Possible asthma  ## Multiple allergies  She had no respiratory issues until she moved away to college when she began experiencing 2-3 episodes of bronchitis each winter.  Then in October 2022 she developed a respiratory infection which persisted for 2 weeks and required antibiotics to clear.  Following that time she is fully recovered except has recurrent episodes of dyspnea.  These episodes are interesting in that they only occur at rest, when falling asleep, or when walking at a normal pace.  She is able to run and play soccer without difficulty.  She has not had any wheezing or coughing.  She does have a somewhat atopic phenotype with eczema and some seasonal allergies.  She does note that she intermittently has allergic type reaction to her dog with rash, sneezing, rhinorrhea, and itchy eyes however she does not think that she has dyspnea during these times.  She had patch and prick testing in 7/2019 with positive results to cats, weeds, trees, and [place].  Her nocturnal and early morning dyspnea could be consistent with asthma as it does tend to be worse over these times, however the rest of her symptoms are quite atypical for asthma.  She does have a history of anemia secondary to anastomotic ulcer in her GI tract, however this would not explain dyspnea that only occurs at rest or mild exertion and not with hard exertion such as running and playing soccer.  She has had multiple episodes of palpitations associated with lightheadedness and dyspnea, and thinks that some of her dyspneic episodes may have been associated with a feeling of an abnormal heartbeat.    However she underwent cardiac evaluation including stress test and  cardiac monitor which were unrevealing.  Since she was last seen she was started on Dupixent by her dermatologist for allergic dermatitis and feels that her symptoms have improved since then.  She is also been using her Breo as needed which may be helping.      -Trial of using Breo scheduled rather than just as needed        Return to clinic: As needed    I personally spent 21 minutes on the date of the encounter doing chart review, history and exam, documentation and further activities per the note.      Farooq Palumbo M.D.  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    Last seen: 6/15/2022    She feels that her breathing is significantly improved since last time she was seen.  She did have an episode of allergy symptoms associated with some mild dyspnea while playing with her sisters dogs.  She also reports 1 episode of nocturnal dyspnea after playing soccer, though she has played soccer multiple times since then without any difficulty.  Her daytime dyspnea has completely resolved, though she continues to have nocturnal dyspnea approximately 1 time per night where she wakes up short of breath.  This lasts 15 to 20 minutes and then she is able to fall back asleep.  There is no coughing or wheezing associated with these episodes.              Review of Systems:     Review of Systems   Respiratory: Positive for shortness of breath. Negative for cough, hemoptysis, sputum production and wheezing.    Cardiovascular: Negative for chest pain, palpitations, orthopnea, claudication, leg swelling and PND.            Physical Examination:     /69 (BP  Location: Right arm, Patient Position: Chair, Cuff Size: Adult Regular)   Pulse 78   SpO2 98%     Physical Exam  Vitals and nursing note reviewed.   Constitutional:       Appearance: She is normal weight.   Cardiovascular:      Rate and Rhythm: Normal rate and regular rhythm.   Pulmonary:      Effort: Pulmonary effort is normal.      Breath sounds: Normal breath sounds. No wheezing, rhonchi or rales.   Musculoskeletal:      Right lower leg: No edema.      Left lower leg: No edema.   Neurological:      General: No focal deficit present.      Mental Status: She is alert and oriented to person, place, and time.       Fingernails without clubbing        Data:     No new data since last seen        Medications:     Current Outpatient Medications   Medication     Cetirizine HCl (ZYRTEC ALLERGY PO)     Cyanocobalamin (VITAMIN B-12) 50 MCG TABS     fluticasone (FLONASE) 50 MCG/ACT nasal spray     fluticasone-vilanterol (BREO ELLIPTA) 200-25 MCG/INH inhaler     methylcellulose (CITRUCEL) 500 MG TABS tablet     Multiple Vitamins-Iron (MULTIVITAMIN/IRON PO)     Probiotic Product (PRO-BIOTIC BLEND PO)     tacrolimus (PROTOPIC) 0.1 % external ointment     acetaminophen (TYLENOL) 325 MG tablet     albuterol (PROAIR HFA/PROVENTIL HFA/VENTOLIN HFA) 108 (90 Base) MCG/ACT inhaler     docusate sodium (COLACE) 100 MG capsule     dupilumab (DUPIXENT) 300 MG/2ML prefilled pen     dupilumab (DUPIXENT) 300 MG/2ML prefilled pen     Dupilumab 300 MG/2ML SOPN     ferrous sulfate (PATRIC-IN-SOL) 75 (15 FE) MG/ML oral drops     fexofenadine (ALLEGRA) 60 MG tablet     FLUARIX QUADRIVALENT 0.5 ML injection     hydrocortisone 2.5 % cream     ketoconazole (NIZORAL) 2 % external shampoo     Loratadine-Pseudoephedrine (CLARITIN-D 24 HOUR PO)     mometasone (ELOCON) 0.1 % external solution     terconazole (TERAZOL 3) 80 MG vaginal suppository     triamcinolone (KENALOG) 0.1 % external ointment     VENTOLIN  (90 Base) MCG/ACT inhaler     vitamin  C (ASCORBIC ACID) 250 MG tablet     No current facility-administered medications for this visit.             The above note was dictated using voice recognition software and may include typographical errors. Please contact the author for any clarifications.

## 2022-09-13 NOTE — LETTER
9/13/2022         RE: Nuris Reddy  [address] [place] [address]        Dear Colleague,    Thank you for referring your patient, Nuris Reddy, to the [facility] [place]. Please see a copy of my visit note below.    Pulmonology Clinic Follow up Visit       Nuris Reddy MRN# [id number]   YOB: 2000 Age: 22 year old     Date of Visit: 9/13/2022 follow-up episodic dyspnea    Reason for Visit: Follow-up intermittent dyspnea          Assessment and Plan:         ## Episodic dyspnea  ## Possible asthma  ## Multiple allergies  She had no respiratory issues until she moved away to college when she began experiencing 2-3 episodes of bronchitis each winter.  Then in October 2022 she developed a respiratory infection which persisted for 2 weeks and required antibiotics to clear.  Following that time she is fully recovered except has recurrent episodes of dyspnea.  These episodes are interesting in that they only occur at rest, when falling asleep, or when walking at a normal pace.  She is able to run and play soccer without difficulty.  She has not had any wheezing or coughing.  She does have a somewhat atopic phenotype with eczema and some seasonal allergies.  She does note that she intermittently has allergic type reaction to her dog with rash, sneezing, rhinorrhea, and itchy eyes however she does not think that she has dyspnea during these times.  She had patch and prick testing in 7/2019 with positive results to cats, weeds, trees, and [place].  Her nocturnal and early morning dyspnea could be consistent with asthma as it does tend to be worse over these times, however the rest of her symptoms are quite atypical for asthma.  She does have a history of anemia secondary to anastomotic ulcer in her GI tract, however this would not explain dyspnea that only occurs at rest or mild exertion and not with hard exertion such as running and  playing soccer.  She has had multiple episodes of palpitations associated with lightheadedness and dyspnea, and thinks that some of her dyspneic episodes may have been associated with a feeling of an abnormal heartbeat.    However she underwent cardiac evaluation including stress test and cardiac monitor which were unrevealing.  Since she was last seen she was started on Dupixent by her dermatologist for allergic dermatitis and feels that her symptoms have improved since then.  She is also been using her Breo as needed which may be helping.      -Trial of using Breo scheduled rather than just as needed        Return to clinic: As needed    I personally spent 21 minutes on the date of the encounter doing chart review, history and exam, documentation and further activities per the note.      Farooq Palumbo M.D.  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    Last seen: 6/15/2022    She feels that her breathing is significantly improved since last time she was seen.  She did have an episode of allergy symptoms associated with some mild dyspnea while playing with her sisters dogs.  She also reports 1 episode of nocturnal dyspnea after playing soccer, though she has played soccer multiple times since then without any difficulty.  Her daytime dyspnea has completely resolved, though she continues to have nocturnal dyspnea approximately 1 time per night where she wakes up short of breath.  This lasts 15 to 20 minutes and then she is able to fall back asleep.  There is no coughing or wheezing associated with these episodes.              Review of Systems:      Review of Systems   Respiratory: Positive for shortness of breath. Negative for cough, hemoptysis, sputum production and wheezing.    Cardiovascular: Negative for chest pain, palpitations, orthopnea, claudication, leg swelling and PND.            Physical Examination:     /69 (BP Location: Right arm, Patient Position: Chair, Cuff Size: Adult Regular)   Pulse 78   SpO2 98%     Physical Exam  Vitals and nursing note reviewed.   Constitutional:       Appearance: She is normal weight.   Cardiovascular:      Rate and Rhythm: Normal rate and regular rhythm.   Pulmonary:      Effort: Pulmonary effort is normal.      Breath sounds: Normal breath sounds. No wheezing, rhonchi or rales.   Musculoskeletal:      Right lower leg: No edema.      Left lower leg: No edema.   Neurological:      General: No focal deficit present.      Mental Status: She is alert and oriented to person, place, and time.       Fingernails without clubbing        Data:     No new data since last seen        Medications:     Current Outpatient Medications   Medication     Cetirizine HCl (ZYRTEC ALLERGY PO)     Cyanocobalamin (VITAMIN B-12) 50 MCG TABS     fluticasone (FLONASE) 50 MCG/ACT nasal spray     fluticasone-vilanterol (BREO ELLIPTA) 200-25 MCG/INH inhaler     methylcellulose (CITRUCEL) 500 MG TABS tablet     Multiple Vitamins-Iron (MULTIVITAMIN/IRON PO)     Probiotic Product (PRO-BIOTIC BLEND PO)     tacrolimus (PROTOPIC) 0.1 % external ointment     acetaminophen (TYLENOL) 325 MG tablet     albuterol (PROAIR HFA/PROVENTIL HFA/VENTOLIN HFA) 108 (90 Base) MCG/ACT inhaler     docusate sodium (COLACE) 100 MG capsule     dupilumab (DUPIXENT) 300 MG/2ML prefilled pen     dupilumab (DUPIXENT) 300 MG/2ML prefilled pen     Dupilumab 300 MG/2ML SOPN     ferrous sulfate (PATRIC-IN-SOL) 75 (15 FE) MG/ML oral drops     fexofenadine (ALLEGRA) 60 MG tablet     FLUARIX QUADRIVALENT 0.5 ML injection     hydrocortisone 2.5 % cream     ketoconazole  (NIZORAL) 2 % external shampoo     Loratadine-Pseudoephedrine (CLARITIN-D 24 HOUR PO)     mometasone (ELOCON) 0.1 % external solution     terconazole (TERAZOL 3) 80 MG vaginal suppository     triamcinolone (KENALOG) 0.1 % external ointment     VENTOLIN  (90 Base) MCG/ACT inhaler     vitamin C (ASCORBIC ACID) 250 MG tablet     No current facility-administered medications for this visit.             The above note was dictated using voice recognition software and may include typographical errors. Please contact the author for any clarifications.          Again, thank you for allowing me to participate in the care of your patient.        Sincerely,        Farooq Palumbo MD

## 2022-09-22 ENCOUNTER — TELEPHONE (OUTPATIENT)
Dept: OTOLARYNGOLOGY | Facility: CLINIC | Age: 22
End: 2022-09-22

## 2022-09-22 NOTE — TELEPHONE ENCOUNTER
----- Message from Geraldine Peck MD sent at 9/13/2022 12:57 PM CDT -----  Can you contact Nuris and inform her that her sinus scan reveals some normal variant anatomy, but no sinus disease? She should follow-up with Allergy.    teach back/(1) partially meets; needs review/practice/verbalization

## 2022-09-22 NOTE — TELEPHONE ENCOUNTER
Called pt and left a VM to call back about results.    [facility]      Annamaria Hearn RN  [facility]  ENT  [address] [address]  [email or web address]   Office:[phone]  Employed by [facility]

## 2022-09-26 NOTE — TELEPHONE ENCOUNTER
Spoke with Nuris and gave results listed below.    [facility]      Annamaria Hearn RN  [facility]  ENT  [address] [address]  [email or web address]   Office:[phone]  Employed by [facility]

## 2022-09-28 ENCOUNTER — OFFICE VISIT (OUTPATIENT)
Dept: PEDIATRICS | Facility: CLINIC | Age: 22
End: 2022-09-28
Payer: COMMERCIAL

## 2022-09-28 VITALS
WEIGHT: 122.4 LBS | BODY MASS INDEX: 20.39 KG/M2 | DIASTOLIC BLOOD PRESSURE: 64 MMHG | RESPIRATION RATE: 16 BRPM | OXYGEN SATURATION: 100 % | HEART RATE: 74 BPM | HEIGHT: 65 IN | SYSTOLIC BLOOD PRESSURE: 92 MMHG | TEMPERATURE: 98.4 F

## 2022-09-28 DIAGNOSIS — Z23 NEED FOR COVID-19 VACCINE: ICD-10-CM

## 2022-09-28 DIAGNOSIS — R80.9 PROTEINURIA, UNSPECIFIED TYPE: ICD-10-CM

## 2022-09-28 DIAGNOSIS — I49.3 PVC'S (PREMATURE VENTRICULAR CONTRACTIONS): ICD-10-CM

## 2022-09-28 DIAGNOSIS — K90.829 SHORT GUT SYNDROME: ICD-10-CM

## 2022-09-28 DIAGNOSIS — Z00.00 ROUTINE GENERAL MEDICAL EXAMINATION AT A HEALTH CARE FACILITY: Primary | ICD-10-CM

## 2022-09-28 DIAGNOSIS — Z13.220 SCREENING CHOLESTEROL LEVEL: ICD-10-CM

## 2022-09-28 LAB
FOLATE SERPL-MCNC: 17.6 NG/ML (ref 4.6–34.8)
HGB BLD-MCNC: 12.3 G/DL (ref 11.7–15.7)
VIT B12 SERPL-MCNC: 297 PG/ML (ref 232–1245)

## 2022-09-28 PROCEDURE — 82607 VITAMIN B-12: CPT | Performed by: INTERNAL MEDICINE

## 2022-09-28 PROCEDURE — 80061 LIPID PANEL: CPT | Performed by: INTERNAL MEDICINE

## 2022-09-28 PROCEDURE — 87086 URINE CULTURE/COLONY COUNT: CPT | Performed by: INTERNAL MEDICINE

## 2022-09-28 PROCEDURE — 36415 COLL VENOUS BLD VENIPUNCTURE: CPT | Performed by: INTERNAL MEDICINE

## 2022-09-28 PROCEDURE — 84443 ASSAY THYROID STIM HORMONE: CPT | Performed by: INTERNAL MEDICINE

## 2022-09-28 PROCEDURE — 99214 OFFICE O/P EST MOD 30 MIN: CPT | Mod: 25 | Performed by: INTERNAL MEDICINE

## 2022-09-28 PROCEDURE — 91312 COVID-19,PF,PFIZER BOOSTER BIVALENT: CPT | Performed by: INTERNAL MEDICINE

## 2022-09-28 PROCEDURE — 82746 ASSAY OF FOLIC ACID SERUM: CPT | Performed by: INTERNAL MEDICINE

## 2022-09-28 PROCEDURE — 99395 PREV VISIT EST AGE 18-39: CPT | Mod: 25 | Performed by: INTERNAL MEDICINE

## 2022-09-28 PROCEDURE — 84156 ASSAY OF PROTEIN URINE: CPT | Performed by: INTERNAL MEDICINE

## 2022-09-28 PROCEDURE — 0124A COVID-19,PF,PFIZER BOOSTER BIVALENT: CPT | Performed by: INTERNAL MEDICINE

## 2022-09-28 PROCEDURE — 85018 HEMOGLOBIN: CPT | Performed by: INTERNAL MEDICINE

## 2022-09-28 PROCEDURE — 81050 URINALYSIS VOLUME MEASURE: CPT | Performed by: INTERNAL MEDICINE

## 2022-09-28 SDOH — ECONOMIC STABILITY: FOOD INSECURITY: WITHIN THE PAST 12 MONTHS, YOU WORRIED THAT YOUR FOOD WOULD RUN OUT BEFORE YOU GOT MONEY TO BUY MORE.: NEVER TRUE

## 2022-09-28 SDOH — ECONOMIC STABILITY: INCOME INSECURITY: HOW HARD IS IT FOR YOU TO PAY FOR THE VERY BASICS LIKE FOOD, HOUSING, MEDICAL CARE, AND HEATING?: NOT HARD AT ALL

## 2022-09-28 SDOH — ECONOMIC STABILITY: FOOD INSECURITY: WITHIN THE PAST 12 MONTHS, THE FOOD YOU BOUGHT JUST DIDN'T LAST AND YOU DIDN'T HAVE MONEY TO GET MORE.: NEVER TRUE

## 2022-09-28 SDOH — ECONOMIC STABILITY: INCOME INSECURITY: IN THE LAST 12 MONTHS, WAS THERE A TIME WHEN YOU WERE NOT ABLE TO PAY THE MORTGAGE OR RENT ON TIME?: NO

## 2022-09-28 SDOH — HEALTH STABILITY: PHYSICAL HEALTH: ON AVERAGE, HOW MANY MINUTES DO YOU ENGAGE IN EXERCISE AT THIS LEVEL?: 40 MIN

## 2022-09-28 SDOH — HEALTH STABILITY: PHYSICAL HEALTH: ON AVERAGE, HOW MANY DAYS PER WEEK DO YOU ENGAGE IN MODERATE TO STRENUOUS EXERCISE (LIKE A BRISK WALK)?: 4 DAYS

## 2022-09-28 ASSESSMENT — ENCOUNTER SYMPTOMS
BREAST MASS: 0
FREQUENCY: 0
SHORTNESS OF BREATH: 1
HEMATOCHEZIA: 0
DIZZINESS: 0
ABDOMINAL PAIN: 0
HEADACHES: 0
DYSURIA: 0
NAUSEA: 0
PALPITATIONS: 1
CHILLS: 0
HEMATURIA: 0
EYE PAIN: 0
NERVOUS/ANXIOUS: 0
ARTHRALGIAS: 0
WEAKNESS: 0
JOINT SWELLING: 0
CONSTIPATION: 1
DIARRHEA: 1
COUGH: 0
FEVER: 0
PARESTHESIAS: 0
HEARTBURN: 0
MYALGIAS: 0
SORE THROAT: 0

## 2022-09-28 ASSESSMENT — PAIN SCALES - GENERAL: PAINLEVEL: NO PAIN (0)

## 2022-09-28 ASSESSMENT — SOCIAL DETERMINANTS OF HEALTH (SDOH)
ARE YOU MARRIED, WIDOWED, DIVORCED, SEPARATED, NEVER MARRIED, OR LIVING WITH A PARTNER?: NEVER MARRIED
HOW OFTEN DO YOU ATTEND CHURCH OR RELIGIOUS SERVICES?: 1 TO 4 TIMES PER YEAR
HOW OFTEN DO YOU GET TOGETHER WITH FRIENDS OR RELATIVES?: ONCE A WEEK
DO YOU BELONG TO ANY CLUBS OR ORGANIZATIONS SUCH AS CHURCH GROUPS UNIONS, FRATERNAL OR ATHLETIC GROUPS, OR SCHOOL GROUPS?: NO
IN A TYPICAL WEEK, HOW MANY TIMES DO YOU TALK ON THE PHONE WITH FAMILY, FRIENDS, OR NEIGHBORS?: MORE THAN THREE TIMES A WEEK

## 2022-09-28 ASSESSMENT — LIFESTYLE VARIABLES
HOW OFTEN DO YOU HAVE SIX OR MORE DRINKS ON ONE OCCASION: LESS THAN MONTHLY
SKIP TO QUESTIONS 9-10: 0
HOW OFTEN DO YOU HAVE A DRINK CONTAINING ALCOHOL: MONTHLY OR LESS
HOW MANY STANDARD DRINKS CONTAINING ALCOHOL DO YOU HAVE ON A TYPICAL DAY: 1 OR 2
AUDIT-C TOTAL SCORE: 2

## 2022-09-28 ASSESSMENT — ASTHMA QUESTIONNAIRES: ACT_TOTALSCORE: 21

## 2022-09-29 LAB
CHOLEST SERPL-MCNC: 155 MG/DL
FASTING STATUS PATIENT QL REPORTED: YES
HDLC SERPL-MCNC: 67 MG/DL
LDLC SERPL CALC-MCNC: 67 MG/DL
NONHDLC SERPL-MCNC: 88 MG/DL
TRIGL SERPL-MCNC: 103 MG/DL
TSH SERPL DL<=0.005 MIU/L-ACNC: 1.9 MU/L (ref 0.4–4)

## 2022-09-30 LAB
ALBUMIN MFR UR ELPH: 7.2 MG/DL
BACTERIA UR CULT: NO GROWTH
COLLECT DURATION TIME UR: 24 H
PROT 24H UR-MRATE: 122.4 MG/SPECIMEN
SPECIMEN VOL UR: 1700 ML

## 2022-10-25 ENCOUNTER — OFFICE VISIT (OUTPATIENT)
Dept: DERMATOLOGY | Facility: CLINIC | Age: 22
End: 2022-10-25
Payer: COMMERCIAL

## 2022-10-25 DIAGNOSIS — L20.84 INTRINSIC ATOPIC DERMATITIS: Primary | ICD-10-CM

## 2022-10-25 DIAGNOSIS — Z23 NEED FOR PROPHYLACTIC VACCINATION AND INOCULATION AGAINST INFLUENZA: ICD-10-CM

## 2022-10-25 DIAGNOSIS — L29.9 PRURITUS: ICD-10-CM

## 2022-10-25 PROCEDURE — 90471 IMMUNIZATION ADMIN: CPT | Performed by: DERMATOLOGY

## 2022-10-25 PROCEDURE — 99214 OFFICE O/P EST MOD 30 MIN: CPT | Mod: 25 | Performed by: DERMATOLOGY

## 2022-10-25 PROCEDURE — 90686 IIV4 VACC NO PRSV 0.5 ML IM: CPT | Performed by: DERMATOLOGY

## 2022-10-25 RX ORDER — RUXOLITINIB 15 MG/G
1 CREAM TOPICAL DAILY
Qty: 60 G | Refills: 3 | Status: SHIPPED | OUTPATIENT
Start: 2022-10-25 | End: 2023-01-20

## 2022-10-25 NOTE — PATIENT INSTRUCTIONS
Pediatric Dermatology  [facility]  [address]  1st [place]  [address]  Phone: [phone]    Pediatric & Adult Dermatology  [facility]  [address]   2nd Floor  [facility] [address]  [phone]                  General information: Dr. Mady Tena is a board-certified dermatologist with subspecialty certification in pediatric dermatology.     Scheduling and Nurse Triage: Dr. Tena sees pediatric patients on Mondays in [place] and adult and pediatric patients on Tuesdays in [place]. The remainder of the week she practices at the [facility]. Please call the above phone numbers to schedule or to talk to a nurse.     -For scheduling at the [place] or [place] locations, or to talk to the triage nurse please call the above phone number at the clinic where you were seen.     -For medication refills, please call your pharmacy.

## 2022-10-25 NOTE — PROGRESS NOTES
DERMATOLOGY CLINIC VISIT NOTE     Dermatology Problem List:  1. Intermittent photosensitive rxn, pruritic. Negative CARIE, VIKTORIA, B vitamins, zinc.   2. Seborrheic dermatitis  3. Atopic predisposition with intermediate and delayed hypersensitive dermatitis on the hands, lips, face, trunk, extremities  - allergy patch testing on 7/22/19  - atopic prick test on 7/22/19  4. Benign pigmented nevi      CHIEF COMPLAINT:  Followup dermatitis and skin check     HISTORY OF PRESENT ILLNESS:  Ms. Reddy is a 22-year-old female who returns to Dermatology Clinic for ongoing evaluation of severe atopic dermatitis. She restarted dupixent in late Aug. She has a new insurance plan and this may have better coverage for dupixent which she has had to stop in previous years after running out of assistance program. Notes some ongoing facial rash, and rash on the arms. She is wondering about allergy testing and if there is a way to prevent skin addiction to steroids. Using triamcinolone several days per week.      Patient Active Problem List   Diagnosis     Other congenital anomalies of intestine     Proteinuria     Ulceration of intestine     Short gut syndrome     Iron deficiency anemia     Chronic rhinitis     Chronic eczema     Anemia, unspecified type     Anastomotic ulcer     Shortness of breath     Palpitations       Allergies   Allergen Reactions     No Known Allergies        Current Outpatient Medications   Medication     acetaminophen (TYLENOL) 325 MG tablet     albuterol (PROAIR HFA/PROVENTIL HFA/VENTOLIN HFA) 108 (90 Base) MCG/ACT inhaler     Cetirizine HCl (ZYRTEC ALLERGY PO)     Cyanocobalamin (VITAMIN B-12) 50 MCG TABS     docusate sodium (COLACE) 100 MG capsule     dupilumab (DUPIXENT) 300 MG/2ML prefilled pen     dupilumab (DUPIXENT) 300 MG/2ML prefilled pen     dupilumab (DUPIXENT) 300 MG/2ML prefilled syringe     Dupilumab 300 MG/2ML SOPN     ferrous sulfate (PATRIC-IN-SOL) 75 (15 FE) MG/ML oral drops     fexofenadine  (ALLEGRA) 60 MG tablet     FLUARIX QUADRIVALENT 0.5 ML injection     fluticasone (FLONASE) 50 MCG/ACT nasal spray     fluticasone-vilanterol (BREO ELLIPTA) 200-25 MCG/INH inhaler     hydrocortisone 2.5 % cream     ketoconazole (NIZORAL) 2 % external shampoo     Loratadine-Pseudoephedrine (CLARITIN-D 24 HOUR PO)     methylcellulose (CITRUCEL) 500 MG TABS tablet     mometasone (ELOCON) 0.1 % external solution     Multiple Vitamins-Iron (MULTIVITAMIN/IRON PO)     Probiotic Product (PRO-BIOTIC BLEND PO)     tacrolimus (PROTOPIC) 0.1 % external ointment     terconazole (TERAZOL 3) 80 MG vaginal suppository     triamcinolone (KENALOG) 0.1 % external ointment     VENTOLIN  (90 Base) MCG/ACT inhaler     vitamin C (ASCORBIC ACID) 250 MG tablet     No current facility-administered medications for this visit.     SOCIAL HISTORY:  The patient took [employer] last spring. Applying to med school.      FAMILY HISTORY:  Sister with asthma.      PHYSICAL EXAMINATION:   Exam of the face, neck, arms, hands,. Normal except as follows:  - Pink scaling very thin plaques on the antecubital fossae  - Mild scaling of the eyelids and upper cheeks  - Scattered light brown macules on the nose, upper cheeks, shoulders, back    ASSESSMENT AND PLAN:   1.  Atopic dermatitis severe, with facial swelling. Patch testing unrevealing. Cleared on dupilumab, but now recurrent off of this medication. Resumed dosing at 300 mg every 2 weeks in Aug. Noted that while there are many online discussions of steroid addiction, this is not something that we see in practice.   -Recommended daily use of tacrolimus ointment which is non-steroidal.   -Given ongoing facial involvement will prescribed opzelura to use daily to the facial lesions.   -May continue to use triamcinolone bid to the arms until clear.   -Continue gentle skin cares.   -Referral placed to allergy given patient's concern about allergy trigger, although past testing has been unrevealing.      Patient to f/up in 1 year, sooner prn.     Mady Tena MD  Pediatric Dermatology Staff       cc:   Aliya Osei MD   [address] [place]  [address]

## 2022-10-25 NOTE — LETTER
10/25/2022      RE: Nuris Reddy  [address] [place] [address]     Dear Colleague,    Thank you for the opportunity to participate in the care of your patient, Nuris Reddy, at the [facility]. Please see a copy of my visit note below.      DERMATOLOGY CLINIC VISIT NOTE     Dermatology Problem List:  1. Intermittent photosensitive rxn, pruritic. Negative CARIE, VIKTORIA, B vitamins, zinc.   2. Seborrheic dermatitis  3. Atopic predisposition with intermediate and delayed hypersensitive dermatitis on the hands, lips, face, trunk, extremities  - allergy patch testing on 7/22/19  - atopic prick test on 7/22/19  4. Benign pigmented nevi      CHIEF COMPLAINT:  Followup dermatitis and skin check     HISTORY OF PRESENT ILLNESS:  Ms. Reddy is a 22-year-old female who returns to Dermatology Clinic for ongoing evaluation of severe atopic dermatitis. She restarted dupixent in late Aug. She has a new insurance plan and this may have better coverage for dupixent which she has had to stop in previous years after running out of assistance program. Notes some ongoing facial rash, and rash on the arms. She is wondering about allergy testing and if there is a way to prevent skin addiction to steroids. Using triamcinolone several days per week.      Patient Active Problem List   Diagnosis     Other congenital anomalies of intestine     Proteinuria     Ulceration of intestine     Short gut syndrome     Iron deficiency anemia     Chronic rhinitis     Chronic eczema     Anemia, unspecified type     Anastomotic ulcer     Shortness of breath     Palpitations       Allergies   Allergen Reactions     No Known Allergies        Current Outpatient Medications   Medication     acetaminophen (TYLENOL) 325 MG tablet     albuterol (PROAIR HFA/PROVENTIL HFA/VENTOLIN HFA) 108 (90 Base) MCG/ACT inhaler     Cetirizine HCl (ZYRTEC ALLERGY PO)     Cyanocobalamin  (VITAMIN B-12) 50 MCG TABS     docusate sodium (COLACE) 100 MG capsule     dupilumab (DUPIXENT) 300 MG/2ML prefilled pen     dupilumab (DUPIXENT) 300 MG/2ML prefilled pen     dupilumab (DUPIXENT) 300 MG/2ML prefilled syringe     Dupilumab 300 MG/2ML SOPN     ferrous sulfate (PATRIC-IN-SOL) 75 (15 FE) MG/ML oral drops     fexofenadine (ALLEGRA) 60 MG tablet     FLUARIX QUADRIVALENT 0.5 ML injection     fluticasone (FLONASE) 50 MCG/ACT nasal spray     fluticasone-vilanterol (BREO ELLIPTA) 200-25 MCG/INH inhaler     hydrocortisone 2.5 % cream     ketoconazole (NIZORAL) 2 % external shampoo     Loratadine-Pseudoephedrine (CLARITIN-D 24 HOUR PO)     methylcellulose (CITRUCEL) 500 MG TABS tablet     mometasone (ELOCON) 0.1 % external solution     Multiple Vitamins-Iron (MULTIVITAMIN/IRON PO)     Probiotic Product (PRO-BIOTIC BLEND PO)     tacrolimus (PROTOPIC) 0.1 % external ointment     terconazole (TERAZOL 3) 80 MG vaginal suppository     triamcinolone (KENALOG) 0.1 % external ointment     VENTOLIN  (90 Base) MCG/ACT inhaler     vitamin C (ASCORBIC ACID) 250 MG tablet     No current facility-administered medications for this visit.     SOCIAL HISTORY:  The patient took [employer] last spring. Applying to med school.      FAMILY HISTORY:  Sister with asthma.      PHYSICAL EXAMINATION:   Exam of the face, neck, arms, hands,. Normal except as follows:  - Pink scaling very thin plaques on the antecubital fossae  - Mild scaling of the eyelids and upper cheeks  - Scattered light brown macules on the nose, upper cheeks, shoulders, back    ASSESSMENT AND PLAN:   1.  Atopic dermatitis severe, with facial swelling. Patch testing unrevealing. Cleared on dupilumab, but now recurrent off of this medication. Resumed dosing at 300 mg every 2 weeks in Aug. Noted that while there are many online discussions of steroid addiction, this is not something that we see in practice.   -Recommended daily use of tacrolimus ointment which is  non-steroidal.   -Given ongoing facial involvement will prescribed opzelura to use daily to the facial lesions.   -May continue to use triamcinolone bid to the arms until clear.   -Continue gentle skin cares.   -Referral placed to allergy given patient's concern about allergy trigger, although past testing has been unrevealing.     Patient to f/up in 1 year, sooner prn.     Mady Tena MD  Pediatric Dermatology Staff       cc:   Aliya Osei MD   [address] [place]  [address]

## 2022-11-06 DIAGNOSIS — K90.829 SHORT GUT SYNDROME: Primary | ICD-10-CM

## 2022-11-06 RX ORDER — LANOLIN ALCOHOL/MO/W.PET/CERES
1000 CREAM (GRAM) TOPICAL DAILY
Qty: 90 TABLET | Refills: 4 | Status: SHIPPED | OUTPATIENT
Start: 2022-11-06

## 2022-11-28 ENCOUNTER — MYC MEDICAL ADVICE (OUTPATIENT)
Dept: DERMATOLOGY | Facility: CLINIC | Age: 22
End: 2022-11-28

## 2022-11-29 ENCOUNTER — TELEPHONE (OUTPATIENT)
Dept: DERMATOLOGY | Facility: CLINIC | Age: 22
End: 2022-11-29

## 2022-11-29 NOTE — TELEPHONE ENCOUNTER
PA Initiation    Medication: Dupixent Renewal  Insurance Company: [employer] - Phone [phone] Fax [phone]  Pharmacy Filling the Rx:    Filling Pharmacy Phone:    Filling Pharmacy Fax:    Start Date: 11/29/2022    Key: [id number]

## 2022-12-02 NOTE — TELEPHONE ENCOUNTER
PRIOR AUTHORIZATION DENIED    Medication: Dupixent Renewal Denied    Denial Date: 12/2/2022    Denial Rational:     Appeal Information: Fax [phone]    Denial doesn't make sense.  Set up date for a renewal the day after this expires.  Set up an appeal to just get a renewal approval

## 2022-12-07 ENCOUNTER — TELEPHONE (OUTPATIENT)
Dept: SURGERY | Facility: CLINIC | Age: 22
End: 2022-12-07

## 2022-12-07 PROBLEM — K64.4 ANAL SKIN TAG: Status: ACTIVE | Noted: 2022-12-07

## 2022-12-07 NOTE — TELEPHONE ENCOUNTER
Spoke with patient via phone, completed the following scheduling, then sent Surgery Packet to patient via [employer] (per patient) including related scheduling information and instructions (no prep):     Required: Yes, you will need a  18 years or older to drive you home from your procedure as well as stay with you for 24 hours after your procedure    Surgery: EXAM UNDER ANESTHESIA, ANUS, EXCISION OF ANAL SKIN TAG    Date:  Tuesday February 7, 2023          Surgeon: Dr. Frandy Mack    Time: You will receive a call 1-3 days prior to your surgery with your finalized surgery and arrival time.     Location: [facility] - [address]--[address]th [place] [address] [phone]      Upcoming Appointments:     Pre-operative Physical appointment:   - Clinic appointment to be scheduled by you with your Primary Care Provider/ Clinic and to be completed within 30 days before your surgery date    Feb 27, 2023 10:00 AM  (Arrive by 9:45 AM)  Post Op with Kenya Mckay PA-C  [facility] [place] ([facility] ) [phone]    [address] [place] [address] 4th Floor [facility] [address]     Marlyn Gamboa  Gillian-op Coordinator  [facility]  Direct Phone: [phone]

## 2022-12-09 NOTE — TELEPHONE ENCOUNTER
MEDICATION APPEAL DENIED    Medication: Dupixent Renewal Denied    Denial Date: 12/9/2021    Denial Rational: PA not needed pt is currently on Dupixent    Second Level Appeal Information: N/A    Second level appeals will be managed by the clinic staff and provider. Please contact the [employer] Prior Authorization Team if additional information about the denial is needed.    No 2nd level appeal needed PA cant be started until 12/21

## 2022-12-23 DIAGNOSIS — L20.84 INTRINSIC ATOPIC DERMATITIS: ICD-10-CM

## 2022-12-27 RX ORDER — DUPILUMAB 300 MG/2ML
INJECTION, SOLUTION SUBCUTANEOUS
Qty: 4 ML | Refills: 4 | Status: SHIPPED | OUTPATIENT
Start: 2022-12-27 | End: 2023-05-04

## 2022-12-27 NOTE — TELEPHONE ENCOUNTER
DUPIXENT 300 MG / 2 ML PFS 2'S  Last Written Prescription Date:  10/18/22  Last Fill Quantity: 4 ml ,   # refills: 2  Last Office Visit : 10/25/22  Future Office visit:  None    Routing refill request to provider for review/approval because:    Seen at  DERMATOLOGY

## 2023-01-20 ENCOUNTER — OFFICE VISIT (OUTPATIENT)
Dept: PEDIATRICS | Facility: CLINIC | Age: 23
End: 2023-01-20
Payer: COMMERCIAL

## 2023-01-20 ENCOUNTER — TELEPHONE (OUTPATIENT)
Dept: SURGERY | Facility: CLINIC | Age: 23
End: 2023-01-20

## 2023-01-20 VITALS
RESPIRATION RATE: 16 BRPM | BODY MASS INDEX: 20.37 KG/M2 | HEIGHT: 64 IN | HEART RATE: 74 BPM | DIASTOLIC BLOOD PRESSURE: 64 MMHG | OXYGEN SATURATION: 100 % | SYSTOLIC BLOOD PRESSURE: 110 MMHG | TEMPERATURE: 98.3 F | WEIGHT: 119.3 LBS

## 2023-01-20 DIAGNOSIS — K90.829 SHORT GUT SYNDROME: ICD-10-CM

## 2023-01-20 DIAGNOSIS — K64.4 ANAL SKIN TAG: ICD-10-CM

## 2023-01-20 DIAGNOSIS — Z01.818 PREOP GENERAL PHYSICAL EXAM: Primary | ICD-10-CM

## 2023-01-20 PROCEDURE — 90471 IMMUNIZATION ADMIN: CPT | Performed by: NURSE PRACTITIONER

## 2023-01-20 PROCEDURE — 90715 TDAP VACCINE 7 YRS/> IM: CPT | Performed by: NURSE PRACTITIONER

## 2023-01-20 PROCEDURE — 99214 OFFICE O/P EST MOD 30 MIN: CPT | Mod: 25 | Performed by: NURSE PRACTITIONER

## 2023-01-20 ASSESSMENT — PAIN SCALES - GENERAL: PAINLEVEL: NO PAIN (0)

## 2023-01-20 NOTE — PATIENT INSTRUCTIONS
Hold turmeric a week before surgery    For informational purposes only. Not to replace the advice of your health care provider. Copyright   ,  [employer] [facility]. All rights reserved. Clinically reviewed by Sabina Hoyos MD. [place] [id number] - REV .  Preparing for Your Surgery  Getting started  A nurse will call you to review your health history and instructions. They will give you an arrival time based on your scheduled surgery time. Please be ready to share:  Your doctor's clinic name and phone number  Your medical, surgical, and anesthesia history  A list of allergies and sensitivities  A list of medicines, including herbal treatments and over-the-counter drugs  Whether the patient has a legal guardian (ask how to send us the papers in advance)  Please tell us if you're pregnant--or if there's any chance you might be pregnant. Some surgeries may injure a fetus (unborn baby), so they require a pregnancy test. Surgeries that are safe for a fetus don't always need a test, and you can choose whether to have one.   If you have a child who's having surgery, please ask for a copy of Preparing for Your Child's Surgery.    Preparing for surgery  Within 10 to 30 days of surgery: Have a pre-op exam (sometimes called an H&P, or History and Physical). This can be done at a clinic or pre-operative center.  If you're having a , you may not need this exam. Talk to your care team.  At your pre-op exam, talk to your care team about all medicines you take. If you need to stop any medicines before surgery, ask when to start taking them again.  We do this for your safety. Many medicines can make you bleed too much during surgery. Some change how well surgery (anesthesia) drugs work.  Call your insurance company to let them know you're having surgery. (If you don't have insurance, call [phone].)  Call your clinic if there's any change in your health. This includes signs of a cold or flu (sore throat,  runny nose, cough, rash, fever). It also includes a scrape or scratch near the surgery site.  If you have questions on the day of surgery, call your hospital or surgery center.  Eating and drinking guidelines  For your safety: Unless your surgeon tells you otherwise, follow the guidelines below.  Eat and drink as usual until 8 hours before you arrive for surgery. After that, no food or milk.  Drink clear liquids until 2 hours before you arrive. These are liquids you can see through, like water, Gatorade, and Propel Water. They also include plain black coffee and tea (no cream or milk), candy, and breath mints. You can spit out gum when you arrive.  If you drink alcohol: Stop drinking it the night before surgery.  If your care team tells you to take medicine on the morning of surgery, it's okay to take it with a sip of water.  Preventing infection  Shower or bathe the night before and morning of your surgery. Follow the instructions your clinic gave you. (If no instructions, use regular soap.)  Don't shave or clip hair near your surgery site. We'll remove the hair if needed.  Don't smoke or vape the morning of surgery. You may chew nicotine gum up to 2 hours before surgery. A nicotine patch is okay.  Note: Some surgeries require you to completely quit smoking and nicotine. Check with your surgeon.  Your care team will make every effort to keep you safe from infection. We will:  Clean our hands often with soap and water (or an alcohol-based hand rub).  Clean the skin at your surgery site with a special soap that kills germs.  Give you a special gown to keep you warm. (Cold raises the risk of infection.)  Wear special hair covers, masks, gowns and gloves during surgery.  Give antibiotic medicine, if prescribed. Not all surgeries need antibiotics.  What to bring on the day of surgery  Photo ID and insurance card  Copy of your health care directive, if you have one  Glasses and hearing aids (bring cases)  You can't wear  contacts during surgery  Inhaler and eye drops, if you use them (tell us about these when you arrive)  CPAP machine or breathing device, if you use them  A few personal items, if spending the night  If you have . . .  A pacemaker, ICD (cardiac defibrillator) or other implant: Bring the ID card.  An implanted stimulator: Bring the remote control.  A legal guardian: Bring a copy of the certified (court-stamped) guardianship papers.  Please remove any jewelry, including body piercings. Leave jewelry and other valuables at home.  If you're going home the day of surgery  You must have a responsible adult drive you home. They should stay with you overnight as well.  If you don't have someone to stay with you, and you aren't safe to go home alone, we may keep you overnight. Insurance often won't pay for this.  After surgery  If it's hard to control your pain or you need more pain medicine, please call your surgeon's office.  Questions?   If you have any questions for your care team, list them here: _________________________________________________________________________________________________________________________________________________________________________ ____________________________________ ____________________________________ ____________________________________

## 2023-01-20 NOTE — PROGRESS NOTES
[facility] [place]  [address]  [address]  [place] [address]  Phone: [phone]  Fax: [phone]  Primary Provider: Aliya Osei  Pre-op Performing Provider: CONNOR PATTON      PREOPERATIVE EVALUATION:  Today's date: 1/20/2023    Nuris Reddy is a 22 year old female who presents for a preoperative evaluation.    Surgical Information:  Surgery/Procedure: Exam under anesthesia, Anus excision of anal skin tag.  Surgery Location: [facility]  Surgeon: Dr Mack  Surgery Date: 2/7/23  Time of Surgery: not determined yet  Where patient plans to recover: At home with family  Fax number for surgical facility: Note does not need to be faxed, will be available electronically in Epic.    Type of Anesthesia Anticipated: Local with MAC    Assessment & Plan     The proposed surgical procedure is considered INTERMEDIATE risk.    (Z01.818) Preop general physical exam  (primary encounter diagnosis)  (K64.4) Anal skin tag    (K91.2) Short gut syndrome  Comment: Of note, has had several inches of her large intestine removed.            Risks and Recommendations:  The patient has the following additional risks and recommendations for perioperative complications:   - No identified additional risk factors other than previously addressed    Medication Instructions:  Hold tumeric 1 week before surgery    RECOMMENDATION:  APPROVAL GIVEN to proceed with proposed procedure, without further diagnostic evaluation.            Subjective     HPI related to upcoming procedure:     Preop Questions 1/19/2023   1. Have you ever had a heart attack or stroke? No   2. Have you ever had surgery on your heart or blood vessels, such as a stent placement, a coronary artery bypass, or surgery on an artery in your head, neck, heart, or legs? No   3. Do you have chest pain with activity? No   4. Do you have a history of  heart failure? No   5. Do you currently  have a cold, bronchitis or symptoms of other infection? No   6. Do you have a cough, shortness of breath, or wheezing? YES - asthma   7. Do you or anyone in your family have previous history of blood clots? No   8. Do you or does anyone in your family have a serious bleeding problem such as prolonged bleeding following surgeries or cuts? No   9. Have you ever had problems with anemia or been told to take iron pills? YES - past   10. Have you had any abnormal blood loss such as black, tarry or bloody stools, or abnormal vaginal bleeding? YES - past   11. Have you ever had a blood transfusion? YES -    11a. Have you ever had a transfusion reaction? No   12. Are you willing to have a blood transfusion if it is medically needed before, during, or after your surgery? Yes   13. Have you or any of your relatives ever had problems with anesthesia? No   14. Do you have sleep apnea, excessive snoring or daytime drowsiness? No   15. Do you have any artifical heart valves or other implanted medical devices like a pacemaker, defibrillator, or continuous glucose monitor? No   16. Do you have artificial joints? No   17. Are you allergic to latex? No   18. Is there any chance that you may be pregnant? No       Preoperative Review of :   reviewed - no record of controlled substances prescribed.          Review of Systems  Constitutional, neuro, ENT, endocrine, pulmonary, cardiac, gastrointestinal, genitourinary, musculoskeletal, integument and psychiatric systems are negative, except as otherwise noted.    Patient Active Problem List    Diagnosis Date Noted     Anal skin tag 12/07/2022     Priority: Medium     Added automatically from request for surgery [id number]       Shortness of breath 08/02/2022     Priority: Medium     Palpitations 08/02/2022     Priority: Medium     Anastomotic ulcer 05/19/2022     Priority: Medium     Anemia, unspecified type 05/09/2022     Priority: Medium     Chronic eczema 07/29/2019     Priority:  Medium     Chronic rhinitis 2016     Priority: Medium     Iron deficiency anemia 2013     Priority: Medium     Short gut syndrome 10/16/2012     Priority: Medium     Ulceration of intestine 2012     Priority: Medium     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 diagnosed due to hematochezia.       Proteinuria 2012     Priority: Medium     Felt consistent with benign intermittent proteinuria or postural proteinuria. Yearly urinalysis and prot/cr ratio. Contact nephrology if the prot/cr is >0.2 or if there are any abnormalities noted in the urinalysis.         Other congenital anomalies of intestine      Priority: Medium     congenital ileal atresia, s/p resection        Past Medical History:   Diagnosis Date     Anemia 10/16/2012     COPD (chronic obstructive pulmonary disease) (H)      History of blood transfusion      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, s/p resection     Palpitations      Short gut syndrome 10/16/2012    ~ 20 cm of ileum as well as ileocecal valve resected in  period     Shortness of breath      Ulceration of intestine 2012     Uncomplicated asthma      Past Surgical History:   Procedure Laterality Date     APPENDECTOMY  May 2022     COLONOSCOPY  2012     COLONOSCOPY  2012     COLONOSCOPY N/A 2022    Procedure: COLONOSCOPY, WITH BIOPSY;  Surgeon: Moisés Gupta MD;  Location: [facility] OR     GI SURGERY  2022    First surgery occurred 1999     LAPAROSCOPIC RESECTION SMALL BOWEL N/A 2022    Procedure: EXCISION, SMALL INTESTINE, LAPAROSCOPIC;  Surgeon: Frandy Mack MD;  Location: [facility] OR     LAPAROSCOPIC RESECTION SMALL BOWEL  2022    Procedure: ;  Surgeon: Frandy Mack MD;  Location: [facility] OR     RESECTION ILEOCECAL  1999    ~ 20 cm of ileum as well as ileocecal valve resected in  period     Current Outpatient Medications   Medication Sig Dispense Refill     Cetirizine  HCl (ZYRTEC ALLERGY PO) Take by mouth every morning       cyanocobalamin (VITAMIN B-12) 1000 MCG tablet Take 1 tablet (1,000 mcg) by mouth daily 90 tablet 4     dupilumab (DUPIXENT) 300 MG/2ML prefilled pen Inject 2 mLs (300 mg) Subcutaneous every 14 days 4 mL 4     dupilumab (DUPIXENT) 300 MG/2ML prefilled pen Inject 4 mLs (600 mg) Subcutaneous See Admin Instructions for 1 dose 4 mL 0     DUPIXENT 300 MG/2ML prefilled syringe INJECT THE CONTENTS OF 1 SYRINGE (300 MG) UNDER THE SKIN EVERY 14 DAYS 4 mL 4     fluticasone (FLONASE) 50 MCG/ACT nasal spray Spray 1-2 sprays into both nostrils daily 16 g 11     fluticasone-vilanterol (BREO ELLIPTA) 200-25 MCG/INH inhaler Inhale 1 puff into the lungs daily 60 each 3     ketoconazole (NIZORAL) 2 % external shampoo Use to shampoo twice weekly. Leave on 5 min then rinse. 120 mL 3     methylcellulose (CITRUCEL) 500 MG TABS tablet Take 500 mg by mouth daily       mometasone (ELOCON) 0.1 % external solution Apply small amount topically to affected areas of scalp daily prn for up to 2 weeks. 60 mL 0     Multiple Vitamins-Iron (MULTIVITAMIN/IRON PO) Take 1 tablet by mouth every morning       Probiotic Product (PRO-BIOTIC BLEND PO) Take by mouth as needed       tacrolimus (PROTOPIC) 0.1 % external ointment Apply to face daily in rash areas (Patient taking differently: as needed Apply to face daily in rash areas) 30 g 11     triamcinolone (KENALOG) 0.1 % external ointment Apply to face rash twice daily for the next two weeks until clear then twice daily as needed for up to 2 weeks. 30 g 1     TURMERIC PO        acetaminophen (TYLENOL) 325 MG tablet Take 3 tablets (975 mg) by mouth every 8 hours as needed for mild pain 30 tablet 0     albuterol (PROAIR HFA/PROVENTIL HFA/VENTOLIN HFA) 108 (90 Base) MCG/ACT inhaler Inhale 1-2 puffs into the lungs every 4 hours as needed for shortness of breath / dyspnea or wheezing 18 g 0     Cyanocobalamin (VITAMIN B-12) 50 MCG TABS Take 100 mcg by  "mouth every morning (Patient not taking: Reported on 1/20/2023)       docusate sodium (COLACE) 100 MG capsule Take 1-2 capsules (100-200 mg) by mouth 2 times daily 120 capsule 11     Dupilumab 300 MG/2ML SOPN Inject 300 mg Subcutaneous every 14 days To start after 600 mg loading dose. 4 mL 11     ferrous sulfate (PATRIC-IN-SOL) 75 (15 FE) MG/ML oral drops Take 0.2 mLs (3 mg) by mouth daily 100 mL 1     fexofenadine (ALLEGRA) 60 MG tablet Take 60 mg by mouth 2 times daily as needed       FLUARIX QUADRIVALENT 0.5 ML injection ADM 0.5ML IM UTD (Patient not taking: Reported on 8/2/2022)       hydrocortisone 2.5 % cream To lips and face rash twice daily as needed for rash 30 g 1     Loratadine-Pseudoephedrine (CLARITIN-D 24 HOUR PO)        Ruxolitinib Phosphate (OPZELURA) 1.5 % CREA Externally apply 1 Application topically daily To areas of atopic dermatitis (Patient not taking: Reported on 1/20/2023) 60 g 3     terconazole (TERAZOL 3) 80 MG vaginal suppository Place 1 suppository (80 mg) vaginally At Bedtime 3 suppository 1     VENTOLIN  (90 Base) MCG/ACT inhaler   1     vitamin C (ASCORBIC ACID) 250 MG tablet Take 1 tablet (250 mg) by mouth daily 100 tablet 11       Allergies   Allergen Reactions     No Known Allergies         Social History     Tobacco Use     Smoking status: Never     Smokeless tobacco: Never     Tobacco comments:     Non-smoking home   Substance Use Topics     Alcohol use: Yes     Comment: Social       History   Drug Use No         Objective     /64 (BP Location: Right arm, Patient Position: Sitting, Cuff Size: Adult Regular)   Pulse 74   Temp 98.3  F (36.8  C) (Tympanic)   Resp 16   Ht 1.626 m (5' 4\")   Wt 54.1 kg (119 lb 4.8 oz)   SpO2 100%   BMI 20.48 kg/m      Physical Exam    GENERAL APPEARANCE: healthy, alert and no distress     EYES: EOMI, PERRL     HENT: ear canals and TM's normal and nose and mouth without ulcers or lesions     NECK: no adenopathy, no asymmetry, masses, or " scars and thyroid normal to palpation     RESP: lungs clear to auscultation - no rales, rhonchi or wheezes     CV: regular rates and rhythm, normal S1 S2, no S3 or S4 and no murmur, click or rub     ABDOMEN:  soft, nontender, no HSM or masses and bowel sounds normal     MS: extremities normal- no gross deformities noted, no evidence of inflammation in joints, FROM in all extremities.     SKIN: no suspicious lesions or rashes     NEURO: Normal strength and tone, sensory exam grossly normal, mentation intact and speech normal     PSYCH: mentation appears normal. and affect normal/bright     LYMPHATICS: No cervical adenopathy    Recent Labs   Lab Test 09/28/22  1129 08/26/22  1404 06/15/22  1024 05/20/22  0738 05/16/22  0748 04/16/22  1506 02/26/22  1459   HGB 12.3 11.1* 10.5*   < > 10.2*   < > 11.2*   PLT  --  433 478*   < > 413   < > 469*   INR  --   --   --   --  1.08  --  1.08   NA  --  133 139   < > 142   < > 139   POTASSIUM  --  3.7 3.4   < > 3.7   < > 3.6   CR  --  0.80 0.78   < > 0.76   < > 0.96    < > = values in this interval not displayed.        Diagnostics:  No labs were ordered during this visit.   No EKG required, no history of coronary heart disease, significant arrhythmia, peripheral arterial disease or other structural heart disease.    Revised Cardiac Risk Index (RCRI):  The patient has the following serious cardiovascular risks for perioperative complications:   - No serious cardiac risks = 0 points     RCRI Interpretation: 0 points: Class I (very low risk - 0.4% complication rate)           Signed Electronically by: KILLIAN Adrian CNP  Copy of this evaluation report is provided to requesting physician.

## 2023-01-20 NOTE — TELEPHONE ENCOUNTER
Patient asked about sooner dates for this procedure, but none were available. Surgery scheduled on 2/7/2023.    Marlyn Gamboa  Gillian-op Coordinator  [facility]  Direct Phone: [phone]

## 2023-01-20 NOTE — H&P (VIEW-ONLY)
[facility] [place]  [address]  [address]  [place] [address]  Phone: [phone]  Fax: [phone]  Primary Provider: Aliya Osei  Pre-op Performing Provider: CONNOR PATTON      PREOPERATIVE EVALUATION:  Today's date: 1/20/2023    Nuris Reddy is a 22 year old female who presents for a preoperative evaluation.    Surgical Information:  Surgery/Procedure: Exam under anesthesia, Anus excision of anal skin tag.  Surgery Location: [facility]  Surgeon: Dr Mack  Surgery Date: 2/7/23  Time of Surgery: not determined yet  Where patient plans to recover: At home with family  Fax number for surgical facility: Note does not need to be faxed, will be available electronically in Epic.    Type of Anesthesia Anticipated: Local with MAC    Assessment & Plan     The proposed surgical procedure is considered INTERMEDIATE risk.    (Z01.818) Preop general physical exam  (primary encounter diagnosis)  (K64.4) Anal skin tag    (K91.2) Short gut syndrome  Comment: Of note, has had several inches of her large intestine removed.            Risks and Recommendations:  The patient has the following additional risks and recommendations for perioperative complications:   - No identified additional risk factors other than previously addressed    Medication Instructions:  Hold tumeric 1 week before surgery    RECOMMENDATION:  APPROVAL GIVEN to proceed with proposed procedure, without further diagnostic evaluation.            Subjective     HPI related to upcoming procedure:     Preop Questions 1/19/2023   1. Have you ever had a heart attack or stroke? No   2. Have you ever had surgery on your heart or blood vessels, such as a stent placement, a coronary artery bypass, or surgery on an artery in your head, neck, heart, or legs? No   3. Do you have chest pain with activity? No   4. Do you have a history of  heart failure? No   5. Do you currently  have a cold, bronchitis or symptoms of other infection? No   6. Do you have a cough, shortness of breath, or wheezing? YES - asthma   7. Do you or anyone in your family have previous history of blood clots? No   8. Do you or does anyone in your family have a serious bleeding problem such as prolonged bleeding following surgeries or cuts? No   9. Have you ever had problems with anemia or been told to take iron pills? YES - past   10. Have you had any abnormal blood loss such as black, tarry or bloody stools, or abnormal vaginal bleeding? YES - past   11. Have you ever had a blood transfusion? YES -    11a. Have you ever had a transfusion reaction? No   12. Are you willing to have a blood transfusion if it is medically needed before, during, or after your surgery? Yes   13. Have you or any of your relatives ever had problems with anesthesia? No   14. Do you have sleep apnea, excessive snoring or daytime drowsiness? No   15. Do you have any artifical heart valves or other implanted medical devices like a pacemaker, defibrillator, or continuous glucose monitor? No   16. Do you have artificial joints? No   17. Are you allergic to latex? No   18. Is there any chance that you may be pregnant? No       Preoperative Review of :   reviewed - no record of controlled substances prescribed.          Review of Systems  Constitutional, neuro, ENT, endocrine, pulmonary, cardiac, gastrointestinal, genitourinary, musculoskeletal, integument and psychiatric systems are negative, except as otherwise noted.    Patient Active Problem List    Diagnosis Date Noted     Anal skin tag 12/07/2022     Priority: Medium     Added automatically from request for surgery [id number]       Shortness of breath 08/02/2022     Priority: Medium     Palpitations 08/02/2022     Priority: Medium     Anastomotic ulcer 05/19/2022     Priority: Medium     Anemia, unspecified type 05/09/2022     Priority: Medium     Chronic eczema 07/29/2019     Priority:  Medium     Chronic rhinitis 2016     Priority: Medium     Iron deficiency anemia 2013     Priority: Medium     Short gut syndrome 10/16/2012     Priority: Medium     Ulceration of intestine 2012     Priority: Medium     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 diagnosed due to hematochezia.       Proteinuria 2012     Priority: Medium     Felt consistent with benign intermittent proteinuria or postural proteinuria. Yearly urinalysis and prot/cr ratio. Contact nephrology if the prot/cr is >0.2 or if there are any abnormalities noted in the urinalysis.         Other congenital anomalies of intestine      Priority: Medium     congenital ileal atresia, s/p resection        Past Medical History:   Diagnosis Date     Anemia 10/16/2012     COPD (chronic obstructive pulmonary disease) (H)      History of blood transfusion      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, s/p resection     Palpitations      Short gut syndrome 10/16/2012    ~ 20 cm of ileum as well as ileocecal valve resected in  period     Shortness of breath      Ulceration of intestine 2012     Uncomplicated asthma      Past Surgical History:   Procedure Laterality Date     APPENDECTOMY  May 2022     COLONOSCOPY  2012     COLONOSCOPY  2012     COLONOSCOPY N/A 2022    Procedure: COLONOSCOPY, WITH BIOPSY;  Surgeon: Moisés Gupta MD;  Location: [facility] OR     GI SURGERY  2022    First surgery occurred 1999     LAPAROSCOPIC RESECTION SMALL BOWEL N/A 2022    Procedure: EXCISION, SMALL INTESTINE, LAPAROSCOPIC;  Surgeon: Frandy Mack MD;  Location: [facility] OR     LAPAROSCOPIC RESECTION SMALL BOWEL  2022    Procedure: ;  Surgeon: Frandy Mack MD;  Location: [facility] OR     RESECTION ILEOCECAL  1999    ~ 20 cm of ileum as well as ileocecal valve resected in  period     Current Outpatient Medications   Medication Sig Dispense Refill     Cetirizine  HCl (ZYRTEC ALLERGY PO) Take by mouth every morning       cyanocobalamin (VITAMIN B-12) 1000 MCG tablet Take 1 tablet (1,000 mcg) by mouth daily 90 tablet 4     dupilumab (DUPIXENT) 300 MG/2ML prefilled pen Inject 2 mLs (300 mg) Subcutaneous every 14 days 4 mL 4     dupilumab (DUPIXENT) 300 MG/2ML prefilled pen Inject 4 mLs (600 mg) Subcutaneous See Admin Instructions for 1 dose 4 mL 0     DUPIXENT 300 MG/2ML prefilled syringe INJECT THE CONTENTS OF 1 SYRINGE (300 MG) UNDER THE SKIN EVERY 14 DAYS 4 mL 4     fluticasone (FLONASE) 50 MCG/ACT nasal spray Spray 1-2 sprays into both nostrils daily 16 g 11     fluticasone-vilanterol (BREO ELLIPTA) 200-25 MCG/INH inhaler Inhale 1 puff into the lungs daily 60 each 3     ketoconazole (NIZORAL) 2 % external shampoo Use to shampoo twice weekly. Leave on 5 min then rinse. 120 mL 3     methylcellulose (CITRUCEL) 500 MG TABS tablet Take 500 mg by mouth daily       mometasone (ELOCON) 0.1 % external solution Apply small amount topically to affected areas of scalp daily prn for up to 2 weeks. 60 mL 0     Multiple Vitamins-Iron (MULTIVITAMIN/IRON PO) Take 1 tablet by mouth every morning       Probiotic Product (PRO-BIOTIC BLEND PO) Take by mouth as needed       tacrolimus (PROTOPIC) 0.1 % external ointment Apply to face daily in rash areas (Patient taking differently: as needed Apply to face daily in rash areas) 30 g 11     triamcinolone (KENALOG) 0.1 % external ointment Apply to face rash twice daily for the next two weeks until clear then twice daily as needed for up to 2 weeks. 30 g 1     TURMERIC PO        acetaminophen (TYLENOL) 325 MG tablet Take 3 tablets (975 mg) by mouth every 8 hours as needed for mild pain 30 tablet 0     albuterol (PROAIR HFA/PROVENTIL HFA/VENTOLIN HFA) 108 (90 Base) MCG/ACT inhaler Inhale 1-2 puffs into the lungs every 4 hours as needed for shortness of breath / dyspnea or wheezing 18 g 0     Cyanocobalamin (VITAMIN B-12) 50 MCG TABS Take 100 mcg by  "mouth every morning (Patient not taking: Reported on 1/20/2023)       docusate sodium (COLACE) 100 MG capsule Take 1-2 capsules (100-200 mg) by mouth 2 times daily 120 capsule 11     Dupilumab 300 MG/2ML SOPN Inject 300 mg Subcutaneous every 14 days To start after 600 mg loading dose. 4 mL 11     ferrous sulfate (PATRIC-IN-SOL) 75 (15 FE) MG/ML oral drops Take 0.2 mLs (3 mg) by mouth daily 100 mL 1     fexofenadine (ALLEGRA) 60 MG tablet Take 60 mg by mouth 2 times daily as needed       FLUARIX QUADRIVALENT 0.5 ML injection ADM 0.5ML IM UTD (Patient not taking: Reported on 8/2/2022)       hydrocortisone 2.5 % cream To lips and face rash twice daily as needed for rash 30 g 1     Loratadine-Pseudoephedrine (CLARITIN-D 24 HOUR PO)        Ruxolitinib Phosphate (OPZELURA) 1.5 % CREA Externally apply 1 Application topically daily To areas of atopic dermatitis (Patient not taking: Reported on 1/20/2023) 60 g 3     terconazole (TERAZOL 3) 80 MG vaginal suppository Place 1 suppository (80 mg) vaginally At Bedtime 3 suppository 1     VENTOLIN  (90 Base) MCG/ACT inhaler   1     vitamin C (ASCORBIC ACID) 250 MG tablet Take 1 tablet (250 mg) by mouth daily 100 tablet 11       Allergies   Allergen Reactions     No Known Allergies         Social History     Tobacco Use     Smoking status: Never     Smokeless tobacco: Never     Tobacco comments:     Non-smoking home   Substance Use Topics     Alcohol use: Yes     Comment: Social       History   Drug Use No         Objective     /64 (BP Location: Right arm, Patient Position: Sitting, Cuff Size: Adult Regular)   Pulse 74   Temp 98.3  F (36.8  C) (Tympanic)   Resp 16   Ht 1.626 m (5' 4\")   Wt 54.1 kg (119 lb 4.8 oz)   SpO2 100%   BMI 20.48 kg/m      Physical Exam    GENERAL APPEARANCE: healthy, alert and no distress     EYES: EOMI, PERRL     HENT: ear canals and TM's normal and nose and mouth without ulcers or lesions     NECK: no adenopathy, no asymmetry, masses, or " scars and thyroid normal to palpation     RESP: lungs clear to auscultation - no rales, rhonchi or wheezes     CV: regular rates and rhythm, normal S1 S2, no S3 or S4 and no murmur, click or rub     ABDOMEN:  soft, nontender, no HSM or masses and bowel sounds normal     MS: extremities normal- no gross deformities noted, no evidence of inflammation in joints, FROM in all extremities.     SKIN: no suspicious lesions or rashes     NEURO: Normal strength and tone, sensory exam grossly normal, mentation intact and speech normal     PSYCH: mentation appears normal. and affect normal/bright     LYMPHATICS: No cervical adenopathy    Recent Labs   Lab Test 09/28/22  1129 08/26/22  1404 06/15/22  1024 05/20/22  0738 05/16/22  0748 04/16/22  1506 02/26/22  1459   HGB 12.3 11.1* 10.5*   < > 10.2*   < > 11.2*   PLT  --  433 478*   < > 413   < > 469*   INR  --   --   --   --  1.08  --  1.08   NA  --  133 139   < > 142   < > 139   POTASSIUM  --  3.7 3.4   < > 3.7   < > 3.6   CR  --  0.80 0.78   < > 0.76   < > 0.96    < > = values in this interval not displayed.        Diagnostics:  No labs were ordered during this visit.   No EKG required, no history of coronary heart disease, significant arrhythmia, peripheral arterial disease or other structural heart disease.    Revised Cardiac Risk Index (RCRI):  The patient has the following serious cardiovascular risks for perioperative complications:   - No serious cardiac risks = 0 points     RCRI Interpretation: 0 points: Class I (very low risk - 0.4% complication rate)           Signed Electronically by: KILLIAN Adrian CNP  Copy of this evaluation report is provided to requesting physician.

## 2023-02-06 ENCOUNTER — ANESTHESIA EVENT (OUTPATIENT)
Dept: SURGERY | Facility: AMBULATORY SURGERY CENTER | Age: 23
End: 2023-02-06
Payer: COMMERCIAL

## 2023-02-07 ENCOUNTER — HOSPITAL ENCOUNTER (OUTPATIENT)
Facility: AMBULATORY SURGERY CENTER | Age: 23
Discharge: HOME | End: 2023-02-07
Attending: SURGERY
Payer: COMMERCIAL

## 2023-02-07 ENCOUNTER — ANESTHESIA (OUTPATIENT)
Dept: SURGERY | Facility: AMBULATORY SURGERY CENTER | Age: 23
End: 2023-02-07
Payer: COMMERCIAL

## 2023-02-07 VITALS
RESPIRATION RATE: 16 BRPM | BODY MASS INDEX: 20.37 KG/M2 | TEMPERATURE: 98.5 F | HEIGHT: 64 IN | HEART RATE: 73 BPM | OXYGEN SATURATION: 100 % | SYSTOLIC BLOOD PRESSURE: 101 MMHG | WEIGHT: 119.3 LBS | DIASTOLIC BLOOD PRESSURE: 67 MMHG

## 2023-02-07 DIAGNOSIS — K64.4 ANAL SKIN TAG: ICD-10-CM

## 2023-02-07 LAB
HCG UR QL: NEGATIVE
INTERNAL QC OK POCT: NORMAL
POCT KIT EXPIRATION DATE: NORMAL
POCT KIT LOT NUMBER: NORMAL

## 2023-02-07 PROCEDURE — 81025 URINE PREGNANCY TEST: CPT | Performed by: PATHOLOGY

## 2023-02-07 PROCEDURE — 46230 REMOVAL OF ANAL TAGS: CPT

## 2023-02-07 PROCEDURE — 88304 TISSUE EXAM BY PATHOLOGIST: CPT | Mod: 26 | Performed by: PATHOLOGY

## 2023-02-07 PROCEDURE — 88304 TISSUE EXAM BY PATHOLOGIST: CPT | Mod: TC | Performed by: SURGERY

## 2023-02-07 PROCEDURE — 46230 REMOVAL OF ANAL TAGS: CPT | Performed by: SURGERY

## 2023-02-07 RX ORDER — ONDANSETRON 4 MG/1
4 TABLET, ORALLY DISINTEGRATING ORAL EVERY 30 MIN PRN
Status: DISCONTINUED | OUTPATIENT
Start: 2023-02-07 | End: 2023-02-07 | Stop reason: HOSPADM

## 2023-02-07 RX ORDER — KETOROLAC TROMETHAMINE 30 MG/ML
INJECTION, SOLUTION INTRAMUSCULAR; INTRAVENOUS PRN
Status: DISCONTINUED | OUTPATIENT
Start: 2023-02-07 | End: 2023-02-07

## 2023-02-07 RX ORDER — FENTANYL CITRATE 50 UG/ML
25 INJECTION, SOLUTION INTRAMUSCULAR; INTRAVENOUS EVERY 5 MIN PRN
Status: DISCONTINUED | OUTPATIENT
Start: 2023-02-07 | End: 2023-02-07 | Stop reason: HOSPADM

## 2023-02-07 RX ORDER — GABAPENTIN 300 MG/1
300 CAPSULE ORAL
Status: COMPLETED | OUTPATIENT
Start: 2023-02-07 | End: 2023-02-07

## 2023-02-07 RX ORDER — OXYCODONE HYDROCHLORIDE 5 MG/1
5 TABLET ORAL EVERY 4 HOURS PRN
Status: DISCONTINUED | OUTPATIENT
Start: 2023-02-07 | End: 2023-02-07 | Stop reason: HOSPADM

## 2023-02-07 RX ORDER — ONDANSETRON 2 MG/ML
4 INJECTION INTRAMUSCULAR; INTRAVENOUS ONCE
Status: DISCONTINUED | OUTPATIENT
Start: 2023-02-07 | End: 2023-02-07 | Stop reason: HOSPADM

## 2023-02-07 RX ORDER — PROPOFOL 10 MG/ML
INJECTION, EMULSION INTRAVENOUS PRN
Status: DISCONTINUED | OUTPATIENT
Start: 2023-02-07 | End: 2023-02-07

## 2023-02-07 RX ORDER — ACETAMINOPHEN 325 MG/1
975 TABLET ORAL ONCE
Status: DISCONTINUED | OUTPATIENT
Start: 2023-02-07 | End: 2023-02-07 | Stop reason: HOSPADM

## 2023-02-07 RX ORDER — OXYCODONE HYDROCHLORIDE 5 MG/1
5 TABLET ORAL EVERY 8 HOURS PRN
Qty: 6 TABLET | Refills: 0 | Status: SHIPPED | OUTPATIENT
Start: 2023-02-07 | End: 2023-02-10

## 2023-02-07 RX ORDER — LIDOCAINE 40 MG/G
CREAM TOPICAL
Status: DISCONTINUED | OUTPATIENT
Start: 2023-02-07 | End: 2023-02-07 | Stop reason: HOSPADM

## 2023-02-07 RX ORDER — HYDROMORPHONE HYDROCHLORIDE 1 MG/ML
0.2 INJECTION, SOLUTION INTRAMUSCULAR; INTRAVENOUS; SUBCUTANEOUS EVERY 5 MIN PRN
Status: DISCONTINUED | OUTPATIENT
Start: 2023-02-07 | End: 2023-02-07 | Stop reason: HOSPADM

## 2023-02-07 RX ORDER — ONDANSETRON 2 MG/ML
4 INJECTION INTRAMUSCULAR; INTRAVENOUS EVERY 30 MIN PRN
Status: DISCONTINUED | OUTPATIENT
Start: 2023-02-07 | End: 2023-02-07 | Stop reason: HOSPADM

## 2023-02-07 RX ORDER — ONDANSETRON 4 MG/1
4 TABLET, ORALLY DISINTEGRATING ORAL
Status: DISCONTINUED | OUTPATIENT
Start: 2023-02-07 | End: 2023-02-08 | Stop reason: HOSPADM

## 2023-02-07 RX ORDER — ACETAMINOPHEN 325 MG/1
975 TABLET ORAL ONCE
Status: COMPLETED | OUTPATIENT
Start: 2023-02-07 | End: 2023-02-07

## 2023-02-07 RX ORDER — LABETALOL HYDROCHLORIDE 5 MG/ML
10 INJECTION, SOLUTION INTRAVENOUS
Status: DISCONTINUED | OUTPATIENT
Start: 2023-02-07 | End: 2023-02-07 | Stop reason: HOSPADM

## 2023-02-07 RX ORDER — LIDOCAINE HYDROCHLORIDE 20 MG/ML
INJECTION, SOLUTION INFILTRATION; PERINEURAL PRN
Status: DISCONTINUED | OUTPATIENT
Start: 2023-02-07 | End: 2023-02-07

## 2023-02-07 RX ORDER — HYDROMORPHONE HYDROCHLORIDE 1 MG/ML
0.4 INJECTION, SOLUTION INTRAMUSCULAR; INTRAVENOUS; SUBCUTANEOUS EVERY 5 MIN PRN
Status: DISCONTINUED | OUTPATIENT
Start: 2023-02-07 | End: 2023-02-07 | Stop reason: HOSPADM

## 2023-02-07 RX ORDER — SODIUM CHLORIDE, SODIUM LACTATE, POTASSIUM CHLORIDE, CALCIUM CHLORIDE 600; 310; 30; 20 MG/100ML; MG/100ML; MG/100ML; MG/100ML
INJECTION, SOLUTION INTRAVENOUS CONTINUOUS
Status: DISCONTINUED | OUTPATIENT
Start: 2023-02-07 | End: 2023-02-07 | Stop reason: HOSPADM

## 2023-02-07 RX ORDER — PROPOFOL 10 MG/ML
INJECTION, EMULSION INTRAVENOUS CONTINUOUS PRN
Status: DISCONTINUED | OUTPATIENT
Start: 2023-02-07 | End: 2023-02-07

## 2023-02-07 RX ORDER — OXYCODONE HYDROCHLORIDE 5 MG/1
5 TABLET ORAL EVERY 4 HOURS PRN
Status: DISCONTINUED | OUTPATIENT
Start: 2023-02-07 | End: 2023-02-08 | Stop reason: HOSPADM

## 2023-02-07 RX ORDER — FENTANYL CITRATE 50 UG/ML
50 INJECTION, SOLUTION INTRAMUSCULAR; INTRAVENOUS EVERY 5 MIN PRN
Status: DISCONTINUED | OUTPATIENT
Start: 2023-02-07 | End: 2023-02-07 | Stop reason: HOSPADM

## 2023-02-07 RX ORDER — KETAMINE HYDROCHLORIDE 10 MG/ML
INJECTION INTRAMUSCULAR; INTRAVENOUS PRN
Status: DISCONTINUED | OUTPATIENT
Start: 2023-02-07 | End: 2023-02-07

## 2023-02-07 RX ADMIN — KETOROLAC TROMETHAMINE 15 MG: 30 INJECTION, SOLUTION INTRAMUSCULAR; INTRAVENOUS at 08:22

## 2023-02-07 RX ADMIN — LIDOCAINE HYDROCHLORIDE 60 MG: 20 INJECTION, SOLUTION INFILTRATION; PERINEURAL at 08:05

## 2023-02-07 RX ADMIN — GABAPENTIN 300 MG: 300 CAPSULE ORAL at 06:52

## 2023-02-07 RX ADMIN — ACETAMINOPHEN 975 MG: 325 TABLET ORAL at 06:52

## 2023-02-07 RX ADMIN — KETAMINE HYDROCHLORIDE 10 MG: 10 INJECTION INTRAMUSCULAR; INTRAVENOUS at 08:05

## 2023-02-07 RX ADMIN — PROPOFOL 150 MCG/KG/MIN: 10 INJECTION, EMULSION INTRAVENOUS at 08:05

## 2023-02-07 RX ADMIN — SODIUM CHLORIDE, SODIUM LACTATE, POTASSIUM CHLORIDE, CALCIUM CHLORIDE: 600; 310; 30; 20 INJECTION, SOLUTION INTRAVENOUS at 07:58

## 2023-02-07 RX ADMIN — PROPOFOL 50 MG: 10 INJECTION, EMULSION INTRAVENOUS at 08:14

## 2023-02-07 NOTE — OP NOTE
"[facility] Colorectal Surgery Operative Report    PREOPERATIVE DIAGNOSIS:  1. Symptomatic Anal skin tag.  2. 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 Anemia  4. COPD  5. Eczema  6. Rhinitis  7. Proteinuria    POSTOPERATIVE DIAGNOSIS:   1. Symptomatic Anal skin tag x2  2. 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 Anemia  4. COPD  5. Eczema  6. Rhinitis  7. Proteinuria    PROCEDURE:  1. Evaluation under anesthesia.  2. Excision of anal skin tag x2    ANESTHESIA: MAC plus local anesthesia.    SURGEON:  Frandy Mack M.D.    INDICATIONS FOR PROCEDURE  Nuris Reddy is a 22 year old female whom I have previously performed a right hemicolectomy for chronic anastomotic ulcer and stricture. She recently presented with symptomatic anal skin tag that is significantly interfering with local hygeine. I thoroughly discussed the risks, benefits, and alternatives of operative treatment with the patient and he/she agreed to proceed.    General risks related to anorectal surgery were reviewed with the patient. These include, but are not limited to urinary retention, abscess, infection, bleeding, chronic pain, anal stenosis, fistula, fissure, and fecal incontinence.     OPERATIVE PROCEDURE: After obtaining informed consent, the patient was brought to the operating room and placed in the prone jackknife position. Appropriate preoperative mechanical deep venous thrombosis prophylaxis was administered. MAC anesthesia was gently induced. Bilateral lower extremity pneumatic compression devices were applied and all pressure points were cushioned. The perianal area was then preped and draped in the standard sterile fashion. After a \"time-out\" was performed, digital rectal exam and anoscopy were performed. A total of 22 mL of 1% lidocaine with epinephrine mixed with Bupivacaine 0.25% without epinephrine was injected as a pudendal block. Anal skin tag(s) located at the posterior positions, x1 " large and x1 small, were excised with monopolar electrocautery. Care was taken to not injure any muscle fibers of the anal sphincter complex. Specimens were sent for permanent pathology. The wound from the larger posterior tag was closed with running 4-0 Monocryl suture. The distal rectum and anal canal were irrigated. Hemostasis was achieved. Instrument, sponge, and needle counts were all correct as reported to me. Bacitracin was applied, as well as a sterile dressing. The patient tolerated the procedure well.    COMPLICATIONS: none, immediately.    ESTIMATED BLOOD LOSS: 2 mL    SPECIMEN(S): anal skin tag x2    DRAINS/TUBES: None.    OPERATIVE FINDINGS: large posterior skin tag atposterior intersphincteric groove, and one small tag.    DISPOSITION: PACU.      Frandy Mack MD  Division of Colon and Rectal Surgery  [facility]  [phone]      CC:  [facility] Surgery billing.  Dea Pacheco NP.

## 2023-02-07 NOTE — ANESTHESIA PREPROCEDURE EVALUATION
Anesthesia Pre-Procedure Evaluation    Patient: Nuris Reddy   MRN: [id number] : 2000        Procedure : Procedure(s):  EXAM UNDER ANESTHESIA, ANUS, EXCISION OF ANAL SKIN TAG          Past Medical History:   Diagnosis Date     Anemia 10/16/2012     COPD (chronic obstructive pulmonary disease) (H)      History of blood transfusion      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, s/p resection     Palpitations      Short gut syndrome 10/16/2012    ~ 20 cm of ileum as well as ileocecal valve resected in  period     Shortness of breath      Ulceration of intestine 2012     Uncomplicated asthma       Past Surgical History:   Procedure Laterality Date     APPENDECTOMY  May 2022     COLONOSCOPY  2012     COLONOSCOPY  2012     COLONOSCOPY N/A 2022    Procedure: COLONOSCOPY, WITH BIOPSY;  Surgeon: Moisés Gupta MD;  Location: [facility] OR     GI SURGERY  2022    First surgery occurred 1999     LAPAROSCOPIC RESECTION SMALL BOWEL N/A 2022    Procedure: EXCISION, SMALL INTESTINE, LAPAROSCOPIC;  Surgeon: Frandy Mack MD;  Location: [facility] OR     LAPAROSCOPIC RESECTION SMALL BOWEL  2022    Procedure: ;  Surgeon: Frandy Mack MD;  Location: [facility] OR     RESECTION ILEOCECAL  1999    ~ 20 cm of ileum as well as ileocecal valve resected in  period      Allergies   Allergen Reactions     No Known Allergies       Social History     Tobacco Use     Smoking status: Never     Smokeless tobacco: Never     Tobacco comments:     Non-smoking home   Substance Use Topics     Alcohol use: Yes     Comment: Social      Wt Readings from Last 1 Encounters:   23 54.1 kg (119 lb 4.8 oz)           Physical Exam    Airway  airway exam normal      Mallampati: I   TM distance: > 3 FB   Neck ROM: full   Mouth opening: > 3 cm    Respiratory Devices and Support         Dental           Cardiovascular   cardiovascular exam normal          Pulmonary    pulmonary exam normal            Other findings: Placement Date: 05/19/22; Placement Time: 1530 (created via procedure documentation); Mask Ventilation: 1; Induction Type: Intravenous; Ease of Intubation: Easy; Technique: Direct laryngoscopy; ETT Type: Single, Oral; Tube Size: 6.5 mm; DL Blade Size: MAC 3; Grade View: 1; Adjucts: Stylet; Placement Person: CRNA; Attempts: 1; Depth: 21 cm    OUTSIDE LABS:  CBC:   Lab Results   Component Value Date    WBC 6.9 08/26/2022    WBC 6.2 06/15/2022    HGB 12.3 09/28/2022    HGB 11.1 (L) 08/26/2022    HCT 33.9 (L) 08/26/2022    HCT 33.9 (L) 06/15/2022     08/26/2022     (H) 06/15/2022     BMP:   Lab Results   Component Value Date     08/26/2022     06/15/2022    POTASSIUM 3.7 08/26/2022    POTASSIUM 3.4 06/15/2022    CHLORIDE 105 08/26/2022    CHLORIDE 106 06/15/2022    CO2 27 08/26/2022    CO2 27 06/15/2022    BUN 7 08/26/2022    BUN 11 06/15/2022    CR 0.80 08/26/2022    CR 0.78 06/15/2022     (H) 08/26/2022    GLC 87 06/15/2022     COAGS:   Lab Results   Component Value Date    PTT 31 05/16/2022    INR 1.08 05/16/2022     POC:   Lab Results   Component Value Date    HCG Negative 01/05/2022     HEPATIC:   Lab Results   Component Value Date    ALBUMIN 3.5 06/15/2022    PROTTOTAL 7.2 06/15/2022    ALT 42 06/15/2022    AST 28 06/15/2022    ALKPHOS 81 06/15/2022    BILITOTAL 0.4 06/15/2022     OTHER:   Lab Results   Component Value Date    SHEELA 9.0 08/26/2022    PHOS 4.3 10/16/2012    MAG 2.0 06/15/2022    LIPASE 58 07/23/2018    TSH 1.90 09/28/2022    T4 0.98 08/10/2021    CRP <2.9 08/07/2019    SED 15 08/07/2019       Anesthesia Plan    ASA Status:  2   NPO Status:  NPO Appropriate    Anesthesia Type: MAC.     - Reason for MAC: straight local not clinically adequate   Induction: Intravenous, Propofol.   Maintenance: TIVA.        Consents    Anesthesia Plan(s) and associated risks, benefits, and realistic alternatives discussed. Questions  answered and patient/representative(s) expressed understanding.    - Discussed:     - Discussed with:  Patient      - Extended Intubation/Ventilatory Support Discussed: No.      - Patient is DNR/DNI Status: No    Use of blood products discussed: No .     Postoperative Care    Pain management: IV analgesics, Oral pain medications, Multi-modal analgesia.   PONV prophylaxis: Dexamethasone or Solumedrol, Ondansetron (or other 5HT-3), Background Propofol Infusion     Comments:                Ozzie King MD

## 2023-02-07 NOTE — ANESTHESIA CARE TRANSFER NOTE
Patient: Nuris Reddy    Procedure: Procedure(s):  EXAM UNDER ANESTHESIA, ANUS, EXCISION OF ANAL SKIN TAG       Diagnosis: Anal skin tag [K64.4]  Diagnosis Additional Information: No value filed.    Anesthesia Type:   MAC     Note:    Oropharynx: spontaneously breathing and oropharynx clear of all foreign objects  Level of Consciousness: awake  Oxygen Supplementation: room air    Independent Airway: airway patency satisfactory and stable  Dentition: dentition unchanged  Vital Signs Stable: post-procedure vital signs reviewed and stable  Report to RN Given: handoff report given  Patient transferred to: Phase II    Handoff Report: Identifed the Patient, Identified the Reponsible Provider, Reviewed the pertinent medical history, Discussed the surgical course, Reviewed Intra-OP anesthesia mangement and issues during anesthesia, Set expectations for post-procedure period and Allowed opportunity for questions and acknowledgement of understanding      Vitals:  Vitals Value Taken Time   /67 02/07/23 0905   Temp 36.9  C (98.5  F) 02/07/23 0905   Pulse     Resp 16 02/07/23 0905   SpO2 100 % 02/07/23 0905       Electronically Signed By: KILLIAN Pizano CRNA  February 7, 2023  9:39 AM

## 2023-02-07 NOTE — OR NURSING
Post op orders require voiding prior to discharge.  Verified verbally with Dr. Mack that this is not necessary.

## 2023-02-07 NOTE — ANESTHESIA POSTPROCEDURE EVALUATION
Patient: Nuris Reddy    Procedure: Procedure(s):  EXAM UNDER ANESTHESIA, ANUS, EXCISION OF ANAL SKIN TAG       Anesthesia Type:  MAC    Note:  Disposition: Outpatient   Postop Pain Control: Uneventful            Sign Out: Well controlled pain   PONV:    Neuro/Psych: Uneventful            Sign Out: Acceptable/Baseline neuro status   Airway/Respiratory: Uneventful            Sign Out: Acceptable/Baseline resp. status   CV/Hemodynamics: Uneventful            Sign Out: Acceptable CV status; No obvious hypovolemia; No obvious fluid overload   Other NRE:    DID A NON-ROUTINE EVENT OCCUR?            Last vitals:  Vitals Value Taken Time   /67 02/07/23 0905   Temp 36.9  C (98.5  F) 02/07/23 0905   Pulse     Resp 16 02/07/23 0905   SpO2 100 % 02/07/23 0905       Electronically Signed By: Ozzie King MD  February 7, 2023  12:31 PM

## 2023-02-07 NOTE — DISCHARGE INSTRUCTIONS
Anorectal Surgery Instructions    What can I expect after anorectal surgery?  Most anorectal procedures are done as outpatient surgery, and you go home the same day as the procedure. A few surgical procedures will require that you stay in the hospital for about one to three days. No matter where the procedure is done or how long or short it takes, these recommendations will help you heal and feel more comfortable.    Medicines:  The anal area is very sensitive; you can expect to have some pain for up to 2-4 weeks after the procedure. Your doctor will give you a prescription for one or more pain medications.    Take acetominaphen (Tylenol) 650-1000 mg four times a day.   Take this on a regular basis (not as needed) following surgery for pain control.   Take the lower dose if you are >65 years old or have liver disease. The maximum dose of acetominaphen is 4000 mg a day. You can stop the acetaminophen or reduce the dose when you are feeling better.  It is important to realize that many narcotic pain relievers (including vicodin, percocet, tylenol #3) also have acetaminophen, and excessive doses of acetaminophen can be dangerous, so do not take these in addition to acetominaphen.  You may take narcotics that don't contain acetominaphen such as oxycodone.      Take oxycodone AS NEEDED in addition to the acetominaphen and naprosyn.    Because narcotics have side effects (including constipation), you should reduce your use of these medications as tolerated as your pain improves.    *In general, the best strategy is to take (if you are able to tolerate it) the tylenol on a regular basis until your pain has largely gone away. You can take the narcotic pain medicine as needed in addition to the tylenol and ibuprofen. As your pain begins to lessen, you should cut back on your narcotic use while continuing to take your regular tylenol and ibuprofen doses.    Refilling prescriptions. If you need additional pain medication,  please call the triage nurse at [phone] during normal business hours (8 a.m. to 4 p.m., Monday though Friday) or have your pharmacy fax a refill request to [phone]. If you call after hours or on the weekends, the doctor on call may not know you personally and may not renew narcotic pain medication by phone. Call your primary care provider for all other medication refills.    Perineal care:  Tub baths:  If possible, take a tub bath or shower immediately after each bowel movement.   Baths should be take at least 3 times daily for the first week to 10 days following your procedure. You should soak in the tub for 10 to 15 minutes each time with water as warm as you can tolerate. You may also use a microphone shower head (with an extension) to shower your back-side instead of a bath.  Even after you go back to work, it is a good idea to sit in the tub in the morning, after returning from work, and again in the evening before bedtime.    Bleeding/Infection:  You can expect to have some bleeding after bowel movements, but it should stop soon after you wipe.   Use a wet cloth or perianal pad (Tucks or Preparation H pads) to gently wipe the area after each bowel movement.  Do not rub the anal area or use a lot of pressure.  Using a spray bottle filled with warm water helps loosen any remaining stool. Blot gently with a soft dry cloth or tissue paper.  Infection around the anal opening is not very common. The anal area has excellent blood supply, which helps the area to heal. Bloody discharge after bowel movements is normal and may last 2 to 4 weeks after your surgery. However, if you bleed between bowel movements and cannot get it to stop, call the triage nurse immediately [phone].    Bowel function:  Take a fiber supplement such as Metamucil, which is over the counter. It is important to drink six to eight glasses of water or juice everyday when using fiber products.    If you do not have a bowel movement  after 1-2 days:  Take Milk of Magnesia-2 tablespoons.     If there are no results, repeat this or add over the counter Miralax.    If you still do not have results, contact the clinic.   If there are no results, repeat this. Stop taking Milk of Magnesia or other laxatives if you begin to have diarrhea.    * Constipation will cause you to strain when you have a bowel movement. The hard stool will be difficult to pass, will increase pain and bleeding, and will slow down healing. Try to avoid constipation and/or diarrhea as this can make the pain and bleeding worse.    * It is important to have regular bowel movements at least every other day and to keep your stool soft. A high fiber diet, including at least four servings of fruits or vegetables daily, will help to keep your bowel movements regular and soft.    Activity:  After your procedure, there are no restrictions on your activity   Except restrictions because being on narcotics and in pain, such as no heavy machine operating or driving.   You may walk, climb stairs, ride in a car, and sit as tolerated.   It is helpful to avoid sitting in one position for long periods (2 or more hours).  After some surgeries, you may be told not to perform any lifting (more than 10 pounds) for several weeks after surgery.    When to call:  When do I need to call the doctor or triage nurse?  If you experience any of the problems listed here, call our triage nurse during business hours ([phone]).   The nurse will help you with your problem or have the doctor call you.   After hours and on weekends, please call the main hospital number ([phone]) and ask for the colon and rectal surgery person on call.   Call for:   ? Fever greater than 101 degrees   ? Chills   ? Foul-smelling drainage   ? Nausea and vomiting   ? Diarrhea - greater than 4 water stools in 24 hours   ? Constipation - no bowel movement after 3 days   ? Severe bleeding that does not stop soon after a bowel  movement   ? Problems with the incision, including severe pain, swelling, or redness  [facility]  Home Care Following Anesthesia  For 24 hours after surgery:  Get plenty of rest.  A responsible adult must stay with you for at least 24 hours after you leave the surgery center.  Do not drive or use heavy equipment.  If you have weakness or tingling, don't drive or use heavy equipment until this feeling goes away.   Do not drink alcohol.   Avoid strenuous or risky activities.  Ask for help when climbing stairs.  You may feel lightheaded.  IF so, sit for a few minutes before standing.  Have someone help you get up.   If you have nausea (feel sick to your stomach): Drink only clear liquids such as apple juice, ginger ale, broth or 7-Up.  Rest may also help.  Be sure to drink enough fluids.  Move to a regular diet as you feel able.   You may have a slight fever.  Call the doctor if your fever is over 100 F (37.7 C) (taken under the tongue) or lasts longer than 24 hours.  You may have a dry mouth, a sore throat, muscle aches or trouble sleeping. These should go away after 24 hours.  Do not make important or legal decisions.   It is recommended to avoid smoking.               Tips for taking pain medications  To get the best pain relief possible, remember these points:  Take pain medications as directed, before pain becomes severe.  Pain medication can upset your stomach: taking it with food may help.  Constipation is a common side effect of pain medication. Drink plenty of  fluids.  Eat foods high in fiber. Take a stool softener if recommended by your doctor or pharmacist.  Do not drink alcohol, drive or operate machinery while taking pain medications.  Ask about other ways to control pain, such as with heat, ice or relaxation.    Tylenol/Acetaminophen Consumption  To help encourage the safe use of acetaminophen, the makers of TYLENOL  have lowered the maximum daily dose for  single-ingredient Extra Strength TYLENOL  (acetaminophen) products sold in the [place] from 8 pills per day (4,000 mg) to 6 pills per day (3,000 mg). The dosing interval has also changed from 2 pills every 4-6 hours to 2 pills every 6 hours.  If you feel your pain relief is insufficient, you may take Tylenol/Acetaminophen in addition to your narcotic pain medication.   Be careful not to exceed 3,000 mg of Tylenol/Acetaminophen in a 24 hour period from all sources.  If you are taking extra strength Tylenol/acetaminophen (500 mg), the maximum dose is 6 tablets in 24 hours.  If you are taking regular strength acetaminophen (325 mg), the maximum dose is 9 tablets in 24 hours.    Call a doctor for any of the following:  Signs of infection (fever, growing tenderness at the surgery site, a large amount of drainage or bleeding, severe pain, foul-smelling drainage, redness, swelling).  It has been over 8 to 10 hours since surgery and you are still not able to urinate (pass water).  Headache for over 24 hours.  Signs of Covid-19 infection (temperature over 100 degrees, shortness of breath, cough, loss of taste/smell, generalized body aches, persistent headache, chills, sore throat, nausea/vomiting/diarrhea)  Your doctor is:  Dr. Frandy Mack, Colon Rectal: [phone]                 Or dial [phone] and ask for the resident on call for:  Colon Rectal  For emergency care, call the:  [place] [facility]:  [phone] (TTY for hearing impaired: [phone])

## 2023-03-14 NOTE — PROGRESS NOTES
Colon and Rectal Surgery Postoperative Clinic Note    RE: Nuris Reddy  : 2000  CHARLIE: 3/15/2023    Nuris Reddy is a very pleasant 22 year old female now status post examination under anesthesia with excision of anal skin tag x2 with Dr. Mack on 23.     Interval history: Doing well. Minimal pain. Bowel movements at baseline- 1-2 looser per day. Taking a fiber supplement. Overall she is happy with the surgery. She is also worried about her B12 and hemoglobin and is wondering if this can be checked today.    Physical Examination: Exam was chaperoned by Qasim Nielsen, EMT-P   /71 (BP Location: Left arm, Patient Position: Sitting, Cuff Size: Adult Regular)   Pulse 74   SpO2 98%   General: alert, oriented, in no acute distress, sitting comfortably  HEENT: moist mucous membranes  Perianal external examination:  Surgical wound posteriorly very small, mild swelling.    Assessment/Plan:  22 year old female now status post examination under anesthesia with excision of anal skin tag x2 with Dr. Mack on 23. Nuris is doing well after surgery. Can try tylenol and warm sitz baths for pain as needed. Will check B12, folate, and hemoglobin today but follow up with PCP for abnormal results. Follow up only as needed. Patient's questions were answered to her stated satisfaction and she is in agreement with this plan.     Medical history:  Past Medical History:   Diagnosis Date     Anemia 10/16/2012     COPD (chronic obstructive pulmonary disease) (H)      History of blood transfusion      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, s/p resection     Palpitations      Short gut syndrome 10/16/2012    ~ 20 cm of ileum as well as ileocecal valve resected in  period     Shortness of breath      Ulceration of intestine 2012     Uncomplicated asthma        Surgical history:  Past Surgical History:   Procedure Laterality Date     APPENDECTOMY  May 2022      COLONOSCOPY  2012     COLONOSCOPY  2012     COLONOSCOPY N/A 2022    Procedure: COLONOSCOPY, WITH BIOPSY;  Surgeon: Moisés Gupta MD;  Location: [facility] OR     EXAM UNDER ANESTHESIA ANUS N/A 2023    Procedure: EXAM UNDER ANESTHESIA, ANUS, EXCISION OF ANAL SKIN TAG;  Surgeon: Frandy Mack MD;  Location: [facility] OR     GI SURGERY  2022    First surgery occurred 1999     LAPAROSCOPIC RESECTION SMALL BOWEL N/A 2022    Procedure: EXCISION, SMALL INTESTINE, LAPAROSCOPIC;  Surgeon: Frandy Mack MD;  Location: [facility] OR     LAPAROSCOPIC RESECTION SMALL BOWEL  2022    Procedure: ;  Surgeon: Frandy Mack MD;  Location: [facility] OR     RESECTION ILEOCECAL  1999    ~ 20 cm of ileum as well as ileocecal valve resected in  period       Problem list:    Patient Active Problem List    Diagnosis Date Noted     Anal skin tag 2022     Priority: Medium     Added automatically from request for surgery [id number]       Shortness of breath 2022     Priority: Medium     Palpitations 2022     Priority: Medium     Anastomotic ulcer 2022     Priority: Medium     Anemia, unspecified type 2022     Priority: Medium     Chronic eczema 2019     Priority: Medium     Chronic rhinitis 2016     Priority: Medium     Iron deficiency anemia 2013     Priority: Medium     Short gut syndrome 10/16/2012     Priority: Medium     Ulceration of intestine 2012     Priority: Medium     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 diagnosed due to hematochezia.       Proteinuria 2012     Priority: Medium     Felt consistent with benign intermittent proteinuria or postural proteinuria. Yearly urinalysis and prot/cr ratio. Contact nephrology if the prot/cr is >0.2 or if there are any abnormalities noted in the urinalysis.         Other congenital anomalies of intestine      Priority: Medium     congenital ileal atresia, s/p resection          Medications:  Current Outpatient Medications   Medication Sig Dispense Refill     Cetirizine HCl (ZYRTEC ALLERGY PO) Take by mouth every morning       cyanocobalamin (VITAMIN B-12) 1000 MCG tablet Take 1 tablet (1,000 mcg) by mouth daily 90 tablet 4     dupilumab (DUPIXENT) 300 MG/2ML prefilled pen Inject 2 mLs (300 mg) Subcutaneous every 14 days 4 mL 4     DUPIXENT 300 MG/2ML prefilled syringe INJECT THE CONTENTS OF 1 SYRINGE (300 MG) UNDER THE SKIN EVERY 14 DAYS 4 mL 4     fluticasone (FLONASE) 50 MCG/ACT nasal spray Spray 1-2 sprays into both nostrils daily 16 g 11     fluticasone-vilanterol (BREO ELLIPTA) 200-25 MCG/INH inhaler Inhale 1 puff into the lungs daily 60 each 3     ketoconazole (NIZORAL) 2 % external shampoo Use to shampoo twice weekly. Leave on 5 min then rinse. 120 mL 3     methylcellulose (CITRUCEL) 500 MG TABS tablet Take 500 mg by mouth daily       mometasone (ELOCON) 0.1 % external solution Apply small amount topically to affected areas of scalp daily prn for up to 2 weeks. 60 mL 0     Multiple Vitamins-Iron (MULTIVITAMIN/IRON PO) Take 1 tablet by mouth every morning       Probiotic Product (PRO-BIOTIC BLEND PO) Take by mouth as needed       tacrolimus (PROTOPIC) 0.1 % external ointment Apply to face daily in rash areas (Patient taking differently: as needed Apply to face daily in rash areas) 30 g 11     triamcinolone (KENALOG) 0.1 % external ointment Apply to face rash twice daily for the next two weeks until clear then twice daily as needed for up to 2 weeks. 30 g 1     TURMERIC PO          Allergies:  Allergies   Allergen Reactions     No Known Allergies        Family history:  Family History   Problem Relation Age of Onset     No Known Problems Mother      No Known Problems Father      Other Cancer Maternal Grandfather         skin cancer     Hypertension Paternal Grandmother      Colon Cancer Paternal Grandmother      Hypertension Paternal Grandfather      Cardiovascular Paternal  Grandfather         Valve condition     Prostate Cancer Paternal Grandfather      Depression Brother      Asthma Sister      No Known Problems Sister      Kidney Disease Maternal Uncle         kidney stones       Social history:  Social History     Tobacco Use     Smoking status: Never     Smokeless tobacco: Never     Tobacco comments:     Non-smoking home   Substance Use Topics     Alcohol use: Yes     Comment: Social     Marital status: single.    Nursing Notes:   Qasim Nielsen, EMT  3/15/2023 12:00 PM  Signed  Chief Complaint   Patient presents with     Post-op Visit       Vitals:    03/15/23 1158   BP: 113/71   BP Location: Left arm   Patient Position: Sitting   Cuff Size: Adult Regular   Pulse: 74   SpO2: 98%       There is no height or weight on file to calculate BMI.    Qasim Nielsen EMT-P         15 minutes spent on the date of the encounter doing chart review, history and exam, documentation and further activities as noted above.   This is a postop visit.      Kenya Mckay PA-C  Colon and Rectal Surgery  [facility]

## 2023-03-15 ENCOUNTER — LAB (OUTPATIENT)
Dept: LAB | Facility: CLINIC | Age: 23
End: 2023-03-15
Payer: COMMERCIAL

## 2023-03-15 ENCOUNTER — OFFICE VISIT (OUTPATIENT)
Dept: SURGERY | Facility: CLINIC | Age: 23
End: 2023-03-15
Payer: COMMERCIAL

## 2023-03-15 VITALS — HEART RATE: 74 BPM | OXYGEN SATURATION: 98 % | SYSTOLIC BLOOD PRESSURE: 113 MMHG | DIASTOLIC BLOOD PRESSURE: 71 MMHG

## 2023-03-15 DIAGNOSIS — K90.829 SHORT GUT SYNDROME: ICD-10-CM

## 2023-03-15 DIAGNOSIS — Z09 FOLLOW-UP EXAMINATION AFTER COLORECTAL SURGERY: Primary | ICD-10-CM

## 2023-03-15 DIAGNOSIS — D50.0 IRON DEFICIENCY ANEMIA DUE TO CHRONIC BLOOD LOSS: ICD-10-CM

## 2023-03-15 DIAGNOSIS — Z09 FOLLOW-UP EXAMINATION AFTER COLORECTAL SURGERY: ICD-10-CM

## 2023-03-15 LAB
FOLATE SERPL-MCNC: 27.7 NG/ML (ref 4.6–34.8)
HGB BLD-MCNC: 10.9 G/DL (ref 11.7–15.7)
VIT B12 SERPL-MCNC: 458 PG/ML (ref 232–1245)

## 2023-03-15 PROCEDURE — 82746 ASSAY OF FOLIC ACID SERUM: CPT | Mod: 90 | Performed by: PATHOLOGY

## 2023-03-15 PROCEDURE — 85018 HEMOGLOBIN: CPT | Performed by: PATHOLOGY

## 2023-03-15 PROCEDURE — 82607 VITAMIN B-12: CPT | Mod: 90 | Performed by: PATHOLOGY

## 2023-03-15 PROCEDURE — 83550 IRON BINDING TEST: CPT | Mod: 90 | Performed by: PATHOLOGY

## 2023-03-15 PROCEDURE — 83540 ASSAY OF IRON: CPT | Mod: 90 | Performed by: PATHOLOGY

## 2023-03-15 PROCEDURE — 36415 COLL VENOUS BLD VENIPUNCTURE: CPT | Performed by: PATHOLOGY

## 2023-03-15 PROCEDURE — 99024 POSTOP FOLLOW-UP VISIT: CPT

## 2023-03-15 PROCEDURE — 99000 SPECIMEN HANDLING OFFICE-LAB: CPT | Performed by: PATHOLOGY

## 2023-03-15 ASSESSMENT — PAIN SCALES - GENERAL: PAINLEVEL: NO PAIN (0)

## 2023-03-15 NOTE — LETTER
3/15/2023       RE: Nuris Reddy  [address] [place] [id number]     Dear Colleague,    Thank you for referring your patient, Nuris Reddy, to the [facility] [place] [facility]. Please see a copy of my visit note below.    Colon and Rectal Surgery Postoperative Clinic Note    RE: Nuris Reddy  : 2000  CHARLIE: 3/15/2023    Nuris Reddy is a very pleasant 22 year old female now status post examination under anesthesia with excision of anal skin tag x2 with Dr. Mack on 23.     Interval history: Doing well. Minimal pain. Bowel movements at baseline- 1-2 looser per day. Taking a fiber supplement. Overall she is happy with the surgery. She is also worried about her B12 and hemoglobin and is wondering if this can be checked today.    Physical Examination: Exam was chaperoned by Qasim Nielsen, EMT-P   /71 (BP Location: Left arm, Patient Position: Sitting, Cuff Size: Adult Regular)   Pulse 74   SpO2 98%   General: alert, oriented, in no acute distress, sitting comfortably  HEENT: moist mucous membranes  Perianal external examination:  Surgical wound posteriorly very small, mild swelling.    Assessment/Plan:  22 year old female now status post examination under anesthesia with excision of anal skin tag x2 with Dr. Mack on 23. Nuris is doing well after surgery. Can try tylenol and warm sitz baths for pain as needed. Will check B12, folate, and hemoglobin today but follow up with PCP for abnormal results. Follow up only as needed. Patient's questions were answered to her stated satisfaction and she is in agreement with this plan.     Medical history:  Past Medical History:   Diagnosis Date     Anemia 10/16/2012     COPD (chronic obstructive pulmonary disease) (H)      History of blood transfusion      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, s/p resection      Palpitations      Short gut syndrome 10/16/2012    ~ 20 cm of ileum as well as ileocecal valve resected in  period     Shortness of breath      Ulceration of intestine 2012     Uncomplicated asthma        Surgical history:  Past Surgical History:   Procedure Laterality Date     APPENDECTOMY  May 2022     COLONOSCOPY  2012     COLONOSCOPY  2012     COLONOSCOPY N/A 2022    Procedure: COLONOSCOPY, WITH BIOPSY;  Surgeon: Moisés Gupta MD;  Location: [facility] OR     EXAM UNDER ANESTHESIA ANUS N/A 2023    Procedure: EXAM UNDER ANESTHESIA, ANUS, EXCISION OF ANAL SKIN TAG;  Surgeon: Frandy Mack MD;  Location: [facility] OR     GI SURGERY  2022    First surgery occurred 1999     LAPAROSCOPIC RESECTION SMALL BOWEL N/A 2022    Procedure: EXCISION, SMALL INTESTINE, LAPAROSCOPIC;  Surgeon: Frandy Mack MD;  Location: [facility]     LAPAROSCOPIC RESECTION SMALL BOWEL  2022    Procedure: ;  Surgeon: Frandy Mack MD;  Location: [facility] OR     RESECTION ILEOCECAL  1999    ~ 20 cm of ileum as well as ileocecal valve resected in  period       Problem list:    Patient Active Problem List    Diagnosis Date Noted     Anal skin tag 2022     Priority: Medium     Added automatically from request for surgery [id number]       Shortness of breath 2022     Priority: Medium     Palpitations 2022     Priority: Medium     Anastomotic ulcer 2022     Priority: Medium     Anemia, unspecified type 2022     Priority: Medium     Chronic eczema 2019     Priority: Medium     Chronic rhinitis 2016     Priority: Medium     Iron deficiency anemia 2013     Priority: Medium     Short gut syndrome 10/16/2012     Priority: Medium     Ulceration of intestine 2012     Priority: Medium     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 diagnosed due to hematochezia.       Proteinuria 2012     Priority: Medium     Felt consistent with  benign intermittent proteinuria or postural proteinuria. Yearly urinalysis and prot/cr ratio. Contact nephrology if the prot/cr is >0.2 or if there are any abnormalities noted in the urinalysis.         Other congenital anomalies of intestine      Priority: Medium     congenital ileal atresia, s/p resection         Medications:  Current Outpatient Medications   Medication Sig Dispense Refill     Cetirizine HCl (ZYRTEC ALLERGY PO) Take by mouth every morning       cyanocobalamin (VITAMIN B-12) 1000 MCG tablet Take 1 tablet (1,000 mcg) by mouth daily 90 tablet 4     dupilumab (DUPIXENT) 300 MG/2ML prefilled pen Inject 2 mLs (300 mg) Subcutaneous every 14 days 4 mL 4     DUPIXENT 300 MG/2ML prefilled syringe INJECT THE CONTENTS OF 1 SYRINGE (300 MG) UNDER THE SKIN EVERY 14 DAYS 4 mL 4     fluticasone (FLONASE) 50 MCG/ACT nasal spray Spray 1-2 sprays into both nostrils daily 16 g 11     fluticasone-vilanterol (BREO ELLIPTA) 200-25 MCG/INH inhaler Inhale 1 puff into the lungs daily 60 each 3     ketoconazole (NIZORAL) 2 % external shampoo Use to shampoo twice weekly. Leave on 5 min then rinse. 120 mL 3     methylcellulose (CITRUCEL) 500 MG TABS tablet Take 500 mg by mouth daily       mometasone (ELOCON) 0.1 % external solution Apply small amount topically to affected areas of scalp daily prn for up to 2 weeks. 60 mL 0     Multiple Vitamins-Iron (MULTIVITAMIN/IRON PO) Take 1 tablet by mouth every morning       Probiotic Product (PRO-BIOTIC BLEND PO) Take by mouth as needed       tacrolimus (PROTOPIC) 0.1 % external ointment Apply to face daily in rash areas (Patient taking differently: as needed Apply to face daily in rash areas) 30 g 11     triamcinolone (KENALOG) 0.1 % external ointment Apply to face rash twice daily for the next two weeks until clear then twice daily as needed for up to 2 weeks. 30 g 1     TURMERIC PO          Allergies:  Allergies   Allergen Reactions     No Known Allergies        Family  history:  Family History   Problem Relation Age of Onset     No Known Problems Mother      No Known Problems Father      Other Cancer Maternal Grandfather         skin cancer     Hypertension Paternal Grandmother      Colon Cancer Paternal Grandmother      Hypertension Paternal Grandfather      Cardiovascular Paternal Grandfather         Valve condition     Prostate Cancer Paternal Grandfather      Depression Brother      Asthma Sister      No Known Problems Sister      Kidney Disease Maternal Uncle         kidney stones       Social history:  Social History     Tobacco Use     Smoking status: Never     Smokeless tobacco: Never     Tobacco comments:     Non-smoking home   Substance Use Topics     Alcohol use: Yes     Comment: Social     Marital status: single.    Nursing Notes:   Qasim Nielsen, EMT  3/15/2023 12:00 PM  Signed  Chief Complaint   Patient presents with     Post-op Visit       Vitals:    03/15/23 1158   BP: 113/71   BP Location: Left arm   Patient Position: Sitting   Cuff Size: Adult Regular   Pulse: 74   SpO2: 98%       There is no height or weight on file to calculate BMI.    Qasim Nielsen EMT-P         15 minutes spent on the date of the encounter doing chart review, history and exam, documentation and further activities as noted above.   This is a postop visit.      Kenya Mckay PA-C  Colon and Rectal Surgery  [facility]

## 2023-03-15 NOTE — NURSING NOTE
Chief Complaint   Patient presents with     Post-op Visit       Vitals:    03/15/23 1158   BP: 113/71   BP Location: Left arm   Patient Position: Sitting   Cuff Size: Adult Regular   Pulse: 74   SpO2: 98%       There is no height or weight on file to calculate BMI.    Qasim Nielsen EMT-P

## 2023-03-16 ENCOUNTER — MYC MEDICAL ADVICE (OUTPATIENT)
Dept: PEDIATRICS | Facility: CLINIC | Age: 23
End: 2023-03-16
Payer: COMMERCIAL

## 2023-03-16 DIAGNOSIS — D50.0 IRON DEFICIENCY ANEMIA DUE TO CHRONIC BLOOD LOSS: Primary | ICD-10-CM

## 2023-03-16 DIAGNOSIS — K90.829 SHORT GUT SYNDROME: ICD-10-CM

## 2023-03-16 DIAGNOSIS — K63.3 ULCERATION OF INTESTINE: ICD-10-CM

## 2023-03-16 LAB
IRON BINDING CAPACITY (ROCHE): 458 UG/DL (ref 240–430)
IRON SATN MFR SERPL: 4 % (ref 15–46)
IRON SERPL-MCNC: 18 UG/DL (ref 37–145)

## 2023-03-20 RX ORDER — FERROUS SULFATE 7.5 MG/0.5
3 SYRINGE (EA) ORAL DAILY
Qty: 50 ML | Refills: 1 | Status: SHIPPED | OUTPATIENT
Start: 2023-03-20

## 2023-03-21 RX ORDER — ALBUTEROL SULFATE 0.83 MG/ML
2.5 SOLUTION RESPIRATORY (INHALATION)
Status: CANCELLED | OUTPATIENT
Start: 2023-03-22

## 2023-03-21 RX ORDER — EPINEPHRINE 1 MG/ML
0.3 INJECTION, SOLUTION, CONCENTRATE INTRAVENOUS EVERY 5 MIN PRN
Status: CANCELLED | OUTPATIENT
Start: 2023-03-22

## 2023-03-21 RX ORDER — METHYLPREDNISOLONE SODIUM SUCCINATE 125 MG/2ML
125 INJECTION, POWDER, LYOPHILIZED, FOR SOLUTION INTRAMUSCULAR; INTRAVENOUS
Status: CANCELLED
Start: 2023-03-22

## 2023-03-21 RX ORDER — HEPARIN SODIUM,PORCINE 10 UNIT/ML
5 VIAL (ML) INTRAVENOUS
Status: CANCELLED | OUTPATIENT
Start: 2023-03-22

## 2023-03-21 RX ORDER — HEPARIN SODIUM (PORCINE) LOCK FLUSH IV SOLN 100 UNIT/ML 100 UNIT/ML
5 SOLUTION INTRAVENOUS
Status: CANCELLED | OUTPATIENT
Start: 2023-03-22

## 2023-03-21 RX ORDER — ALBUTEROL SULFATE 90 UG/1
1-2 AEROSOL, METERED RESPIRATORY (INHALATION)
Status: CANCELLED
Start: 2023-03-22

## 2023-03-21 RX ORDER — DIPHENHYDRAMINE HYDROCHLORIDE 50 MG/ML
50 INJECTION INTRAMUSCULAR; INTRAVENOUS
Status: CANCELLED
Start: 2023-03-22

## 2023-03-21 NOTE — TELEPHONE ENCOUNTER
Needs lab appointment in 2-3 months with provider appointment right after that.    Patient in agreement and interested in the iron infusion, will order and patient will have to call to schedule and check with insurance if covered.    Huddled with Dr. Osei- Feraheme 2 infusions  by 7 days.  In the order for infusion, I see Feraheme 510 mg in sodium chloride infusion, administered over 15min.  Will order 2 doses as per provider.    Order placed and patient informed.  Patient will call the infusion center to schedule.    Karina Marie RN

## 2023-03-22 NOTE — TELEPHONE ENCOUNTER
See pt's [employer] message.     -Replied via [employer].     Jenna LEVY RN   PAL (Patient Advocate Liaison)  [facility]

## 2023-03-24 NOTE — TELEPHONE ENCOUNTER
See patient's MyChart message   - Patient states that the infusion would cost her $1500 with a total of $6000 without insurance   - Patient wondering if there is an alternative infusion option that would be less expensive     Dr. Osei, please review and advise.     Carmina KIMBALL RN   Patient Advocate Liaison (PAL)  [facility]   [phone]

## 2023-03-24 NOTE — TELEPHONE ENCOUNTER
For now, have her take the highest dose of iron drops she can tolerate, up to 4 full ml/day = 60 mg elemental iron. She is currently taking 3 mg elemental iron.  Could place order for venofer and see how expensive that is. I'm not sure there is a less expensive one or who to ask. Likely will need to place order and wait until they check with insurance.  Also, she should ask her insurance if her cost is a deductible that would be the same no matter which infusion we do. I think it's unlikely any of these infusions are <$1500 but only the hospital/infusion center would know that. Could check with pricing dept as well.  Aliya Osei M.D.

## 2023-03-27 NOTE — TELEPHONE ENCOUNTER
Asking patient to check if Venofer would be cheaper.  She has already met her deductible, so she is responsible for 25% of the total cost of an infusion, trying to find a cheaper iron infusion.  -for not, taking oral iron, will take as much as she can tolerate.    Waiting for reply from patient and can order Venofer 200mg for 5 visits, or 300mg for 3 visits.    Karina Marie RN

## 2023-04-04 ENCOUNTER — TELEPHONE (OUTPATIENT)
Dept: DERMATOLOGY | Facility: CLINIC | Age: 23
End: 2023-04-04
Payer: COMMERCIAL

## 2023-04-04 NOTE — TELEPHONE ENCOUNTER
Called and left message for patient. Requesting call back;  Looking to schedule patient first available per Dr. Tena's recommendations. Vy Blankenship MA

## 2023-04-14 ENCOUNTER — MYC MEDICAL ADVICE (OUTPATIENT)
Dept: PEDIATRICS | Facility: CLINIC | Age: 23
End: 2023-04-14
Payer: COMMERCIAL

## 2023-04-14 DIAGNOSIS — D50.9 IRON DEFICIENCY ANEMIA, UNSPECIFIED IRON DEFICIENCY ANEMIA TYPE: ICD-10-CM

## 2023-04-14 DIAGNOSIS — K63.3 ULCERATION OF INTESTINE: ICD-10-CM

## 2023-04-14 DIAGNOSIS — K90.829 SHORT GUT SYNDROME: ICD-10-CM

## 2023-04-14 DIAGNOSIS — D50.9 IRON DEFICIENCY ANEMIA: Primary | ICD-10-CM

## 2023-04-14 RX ORDER — EPINEPHRINE 1 MG/ML
0.3 INJECTION, SOLUTION, CONCENTRATE INTRAVENOUS EVERY 5 MIN PRN
Status: CANCELLED | OUTPATIENT
Start: 2023-04-14

## 2023-04-14 RX ORDER — ALBUTEROL SULFATE 90 UG/1
1-2 AEROSOL, METERED RESPIRATORY (INHALATION)
Status: CANCELLED
Start: 2023-04-14

## 2023-04-14 RX ORDER — HEPARIN SODIUM,PORCINE 10 UNIT/ML
5 VIAL (ML) INTRAVENOUS
Status: CANCELLED | OUTPATIENT
Start: 2023-04-14

## 2023-04-14 RX ORDER — METHYLPREDNISOLONE SODIUM SUCCINATE 125 MG/2ML
125 INJECTION, POWDER, LYOPHILIZED, FOR SOLUTION INTRAMUSCULAR; INTRAVENOUS
Status: CANCELLED
Start: 2023-04-14

## 2023-04-14 RX ORDER — DIPHENHYDRAMINE HYDROCHLORIDE 50 MG/ML
50 INJECTION INTRAMUSCULAR; INTRAVENOUS
Status: CANCELLED
Start: 2023-04-14

## 2023-04-14 RX ORDER — HEPARIN SODIUM (PORCINE) LOCK FLUSH IV SOLN 100 UNIT/ML 100 UNIT/ML
5 SOLUTION INTRAVENOUS
Status: CANCELLED | OUTPATIENT
Start: 2023-04-14

## 2023-04-14 RX ORDER — ALBUTEROL SULFATE 0.83 MG/ML
2.5 SOLUTION RESPIRATORY (INHALATION)
Status: CANCELLED | OUTPATIENT
Start: 2023-04-14

## 2023-04-14 NOTE — TELEPHONE ENCOUNTER
This was not routed back to me.   Just noticed this today.  I will change the iron infusion order.  See the new encounter dated 4/14 for continuation of this.  Karina Marie RN

## 2023-04-14 NOTE — TELEPHONE ENCOUNTER
Patient has actually responded to me on 3/30 but the message has not been routed to me earlier.  I see that venofer 300mg manasa be cheaper and patient asking to order this.  She is scheduled for 3 infusions,  by  Days for Venofer 200mg each time.    We need to place an order for this. She is already scheduled for three infusions, first one on 4/19, in appointment notes it does say Venofer, but Feraheme is ordered in therapy plans.    I huddled [facility] Dr. Osei-ok to change the order and then route to her to review next week.  Order placed and routed to PCP. Patient informed that she needs to do blood work a month after infusions, per Dr Osei.    Iron and iron binding, and CBC with plt have already been ordered-did we want to add ferritin?  Will keep and discuss with Dr. Osei next week.  Patient will need a lab appointment.  Karina Marie RN

## 2023-04-17 DIAGNOSIS — D50.9 IRON DEFICIENCY ANEMIA, UNSPECIFIED IRON DEFICIENCY ANEMIA TYPE: Primary | ICD-10-CM

## 2023-04-17 DIAGNOSIS — K63.3 ULCERATION OF INTESTINE: ICD-10-CM

## 2023-04-17 DIAGNOSIS — K90.829 SHORT GUT SYNDROME: ICD-10-CM

## 2023-04-17 NOTE — TELEPHONE ENCOUNTER
Actually, she doesn't meet the criteria that I see for pneumococcal vaccination in adults <65. Does not have low GFR<60, lung disease, diabetes. Her GI issues would not qualify her as I read the recommendations. Let her know we can discuss at her next prev visit due September 2023.   Aliya Osei M.D.

## 2023-04-17 NOTE — TELEPHONE ENCOUNTER
Patient Quality Outreach Health Maintenance - PAL RN    Summary:    PAL RN contacted pt regarding overdue health maintenance    Patient is due/failing the following:       Topic Date Due     Pneumococcal Vaccine (1 - PCV) 08/05/2005       Health Maintenance Due   Topic Date Due     Pneumococcal Vaccine: Pediatrics (0 to 5 Years) and At-Risk Patients (6 to 64 Years) (1 - PCV) 08/05/2005       Type of outreach:    verifying with provider, waiting for order to be placed and then will reach out to pt to inform her and schedule    - Advised patient if any questions or concerns comes up to call the PAL RN.   - Patient given opportunity to ask questions and at this time there is nothing further needed.     Questions for provider review:    Please place pneumococcal vaccine order.                                                                                     Chart routed to Provider.    Karina Marie, RN

## 2023-04-18 RX ORDER — HEPARIN SODIUM,PORCINE 10 UNIT/ML
5 VIAL (ML) INTRAVENOUS
Status: CANCELLED | OUTPATIENT
Start: 2023-04-18

## 2023-04-18 RX ORDER — EPINEPHRINE 1 MG/ML
0.3 INJECTION, SOLUTION, CONCENTRATE INTRAVENOUS EVERY 5 MIN PRN
Status: CANCELLED | OUTPATIENT
Start: 2023-04-18

## 2023-04-18 RX ORDER — HEPARIN SODIUM (PORCINE) LOCK FLUSH IV SOLN 100 UNIT/ML 100 UNIT/ML
5 SOLUTION INTRAVENOUS
Status: CANCELLED | OUTPATIENT
Start: 2023-04-18

## 2023-04-18 RX ORDER — ALBUTEROL SULFATE 0.83 MG/ML
2.5 SOLUTION RESPIRATORY (INHALATION)
Status: CANCELLED | OUTPATIENT
Start: 2023-04-18

## 2023-04-18 RX ORDER — ALBUTEROL SULFATE 90 UG/1
1-2 AEROSOL, METERED RESPIRATORY (INHALATION)
Status: CANCELLED
Start: 2023-04-18

## 2023-04-18 RX ORDER — DIPHENHYDRAMINE HYDROCHLORIDE 50 MG/ML
50 INJECTION INTRAMUSCULAR; INTRAVENOUS
Status: CANCELLED
Start: 2023-04-18

## 2023-04-18 RX ORDER — METHYLPREDNISOLONE SODIUM SUCCINATE 125 MG/2ML
125 INJECTION, POWDER, LYOPHILIZED, FOR SOLUTION INTRAMUSCULAR; INTRAVENOUS
Status: CANCELLED
Start: 2023-04-18

## 2023-04-18 NOTE — TELEPHONE ENCOUNTER
FYI-starting iron infusions-3 doses of Venofer 300mg, first one tomorrow. Labs are ordered and patient is aware to get them done about one month after infusions.  Did you want ferritin added as well?      Karina Marie RN

## 2023-04-18 NOTE — TELEPHONE ENCOUNTER
I have adjusted the therapy order for Venofer.  Can do 200mg 5 times or 300mg two times.  Patient prefers to get 3 infusions and asking to change to 300mg.  Ok per infusion center, Dr. Osei wanted to get the regular infusion ordered which is 300mg three tines or 200mg 5 times, changes per provider and patient's preference.  Karina Marie RN

## 2023-04-18 NOTE — TELEPHONE ENCOUNTER
I have not discussed this with patient-it was routed to provider first, it will still be on HM so ok to discuss at next visit. Patient has not asked or inquired about it.  Karina Marie RN

## 2023-04-19 ENCOUNTER — INFUSION THERAPY VISIT (OUTPATIENT)
Dept: INFUSION THERAPY | Facility: CLINIC | Age: 23
End: 2023-04-19
Attending: INTERNAL MEDICINE
Payer: COMMERCIAL

## 2023-04-19 VITALS
DIASTOLIC BLOOD PRESSURE: 72 MMHG | SYSTOLIC BLOOD PRESSURE: 120 MMHG | TEMPERATURE: 98.8 F | HEART RATE: 86 BPM | OXYGEN SATURATION: 100 %

## 2023-04-19 DIAGNOSIS — D50.9 IRON DEFICIENCY ANEMIA: Primary | ICD-10-CM

## 2023-04-19 PROCEDURE — 96366 THER/PROPH/DIAG IV INF ADDON: CPT

## 2023-04-19 PROCEDURE — 250N000011 HC RX IP 250 OP 636: Performed by: INTERNAL MEDICINE

## 2023-04-19 PROCEDURE — 96365 THER/PROPH/DIAG IV INF INIT: CPT

## 2023-04-19 PROCEDURE — 258N000003 HC RX IP 258 OP 636: Performed by: INTERNAL MEDICINE

## 2023-04-19 RX ORDER — ALBUTEROL SULFATE 0.83 MG/ML
2.5 SOLUTION RESPIRATORY (INHALATION)
Status: CANCELLED | OUTPATIENT
Start: 2023-04-21

## 2023-04-19 RX ORDER — HEPARIN SODIUM (PORCINE) LOCK FLUSH IV SOLN 100 UNIT/ML 100 UNIT/ML
5 SOLUTION INTRAVENOUS
Status: CANCELLED | OUTPATIENT
Start: 2023-04-21

## 2023-04-19 RX ORDER — ALBUTEROL SULFATE 90 UG/1
1-2 AEROSOL, METERED RESPIRATORY (INHALATION)
Status: CANCELLED
Start: 2023-04-21

## 2023-04-19 RX ORDER — EPINEPHRINE 1 MG/ML
0.3 INJECTION, SOLUTION INTRAMUSCULAR; SUBCUTANEOUS EVERY 5 MIN PRN
Status: CANCELLED | OUTPATIENT
Start: 2023-04-21

## 2023-04-19 RX ORDER — METHYLPREDNISOLONE SODIUM SUCCINATE 125 MG/2ML
125 INJECTION, POWDER, LYOPHILIZED, FOR SOLUTION INTRAMUSCULAR; INTRAVENOUS
Status: CANCELLED
Start: 2023-04-21

## 2023-04-19 RX ORDER — HEPARIN SODIUM,PORCINE 10 UNIT/ML
5 VIAL (ML) INTRAVENOUS
Status: CANCELLED | OUTPATIENT
Start: 2023-04-21

## 2023-04-19 RX ORDER — DIPHENHYDRAMINE HYDROCHLORIDE 50 MG/ML
50 INJECTION INTRAMUSCULAR; INTRAVENOUS
Status: CANCELLED
Start: 2023-04-21

## 2023-04-19 RX ADMIN — SODIUM CHLORIDE 250 ML: 9 INJECTION, SOLUTION INTRAVENOUS at 08:35

## 2023-04-19 RX ADMIN — IRON SUCROSE 300 MG: 20 INJECTION, SOLUTION INTRAVENOUS at 08:34

## 2023-04-19 NOTE — PROGRESS NOTES
Infusion Nursing Note:  Nuris VASQUEZ Reddy presents today for Venofer #1 of 3.    Patient seen by provider today: No   present during visit today: Not Applicable.    Note: Oriented to infusion center and call light system. Venofer education completed. Patient had no further questions.       Intravenous Access:  Peripheral IV placed.    Treatment Conditions:  Iron studies completed 3/15      Post Infusion Assessment:  Patient tolerated infusion without incident.  Blood return noted pre and post infusion.  Site patent and intact, free from redness, edema or discomfort.  No evidence of extravasations.  Access discontinued per protocol.       Discharge Plan:   Discharge instructions reviewed with: Patient.  Patient and/or family verbalized understanding of discharge instructions and all questions answered.  AVS to patient via [employer].  Patient will return 4/26 for next appointment.   Patient discharged in stable condition accompanied by: self.  Departure Mode: Ambulatory.      Karina Samuel RN

## 2023-04-24 ENCOUNTER — OFFICE VISIT (OUTPATIENT)
Dept: FAMILY MEDICINE | Facility: CLINIC | Age: 23
End: 2023-04-24
Payer: COMMERCIAL

## 2023-04-24 VITALS
TEMPERATURE: 98.2 F | DIASTOLIC BLOOD PRESSURE: 75 MMHG | WEIGHT: 119 LBS | HEART RATE: 80 BPM | BODY MASS INDEX: 21.09 KG/M2 | OXYGEN SATURATION: 100 % | RESPIRATION RATE: 15 BRPM | HEIGHT: 63 IN | SYSTOLIC BLOOD PRESSURE: 115 MMHG

## 2023-04-24 DIAGNOSIS — L30.9 ECZEMA, UNSPECIFIED TYPE: Primary | ICD-10-CM

## 2023-04-24 DIAGNOSIS — B35.0 TINEA CAPITIS: ICD-10-CM

## 2023-04-24 PROCEDURE — 99213 OFFICE O/P EST LOW 20 MIN: CPT | Performed by: FAMILY MEDICINE

## 2023-04-24 RX ORDER — MOMETASONE FUROATE 1 MG/ML
SOLUTION TOPICAL
Qty: 60 ML | Refills: 0 | Status: SHIPPED | OUTPATIENT
Start: 2023-04-24

## 2023-04-24 RX ORDER — CLOTRIMAZOLE 1 G/ML
SOLUTION TOPICAL 2 TIMES DAILY
Qty: 15 ML | Refills: 1 | Status: SHIPPED | OUTPATIENT
Start: 2023-04-24 | End: 2023-05-08

## 2023-04-24 NOTE — PROGRESS NOTES
"  Assessment & Plan     Eczema, unspecified type  Macular papular patches under left eye and alonfg left scalp  - mometasone (ELOCON) 0.1 % external solution  Dispense: 60 mL; Refill: 0  - clotrimazole (LOTRIMIN) 1 % external solution  Dispense: 15 mL; Refill: 1  - Adult Dermatology Referral    Tinea capitis  As above  - clotrimazole (LOTRIMIN) 1 % external solution  Dispense: 15 mL; Refill: 1  - Adult Dermatology Referral               See Patient Instructions  Limit soap and facial products  Kishor Retana MD  [facility] ALLY Lawler is a 22 year old, presenting for the following health issues:    Derm Problem (Dry skin around L eye)         View : No data to display.              History of Present Illness       Reason for visit:  Derm problem    She eats 2-3 servings of fruits and vegetables daily.She consumes 1 sweetened beverage(s) daily.She exercises with enough effort to increase her heart rate 30 to 60 minutes per day.  She exercises with enough effort to increase her heart rate 3 or less days per week. She is missing 3 dose(s) of medications per week.               Review of Systems   Constitutional, HEENT, cardiovascular, pulmonary, gi and gu systems are negative, except as otherwise noted.      Objective    /75   Pulse 80   Temp 98.2  F (36.8  C) (Temporal)   Resp 15   Ht 1.6 m (5' 3\")   Wt 54 kg (119 lb)   LMP 04/03/2023 (Exact Date)   SpO2 100%   BMI 21.08 kg/m    Body mass index is 21.08 kg/m .  Physical Exam   GENERAL: healthy, alert and no distress  EYES: Eyes grossly normal to inspection, PERRL and conjunctivae and sclerae normal  SKIN: no suspicious lesions or rashes and maculopapular eruption - left infraorbital and left hairline    Lab on 03/15/2023   Component Date Value Ref Range Status     Vitamin B12 03/15/2023 458  232 - 1,245 pg/mL Final     Folic Acid 03/15/2023 27.7  4.6 - 34.8 ng/mL Final     Hemoglobin 03/15/2023 10.9 (L)  11.7 " - 15.7 g/dL Final     Iron 03/15/2023 18 (L)  37 - 145 ug/dL Final     Iron Binding Capacity 03/15/2023 458 (H)  240 - 430 ug/dL Final     Iron Sat Index 03/15/2023 4 (L)  15 - 46 % Final

## 2023-04-26 ENCOUNTER — INFUSION THERAPY VISIT (OUTPATIENT)
Dept: INFUSION THERAPY | Facility: CLINIC | Age: 23
End: 2023-04-26
Attending: INTERNAL MEDICINE
Payer: COMMERCIAL

## 2023-04-26 VITALS
DIASTOLIC BLOOD PRESSURE: 57 MMHG | HEART RATE: 71 BPM | OXYGEN SATURATION: 100 % | SYSTOLIC BLOOD PRESSURE: 113 MMHG | TEMPERATURE: 98.9 F

## 2023-04-26 DIAGNOSIS — D50.9 IRON DEFICIENCY ANEMIA, UNSPECIFIED IRON DEFICIENCY ANEMIA TYPE: Primary | ICD-10-CM

## 2023-04-26 PROCEDURE — 96365 THER/PROPH/DIAG IV INF INIT: CPT

## 2023-04-26 PROCEDURE — 250N000011 HC RX IP 250 OP 636: Performed by: INTERNAL MEDICINE

## 2023-04-26 PROCEDURE — 258N000003 HC RX IP 258 OP 636: Performed by: INTERNAL MEDICINE

## 2023-04-26 PROCEDURE — 96366 THER/PROPH/DIAG IV INF ADDON: CPT

## 2023-04-26 RX ORDER — EPINEPHRINE 1 MG/ML
0.3 INJECTION, SOLUTION INTRAMUSCULAR; SUBCUTANEOUS EVERY 5 MIN PRN
Status: CANCELLED | OUTPATIENT
Start: 2023-04-27

## 2023-04-26 RX ORDER — ALBUTEROL SULFATE 90 UG/1
1-2 AEROSOL, METERED RESPIRATORY (INHALATION)
Status: CANCELLED
Start: 2023-04-27

## 2023-04-26 RX ORDER — ALBUTEROL SULFATE 0.83 MG/ML
2.5 SOLUTION RESPIRATORY (INHALATION)
Status: CANCELLED | OUTPATIENT
Start: 2023-04-27

## 2023-04-26 RX ORDER — HEPARIN SODIUM,PORCINE 10 UNIT/ML
5 VIAL (ML) INTRAVENOUS
Status: CANCELLED | OUTPATIENT
Start: 2023-04-27

## 2023-04-26 RX ORDER — DIPHENHYDRAMINE HYDROCHLORIDE 50 MG/ML
50 INJECTION INTRAMUSCULAR; INTRAVENOUS
Status: CANCELLED
Start: 2023-04-27

## 2023-04-26 RX ORDER — METHYLPREDNISOLONE SODIUM SUCCINATE 125 MG/2ML
125 INJECTION, POWDER, LYOPHILIZED, FOR SOLUTION INTRAMUSCULAR; INTRAVENOUS
Status: CANCELLED
Start: 2023-04-27

## 2023-04-26 RX ORDER — HEPARIN SODIUM (PORCINE) LOCK FLUSH IV SOLN 100 UNIT/ML 100 UNIT/ML
5 SOLUTION INTRAVENOUS
Status: CANCELLED | OUTPATIENT
Start: 2023-04-27

## 2023-04-26 RX ADMIN — SODIUM CHLORIDE 250 ML: 9 INJECTION, SOLUTION INTRAVENOUS at 08:35

## 2023-04-26 RX ADMIN — IRON SUCROSE 300 MG: 20 INJECTION, SOLUTION INTRAVENOUS at 08:15

## 2023-04-26 NOTE — PROGRESS NOTES
Infusion Nursing Note:  Nuris Reddy presents today for 2 of 3 venofer.    Patient seen by provider today: No   present during visit today: Not Applicable.    Note: some leg pain in right knee post 1st dose. Only lasted 1/2 day.    Not long into infusion, Nuris started having irritation where the iron was infusing. IV fluids were added. Tolerated remainder of dose without incident.     Intravenous Access:  Peripheral IV placed.    Treatment Conditions:  Iron studies reviewed.    Post Infusion Assessment:  Patient tolerated infusion without incident.  No evidence of extravasations.  Access discontinued per protocol.       Discharge Plan:   Discharge instructions reviewed with: Patient.  Patient and/or family verbalized understanding of discharge instructions and all questions answered.  AVS to patient via [employer].  Patient will return 5/3/23 for next appointment.   Patient discharged in stable condition accompanied by: self.  Departure Mode: Ambulatory.      Natalio Richards RN

## 2023-05-03 ENCOUNTER — INFUSION THERAPY VISIT (OUTPATIENT)
Dept: INFUSION THERAPY | Facility: CLINIC | Age: 23
End: 2023-05-03
Attending: INTERNAL MEDICINE
Payer: COMMERCIAL

## 2023-05-03 VITALS
OXYGEN SATURATION: 100 % | HEART RATE: 74 BPM | TEMPERATURE: 98.6 F | SYSTOLIC BLOOD PRESSURE: 99 MMHG | DIASTOLIC BLOOD PRESSURE: 67 MMHG

## 2023-05-03 DIAGNOSIS — L20.84 INTRINSIC ATOPIC DERMATITIS: ICD-10-CM

## 2023-05-03 DIAGNOSIS — D50.9 IRON DEFICIENCY ANEMIA, UNSPECIFIED IRON DEFICIENCY ANEMIA TYPE: Primary | ICD-10-CM

## 2023-05-03 PROCEDURE — 96366 THER/PROPH/DIAG IV INF ADDON: CPT

## 2023-05-03 PROCEDURE — 258N000003 HC RX IP 258 OP 636: Performed by: INTERNAL MEDICINE

## 2023-05-03 PROCEDURE — 96365 THER/PROPH/DIAG IV INF INIT: CPT

## 2023-05-03 PROCEDURE — 250N000011 HC RX IP 250 OP 636: Performed by: INTERNAL MEDICINE

## 2023-05-03 RX ORDER — EPINEPHRINE 1 MG/ML
0.3 INJECTION, SOLUTION INTRAMUSCULAR; SUBCUTANEOUS EVERY 5 MIN PRN
Status: CANCELLED | OUTPATIENT
Start: 2023-05-04

## 2023-05-03 RX ORDER — ALBUTEROL SULFATE 90 UG/1
1-2 AEROSOL, METERED RESPIRATORY (INHALATION)
Status: CANCELLED
Start: 2023-05-04

## 2023-05-03 RX ORDER — HEPARIN SODIUM (PORCINE) LOCK FLUSH IV SOLN 100 UNIT/ML 100 UNIT/ML
5 SOLUTION INTRAVENOUS
Status: CANCELLED | OUTPATIENT
Start: 2023-05-04

## 2023-05-03 RX ORDER — HEPARIN SODIUM,PORCINE 10 UNIT/ML
5 VIAL (ML) INTRAVENOUS
Status: CANCELLED | OUTPATIENT
Start: 2023-05-04

## 2023-05-03 RX ORDER — DIPHENHYDRAMINE HYDROCHLORIDE 50 MG/ML
50 INJECTION INTRAMUSCULAR; INTRAVENOUS
Status: CANCELLED
Start: 2023-05-04

## 2023-05-03 RX ORDER — METHYLPREDNISOLONE SODIUM SUCCINATE 125 MG/2ML
125 INJECTION, POWDER, LYOPHILIZED, FOR SOLUTION INTRAMUSCULAR; INTRAVENOUS
Status: CANCELLED
Start: 2023-05-04

## 2023-05-03 RX ORDER — ALBUTEROL SULFATE 0.83 MG/ML
2.5 SOLUTION RESPIRATORY (INHALATION)
Status: CANCELLED | OUTPATIENT
Start: 2023-05-04

## 2023-05-03 RX ADMIN — SODIUM CHLORIDE 250 ML: 9 INJECTION, SOLUTION INTRAVENOUS at 08:37

## 2023-05-03 RX ADMIN — IRON SUCROSE 300 MG: 20 INJECTION, SOLUTION INTRAVENOUS at 08:37

## 2023-05-03 NOTE — PROGRESS NOTES
Infusion Nursing Note:  Nuris Reddy presents today for 3 of 3 venofer.    Patient seen by provider today: No   present during visit today: Not Applicable.    Note: Craving ice less but not too much improvement in other symptoms.    Intravenous Access:  Peripheral IV placed.    Treatment Conditions:  Iron studies reviewed.    Post Infusion Assessment:  Patient tolerated infusion without incident.  No evidence of extravasations.  Access discontinued per protocol.       Discharge Plan:   Discharge instructions reviewed with: Patient.  AVS to patient via MYCHART.    Patient discharged in stable condition accompanied by: self.  Departure Mode: Ambulatory.      Natalio Richards RN

## 2023-05-04 RX ORDER — DUPILUMAB 300 MG/2ML
INJECTION, SOLUTION SUBCUTANEOUS
Qty: 4 ML | Refills: 4 | Status: SHIPPED | OUTPATIENT
Start: 2023-05-04 | End: 2023-09-23

## 2023-06-21 ENCOUNTER — TRANSFERRED RECORDS (OUTPATIENT)
Dept: PEDIATRICS | Facility: CLINIC | Age: 23
End: 2023-06-21
Payer: COMMERCIAL

## 2023-09-22 DIAGNOSIS — L20.84 INTRINSIC ATOPIC DERMATITIS: ICD-10-CM

## 2023-09-23 RX ORDER — DUPILUMAB 300 MG/2ML
INJECTION, SOLUTION SUBCUTANEOUS
Qty: 4 ML | Refills: 4 | Status: SHIPPED | OUTPATIENT
Start: 2023-09-23 | End: 2024-02-02

## 2023-09-25 ENCOUNTER — TELEPHONE (OUTPATIENT)
Dept: DERMATOLOGY | Facility: CLINIC | Age: 23
End: 2023-09-25
Payer: COMMERCIAL

## 2023-09-25 NOTE — TELEPHONE ENCOUNTER
Prior Authorization Approval    Medication: DUPIXENT 300 MG/2ML SC SOPN  Authorization Effective Date: 8/26/2023  Authorization Expiration Date: 1/23/2024  Approved Dose/Quantity: 4 pens per 28 days  Reference #: [id number]   Insurance Company: [employer] - Phone [phone] Fax [phone]  Expected CoPay:       CoPay Card Available:      Financial Assistance Needed: gave pt info about copay card  Which Pharmacy is filling the prescription:    Pharmacy Notified:    Patient Notified: yes via [employer]    [id number]           [facility]  [email or web address]  Phone: [phone]  Fax: [phone]

## 2023-10-17 ENCOUNTER — ALLIED HEALTH/NURSE VISIT (OUTPATIENT)
Dept: FAMILY MEDICINE | Facility: CLINIC | Age: 23
End: 2023-10-17
Payer: COMMERCIAL

## 2023-10-17 DIAGNOSIS — Z23 ENCOUNTER FOR IMMUNIZATION: Primary | ICD-10-CM

## 2023-10-17 PROCEDURE — 99207 PR NO CHARGE NURSE ONLY: CPT

## 2023-10-17 PROCEDURE — 90471 IMMUNIZATION ADMIN: CPT

## 2023-10-17 PROCEDURE — 90686 IIV4 VACC NO PRSV 0.5 ML IM: CPT

## 2023-10-17 NOTE — PROGRESS NOTES
Prior to immunization administration, verified patients identity using patient s name and date of birth. Please see Immunization Activity for additional information.     Screening Questionnaire for Adult Immunization    Are you sick today?   No   Do you have allergies to medications, food, a vaccine component or latex?   No   Have you ever had a serious reaction after receiving a vaccination?   No   Do you have a long-term health problem with heart, lung, kidney, or metabolic disease (e.g., diabetes), asthma, a blood disorder, no spleen, complement component deficiency, a cochlear implant, or a spinal fluid leak?  Are you on long-term aspirin therapy?   No   Do you have cancer, leukemia, HIV/AIDS, or any other immune system problem?   No   Do you have a parent, brother, or sister with an immune system problem?   No   In the past 3 months, have you taken medications that affect  your immune system, such as prednisone, other steroids, or anticancer drugs; drugs for the treatment of rheumatoid arthritis, Crohn s disease, or psoriasis; or have you had radiation treatments?   No   Have you had a seizure, or a brain or other nervous system problem?   No   During the past year, have you received a transfusion of blood or blood    products, or been given immune (gamma) globulin or antiviral drug?   No   For women: Are you pregnant or is there a chance you could become       pregnant during the next month?   No   Have you received any vaccinations in the past 4 weeks?   No     Immunization questionnaire answers were all negative.    I have reviewed the following standing orders:   This patient is due and qualifies for the Influenza vaccine.    Click here for Influenza Vaccine Standing Order    I have reviewed the vaccines inclusion and exclusion criteria; No concerns regarding eligibility.     Patient instructed to remain in clinic for 15 minutes afterwards, and to report any adverse reactions.     Screening performed by  Burak Boyd RN on 10/17/2023 at 4:16 PM.

## 2023-10-19 DIAGNOSIS — L20.84 INTRINSIC ATOPIC DERMATITIS: ICD-10-CM

## 2023-10-20 ENCOUNTER — TELEPHONE (OUTPATIENT)
Dept: PEDIATRICS | Facility: CLINIC | Age: 23
End: 2023-10-20
Payer: COMMERCIAL

## 2023-10-20 RX ORDER — TRIAMCINOLONE ACETONIDE 1 MG/G
OINTMENT TOPICAL
Qty: 30 G | Refills: 1 | Status: SHIPPED | OUTPATIENT
Start: 2023-10-20 | End: 2024-06-18

## 2023-10-20 NOTE — TELEPHONE ENCOUNTER
Prior Authorization Retail Medication Request    Medication/Dose: Tacrolimus 0.1% Ointment   ICD code (if different than what is on RX):  L20.84   Previously Tried and Failed:  see chart  Rationale:  see chart    Insurance Name:  [employer]ica  Insurance ID:  [id number]       Pharmacy Information (if different than what is on RX)  Name:  [facility]  Phone:  [phone]

## 2023-10-23 DIAGNOSIS — L20.84 INTRINSIC ATOPIC DERMATITIS: ICD-10-CM

## 2023-10-23 NOTE — TELEPHONE ENCOUNTER
Central Prior Authorization Team   Phone: [phone]    PA Initiation    Medication: Tacrolimus 0.1% Ointment   Insurance Company: [employer] - Phone [phone] Fax [phone]  Pharmacy Filling the Rx: [id number] - [place], [place] - [address]  AT [address]  Filling Pharmacy Phone: [phone]  Filling Pharmacy Fax:    Start Date: 10/23/2023

## 2023-10-24 ENCOUNTER — ALLIED HEALTH/NURSE VISIT (OUTPATIENT)
Dept: FAMILY MEDICINE | Facility: CLINIC | Age: 23
End: 2023-10-24
Payer: COMMERCIAL

## 2023-10-24 DIAGNOSIS — Z23 ENCOUNTER FOR IMMUNIZATION: Primary | ICD-10-CM

## 2023-10-24 PROCEDURE — 91320 SARSCV2 VAC 30MCG TRS-SUC IM: CPT

## 2023-10-24 PROCEDURE — 90480 ADMN SARSCOV2 VAC 1/ONLY CMP: CPT

## 2023-10-24 PROCEDURE — 99207 PR NO CHARGE NURSE ONLY: CPT

## 2023-10-24 RX ORDER — TACROLIMUS 1 MG/G
OINTMENT TOPICAL
Qty: 30 G | Refills: 0 | Status: SHIPPED | OUTPATIENT
Start: 2023-10-24 | End: 2024-01-10

## 2023-10-24 NOTE — TELEPHONE ENCOUNTER
Prior Authorization Not Needed per Insurance    Medication: Tacrolimus 0.1% Ointment   Insurance Company: [employer] - Phone [phone] Fax [phone]  Expected CoPay:      Pharmacy Filling the Rx: [id number] - [place], [place] - [address]  AT [address]  Pharmacy Notified:  Yes  Patient Notified:  Yes  **Instructed pharmacy to notify patient when script is ready to /ship.**

## 2023-10-24 NOTE — PROGRESS NOTES
Prior to immunization administration, verified patients identity using patient s name and date of birth. Please see Immunization Activity for additional information.     Screening Questionnaire for Adult Immunization    Are you sick today?   No   Do you have allergies to medications, food, a vaccine component or latex?   No   Have you ever had a serious reaction after receiving a vaccination?   No   Do you have a long-term health problem with heart, lung, kidney, or metabolic disease (e.g., diabetes), asthma, a blood disorder, no spleen, complement component deficiency, a cochlear implant, or a spinal fluid leak?  Are you on long-term aspirin therapy?   No   Do you have cancer, leukemia, HIV/AIDS, or any other immune system problem?   No   Do you have a parent, brother, or sister with an immune system problem?   No   In the past 3 months, have you taken medications that affect  your immune system, such as prednisone, other steroids, or anticancer drugs; drugs for the treatment of rheumatoid arthritis, Crohn s disease, or psoriasis; or have you had radiation treatments?   No   Have you had a seizure, or a brain or other nervous system problem?   No   During the past year, have you received a transfusion of blood or blood    products, or been given immune (gamma) globulin or antiviral drug?   No   For women: Are you pregnant or is there a chance you could become       pregnant during the next month?   No   Have you received any vaccinations in the past 4 weeks?   No     Immunization questionnaire answers were all negative.    I have reviewed the following standing orders:   This patient is due and qualifies for the Covid-19 vaccine.     Click here for COVID-19 Standing Order    I have reviewed the vaccines inclusion and exclusion criteria; No concerns regarding eligibility.     Patient instructed to remain in clinic for 15 minutes afterwards, and to report any adverse reactions. Nuris's affect is pleasant.     Thank  you for the opportunity.     Screening performed by Burak Boyd RN on 10/24/2023 at 4:01 PM.

## 2023-11-19 ENCOUNTER — HEALTH MAINTENANCE LETTER (OUTPATIENT)
Age: 23
End: 2023-11-19

## 2023-12-04 ENCOUNTER — MEDICAL CORRESPONDENCE (OUTPATIENT)
Dept: HEALTH INFORMATION MANAGEMENT | Facility: CLINIC | Age: 23
End: 2023-12-04
Payer: COMMERCIAL

## 2023-12-04 DIAGNOSIS — B37.9 INFECTION DUE TO CANDIDA ALBICANS: Primary | ICD-10-CM

## 2023-12-04 DIAGNOSIS — K91.2 HYPOGLYCEMIA FOLLOWING GASTROINTESTINAL SURGERY: ICD-10-CM

## 2023-12-04 DIAGNOSIS — E61.5: ICD-10-CM

## 2023-12-04 DIAGNOSIS — E55.9 VITAMIN D DEFICIENCY: ICD-10-CM

## 2023-12-04 DIAGNOSIS — E51.9 VITAMIN B1 DEFICIENCY: ICD-10-CM

## 2023-12-04 DIAGNOSIS — E53.1 VITAMIN B6 DEFICIENCY: ICD-10-CM

## 2023-12-04 DIAGNOSIS — E53.8 FOLATE DEFICIENCY: ICD-10-CM

## 2023-12-04 DIAGNOSIS — E60 ZINC DEFICIENCY: ICD-10-CM

## 2023-12-23 DIAGNOSIS — R06.02 SHORTNESS OF BREATH: ICD-10-CM

## 2023-12-26 DIAGNOSIS — R06.02 SHORTNESS OF BREATH: ICD-10-CM

## 2023-12-28 ENCOUNTER — TELEPHONE (OUTPATIENT)
Dept: PULMONOLOGY | Facility: CLINIC | Age: 23
End: 2023-12-28
Payer: COMMERCIAL

## 2023-12-28 NOTE — TELEPHONE ENCOUNTER
Left Voicemail (1st Attempt) for the patient to call back and schedule the following:    Appointment type: NEVIN  Provider: JUAN  Return date: NEXT AVAIL  Specialty phone number: [phone]  Additional appointment(s) needed: NA  Additonal Notes: NEEDED FOR REFILL OF RX

## 2024-01-02 ENCOUNTER — TELEPHONE (OUTPATIENT)
Dept: PULMONOLOGY | Facility: CLINIC | Age: 24
End: 2024-01-02
Payer: COMMERCIAL

## 2024-01-02 NOTE — TELEPHONE ENCOUNTER
Left Voicemail (2nd Attempt) for the patient to call back and schedule the following:    Appointment type: NEVIN  Provider: JUAN  Return date: NEXT AVAIL  Specialty phone number: [phone]  Additional appointment(s) needed: NA  Additonal Notes: NEEDED FOR REFILL RX

## 2024-01-03 ENCOUNTER — TELEPHONE (OUTPATIENT)
Dept: PEDIATRICS | Facility: CLINIC | Age: 24
End: 2024-01-03
Payer: COMMERCIAL

## 2024-01-03 RX ORDER — FLUTICASONE FUROATE AND VILANTEROL 200; 25 UG/1; UG/1
1 POWDER RESPIRATORY (INHALATION) DAILY
OUTPATIENT
Start: 2024-01-03

## 2024-01-03 NOTE — TELEPHONE ENCOUNTER
Patient Quality Outreach Health Maintenance - PAL RN    Summary:    PAL RN contacted pt regarding overdue health maintenance    Patient is due/failing the following:   Cervical Cancer Screening - PAP Needed  Physical Preventive Adult Physical    Health Maintenance Due   Topic Date Due    CHLAMYDIA SCREENING  06/28/2023    YEARLY PREVENTIVE VISIT  09/28/2023    ANNUAL REVIEW OF HM ORDERS  09/28/2023    PHQ-2 (once per calendar year)  01/01/2024    PAP  03/01/2024       Type of outreach:    Sent sendwithus message.  Patient is scheduled for 1/10 for a physical-in-person appointment- so no message was sent.      - Advised patient if any questions or concerns comes up to call the PAL RN.   - Patient given opportunity to ask questions and at this time there is nothing further needed.     Questions for provider review:    None                                                                                     Chart routed to none.    Karina Marie, RN

## 2024-01-10 ENCOUNTER — OFFICE VISIT (OUTPATIENT)
Dept: PEDIATRICS | Facility: CLINIC | Age: 24
End: 2024-01-10
Payer: COMMERCIAL

## 2024-01-10 VITALS
BODY MASS INDEX: 21.88 KG/M2 | SYSTOLIC BLOOD PRESSURE: 105 MMHG | HEIGHT: 63 IN | OXYGEN SATURATION: 100 % | HEART RATE: 82 BPM | DIASTOLIC BLOOD PRESSURE: 70 MMHG | TEMPERATURE: 97.9 F | WEIGHT: 123.5 LBS | RESPIRATION RATE: 20 BRPM

## 2024-01-10 DIAGNOSIS — L20.84 INTRINSIC ATOPIC DERMATITIS: ICD-10-CM

## 2024-01-10 DIAGNOSIS — Z12.4 CERVICAL CANCER SCREENING: ICD-10-CM

## 2024-01-10 DIAGNOSIS — Z00.00 ROUTINE GENERAL MEDICAL EXAMINATION AT A HEALTH CARE FACILITY: Primary | ICD-10-CM

## 2024-01-10 DIAGNOSIS — Z11.3 SCREENING FOR STDS (SEXUALLY TRANSMITTED DISEASES): ICD-10-CM

## 2024-01-10 PROCEDURE — 86780 TREPONEMA PALLIDUM: CPT | Performed by: INTERNAL MEDICINE

## 2024-01-10 PROCEDURE — G0145 SCR C/V CYTO,THINLAYER,RESCR: HCPCS | Performed by: INTERNAL MEDICINE

## 2024-01-10 PROCEDURE — 87591 N.GONORRHOEAE DNA AMP PROB: CPT | Performed by: INTERNAL MEDICINE

## 2024-01-10 PROCEDURE — 86803 HEPATITIS C AB TEST: CPT | Performed by: INTERNAL MEDICINE

## 2024-01-10 PROCEDURE — 87389 HIV-1 AG W/HIV-1&-2 AB AG IA: CPT | Performed by: INTERNAL MEDICINE

## 2024-01-10 PROCEDURE — 36415 COLL VENOUS BLD VENIPUNCTURE: CPT | Performed by: INTERNAL MEDICINE

## 2024-01-10 PROCEDURE — 87491 CHLMYD TRACH DNA AMP PROBE: CPT | Performed by: INTERNAL MEDICINE

## 2024-01-10 PROCEDURE — 99395 PREV VISIT EST AGE 18-39: CPT | Performed by: INTERNAL MEDICINE

## 2024-01-10 RX ORDER — FLUTICASONE FUROATE AND VILANTEROL 200; 25 UG/1; UG/1
1 POWDER RESPIRATORY (INHALATION) DAILY
Qty: 90 EACH | Refills: 4 | Status: SHIPPED | OUTPATIENT
Start: 2024-01-10

## 2024-01-10 RX ORDER — TACROLIMUS 1 MG/G
OINTMENT TOPICAL
Qty: 30 G | Refills: 0 | Status: SHIPPED | OUTPATIENT
Start: 2024-01-10 | End: 2024-06-18

## 2024-01-10 ASSESSMENT — ENCOUNTER SYMPTOMS
FEVER: 0
HEMATURIA: 0
DIZZINESS: 0
WEAKNESS: 0
SORE THROAT: 0
FREQUENCY: 0
MYALGIAS: 0
HEADACHES: 0
NAUSEA: 0
BREAST MASS: 0
HEMATOCHEZIA: 0
CHILLS: 1
DIARRHEA: 1
COUGH: 0
EYE PAIN: 0
PARESTHESIAS: 1
PALPITATIONS: 1
ABDOMINAL PAIN: 0
HEARTBURN: 0
DYSURIA: 0
ARTHRALGIAS: 0
JOINT SWELLING: 0
CONSTIPATION: 0
NERVOUS/ANXIOUS: 1
SHORTNESS OF BREATH: 0

## 2024-01-10 ASSESSMENT — PAIN SCALES - GENERAL: PAINLEVEL: NO PAIN (0)

## 2024-01-10 NOTE — PROGRESS NOTES
SUBJECTIVE:   Nuris is a 23 year old, presenting for the following:  Physical        1/10/2024     2:10 PM   Additional Questions   Roomed by Lynnette Avery CMA   Accompanied by Self         1/10/2024     2:10 PM   Patient Reported Additional Medications   Patient reports taking the following new medications N/A       Healthy Habits:     Getting at least 3 servings of Calcium per day:  Yes    Bi-annual eye exam:  Yes    Dental care twice a year:  Yes    Sleep apnea or symptoms of sleep apnea:  None    Diet:  Regular (no restrictions)    Frequency of exercise:  2-3 days/week    Duration of exercise:  45-60 minutes    Taking medications regularly:  No    Barriers to taking medications:  Problems remembering to take them and Side effects    Medication side effects:  Significant flushing    Additional concerns today:  Yes      Today's PHQ-2 Score:       1/10/2024     2:04 PM   PHQ-2 ( 1999 [employer])   Q1: Little interest or pleasure in doing things 0   Q2: Feeling down, depressed or hopeless 0   PHQ-2 Score 0   Q1: Little interest or pleasure in doing things Not at all   Q2: Feeling down, depressed or hopeless Not at all   PHQ-2 Score 0       Social History     Tobacco Use    Smoking status: Never    Smokeless tobacco: Never    Tobacco comments:     Non-smoking home   Substance Use Topics    Alcohol use: Yes     Comment: Social             1/10/2024     2:04 PM   Alcohol Use   Prescreen: >3 drinks/day or >7 drinks/week? No     Reviewed orders with patient.  Reviewed health maintenance and updated orders accordingly - Yes  Labs reviewed in EPIC    Breast Cancer Screening:        History of abnormal Pap smear: NO - age 21-29 PAP every 3 years recommended      Latest Ref Rng & Units 1/10/2024     3:41 PM 3/1/2021    10:54 AM 3/1/2021    10:22 AM   PAP / HPV   PAP  Negative for Intraepithelial Lesion or Malignancy (NILM)      PAP (Historical)    NIL    HPV 16 DNA NEG^Negative  Negative     HPV 18 DNA NEG^Negative   "Negative     Other HR HPV NEG^Negative  Negative       Reviewed and updated as needed this visit by clinical staff   Tobacco  Allergies  Meds              Reviewed and updated as needed this visit by Provider                      Review of Systems   Constitutional:  Positive for chills. Negative for fever.   HENT:  Negative for congestion, ear pain, hearing loss and sore throat.    Eyes:  Negative for pain and visual disturbance.   Respiratory:  Negative for cough and shortness of breath.    Cardiovascular:  Positive for palpitations. Negative for chest pain and peripheral edema.   Gastrointestinal:  Positive for diarrhea. Negative for abdominal pain, constipation, heartburn, hematochezia and nausea.   Breasts:  Negative for tenderness, breast mass and discharge.   Genitourinary:  Negative for dysuria, frequency, genital sores, hematuria, pelvic pain, urgency, vaginal bleeding and vaginal discharge.   Musculoskeletal:  Negative for arthralgias, joint swelling and myalgias.   Skin:  Positive for rash.   Neurological:  Positive for paresthesias. Negative for dizziness, weakness and headaches.   Psychiatric/Behavioral:  Negative for mood changes. The patient is nervous/anxious.           OBJECTIVE:   /70 (BP Location: Right arm, Patient Position: Sitting, Cuff Size: Adult Regular)   Pulse 82   Temp 97.9  F (36.6  C) (Oral)   Resp 20   Ht 1.6 m (5' 3\")   Wt 56 kg (123 lb 8 oz)   LMP 12/19/2023 (Exact Date)   SpO2 100%   BMI 21.88 kg/m    Physical Exam  GENERAL: alert and no distress  EYES: Eyes grossly normal to inspection, PERRL and conjunctivae and sclerae normal  HENT: ear canals and TM's normal, nose and mouth without ulcers or lesions  NECK: no adenopathy, no asymmetry, masses, or scars  RESP: lungs clear to auscultation - no rales, rhonchi or wheezes  CV: regular rate and rhythm, normal S1 S2, no S3 or S4, no murmur, click or rub, no peripheral edema  ABDOMEN: soft, nontender, no " hepatosplenomegaly, no masses and bowel sounds normal   (female): normal female external genitalia, normal urethral meatus, vaginal mucosa pink, moist, well rugated  MS: no gross musculoskeletal defects noted, no edema  SKIN: no suspicious lesions or rashes  NEURO: Normal strength and tone, mentation intact and speech normal  PSYCH: mentation appears normal, affect normal/bright  LYMPH: no cervical, supraclavicular, axillary, or inguinal adenopathy    Diagnostic Test Results:  Labs reviewed in Epic    ASSESSMENT/PLAN:     1. Routine general medical examination at a health care facility    - Adult GI  Referral - Consult Only; Future  - HIV Antigen Antibody Combo; Future  - Hepatitis C Screen Reflex to HCV RNA Quant and Genotype; Future  - Treponema Abs w Reflex to RPR and Titer; Future  - NEISSERIA GONORRHOEA PCR; Future  - CHLAMYDIA TRACHOMATIS PCR; Future  - HIV Antigen Antibody Combo  - Hepatitis C Screen Reflex to HCV RNA Quant and Genotype  - Treponema Abs w Reflex to RPR and Titer  - NEISSERIA GONORRHOEA PCR  - CHLAMYDIA TRACHOMATIS PCR      2. Intrinsic atopic dermatitis  Sees dermatology  - tacrolimus (PROTOPIC) 0.1 % external ointment; Apply to rash areas around the eyes daily.  Dispense: 30 g; Refill: 0    3. Screening for STDs (sexually transmitted diseases)      4. Cervical cancer screening    - Pap Screen only - recommended age 21 - 24 years    Iron deficiency testing ordered by Dr. Sanderson.    COUNSELING:  Reviewed preventive health counseling, as reflected in patient instructions        She reports that she has never smoked. She has never used smokeless tobacco.          Aliya Osei MD  [facility]

## 2024-01-11 LAB
C TRACH DNA SPEC QL NAA+PROBE: NEGATIVE
HCV AB SERPL QL IA: NONREACTIVE
HIV 1+2 AB+HIV1 P24 AG SERPL QL IA: NONREACTIVE
N GONORRHOEA DNA SPEC QL NAA+PROBE: NEGATIVE
T PALLIDUM AB SER QL: NONREACTIVE

## 2024-01-15 ENCOUNTER — TELEPHONE (OUTPATIENT)
Dept: DERMATOLOGY | Facility: CLINIC | Age: 24
End: 2024-01-15
Payer: COMMERCIAL

## 2024-01-15 LAB
BKR LAB AP GYN ADEQUACY: NORMAL
BKR LAB AP GYN INTERPRETATION: NORMAL
BKR LAB AP HPV REFLEX: NO
BKR LAB AP LMP: NORMAL
BKR LAB AP PREVIOUS ABNORMAL: NORMAL
PATH REPORT.COMMENTS IMP SPEC: NORMAL
PATH REPORT.COMMENTS IMP SPEC: NORMAL
PATH REPORT.RELEVANT HX SPEC: NORMAL

## 2024-01-15 NOTE — TELEPHONE ENCOUNTER
Prior Authorization Approval    Medication: DUPIXENT 300 MG/2ML SC SOPN  Authorization Effective Date: 12/16/2023  Authorization Expiration Date: 1/14/2025  Approved Dose/Quantity: 4ml per 28 days  Reference #: [id number]   Insurance Company: [employer] Non-Specialty PA's - Phone [phone] Fax [phone]  Expected CoPay: $    CoPay Card Available:      Financial Assistance Needed: na  Which Pharmacy is filling the prescription:    Pharmacy Notified: no  Patient Notified:         Jenna Mcneill [facility] Specialty Pharmacy Liaison  [email or web address]  Phone: [phone]  Fax: [phone]

## 2024-02-02 DIAGNOSIS — L20.84 INTRINSIC ATOPIC DERMATITIS: ICD-10-CM

## 2024-02-02 RX ORDER — DUPILUMAB 300 MG/2ML
INJECTION, SOLUTION SUBCUTANEOUS
Qty: 4 ML | Refills: 4 | Status: SHIPPED | OUTPATIENT
Start: 2024-02-02 | End: 2024-06-11

## 2024-02-14 ENCOUNTER — TELEPHONE (OUTPATIENT)
Dept: GASTROENTEROLOGY | Facility: CLINIC | Age: 24
End: 2024-02-14
Payer: COMMERCIAL

## 2024-02-14 NOTE — TELEPHONE ENCOUNTER
Left Voicemail (1st Attempt) for the patient to call back and schedule the following:    Appointment type: return GI  Provider: unknown provider  Return date: next available  Specialty phone number: [phone]  Additional appointment(s) needed:   Additonal Notes:

## 2024-02-19 ENCOUNTER — TELEPHONE (OUTPATIENT)
Facility: CLINIC | Age: 24
End: 2024-02-19
Payer: COMMERCIAL

## 2024-02-19 NOTE — TELEPHONE ENCOUNTER
Left Voicemail (2nd Attempt) for the patient to call back and schedule the following:    Appointment type: return GI   Provider: Dr. PETER bagley  Return date: next available  Specialty phone number: [phone]  Additional appointment(s) needed:   Additonal Notes:

## 2024-04-16 ENCOUNTER — LAB (OUTPATIENT)
Dept: LAB | Facility: CLINIC | Age: 24
End: 2024-04-16
Payer: COMMERCIAL

## 2024-04-16 DIAGNOSIS — E60 ZINC DEFICIENCY: ICD-10-CM

## 2024-04-16 DIAGNOSIS — E55.9 VITAMIN D DEFICIENCY: ICD-10-CM

## 2024-04-16 DIAGNOSIS — B37.9 INFECTION DUE TO CANDIDA ALBICANS: ICD-10-CM

## 2024-04-16 DIAGNOSIS — E51.9 VITAMIN B1 DEFICIENCY: ICD-10-CM

## 2024-04-16 DIAGNOSIS — E61.5: ICD-10-CM

## 2024-04-16 DIAGNOSIS — E53.8 FOLATE DEFICIENCY: ICD-10-CM

## 2024-04-16 DIAGNOSIS — E53.1 VITAMIN B6 DEFICIENCY: ICD-10-CM

## 2024-04-16 DIAGNOSIS — K91.2 HYPOGLYCEMIA FOLLOWING GASTROINTESTINAL SURGERY: ICD-10-CM

## 2024-04-16 LAB
ERYTHROCYTE [DISTWIDTH] IN BLOOD BY AUTOMATED COUNT: 13.9 % (ref 10–15)
HCT VFR BLD AUTO: 37.5 % (ref 35–47)
HGB BLD-MCNC: 12.1 G/DL (ref 11.7–15.7)
MCH RBC QN AUTO: 27.6 PG (ref 26.5–33)
MCHC RBC AUTO-ENTMCNC: 32.3 G/DL (ref 31.5–36.5)
MCV RBC AUTO: 86 FL (ref 78–100)
PLATELET # BLD AUTO: 342 10E3/UL (ref 150–450)
RBC # BLD AUTO: 4.38 10E6/UL (ref 3.8–5.2)
WBC # BLD AUTO: 6.2 10E3/UL (ref 4–11)

## 2024-04-16 PROCEDURE — 84425 ASSAY OF VITAMIN B-1: CPT | Mod: 90

## 2024-04-16 PROCEDURE — 82728 ASSAY OF FERRITIN: CPT

## 2024-04-16 PROCEDURE — 36415 COLL VENOUS BLD VENIPUNCTURE: CPT

## 2024-04-16 PROCEDURE — 99000 SPECIMEN HANDLING OFFICE-LAB: CPT

## 2024-04-16 PROCEDURE — 84207 ASSAY OF VITAMIN B-6: CPT | Mod: 90

## 2024-04-16 PROCEDURE — 82306 VITAMIN D 25 HYDROXY: CPT

## 2024-04-16 PROCEDURE — 83540 ASSAY OF IRON: CPT

## 2024-04-16 PROCEDURE — 82180 ASSAY OF ASCORBIC ACID: CPT | Mod: 90

## 2024-04-16 PROCEDURE — 85027 COMPLETE CBC AUTOMATED: CPT

## 2024-04-16 PROCEDURE — 83550 IRON BINDING TEST: CPT

## 2024-04-16 PROCEDURE — 84630 ASSAY OF ZINC: CPT | Mod: 90

## 2024-04-17 LAB
FERRITIN SERPL-MCNC: 12 NG/ML (ref 6–175)
IRON BINDING CAPACITY (ROCHE): 425 UG/DL (ref 240–430)
IRON SATN MFR SERPL: 23 % (ref 15–46)
IRON SERPL-MCNC: 97 UG/DL (ref 37–145)
VIT D+METAB SERPL-MCNC: 29 NG/ML (ref 20–50)

## 2024-04-18 LAB — ZINC SERPL-MCNC: 74.2 UG/DL

## 2024-04-19 LAB — PYRIDOXAL PHOS SERPL-SCNC: 34.8 NMOL/L

## 2024-04-20 LAB — VIT C SERPL-MCNC: 55 UMOL/L

## 2024-04-26 LAB — VIT B1 SERPL-SCNC: 11 NMOL/L

## 2024-05-14 ENCOUNTER — OFFICE VISIT (OUTPATIENT)
Dept: GASTROENTEROLOGY | Facility: CLINIC | Age: 24
End: 2024-05-14
Payer: COMMERCIAL

## 2024-05-14 VITALS
WEIGHT: 126 LBS | HEART RATE: 80 BPM | DIASTOLIC BLOOD PRESSURE: 76 MMHG | SYSTOLIC BLOOD PRESSURE: 113 MMHG | HEIGHT: 63 IN | OXYGEN SATURATION: 100 % | BODY MASS INDEX: 22.32 KG/M2

## 2024-05-14 DIAGNOSIS — K28.9 ANASTOMOTIC ULCER: Primary | ICD-10-CM

## 2024-05-14 PROCEDURE — 99214 OFFICE O/P EST MOD 30 MIN: CPT | Mod: GC | Performed by: STUDENT IN AN ORGANIZED HEALTH CARE EDUCATION/TRAINING PROGRAM

## 2024-05-14 RX ORDER — MONTELUKAST SODIUM 4 MG/1
TABLET, CHEWABLE ORAL
COMMUNITY

## 2024-05-14 ASSESSMENT — PAIN SCALES - GENERAL: PAINLEVEL: NO PAIN (0)

## 2024-05-14 NOTE — NURSING NOTE
"chief complaint    Vitals:    05/14/24 1446   BP: 113/76   Pulse: 80   SpO2: 100%   Weight: 57.2 kg (126 lb)   Height: 1.6 m (5' 3\")       Body mass index is 22.32 kg/m .    Nikki Garcia MA    "

## 2024-05-14 NOTE — LETTER
2024         RE: Nuris Reddy  [address] [place] [address]        Dear Colleague,    Thank you for referring your patient, Nuris Reddy, to the [facility] [place]. Please see a copy of my visit note below.    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 Now presents with ongoing iron deficiency.     # Ileal atresia s/p surgical repair  # Ileocolonic anastomotic ulcers s/p small bowel resection  # Iron deficiency anemia    Continues to have iron deficiency, although recent hemoglobin was normal. Doesn't report any blood in her stool. Prior work-up hasn't showed any evidence of IBD and prior inflammation assumed to be 2/2 to anastomotic ulcers. Would prefer to hold off any further colonoscopies.     Unlikely that this is related to IBD. It's possible that she might have microscopic iron losses from small ulcerations in the anastomotic area that wouldn't cause visible bleeding, also in the setting of menstrual cycles causing further loss.     -- No further work-up required at this time.  -- Advised patient to let us know if any significant GI bleed or worsening anemia to consider colonoscopy.  -- Advised about daily iron supplement.  -- Consider fiber supplement to reduce straining and for hemorrhoids management.           Return to Clinic: 1 year    Wil Munguia MD      Patient discussed and seen with Gastroenterology staff Dr. Gracia, who is in agreement with the above.      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 Now presents with ongoing iron deficiency.     Presents today, reports no blood in her stool although she does feel tired sometimes and even craving ice. Rarely takes iron supplements. Doesn't report any heavy menses. Reports some diarrhea which is well controlled when she takes colestipol. Also has some bloating, recently got a prescription of Neomycin for presumed SIBO.     Targeted GI review of systems complete and is otherwise negative.     Past Medical History:   Diagnosis Date    Anemia 10/16/2012    COPD (chronic obstructive pulmonary disease) (H)     History of blood transfusion     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, s/p resection    Palpitations     Short gut syndrome 10/16/2012    ~ 20 cm of ileum as well as ileocecal valve resected in  period    Shortness of breath     Ulceration of intestine 2012    Uncomplicated asthma        Past Surgical History:   Procedure Laterality Date    APPENDECTOMY  May 2022    COLONOSCOPY  2012    COLONOSCOPY  2012    COLONOSCOPY N/A 2022    Procedure: COLONOSCOPY, WITH BIOPSY;  Surgeon: Moisés Gupta MD;  [place] [facility] [place]    EXAM UNDER ANESTHESIA ANUS N/A 2023    Procedure: EXAM UNDER ANESTHESIA, ANUS, EXCISION OF ANAL SKIN TAG;  Surgeon: Frandy Mack MD;  Location: [facility] [place]    GI SURGERY  2022    First surgery occurred 1999    LAPAROSCOPIC RESECTION SMALL BOWEL N/A 2022    Procedure: EXCISION, SMALL INTESTINE, LAPAROSCOPIC;  Surgeon: Frandy Mack MD;  Location: [facility] OR    LAPAROSCOPIC RESECTION  "SMALL BOWEL  2022    Procedure: ;  Surgeon: Frandy Mack MD;  Location: [facility] OR    RESECTION ILEOCECAL  1999    ~ 20 cm of ileum as well as ileocecal valve resected in  period       Family History   Problem Relation Age of Onset    No Known Problems Mother     No Known Problems Father     Other Cancer Maternal Grandfather         skin cancer    Hypertension Paternal Grandmother     Colon Cancer Paternal Grandmother     Hypertension Paternal Grandfather     Cardiovascular Paternal Grandfather         Valve condition    Prostate Cancer Paternal Grandfather     Depression Brother     Asthma Sister     No Known Problems Sister     Kidney Disease Maternal Uncle         kidney stones       Social History     Tobacco Use    Smoking status: Never    Smokeless tobacco: Never    Tobacco comments:     Non-smoking home   Substance Use Topics    Alcohol use: Yes     Comment: Social       Physical exam:     Vitals:/76   Pulse 80   Ht 1.6 m (5' 3\")   Wt 57.2 kg (126 lb)   SpO2 100%   BMI 22.32 kg/m     BMI: Body mass index is 22.32 kg/m .      GEN: NAD, A&Ox3, appropriate  HEENT: EOMI, PERRLA, MMM, hearing grossly intact  Neck: full ROM  Cardiopulmonary: non labored, comfortable on RA, nondiaphoretic, no LE edema  Abdominal: soft, nontender, nondistended, no HSM, no rebound, no guarding, normoactive BS  Skin: no jaundice, no gross lesions on visible skin  Neuro: A&Ox3, grossly intact motor/sensation, gait intact  MSK: no gross deformity, moves arms/legs equally  Psych: normal affect, congruent with mood      Labs:       Relevant imaging:     CT abdomen/pelvis 2022    IMPRESSION:   1.  Mild colonic some mucosal thickening nonspecific, may relate to  the patient's history of Crohn's disease. Small bowel is unremarkable  although there is limited evaluation of the distal small bowel/ileal  colonic anastomosis due to lack of contrast transit into this segment  during the time of acquired images. No " upstream obstruction.  2.  Cholelithiasis without cholecystitis.    Endoscopy:    Colonoscopy 01/2022    Impression:            - Localized ulcer found at the ileocolonic                          anastomosis, likely ischemic. Some stenosis as well,                          but pediatric colonoscope able to pass without                          resistance. Biopsied.                          - Suspect that this her anemia and intermittent GI                          bleeding is from anastomosis.                          - The examined portion of the ileum was normal.                          Biopsied.                          - The entire examined colon is normal. Biopsied: right                          colon, left colon, rectum.     Pathology:     A. ILEUM BIOPSIES:  -Small intestinal mucosa with intact villous architecture and focal surface active inflammation  -Negative for signs of chronicity including granuloma formation, pseudopyloric metaplasia and dysplasia    B. IC VALVE BIOPSIES:  -Small intestinal mucosa with chronic active inflammation, surface erosion and ulceration  -Negative for granuloma formation, pseudopyloric metaplasia and dysplasia  -No evidence of viral cytopathic effect on routine stain    C. RIGHT COLON BIOPSIES:  - Colonic mucosa with no significant histologic abnormality  - Negative for active inflammation, granuloma formation and lymphocytic colitis    D. LEFT COLON BIOPSIES:  - Colonic mucosa with no significant histologic abnormality  - Negative for active inflammation, granuloma formation and lymphocytic colitis    E. RECTUM BIOPSIES:  - Colorectal mucosa with no significant histologic abnormality  - Negative for active inflammation, granuloma formation and lymphocytic colitis    Relevant surgical reports:     A. ILEOCOLIC ANASTOMOSIS:  - Small bowel with ulceration and transmural inflammation  - Unremarkable colon  - No evidence of dysplasia or malignancy  - Three benign lymph nodes               Attestation signed by Chandrakant Gracia MD at 5/14/2024  4:57 PM:  ATTENDING ATTESTATION:     DATE SEEN: 5/14/2024    Patient was discussed, seen, and examined by me, Chandrakant Gracia. The plan of care and pertinent data/imaging were reviewed with Dr. Munguia. I agree with the assessment and plan as delineated above with the following additions:     History of ileal atresia s/p surgical resection with IC anastomosis. Had LUZ felt to be related to IC anastomotic ulcer. Now s/p IC anastomotic resection and new IC anastomosis (right colectomy). Her iron studies have improved but she still has required iron supplementation. Prior pathology reviewed and there is no evidence of Crohn's Disease.     She may again have some IC anastomotic ulceration (this is common). Discussed that we can repeat a colonoscopy to look. She does not want to do this and I do not think we need to. Can just replace iron as needed. We discussed MRE to evaluate for any other small bowel reason for iron deficiency. She again does not want to pursue this test and I think that is reasonable as the test is likely to be low yield.     Continue as needed iron supplementation. Follow up in 1 year.    This plan and recommendations were discussed with the patient. She agrees to the plan. All questions answered.    Thank you for this consultation.  It was a pleasure to participate in the care of this patient; please contact us with any further questions.  I spent a total of 30 minutes during the day of encounter performed chart review, meeting with patient, patient counseling, care coordination, and documentation.      Please contact me with any further questions.    Chandrakant Gracia MD  [facility]  Division of Gastroenterology, Hepatology and Nutrition      Again, thank you for allowing me to participate in the care of your patient.      Sincerely,    Wil Munguia MD

## 2024-05-14 NOTE — PROGRESS NOTES
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 Now presents with ongoing iron deficiency.     # Ileal atresia s/p surgical repair  # Ileocolonic anastomotic ulcers s/p small bowel resection  # Iron deficiency anemia    Continues to have iron deficiency, although recent hemoglobin was normal. Doesn't report any blood in her stool. Prior work-up hasn't showed any evidence of IBD and prior inflammation assumed to be 2/2 to anastomotic ulcers. Would prefer to hold off any further colonoscopies.     Unlikely that this is related to IBD. It's possible that she might have microscopic iron losses from small ulcerations in the anastomotic area that wouldn't cause visible bleeding, also in the setting of menstrual cycles causing further loss.     -- No further work-up required at this time.  -- Advised patient to let us know if any significant GI bleed or worsening anemia to consider colonoscopy.  -- Advised about daily iron supplement.  -- Consider fiber supplement to reduce straining and for hemorrhoids management.           Return to Clinic: 1 year    Wil Munguia MD      Patient discussed and seen with Gastroenterology staff Dr. Gracia, who is in agreement with the above.     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 Now presents with  ongoing iron deficiency.     Presents today, reports no blood in her stool although she does feel tired sometimes and even craving ice. Rarely takes iron supplements. Doesn't report any heavy menses. Reports some diarrhea which is well controlled when she takes colestipol. Also has some bloating, recently got a prescription of Neomycin for presumed SIBO.     Targeted GI review of systems complete and is otherwise negative.     Past Medical History:   Diagnosis Date    Anemia 10/16/2012    COPD (chronic obstructive pulmonary disease) (H)     History of blood transfusion     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, s/p resection    Palpitations     Short gut syndrome 10/16/2012    ~ 20 cm of ileum as well as ileocecal valve resected in  period    Shortness of breath     Ulceration of intestine 2012    Uncomplicated asthma        Past Surgical History:   Procedure Laterality Date    APPENDECTOMY  May 2022    COLONOSCOPY  2012    COLONOSCOPY  2012    COLONOSCOPY N/A 2022    Procedure: COLONOSCOPY, WITH BIOPSY;  Surgeon: Moisés Gupta MD;  Location: [facility] OR    EXAM UNDER ANESTHESIA ANUS N/A 2023    Procedure: EXAM UNDER ANESTHESIA, ANUS, EXCISION OF ANAL SKIN TAG;  Surgeon: Frandy Mack MD;  Location: [facility] OR    GI SURGERY  2022    First surgery occurred 1999    LAPAROSCOPIC RESECTION SMALL BOWEL N/A 2022    Procedure: EXCISION, SMALL INTESTINE, LAPAROSCOPIC;  Surgeon: Frandy Mack MD;  Location: [facility] OR    LAPAROSCOPIC RESECTION SMALL BOWEL  2022    Procedure: ;  Surgeon: Frandy Mack MD;  Location: [facility] OR    RESECTION ILEOCECAL  1999    ~ 20 cm of ileum as well as ileocecal valve resected in  period       Family History   Problem Relation Age of Onset    No Known Problems Mother     No Known Problems Father     Other Cancer Maternal Grandfather         skin cancer    Hypertension Paternal Grandmother      "Colon Cancer Paternal Grandmother     Hypertension Paternal Grandfather     Cardiovascular Paternal Grandfather         Valve condition    Prostate Cancer Paternal Grandfather     Depression Brother     Asthma Sister     No Known Problems Sister     Kidney Disease Maternal Uncle         kidney stones       Social History     Tobacco Use    Smoking status: Never    Smokeless tobacco: Never    Tobacco comments:     Non-smoking home   Substance Use Topics    Alcohol use: Yes     Comment: Social       Physical exam:     Vitals:/76   Pulse 80   Ht 1.6 m (5' 3\")   Wt 57.2 kg (126 lb)   SpO2 100%   BMI 22.32 kg/m     BMI: Body mass index is 22.32 kg/m .      GEN: NAD, A&Ox3, appropriate  HEENT: EOMI, PERRLA, MMM, hearing grossly intact  Neck: full ROM  Cardiopulmonary: non labored, comfortable on RA, nondiaphoretic, no LE edema  Abdominal: soft, nontender, nondistended, no HSM, no rebound, no guarding, normoactive BS  Skin: no jaundice, no gross lesions on visible skin  Neuro: A&Ox3, grossly intact motor/sensation, gait intact  MSK: no gross deformity, moves arms/legs equally  Psych: normal affect, congruent with mood      Labs:       Relevant imaging:     CT abdomen/pelvis 03/2022    IMPRESSION:   1.  Mild colonic some mucosal thickening nonspecific, may relate to  the patient's history of Crohn's disease. Small bowel is unremarkable  although there is limited evaluation of the distal small bowel/ileal  colonic anastomosis due to lack of contrast transit into this segment  during the time of acquired images. No upstream obstruction.  2.  Cholelithiasis without cholecystitis.    Endoscopy:    Colonoscopy 01/2022    Impression:            - Localized ulcer found at the ileocolonic                          anastomosis, likely ischemic. Some stenosis as well,                          but pediatric colonoscope able to pass without                          resistance. Biopsied.                          - Suspect that " this her anemia and intermittent GI                          bleeding is from anastomosis.                          - The examined portion of the ileum was normal.                          Biopsied.                          - The entire examined colon is normal. Biopsied: right                          colon, left colon, rectum.     Pathology:     A. ILEUM BIOPSIES:  -Small intestinal mucosa with intact villous architecture and focal surface active inflammation  -Negative for signs of chronicity including granuloma formation, pseudopyloric metaplasia and dysplasia    B. IC VALVE BIOPSIES:  -Small intestinal mucosa with chronic active inflammation, surface erosion and ulceration  -Negative for granuloma formation, pseudopyloric metaplasia and dysplasia  -No evidence of viral cytopathic effect on routine stain    C. RIGHT COLON BIOPSIES:  - Colonic mucosa with no significant histologic abnormality  - Negative for active inflammation, granuloma formation and lymphocytic colitis    D. LEFT COLON BIOPSIES:  - Colonic mucosa with no significant histologic abnormality  - Negative for active inflammation, granuloma formation and lymphocytic colitis    E. RECTUM BIOPSIES:  - Colorectal mucosa with no significant histologic abnormality  - Negative for active inflammation, granuloma formation and lymphocytic colitis    Relevant surgical reports:     A. ILEOCOLIC ANASTOMOSIS:  - Small bowel with ulceration and transmural inflammation  - Unremarkable colon  - No evidence of dysplasia or malignancy  - Three benign lymph nodes

## 2024-06-03 ENCOUNTER — TELEPHONE (OUTPATIENT)
Dept: GASTROENTEROLOGY | Facility: CLINIC | Age: 24
End: 2024-06-03
Payer: COMMERCIAL

## 2024-06-03 NOTE — TELEPHONE ENCOUNTER
Left Voicemail (1st Attempt) for the patient to call back and schedule the following:    Appointment type: return   Provider: Dr. Munguia  Return date: 5/14/2025  Specialty phone number: [phone]  Additional appointment(s) needed:   Additonal Notes:

## 2024-06-10 DIAGNOSIS — L20.84 INTRINSIC ATOPIC DERMATITIS: ICD-10-CM

## 2024-06-11 RX ORDER — DUPILUMAB 300 MG/2ML
INJECTION, SOLUTION SUBCUTANEOUS
Qty: 4 ML | Refills: 1 | Status: SHIPPED | OUTPATIENT
Start: 2024-06-11 | End: 2024-06-18

## 2024-06-18 ENCOUNTER — OFFICE VISIT (OUTPATIENT)
Dept: DERMATOLOGY | Facility: CLINIC | Age: 24
End: 2024-06-18
Payer: COMMERCIAL

## 2024-06-18 DIAGNOSIS — L20.84 INTRINSIC ATOPIC DERMATITIS: ICD-10-CM

## 2024-06-18 DIAGNOSIS — D22.9 MULTIPLE NEVI: Primary | ICD-10-CM

## 2024-06-18 DIAGNOSIS — L21.9 DERMATITIS, SEBORRHEIC: ICD-10-CM

## 2024-06-18 PROCEDURE — 99214 OFFICE O/P EST MOD 30 MIN: CPT | Performed by: DERMATOLOGY

## 2024-06-18 RX ORDER — TRIAMCINOLONE ACETONIDE 1 MG/G
OINTMENT TOPICAL
Qty: 80 G | Refills: 3 | Status: SHIPPED | OUTPATIENT
Start: 2024-06-18

## 2024-06-18 RX ORDER — KETOCONAZOLE 20 MG/ML
SHAMPOO TOPICAL
Qty: 120 ML | Refills: 11 | Status: SHIPPED | OUTPATIENT
Start: 2024-06-18

## 2024-06-18 RX ORDER — TACROLIMUS 1 MG/G
OINTMENT TOPICAL
Qty: 30 G | Refills: 11 | Status: SHIPPED | OUTPATIENT
Start: 2024-06-18

## 2024-06-18 RX ORDER — DUPILUMAB 300 MG/2ML
300 INJECTION, SOLUTION SUBCUTANEOUS
Qty: 4 ML | Refills: 11 | Status: SHIPPED | OUTPATIENT
Start: 2024-06-18

## 2024-06-18 NOTE — LETTER
6/18/2024      RE: Nuris Reddy  [address] [place] [address]     Dear Colleague,    Thank you for the opportunity to participate in the care of your patient, Nuris Reddy, at the [facility]. Please see a copy of my visit note below.    DERMATOLOGY CLINIC VISIT NOTE     Dermatology Problem List:  1. Intermittent photosensitive rxn, pruritic. Negative CARIE, VIKTORIA, B vitamins, zinc.   2. Seborrheic dermatitis  3. Atopic predisposition with intermediate and delayed hypersensitive dermatitis on the hands, lips, face, trunk, extremities  - allergy patch testing on 7/22/19  - atopic prick test on 7/22/19  - Dupixent  4. Benign pigmented nevi      CHIEF COMPLAINT:  Followup dermatitis and skin check     HISTORY OF PRESENT ILLNESS:  Ms. Reddy is a 23-year-old female who returns to Dermatology Clinic for ongoing evaluation of severe atopic dermatitis on Dupixent and skin check.  Patient states that she continues to tolerate Dupixent she denies side effects including conjunctivitis..  She reports that she would like a mole check today.  She has 2 new moles on her abdomen that she would like assessed.  Patient states she also gets intermittent itch in her scalp.  She has tried ketoconazole shampoo but is not sure that this is helpful.  She is not currently using any topicals but previously had mometasone solution available.  For her atopic dermatitis she continues to use triamcinolone 0.1% to trunk and extremities, tacrolimus ointment to the face twice daily as needed.      Patient Active Problem List   Diagnosis    Other congenital anomalies of intestine    Proteinuria    Ulceration of intestine    Short gut syndrome    Iron deficiency anemia    Chronic rhinitis    Chronic eczema    Anemia, unspecified type    Anastomotic ulcer    Shortness of breath    Palpitations    Anal skin tag       Allergies   Allergen Reactions    No Known  Allergies        Current Outpatient Medications   Medication Sig Dispense Refill    Cetirizine HCl (ZYRTEC ALLERGY PO) Take by mouth every morning      colestipol (COLESTID) 1 g tablet TAKE 1 TABLET BY MOUTH EVERY DAY TO TREAT SHORT BOWEL SYNDROME      cyanocobalamin (VITAMIN B-12) 1000 MCG tablet Take 1 tablet (1,000 mcg) by mouth daily 90 tablet 4    dupilumab (DUPIXENT) 300 MG/2ML prefilled pen Inject 2 mLs (300 mg) Subcutaneous every 14 days 4 mL 4    DUPIXENT 300 MG/2ML prefilled syringe INJECT 300 MG (1 SYRINGE) UNDER THE SKIN EVERY 14 DAYS 4 mL 1    ferrous sulfate (PATRIC-IN-SOL) 75 (15 FE) MG/ML oral drops Take 0.2 mLs (3 mg) by mouth daily Increase to twice daily if tolerated. 50 mL 1    fluticasone (FLONASE) 50 MCG/ACT nasal spray Spray 1-2 sprays into both nostrils daily 16 g 11    fluticasone-vilanterol (BREO ELLIPTA) 200-25 MCG/ACT inhaler INHALE 1 PUFF INTO THE LUNGS DAILY 90 each 4    ketoconazole (NIZORAL) 2 % external shampoo Use to shampoo twice weekly. Leave on 5 min then rinse. 120 mL 3    methylcellulose (CITRUCEL) 500 MG TABS tablet Take 500 mg by mouth daily      mometasone (ELOCON) 0.1 % external solution Apply small amount topically to affected areas of scalp daily prn for up to 2 weeks. 60 mL 0    Multiple Vitamins-Iron (MULTIVITAMIN/IRON PO) Take 1 tablet by mouth every morning      Probiotic Product (PRO-BIOTIC BLEND PO) Take by mouth as needed      tacrolimus (PROTOPIC) 0.1 % external ointment Apply to rash areas around the eyes daily. 30 g 0    triamcinolone (KENALOG) 0.1 % external ointment APPLY TO FACE RASH TWICE DAILY FOR THE NEXT 2 WEEKS UNTIL CLEAR THEN TWICE DAILY AS NEEDED FOR UP TO 2 WEEKS 30 g 1    TURMERIC PO        No current facility-administered medications for this visit.     SOCIAL HISTORY:  Applying to [employer]     FAMILY HISTORY:  Sister with asthma.      PHYSICAL EXAMINATION:   Full body skin check  - Brown macules 2-3 mm on the R and L abdomen  - Scattered light brown  macules on the nose, upper cheeks, shoulders, back  - Mild erythema and scaling of the scalp      ASSESSMENT AND PLAN:   1.  Atopic dermatitis severe, with facial swelling. Patch testing unrevealing. Cleared on dupilumab, and tolerating well. BSA is <1% with IGA of 0-. Taking 300 mg every 2 weeks and notes that she develops rash if dosing is delayed.   -Recommended twice daily use of tacrolimus ointment which is non-steroidal to the face  -May continue to use triamcinolone ointment bid to the arms, legs, trunk as needed  -Continue gentle skin cares.     2. Seborrheic dermatitis: Chronic with waxing and waning course. Continue ketoconazole shampoo biw or other over the counter anti dandruff shampoo. Mometasone solution nightly as needed.     3. Benign pigmented nevi: No lesions of concern. Sun protection recommended. Discussed ABCDEs of malignant melanoma.      Patient to f/up in 1 year, sooner prn.     Mady Tena MD   of Dermatology  [facility]     cc:   Aliya Osei MD   [address] [place]  [address]

## 2024-06-18 NOTE — PROGRESS NOTES
DERMATOLOGY CLINIC VISIT NOTE     Dermatology Problem List:  1. Intermittent photosensitive rxn, pruritic. Negative CARIE, VIKTORIA, B vitamins, zinc.   2. Seborrheic dermatitis  3. Atopic predisposition with intermediate and delayed hypersensitive dermatitis on the hands, lips, face, trunk, extremities  - allergy patch testing on 7/22/19  - atopic prick test on 7/22/19  - Dupixent  4. Benign pigmented nevi      CHIEF COMPLAINT:  Followup dermatitis and skin check     HISTORY OF PRESENT ILLNESS:  Ms. Reddy is a 23-year-old female who returns to Dermatology Clinic for ongoing evaluation of severe atopic dermatitis on Dupixent and skin check.  Patient states that she continues to tolerate Dupixent she denies side effects including conjunctivitis..  She reports that she would like a mole check today.  She has 2 new moles on her abdomen that she would like assessed.  Patient states she also gets intermittent itch in her scalp.  She has tried ketoconazole shampoo but is not sure that this is helpful.  She is not currently using any topicals but previously had mometasone solution available.  For her atopic dermatitis she continues to use triamcinolone 0.1% to trunk and extremities, tacrolimus ointment to the face twice daily as needed.      Patient Active Problem List   Diagnosis    Other congenital anomalies of intestine    Proteinuria    Ulceration of intestine    Short gut syndrome    Iron deficiency anemia    Chronic rhinitis    Chronic eczema    Anemia, unspecified type    Anastomotic ulcer    Shortness of breath    Palpitations    Anal skin tag       Allergies   Allergen Reactions    No Known Allergies        Current Outpatient Medications   Medication Sig Dispense Refill    Cetirizine HCl (ZYRTEC ALLERGY PO) Take by mouth every morning      colestipol (COLESTID) 1 g tablet TAKE 1 TABLET BY MOUTH EVERY DAY TO TREAT SHORT BOWEL SYNDROME      cyanocobalamin (VITAMIN B-12) 1000 MCG tablet Take 1 tablet (1,000 mcg) by mouth  daily 90 tablet 4    dupilumab (DUPIXENT) 300 MG/2ML prefilled pen Inject 2 mLs (300 mg) Subcutaneous every 14 days 4 mL 4    DUPIXENT 300 MG/2ML prefilled syringe INJECT 300 MG (1 SYRINGE) UNDER THE SKIN EVERY 14 DAYS 4 mL 1    ferrous sulfate (PATRIC-IN-SOL) 75 (15 FE) MG/ML oral drops Take 0.2 mLs (3 mg) by mouth daily Increase to twice daily if tolerated. 50 mL 1    fluticasone (FLONASE) 50 MCG/ACT nasal spray Spray 1-2 sprays into both nostrils daily 16 g 11    fluticasone-vilanterol (BREO ELLIPTA) 200-25 MCG/ACT inhaler INHALE 1 PUFF INTO THE LUNGS DAILY 90 each 4    ketoconazole (NIZORAL) 2 % external shampoo Use to shampoo twice weekly. Leave on 5 min then rinse. 120 mL 3    methylcellulose (CITRUCEL) 500 MG TABS tablet Take 500 mg by mouth daily      mometasone (ELOCON) 0.1 % external solution Apply small amount topically to affected areas of scalp daily prn for up to 2 weeks. 60 mL 0    Multiple Vitamins-Iron (MULTIVITAMIN/IRON PO) Take 1 tablet by mouth every morning      Probiotic Product (PRO-BIOTIC BLEND PO) Take by mouth as needed      tacrolimus (PROTOPIC) 0.1 % external ointment Apply to rash areas around the eyes daily. 30 g 0    triamcinolone (KENALOG) 0.1 % external ointment APPLY TO FACE RASH TWICE DAILY FOR THE NEXT 2 WEEKS UNTIL CLEAR THEN TWICE DAILY AS NEEDED FOR UP TO 2 WEEKS 30 g 1    TURMERIC PO        No current facility-administered medications for this visit.     SOCIAL HISTORY:  Applying to [employer]     FAMILY HISTORY:  Sister with asthma.      PHYSICAL EXAMINATION:   Full body skin check  - Brown macules 2-3 mm on the R and L abdomen  - Scattered light brown macules on the nose, upper cheeks, shoulders, back  - Mild erythema and scaling of the scalp      ASSESSMENT AND PLAN:   1.  Atopic dermatitis severe, with facial swelling. Patch testing unrevealing. Cleared on dupilumab, and tolerating well. BSA is <1% with IGA of 0-. Taking 300 mg every 2 weeks and notes that she develops rash if  dosing is delayed.   -Recommended twice daily use of tacrolimus ointment which is non-steroidal to the face  -May continue to use triamcinolone ointment bid to the arms, legs, trunk as needed  -Continue gentle skin cares.     2. Seborrheic dermatitis: Chronic with waxing and waning course. Continue ketoconazole shampoo biw or other over the counter anti dandruff shampoo. Mometasone solution nightly as needed.     3. Benign pigmented nevi: No lesions of concern. Sun protection recommended. Discussed ABCDEs of malignant melanoma.      Patient to f/up in 1 year, sooner prn.     Mady Tena MD   of Dermatology  [facility]     cc:   Aliya Osei MD   [address] [place]  [address]

## 2024-08-13 NOTE — PATIENT INSTRUCTIONS
You'll get a call from [facility]  ob/gyn to help you schedule a consult appointment for IUD.    Also consider:  OB/Gyn specialists, [facility]: [phone]  Dr. Jennifer Bingham Ob/Gyn [phone]    Stop iron tablets and start the liquid iron at much smaller dose 3 mg once daily with vitamin C.    Call for appointment with Dr. Barrett.     Hide Additional Notes?: No Detail Level: Detailed

## 2024-09-16 ENCOUNTER — ALLIED HEALTH/NURSE VISIT (OUTPATIENT)
Dept: FAMILY MEDICINE | Facility: CLINIC | Age: 24
End: 2024-09-16
Payer: COMMERCIAL

## 2024-09-16 DIAGNOSIS — Z23 ENCOUNTER FOR IMMUNIZATION: Primary | ICD-10-CM

## 2024-09-16 PROCEDURE — 90656 IIV3 VACC NO PRSV 0.5 ML IM: CPT

## 2024-09-16 PROCEDURE — 99207 PR NO CHARGE NURSE ONLY: CPT

## 2024-09-16 PROCEDURE — 90471 IMMUNIZATION ADMIN: CPT

## 2024-09-16 NOTE — PROGRESS NOTES
Prior to immunization administration, verified patients identity using patient s name and date of birth. Please see Immunization Activity for additional information.     Screening Questionnaire for Adult Immunization    Are you sick today?   No   Do you have allergies to medications, food, a vaccine component or latex?   No   Have you ever had a serious reaction after receiving a vaccination?   No   Do you have a long-term health problem with heart, lung, kidney, or metabolic disease (e.g., diabetes), asthma, a blood disorder, no spleen, complement component deficiency, a cochlear implant, or a spinal fluid leak?  Are you on long-term aspirin therapy?   No   Do you have cancer, leukemia, HIV/AIDS, or any other immune system problem?   No   Do you have a parent, brother, or sister with an immune system problem?   No   In the past 3 months, have you taken medications that affect  your immune system, such as prednisone, other steroids, or anticancer drugs; drugs for the treatment of rheumatoid arthritis, Crohn s disease, or psoriasis; or have you had radiation treatments?   No   Have you had a seizure, or a brain or other nervous system problem?   No   During the past year, have you received a transfusion of blood or blood    products, or been given immune (gamma) globulin or antiviral drug?   No   For women: Are you pregnant or is there a chance you could become       pregnant during the next month?   No   Have you received any vaccinations in the past 4 weeks?   No     Immunization questionnaire answers were all negative.    I have reviewed the following standing orders:   This patient is due and qualifies for the Influenza vaccine.    Click here for Influenza Vaccine Standing Order    I have reviewed the vaccines inclusion and exclusion criteria; No concerns regarding eligibility.     Patient instructed to remain in clinic for 15 minutes afterwards, and to report any adverse reactions.     Screening performed by  Burak Boyd RN on 9/16/2024 at 3:40 PM.

## 2024-10-07 ENCOUNTER — ALLIED HEALTH/NURSE VISIT (OUTPATIENT)
Dept: FAMILY MEDICINE | Facility: CLINIC | Age: 24
End: 2024-10-07
Payer: COMMERCIAL

## 2024-10-07 DIAGNOSIS — Z23 ENCOUNTER FOR IMMUNIZATION: Primary | ICD-10-CM

## 2024-10-07 PROCEDURE — 99207 PR NO CHARGE NURSE ONLY: CPT

## 2024-10-07 PROCEDURE — 90480 ADMN SARSCOV2 VAC 1/ONLY CMP: CPT

## 2024-10-07 PROCEDURE — 91320 SARSCV2 VAC 30MCG TRS-SUC IM: CPT

## 2024-10-07 NOTE — PROGRESS NOTES
Prior to immunization administration, verified patients identity using patient s name and date of birth. Please see Immunization Activity for additional information.     Is the patient's temperature normal (100.5 or less)? Yes     Patient MEETS CRITERIA. PROCEED with vaccine administration.      Patient instructed to remain in clinic for 15 minutes afterwards, and to report any adverse reactions.      Link to Ancillary Visit Immunization Standing Orders SmartSet     Screening performed by Burak Boyd RN on 10/7/2024 at 11:01 AM.

## 2024-10-15 ENCOUNTER — MYC MEDICAL ADVICE (OUTPATIENT)
Dept: PEDIATRICS | Facility: CLINIC | Age: 24
End: 2024-10-15
Payer: COMMERCIAL

## 2024-10-15 DIAGNOSIS — D50.9 IRON DEFICIENCY ANEMIA, UNSPECIFIED IRON DEFICIENCY ANEMIA TYPE: Primary | ICD-10-CM

## 2024-10-15 NOTE — TELEPHONE ENCOUNTER
See patient's MyChart message   - Patient requesting lab orders for hemoglobin and iron to see if her labs are improving with the iron supplements that she has been taking     Component      Latest Ref Rn 4/16/2024  10:41 AM   WBC      4.0 - 11.0 10e3/uL 6.2    RBC Count      3.80 - 5.20 10e6/uL 4.38    Hemoglobin      11.7 - 15.7 g/dL 12.1    Hematocrit      35.0 - 47.0 % 37.5    MCV      78 - 100 fL 86    MCH      26.5 - 33.0 pg 27.6    MCHC      31.5 - 36.5 g/dL 32.3    RDW      10.0 - 15.0 % 13.9    Platelet Count      150 - 450 10e3/uL 342    Iron      37 - 145 ug/dL 97    Iron Binding Capacity      240 - 430 ug/dL 425    Iron Sat Index      15 - 46 % 23    Ferritin      6 - 175 ng/mL 12      Component      Latest Ref Rn 3/15/2023  12:25 PM   Iron      37 - 145 ug/dL 18 (L)    Iron Binding Capacity      240 - 430 ug/dL 458 (H)    Iron Sat Index      15 - 46 % 4 (L)    Hemoglobin      11.7 - 15.7 g/dL 10.9 (L)      ferrous sulfate (PATRIC-IN-SOL) 75 (15 FE) MG/ML oral drops 50 mL 1 3/20/2023 -- --   Sig - Route: Take 0.2 mLs (3 mg) by mouth daily Increase to twice daily if tolerated. - Oral     Dr. Osei, please review and order as appropriate.     Carmina KIMBALL RN   [facility]

## 2024-10-18 ENCOUNTER — LAB (OUTPATIENT)
Dept: LAB | Facility: CLINIC | Age: 24
End: 2024-10-18
Payer: COMMERCIAL

## 2024-10-18 DIAGNOSIS — D50.9 IRON DEFICIENCY ANEMIA, UNSPECIFIED IRON DEFICIENCY ANEMIA TYPE: ICD-10-CM

## 2024-10-18 LAB
HGB BLD-MCNC: 12.3 G/DL (ref 11.7–15.7)
IRON BINDING CAPACITY (ROCHE): 400 UG/DL (ref 240–430)
IRON SATN MFR SERPL: 15 % (ref 15–46)
IRON SERPL-MCNC: 58 UG/DL (ref 37–145)

## 2024-10-18 PROCEDURE — 85018 HEMOGLOBIN: CPT

## 2024-10-18 PROCEDURE — 83540 ASSAY OF IRON: CPT

## 2024-10-18 PROCEDURE — 36415 COLL VENOUS BLD VENIPUNCTURE: CPT

## 2024-10-18 PROCEDURE — 83550 IRON BINDING TEST: CPT

## 2024-11-06 ENCOUNTER — TRANSFERRED RECORDS (OUTPATIENT)
Dept: HEALTH INFORMATION MANAGEMENT | Facility: CLINIC | Age: 24
End: 2024-11-06
Payer: COMMERCIAL

## 2024-12-23 ENCOUNTER — OFFICE VISIT (OUTPATIENT)
Dept: FAMILY MEDICINE | Facility: CLINIC | Age: 24
End: 2024-12-23
Payer: COMMERCIAL

## 2024-12-23 VITALS
DIASTOLIC BLOOD PRESSURE: 62 MMHG | HEART RATE: 82 BPM | OXYGEN SATURATION: 98 % | SYSTOLIC BLOOD PRESSURE: 108 MMHG | RESPIRATION RATE: 15 BRPM

## 2024-12-23 DIAGNOSIS — J40 BRONCHITIS: Primary | ICD-10-CM

## 2024-12-23 PROCEDURE — 99213 OFFICE O/P EST LOW 20 MIN: CPT

## 2024-12-23 RX ORDER — AZITHROMYCIN 250 MG/1
TABLET, FILM COATED ORAL
Qty: 6 TABLET | Refills: 0 | Status: SHIPPED | OUTPATIENT
Start: 2024-12-23 | End: 2024-12-28

## 2024-12-23 ASSESSMENT — ENCOUNTER SYMPTOMS: COUGH: 1

## 2024-12-23 ASSESSMENT — PAIN SCALES - GENERAL: PAINLEVEL_OUTOF10: NO PAIN (0)

## 2024-12-23 NOTE — PROGRESS NOTES
Assessment & Plan     Bronchitis  - azithromycin (ZITHROMAX) 250 MG tablet; Take 2 tablets (500 mg) by mouth daily for 1 day, THEN 1 tablet (250 mg) daily for 4 days.  Symptoms could fit with bronchitis with differential for allergic rhinitis.  Advised the patient to switch her antihistamine and try Allegra or Claritin instead of Zyrtec about 5 days.  She could consider doing azithromycin if not improving by the end of the week (she is on Dupixent, which increases odds of infection). Advised of potential risks of abx  Unlikely to be Mycoplasma pneumonia given normal O2, normal LS.      Subjective   Nuris is a 24 year old, presenting for the following health issues:  Cough    Clear mucous issues. Cough-dry cough, feels up in the top of the throat-with shortness of breath at times. Sometimes she feels like she wants to cough to help, but it doesn't help the breathing too much.  No facial pain. No sore throat. No fever. Chills the first week, but that had improved. Mucous has aways beeno on the clear side.   Mild asthma and brochnitis in college  Has breo inhaler, but has not used it everyday (just as needed).      12/23/2024    11:51 AM   Additional Questions   Roomed by KALIA ANDREWS     Cough    History of Present Illness       Reason for visit:  COUGH  Symptom onset:  3-4 weeks ago          Objective    /62   Pulse 82   Resp 15   SpO2 98%   There is no height or weight on file to calculate BMI.  Physical Exam   GENERAL: alert and no distress  HENT: ear canals and TM's normal, nose and mouth without ulcers or lesions. No sinus tenderness to palpation.  RESP: lungs clear to auscultation - no rales, rhonchi or wheezes  CV: regular rate and rhythm, normal S1 S2, no S3 or S4, no murmur, click or rub, no peripheral edema   PSYCH: mentation appears normal, affect normal/bright            Signed Electronically by: Matthew Edmonds NP

## 2025-01-14 SDOH — HEALTH STABILITY: PHYSICAL HEALTH: ON AVERAGE, HOW MANY MINUTES DO YOU ENGAGE IN EXERCISE AT THIS LEVEL?: 60 MIN

## 2025-01-14 SDOH — HEALTH STABILITY: PHYSICAL HEALTH: ON AVERAGE, HOW MANY DAYS PER WEEK DO YOU ENGAGE IN MODERATE TO STRENUOUS EXERCISE (LIKE A BRISK WALK)?: 3 DAYS

## 2025-01-14 ASSESSMENT — SOCIAL DETERMINANTS OF HEALTH (SDOH): HOW OFTEN DO YOU GET TOGETHER WITH FRIENDS OR RELATIVES?: TWICE A WEEK

## 2025-01-15 ENCOUNTER — OFFICE VISIT (OUTPATIENT)
Dept: PEDIATRICS | Facility: CLINIC | Age: 25
End: 2025-01-15
Attending: INTERNAL MEDICINE
Payer: COMMERCIAL

## 2025-01-15 VITALS
HEART RATE: 71 BPM | HEIGHT: 64 IN | SYSTOLIC BLOOD PRESSURE: 110 MMHG | BODY MASS INDEX: 20.71 KG/M2 | WEIGHT: 121.3 LBS | OXYGEN SATURATION: 98 % | DIASTOLIC BLOOD PRESSURE: 77 MMHG | RESPIRATION RATE: 18 BRPM | TEMPERATURE: 98 F

## 2025-01-15 DIAGNOSIS — L85.8 KERATOSIS PILARIS: ICD-10-CM

## 2025-01-15 DIAGNOSIS — E60 ZINC DEFICIENCY: ICD-10-CM

## 2025-01-15 DIAGNOSIS — J30.89 SEASONAL ALLERGIC RHINITIS DUE TO OTHER ALLERGIC TRIGGER: ICD-10-CM

## 2025-01-15 DIAGNOSIS — Z13.220 LIPID SCREENING: ICD-10-CM

## 2025-01-15 DIAGNOSIS — R80.1 PERSISTENT PROTEINURIA: ICD-10-CM

## 2025-01-15 DIAGNOSIS — K90.829 SHORT BOWEL SYNDROME, UNSPECIFIED WHETHER COLON IN CONTINUITY: ICD-10-CM

## 2025-01-15 DIAGNOSIS — Z00.00 ROUTINE GENERAL MEDICAL EXAMINATION AT A HEALTH CARE FACILITY: Primary | ICD-10-CM

## 2025-01-15 DIAGNOSIS — D50.0 IRON DEFICIENCY ANEMIA DUE TO CHRONIC BLOOD LOSS: ICD-10-CM

## 2025-01-15 DIAGNOSIS — J45.20 MILD INTERMITTENT ASTHMA WITHOUT COMPLICATION: ICD-10-CM

## 2025-01-15 LAB
ANION GAP SERPL CALCULATED.3IONS-SCNC: 10 MMOL/L (ref 7–15)
BUN SERPL-MCNC: 7 MG/DL (ref 6–20)
C TRACH DNA SPEC QL PROBE+SIG AMP: NEGATIVE
CALCIUM SERPL-MCNC: 9.4 MG/DL (ref 8.8–10.4)
CHLORIDE SERPL-SCNC: 106 MMOL/L (ref 98–107)
CHOLEST SERPL-MCNC: 124 MG/DL
CREAT SERPL-MCNC: 0.8 MG/DL (ref 0.51–0.95)
EGFRCR SERPLBLD CKD-EPI 2021: >90 ML/MIN/1.73M2
FASTING STATUS PATIENT QL REPORTED: YES
GLUCOSE SERPL-MCNC: 86 MG/DL (ref 70–99)
GLUCOSE SERPL-MCNC: 86 MG/DL (ref 70–99)
HCO3 SERPL-SCNC: 24 MMOL/L (ref 22–29)
HDLC SERPL-MCNC: 68 MG/DL
HGB BLD-MCNC: 13.4 G/DL (ref 11.7–15.7)
IRON BINDING CAPACITY (ROCHE): 387 UG/DL (ref 240–430)
IRON SATN MFR SERPL: 14 % (ref 15–46)
IRON SERPL-MCNC: 56 UG/DL (ref 37–145)
LDLC SERPL CALC-MCNC: 43 MG/DL
N GONORRHOEA DNA SPEC QL NAA+PROBE: NEGATIVE
NONHDLC SERPL-MCNC: 56 MG/DL
POTASSIUM SERPL-SCNC: 3.6 MMOL/L (ref 3.4–5.3)
SODIUM SERPL-SCNC: 140 MMOL/L (ref 135–145)
SPECIMEN TYPE: NORMAL
TRIGL SERPL-MCNC: 67 MG/DL
VIT B12 SERPL-MCNC: 374 PG/ML (ref 232–1245)

## 2025-01-15 PROCEDURE — 87389 HIV-1 AG W/HIV-1&-2 AB AG IA: CPT | Performed by: INTERNAL MEDICINE

## 2025-01-15 PROCEDURE — 86780 TREPONEMA PALLIDUM: CPT | Performed by: INTERNAL MEDICINE

## 2025-01-15 PROCEDURE — 87491 CHLMYD TRACH DNA AMP PROBE: CPT | Performed by: INTERNAL MEDICINE

## 2025-01-15 PROCEDURE — 90471 IMMUNIZATION ADMIN: CPT | Performed by: INTERNAL MEDICINE

## 2025-01-15 PROCEDURE — 80061 LIPID PANEL: CPT | Performed by: INTERNAL MEDICINE

## 2025-01-15 PROCEDURE — 85018 HEMOGLOBIN: CPT | Performed by: INTERNAL MEDICINE

## 2025-01-15 PROCEDURE — 90677 PCV20 VACCINE IM: CPT | Performed by: INTERNAL MEDICINE

## 2025-01-15 PROCEDURE — 36415 COLL VENOUS BLD VENIPUNCTURE: CPT | Performed by: INTERNAL MEDICINE

## 2025-01-15 PROCEDURE — 82607 VITAMIN B-12: CPT | Performed by: INTERNAL MEDICINE

## 2025-01-15 PROCEDURE — 83540 ASSAY OF IRON: CPT | Performed by: INTERNAL MEDICINE

## 2025-01-15 PROCEDURE — 87591 N.GONORRHOEAE DNA AMP PROB: CPT | Performed by: INTERNAL MEDICINE

## 2025-01-15 PROCEDURE — 83550 IRON BINDING TEST: CPT | Performed by: INTERNAL MEDICINE

## 2025-01-15 PROCEDURE — 99395 PREV VISIT EST AGE 18-39: CPT | Mod: 25 | Performed by: INTERNAL MEDICINE

## 2025-01-15 PROCEDURE — 99000 SPECIMEN HANDLING OFFICE-LAB: CPT | Performed by: INTERNAL MEDICINE

## 2025-01-15 PROCEDURE — 80048 BASIC METABOLIC PNL TOTAL CA: CPT | Performed by: INTERNAL MEDICINE

## 2025-01-15 PROCEDURE — G2211 COMPLEX E/M VISIT ADD ON: HCPCS | Performed by: INTERNAL MEDICINE

## 2025-01-15 PROCEDURE — 86803 HEPATITIS C AB TEST: CPT | Performed by: INTERNAL MEDICINE

## 2025-01-15 PROCEDURE — 84630 ASSAY OF ZINC: CPT | Mod: 90 | Performed by: INTERNAL MEDICINE

## 2025-01-15 PROCEDURE — 99214 OFFICE O/P EST MOD 30 MIN: CPT | Mod: 25 | Performed by: INTERNAL MEDICINE

## 2025-01-15 RX ORDER — INHALER, ASSIST DEVICES
SPACER (EA) MISCELLANEOUS
Qty: 1 EACH | Refills: 1 | Status: SHIPPED | OUTPATIENT
Start: 2025-01-15

## 2025-01-15 RX ORDER — FLUTICASONE PROPIONATE 50 MCG
1-2 SPRAY, SUSPENSION (ML) NASAL DAILY
Qty: 16 G | Refills: 11 | Status: SHIPPED | OUTPATIENT
Start: 2025-01-15

## 2025-01-15 RX ORDER — CETIRIZINE HYDROCHLORIDE 10 MG/1
10 TABLET ORAL DAILY PRN
Qty: 90 TABLET | Refills: 4 | Status: SHIPPED | OUTPATIENT
Start: 2025-01-15

## 2025-01-15 RX ORDER — BUDESONIDE AND FORMOTEROL FUMARATE DIHYDRATE 160; 4.5 UG/1; UG/1
AEROSOL RESPIRATORY (INHALATION)
Qty: 20.4 G | Refills: 11 | Status: SHIPPED | OUTPATIENT
Start: 2025-01-15

## 2025-01-15 NOTE — PROGRESS NOTES
Preventive Care Visit  [facility] LALITHA Osei MD, Internal Medicine  Daniel 15, 2025      Assessment & Plan     Routine general medical examination at a health care facility    - Chlamydia trachomatis/Neisseria gonorrhoeae by PCR (vagina)  - HIV Antigen Antibody Combo; Future  - Treponema Abs w Reflex to RPR and Titer; Future  - Hepatitis C Screen Reflex to HCV RNA Quant and Genotype; Future  - HIV Antigen Antibody Combo  - Treponema Abs w Reflex to RPR and Titer  - Hepatitis C Screen Reflex to HCV RNA Quant and Genotype    Mild intermittent asthma without complication  No current symptoms. Prn with URI. Was using Breo during flares with albuterol prn. Discussed option to do symbicort  - budesonide-formoterol (SYMBICORT) 160-4.5 MCG/ACT Inhaler; Inhale 2 puffs once daily prn plus 1-2 puffs as needed. May use up to 12 puffs per day.  - Spacer/Aero-Holding Chambers (VORTEX VALVED HOLDING CHAMBER) AMADOR; Use with inhalers when needed    Seasonal allergic rhinitis due to other allergic trigger    - fluticasone (FLONASE) 50 MCG/ACT nasal spray; Spray 1-2 sprays into both nostrils daily.  - cetirizine (ZYRTEC ALLERGY) 10 MG tablet; Take 1 tablet (10 mg) by mouth daily as needed for allergies.    Iron deficiency anemia due to chronic blood loss  Follows with GI. Takes colestipol and plan is just to monitor for anemia. Chronic blood loss unlikely to resolve but may worsen. Discussed checking hgb/iron every 6 months since she usually doesn't have symptoms until anemia is significant  - Iron and iron binding capacity; Standing  - Hemoglobin; Standing  - Iron and iron binding capacity  - Hemoglobin    Persistent proteinuria  Due for labs. Not discussed during visit. [employer] message sent  - Routine UA with Microscopic Reflex to Culture and Protein/Creatinine Ratio; Future    Keratosis pilaris  On arms and buttocks. Discussed OTC management options. Printed info from [employer] for her review.     Zinc  deficiency  This was noted at another clinic. Will check level. Currently taking zinc and facial rash resolved. As long as level not too high, OK to continue  - Zinc; Future  - Zinc    Lipid screening    - Lipid panel reflex to direct LDL Fasting; Future  - Glucose; Future  - Lipid panel reflex to direct LDL Fasting  - Glucose    Short bowel syndrome, unspecified whether colon in continuity    - Vitamin B12; Future        Counseling  Appropriate preventive services were addressed with this patient via screening, questionnaire, or discussion as appropriate for fall prevention, nutrition, physical activity, Tobacco-use cessation, social engagement, weight loss and cognition.  Checklist reviewing preventive services available has been given to the patient.  Reviewed patient's diet, addressing concerns and/or questions.   She is at risk for lack of exercise and has been provided with information to increase physical activity for the benefit of her well-being.   She is at risk for psychosocial distress and has been provided with information to reduce risk.       See Patient Instructions    Mark Lawler is a 24 year old, presenting for the following:  Wellness Visit        1/15/2025     9:53 AM   Additional Questions   Roomed by BRITTANY GARDNER MA          HPI    Taking iron every day.    Takes colestipol, but not every day. Gets constipation when she takes this and eats dairy at the same time.    Dupixent helps a lot with itching. Every time she takes a hot shower she gets itchy arms and legs.         1/14/2025   General Health   How would you rate your overall physical health? Good   Feel stress (tense, anxious, or unable to sleep) To some extent   (!) STRESS CONCERN      1/14/2025   Nutrition   Three or more servings of calcium each day? (!) NO   Diet: Regular (no restrictions)   How many servings of fruit and vegetables per day? (!) 2-3   How many sweetened beverages each day? 0-1         1/14/2025   Exercise    Days per week of moderate/strenous exercise 3 days   Average minutes spent exercising at this level 60 min         1/14/2025   Social Factors   Frequency of gathering with friends or relatives Twice a week   Worry food won't last until get money to buy more No   Food not last or not have enough money for food? No   Do you have housing? (Housing is defined as stable permanent housing and does not include staying ouside in a car, in a tent, in an abandoned building, in an overnight shelter, or couch-surfing.) Yes   Are you worried about losing your housing? No   Lack of transportation? No   Unable to get utilities (heat,electricity)? No         1/14/2025   Dental   Dentist two times every year? Yes         1/14/2025   TB Screening   Were you born outside of the [place]? No         Today's PHQ-2 Score:       1/14/2025    11:47 AM   PHQ-2 ( 1999 [employer])   Q1: Little interest or pleasure in doing things 1   Q2: Feeling down, depressed or hopeless 0   PHQ-2 Score 1    Q1: Little interest or pleasure in doing things Several days   Q2: Feeling down, depressed or hopeless Not at all   PHQ-2 Score 1       Patient-reported           1/14/2025   Substance Use   Alcohol more than 3/day or more than 7/wk No   Do you use any other substances recreationally? No     Social History     Tobacco Use    Smoking status: Never    Smokeless tobacco: Never    Tobacco comments:     Non-smoking home   Vaping Use    Vaping status: Never Used   Substance Use Topics    Alcohol use: Yes     Comment: Social    Drug use: No          Mammogram Screening - Patient under 40 years of age: Routine Mammogram Screening not recommended.         1/14/2025   STI Screening   New sexual partner(s) since last STI/HIV test? No     History of abnormal Pap smear: No - age 21-29 PAP every 3 years recommended        Latest Ref Rng & Units 1/10/2024     3:41 PM 3/1/2021    10:54 AM 3/1/2021    10:22 AM   PAP / HPV   PAP  Negative for Intraepithelial Lesion or Malignancy  "(NILM)      PAP (Historical)    NIL    HPV 16 DNA NEG^Negative  Negative     HPV 18 DNA NEG^Negative  Negative     Other HR HPV NEG^Negative  Negative             1/14/2025   Contraception/Family Planning   Questions about contraception or family planning No       Reviewed and updated as needed this visit by Provider   Tobacco  Allergies  Meds  Problems  Med Hx  Surg Hx  Fam Hx            Lab work is in process  Labs reviewed in EPIC      Review of Systems  Constitutional, HEENT, cardiovascular, pulmonary, gi and gu systems are negative, except as otherwise noted.     Objective    Exam  /77 (BP Location: Right arm, Patient Position: Sitting, Cuff Size: Adult Large)   Pulse 71   Temp 98  F (36.7  C) (Temporal)   Resp 18   Ht 1.626 m (5' 4\")   Wt 55 kg (121 lb 4.8 oz)   LMP 01/10/2025 (Approximate)   SpO2 98%   BMI 20.82 kg/m     Estimated body mass index is 20.82 kg/m  as calculated from the following:    Height as of this encounter: 1.626 m (5' 4\").    Weight as of this encounter: 55 kg (121 lb 4.8 oz).    Physical Exam  GENERAL: alert and no distress  NECK: no adenopathy, no asymmetry, masses, or scars  RESP: lungs clear to auscultation - no rales, rhonchi or wheezes  CV: regular rate and rhythm, normal S1 S2, no S3 or S4, no murmur, click or rub, no peripheral edema  ABDOMEN: soft, nontender, no hepatosplenomegaly, no masses and bowel sounds normal  MS: no gross musculoskeletal defects noted, no edema  SKIN: no suspicious lesions or rashes and firm pink hyperkeratotic papules scattered over buttocks, scant papules extensor surface of upper arms.        Signed Electronically by: Aliya Osei MD    "

## 2025-01-15 NOTE — PATIENT INSTRUCTIONS
Patient Education   Preventive Care Advice   This is general advice given by our system to help you stay healthy. However, your care team may have specific advice just for you. Please talk to your care team about your preventive care needs.  Nutrition  Eat 5 or more servings of fruits and vegetables each day.  Try wheat bread, brown rice and whole grain pasta (instead of white bread, rice, and pasta).  Get enough calcium and vitamin D. Check the label on foods and aim for 100% of the RDA (recommended daily allowance).  Lifestyle  Exercise at least 150 minutes each week  (30 minutes a day, 5 days a week).  Do muscle strengthening activities 2 days a week. These help control your weight and prevent disease.  No smoking.  Wear sunscreen to prevent skin cancer.  Have a dental exam and cleaning every 6 months.  Yearly exams  See your health care team every year to talk about:  Any changes in your health.  Any medicines your care team has prescribed.  Preventive care, family planning, and ways to prevent chronic diseases.  Shots (vaccines)   HPV shots (up to age 26), if you've never had them before.  Hepatitis B shots (up to age 59), if you've never had them before.  COVID-19 shot: Get this shot when it's due.  Flu shot: Get a flu shot every year.  Tetanus shot: Get a tetanus shot every 10 years.  Pneumococcal, hepatitis A, and RSV shots: Ask your care team if you need these based on your risk.  Shingles shot (for age 50 and up)  General health tests  Diabetes screening:  Starting at age 35, Get screened for diabetes at least every 3 years.  If you are younger than age 35, ask your care team if you should be screened for diabetes.  Cholesterol test: At age 39, start having a cholesterol test every 5 years, or more often if advised.  Bone density scan (DEXA): At age 50, ask your care team if you should have this scan for osteoporosis (brittle bones).  Hepatitis C: Get tested at least once in your life.  STIs (sexually  transmitted infections)  Before age 24: Ask your care team if you should be screened for STIs.  After age 24: Get screened for STIs if you're at risk. You are at risk for STIs (including HIV) if:  You are sexually active with more than one person.  You don't use condoms every time.  You or a partner was diagnosed with a sexually transmitted infection.  If you are at risk for HIV, ask about PrEP medicine to prevent HIV.  Get tested for HIV at least once in your life, whether you are at risk for HIV or not.  Cancer screening tests  Cervical cancer screening: If you have a cervix, begin getting regular cervical cancer screening tests starting at age 21.  Breast cancer scan (mammogram): If you've ever had breasts, begin having regular mammograms starting at age 40. This is a scan to check for breast cancer.  Colon cancer screening: It is important to start screening for colon cancer at age 45.  Have a colonoscopy test every 10 years (or more often if you're at risk) Or, ask your provider about stool tests like a FIT test every year or Cologuard test every 3 years.  To learn more about your testing options, visit:   .  For help making a decision, visit:   https://bit.ly/wj66233.  Prostate cancer screening test: If you have a prostate, ask your care team if a prostate cancer screening test (PSA) at age 55 is right for you.  Lung cancer screening: If you are a current or former smoker ages 50 to 80, ask your care team if ongoing lung cancer screenings are right for you.  For informational purposes only. Not to replace the advice of your health care provider. Copyright   2023 [facility]. All rights reserved. Clinically reviewed by the [facility] Transitions Program. [place] 648809 - REV 01/24.  Learning About Stress  What is stress?     Stress is your body's response to a hard situation. Your body can have a physical, emotional, or mental response. Stress is a fact of life for most people, and it  affects everyone differently. What causes stress for you may not be stressful for someone else.  A lot of things can cause stress. You may feel stress when you go on a job interview, take a test, or run a race. This kind of short-term stress is normal and even useful. It can help you if you need to work hard or react quickly. For example, stress can help you finish an important job on time.  Long-term stress is caused by ongoing stressful situations or events. Examples of long-term stress include long-term health problems, ongoing problems at work, or conflicts in your family. Long-term stress can harm your health.  How does stress affect your health?  When you are stressed, your body responds as though you are in danger. It makes hormones that speed up your heart, make you breathe faster, and give you a burst of energy. This is called the fight-or-flight stress response. If the stress is over quickly, your body goes back to normal and no harm is done.  But if stress happens too often or lasts too long, it can have bad effects. Long-term stress can make you more likely to get sick, and it can make symptoms of some diseases worse. If you tense up when you are stressed, you may develop neck, shoulder, or low back pain. Stress is linked to high blood pressure and heart disease.  Stress also harms your emotional health. It can make you ba, tense, or depressed. Your relationships may suffer, and you may not do well at work or school.  What can you do to manage stress?  You can try these things to help manage stress:   Do something active. Exercise or activity can help reduce stress. Walking is a great way to get started. Even everyday activities such as housecleaning or yard work can help.  Try yoga or lori chi. These techniques combine exercise and meditation. You may need some training at first to learn them.  Do something you enjoy. For example, listen to music or go to a movie. Practice your hobby or do volunteer  "work.  Meditate. This can help you relax, because you are not worrying about what happened before or what may happen in the future.  Do guided imagery. Imagine yourself in any setting that helps you feel calm. You can use online videos, books, or a teacher to guide you.  Do breathing exercises. For example:  From a standing position, bend forward from the waist with your knees slightly bent. Let your arms dangle close to the floor.  Breathe in slowly and deeply as you return to a standing position. Roll up slowly and lift your head last.  Hold your breath for just a few seconds in the standing position.  Breathe out slowly and bend forward from the waist.  Let your feelings out. Talk, laugh, cry, and express anger when you need to. Talking with supportive friends or family, a counselor, or a valdez leader about your feelings is a healthy way to relieve stress. Avoid discussing your feelings with people who make you feel worse.  Write. It may help to write about things that are bothering you. This helps you find out how much stress you feel and what is causing it. When you know this, you can find better ways to cope.  What can you do to prevent stress?  You might try some of these things to help prevent stress:  Manage your time. This helps you find time to do the things you want and need to do.  Get enough sleep. Your body recovers from the stresses of the day while you are sleeping.  Get support. Your family, friends, and community can make a difference in how you experience stress.  Limit your news feed. Avoid or limit time on social media or news that may make you feel stressed.  Do something active. Exercise or activity can help reduce stress. Walking is a great way to get started.  Where can you learn more?  Go to https://www.[employer].net/patiented  Enter N032 in the search box to learn more about \"Learning About Stress.\"  Current as of: October 24, 2023  Content Version: 14.3    2024 [employer]. "   Care instructions adapted under license by your healthcare professional. If you have questions about a medical condition or this instruction, always ask your healthcare professional. [employer] disclaims any warranty or liability for your use of this information.

## 2025-01-15 NOTE — LETTER
My Asthma Action Plan    Name: Nuris Reddy   YOB: 2000  Date: 1/15/2025   My doctor: Aliya Osei MD   My clinic: [facility]        My Rescue Medicine:   Albuterol inhaler (Proair/Ventolin/Proventil HFA)  2-4 puffs EVERY 4 HOURS as needed. Use a spacer if recommended by your provider.  Symbicort daily prn   My Asthma Severity:   Intermittent / Exercise Induced  Know your asthma triggers: upper respiratory infections             GREEN ZONE   Good Control  I feel good  No cough or wheeze  Can work, sleep and play without asthma symptoms       Take your asthma control medicine every day.     If exercise triggers your asthma, take your rescue medication  15 minutes before exercise or sports, and  During exercise if you have asthma symptoms  Spacer to use with inhaler: If you have a spacer, make sure to use it with your inhaler             YELLOW ZONE Getting Worse  I have ANY of these:  I do not feel good  Cough or wheeze  Chest feels tight  Wake up at night   Keep taking your Green Zone medications  Start taking your rescue medicine:  every 20 minutes for up to 1 hour. Then every 4 hours for 24-48 hours.  If you stay in the Yellow Zone for more than 12-24 hours, contact your doctor.  If you do not return to the Green Zone in 12-24 hours or you get worse, start taking your oral steroid medicine if prescribed by your provider.           RED ZONE Medical Alert - Get Help  I have ANY of these:  I feel awful  Medicine is not helping  Breathing getting harder  Trouble walking or talking  Nose opens wide to breathe       Take your rescue medicine NOW  If your provider has prescribed an oral steroid medicine, start taking it NOW  Call your doctor NOW  If you are still in the Red Zone after 20 minutes and you have not reached your doctor:  Take your rescue medicine again and  Call 911 or go to the emergency room right away    See your regular doctor within 2 weeks of an Emergency  Room or Urgent Care visit for follow-up treatment.          Annual Reminders:  Meet with Asthma Educator,  Flu Shot in the Fall, consider Pneumonia Vaccination for patients with asthma (aged 19 and older).    Pharmacy:    [id number] - [place], [place] - [address] AT SEC OF [facility] [place], [place] - [address] [facility]  [id number] - [place], [place] - [address] [facility] [facility]  AT [place], [place] - [address] [facility] [place], [place] - [address]    Electronically signed by Aliya Osei MD   Date: 01/15/25                    Asthma Triggers  How To Control Things That Make Your Asthma Worse    Triggers are things that make your asthma worse.  Look at the list below to help you find your triggers and   what you can do about them. You can help prevent asthma flare-ups by staying away from your triggers.      Trigger                                                          What you can do   Cigarette Smoke  Tobacco smoke can make asthma worse. Do not allow smoking in your home, car or around you.  Be sure no one smokes at a child s day care or school.  If you smoke, ask your health care provider for ways to help you quit.  Ask family members to quit too.  Ask your health care provider for a referral to Quit Plan to help you quit smoking, or call 6-924-000-PLAN.     Colds, Flu, Bronchitis  These are common triggers of asthma. Wash your hands often.  Don t touch your eyes, nose or mouth.  Get a flu shot every year.     Dust Mites  These are tiny bugs that live in cloth or carpet. They are too small to see. Wash sheets and blankets in hot water every week.   Encase pillows and mattress in dust mite proof covers.  Avoid having carpet if you can. If you have carpet, vacuum weekly.   Use a dust mask and HEPA vacuum.   Pollen and Outdoor Mold  Some people are allergic to  trees, grass, or weed pollen, or molds. Try to keep your windows closed.  Limit time out doors when pollen count is high.   Ask you health care provider about taking medicine during allergy season.     Animal Dander  Some people are allergic to skin flakes, urine or saliva from pets with fur or feathers. Keep pets with fur or feathers out of your home.    If you can t keep the pet outdoors, then keep the pet out of your bedroom.  Keep the bedroom door closed.  Keep pets off cloth furniture and away from stuffed toys.     Mice, Rats, and Cockroaches  Some people are allergic to the waste from these pests.   Cover food and garbage.  Clean up spills and food crumbs.  Store grease in the refrigerator.   Keep food out of the bedroom.   Indoor Mold  This can be a trigger if your home has high moisture. Fix leaking faucets, pipes, or other sources of water.   Clean moldy surfaces.  Dehumidify basement if it is damp and smelly.   Smoke, Strong Odors, and Sprays  These can reduce air quality. Stay away from strong odors and sprays, such as perfume, powder, hair spray, paints, smoke incense, paint, cleaning products, candles and new carpet.   Exercise or Sports  Some people with asthma have this trigger. Be active!  Ask your doctor about taking medicine before sports or exercise to prevent symptoms.    Warm up for 5-10 minutes before and after sports or exercise.     Other Triggers of Asthma  Cold air:  Cover your nose and mouth with a scarf.  Sometimes laughing or crying can be a trigger.  Some medicines and food can trigger asthma.

## 2025-01-16 LAB
HCV AB SERPL QL IA: NONREACTIVE
HIV 1+2 AB+HIV1 P24 AG SERPL QL IA: NONREACTIVE
T PALLIDUM AB SER QL: NONREACTIVE

## 2025-02-04 ENCOUNTER — TELEPHONE (OUTPATIENT)
Dept: DERMATOLOGY | Facility: CLINIC | Age: 25
End: 2025-02-04
Payer: COMMERCIAL

## 2025-02-04 NOTE — TELEPHONE ENCOUNTER
[facility] Call Center    Phone Message    May a detailed message be left on voicemail: yes     Reason for Call: Other: Pts pharmacy is calling to let the clinic know that a prior auth is needed for dupilumab (DUPIXENT) 300 MG/2ML prefilled syringe the case number is [id number], and the Rx will be placed on hold in 48 hours if it is not completed, please call back at [phone] thanks!     Action Taken: Other: EA DERM    Travel Screening: Not Applicable     Date of Service:

## 2025-02-05 NOTE — TELEPHONE ENCOUNTER
PA Initiation    Medication: DUPIXENT 300 MG/2ML SC [facility]  Insurance Company: [employer] - Phone [phone] Fax [phone]  Pharmacy Filling the Rx: [facility] [place] - [address] [place] [address]  Filling Pharmacy Phone:    Filling Pharmacy Fax:    Start Date: 2/5/2025

## 2025-02-05 NOTE — TELEPHONE ENCOUNTER
Prior Authorization Approval    Medication: DUPIXENT 300 MG/2ML SC [facility]  Authorization Effective Date: 1/6/2025  Authorization Expiration Date: 2/5/2026  Approved Dose/Quantity: 4ml for 28 days  Reference #: [id number]   Insurance Company: MEDICA - Phone [phone] Fax [phone]  Expected CoPay: $    CoPay Card Available: No    Financial Assistance Needed: no  Which Pharmacy is filling the prescription[facility] - [address] [place] [address]  Pharmacy Notified: yes  Patient Notified: yes

## 2025-02-06 ENCOUNTER — TELEPHONE (OUTPATIENT)
Dept: DERMATOLOGY | Facility: CLINIC | Age: 25
End: 2025-02-06
Payer: COMMERCIAL

## 2025-02-06 NOTE — TELEPHONE ENCOUNTER
MTM appointment no showed, we made one more attempt to reschedule.     Routing back to referring provider and [facility] Pharmacist Team        Kya Kim  MTM

## 2025-02-27 ENCOUNTER — TELEPHONE (OUTPATIENT)
Dept: FAMILY MEDICINE | Facility: CLINIC | Age: 25
End: 2025-02-27
Payer: COMMERCIAL

## 2025-02-27 DIAGNOSIS — H10.33 ACUTE CONJUNCTIVITIS OF BOTH EYES, UNSPECIFIED ACUTE CONJUNCTIVITIS TYPE: Primary | ICD-10-CM

## 2025-02-27 RX ORDER — POLYMYXIN B SULFATE AND TRIMETHOPRIM 1; 10000 MG/ML; [USP'U]/ML
1-2 SOLUTION OPHTHALMIC EVERY 4 HOURS
Qty: 10 ML | Refills: 0 | Status: SHIPPED | OUTPATIENT
Start: 2025-02-27

## 2025-06-09 DIAGNOSIS — L20.84 INTRINSIC ATOPIC DERMATITIS: ICD-10-CM

## 2025-06-09 RX ORDER — DUPILUMAB 300 MG/2ML
INJECTION, SOLUTION SUBCUTANEOUS
Qty: 12 ML | OUTPATIENT
Start: 2025-06-09

## 2025-06-09 RX ORDER — DUPILUMAB 300 MG/2ML
300 INJECTION, SOLUTION SUBCUTANEOUS
Qty: 4 ML | Refills: 4 | Status: SHIPPED | OUTPATIENT
Start: 2025-06-09

## 2025-06-09 NOTE — TELEPHONE ENCOUNTER
Refills sent in separate [facility] encounter per CPA    Jenna Weeks, DeniseD, MPH  Medication Therapy Management Pharmacist  [facility]  [facility] Scheduling Line: [phone]

## 2025-06-09 NOTE — PROGRESS NOTES
Patient saw Dr. Tena in June 2024    1.  Atopic dermatitis severe, with facial swelling. Patch testing unrevealing. Cleared on dupilumab, and tolerating well. BSA is <1% with IGA of 0-. Taking 300 mg every 2 weeks and notes that she develops rash if dosing is delayed.   -Recommended twice daily use of tacrolimus ointment which is non-steroidal to the face  -May continue to use triamcinolone ointment bid to the arms, legs, trunk as needed  -Continue gentle skin cares.    She has a follow up appointment in September. Refills for Dupixent sent per CPA.    Jenna Weeks, DeniseD, MPH  Medication Therapy Management Pharmacist  [facility]  [facility] Scheduling Line: [phone]

## 2025-07-02 ENCOUNTER — MYC REFILL (OUTPATIENT)
Dept: DERMATOLOGY | Facility: CLINIC | Age: 25
End: 2025-07-02
Payer: COMMERCIAL

## 2025-07-02 DIAGNOSIS — L20.84 INTRINSIC ATOPIC DERMATITIS: ICD-10-CM

## 2025-07-08 RX ORDER — KETOCONAZOLE 20 MG/ML
SHAMPOO, SUSPENSION TOPICAL
Qty: 120 ML | Refills: 11 | Status: SHIPPED | OUTPATIENT
Start: 2025-07-08

## (undated) DEVICE — KIT PATIENT POSITIONING PIGAZZI LATEX FREE 40580

## (undated) DEVICE — PROTECTOR ARM ONE-STEP TRENDELENBURG 40418

## (undated) DEVICE — DRAPE IOBAN INCISE 23X17" 6650EZ

## (undated) DEVICE — LINEN TOWEL PACK X5 5464

## (undated) DEVICE — ENDO TROCAR BLUNT TIP KII BALLOON 12X100MM C0R47

## (undated) DEVICE — SU VICRYL 3-0 SH 27" J316H

## (undated) DEVICE — SU VICRYL 2-0 TIE 12X18" J905T

## (undated) DEVICE — SOL WATER IRRIG 1000ML BOTTLE 2F7114

## (undated) DEVICE — CATH TRAY FOLEY SURESTEP 16FR W/URNE MTR STLK LATEX A303316A

## (undated) DEVICE — TUBING SMOKE EVAC PNEUMOCLEAR HIGH FLOW 0620050250

## (undated) DEVICE — ESU ENDO SCISSORS 5MM CVD 5DCS

## (undated) DEVICE — Device

## (undated) DEVICE — DRAPE LEGGINGS CLEAR 8430

## (undated) DEVICE — SU MONOCRYL 4-0 PS-2 27" UND Y426H

## (undated) DEVICE — PACK RECTAL KIT CUSTOM ASC

## (undated) DEVICE — SU SILK 3-0 TIE 12X30" A304H

## (undated) DEVICE — SUCTION IRR STRYKERFLOW II W/TIP 250-070-520

## (undated) DEVICE — PREP CHLORAPREP 26ML TINTED HI-LITE ORANGE 930815

## (undated) DEVICE — KIT ENDO TURNOVER/PROCEDURE CARRY-ON 101822

## (undated) DEVICE — STPL RELOAD LINEAR 90 X 3.5MM  TA9035L

## (undated) DEVICE — ENDO TROCAR SLEEVE KII ADV FIXATION 05X100MM CFS02

## (undated) DEVICE — SU SILK 2-0 TIE 12X30" A305H

## (undated) DEVICE — TUBING SUCTION 12"X1/4" N612

## (undated) DEVICE — SOL WATER IRRIG 500ML BOTTLE 2F7113

## (undated) DEVICE — SU PDS II 0 CT-1 27" Z340H

## (undated) DEVICE — TAPE DURAPORE 3" SILK 1538-3

## (undated) DEVICE — STPL RELOAD LINEAR CUT 75MM TCR75

## (undated) DEVICE — SOL WATER IRRIG 3000ML BAG 2B7117

## (undated) DEVICE — SUCTION MANIFOLD NEPTUNE 2 SYS 1 PORT 702-025-000

## (undated) DEVICE — SU VICRYL 3-0 SH 27" UND J416H

## (undated) DEVICE — STPL LINEAR CUT 75MM TLC75

## (undated) DEVICE — SU VICRYL 2-0 CT-1 27" UND J259H

## (undated) DEVICE — LINEN TOWEL PACK X30 5481

## (undated) DEVICE — ENDO TROCAR FIRST ENTRY KII FIOS ADV FIX 05X100MM CFF03

## (undated) DEVICE — LINEN TOWEL PACK X6 WHITE 5487

## (undated) DEVICE — GLOVE PROTEXIS W/NEU-THERA 7.0  2D73TE70

## (undated) DEVICE — ESU GROUND PAD ADULT W/CORD E7507

## (undated) DEVICE — SUCTION TIP POOLE K770

## (undated) DEVICE — PANTIES MESH LG/XLG 2PK 706M2

## (undated) DEVICE — ESU LIGASURE MARYLAND LAPAROSCOPIC SLR/DVDR 5MMX37CM LF1937

## (undated) DEVICE — SU PDS II 3-0 SH 27" Z316H

## (undated) DEVICE — KIT ENDO FIRST STEP DISINFECTANT 200ML W/POUCH EP-4

## (undated) DEVICE — SU VICRYL 0 UR-6 27" J603H

## (undated) DEVICE — STPL LINEAR 90 X 3.5MM TA9035S

## (undated) DEVICE — SYSTEM LAPAROVUE VISIBILITY LAPVUE10

## (undated) DEVICE — SUCTION MANIFOLD NEPTUNE 2 SYS 4 PORT 0702-020-000

## (undated) DEVICE — GOWN IMPERVIOUS 2XL BLUE

## (undated) DEVICE — SPECIMEN CONTAINER 3OZ W/FORMALIN 59901

## (undated) DEVICE — WIPES FOLEY CARE SURESTEP PROVON DFC100

## (undated) DEVICE — PAD CHUX UNDERPAD 30X30"

## (undated) RX ORDER — FENTANYL CITRATE 50 UG/ML
INJECTION, SOLUTION INTRAMUSCULAR; INTRAVENOUS
Status: DISPENSED
Start: 2022-05-19

## (undated) RX ORDER — ONDANSETRON 2 MG/ML
INJECTION INTRAMUSCULAR; INTRAVENOUS
Status: DISPENSED
Start: 2022-01-05

## (undated) RX ORDER — LIDOCAINE HYDROCHLORIDE AND EPINEPHRINE 10; 10 MG/ML; UG/ML
INJECTION, SOLUTION INFILTRATION; PERINEURAL
Status: DISPENSED
Start: 2023-02-07

## (undated) RX ORDER — LIDOCAINE HYDROCHLORIDE 20 MG/ML
INJECTION, SOLUTION EPIDURAL; INFILTRATION; INTRACAUDAL; PERINEURAL
Status: DISPENSED
Start: 2022-05-19

## (undated) RX ORDER — ACETAMINOPHEN 325 MG/1
TABLET ORAL
Status: DISPENSED
Start: 2022-05-19

## (undated) RX ORDER — BUPIVACAINE HYDROCHLORIDE 2.5 MG/ML
INJECTION, SOLUTION EPIDURAL; INFILTRATION; INTRACAUDAL
Status: DISPENSED
Start: 2023-02-07

## (undated) RX ORDER — BUPIVACAINE HYDROCHLORIDE 2.5 MG/ML
INJECTION, SOLUTION EPIDURAL; INFILTRATION; INTRACAUDAL
Status: DISPENSED
Start: 2022-05-19

## (undated) RX ORDER — METRONIDAZOLE 500 MG/100ML
INJECTION, SOLUTION INTRAVENOUS
Status: DISPENSED
Start: 2022-05-19

## (undated) RX ORDER — CEFAZOLIN SODIUM/WATER 2 G/20 ML
SYRINGE (ML) INTRAVENOUS
Status: DISPENSED
Start: 2022-05-19

## (undated) RX ORDER — FENTANYL CITRATE 50 UG/ML
INJECTION, SOLUTION INTRAMUSCULAR; INTRAVENOUS
Status: DISPENSED
Start: 2022-01-05

## (undated) RX ORDER — GABAPENTIN 300 MG/1
CAPSULE ORAL
Status: DISPENSED
Start: 2023-02-07

## (undated) RX ORDER — DEXAMETHASONE SODIUM PHOSPHATE 4 MG/ML
INJECTION, SOLUTION INTRA-ARTICULAR; INTRALESIONAL; INTRAMUSCULAR; INTRAVENOUS; SOFT TISSUE
Status: DISPENSED
Start: 2022-05-19

## (undated) RX ORDER — ROCURONIUM BROMIDE 50 MG/5 ML
SYRINGE (ML) INTRAVENOUS
Status: DISPENSED
Start: 2022-05-19

## (undated) RX ORDER — PROPOFOL 10 MG/ML
INJECTION, EMULSION INTRAVENOUS
Status: DISPENSED
Start: 2022-05-19

## (undated) RX ORDER — ENOXAPARIN SODIUM 100 MG/ML
INJECTION SUBCUTANEOUS
Status: DISPENSED
Start: 2022-05-19

## (undated) RX ORDER — ONDANSETRON 2 MG/ML
INJECTION INTRAMUSCULAR; INTRAVENOUS
Status: DISPENSED
Start: 2022-05-19

## (undated) RX ORDER — DIPHENHYDRAMINE HYDROCHLORIDE 50 MG/ML
INJECTION INTRAMUSCULAR; INTRAVENOUS
Status: DISPENSED
Start: 2022-01-05

## (undated) RX ORDER — GLYCOPYRROLATE 0.2 MG/ML
INJECTION, SOLUTION INTRAMUSCULAR; INTRAVENOUS
Status: DISPENSED
Start: 2022-05-19

## (undated) RX ORDER — HYDROMORPHONE HCL IN WATER/PF 6 MG/30 ML
PATIENT CONTROLLED ANALGESIA SYRINGE INTRAVENOUS
Status: DISPENSED
Start: 2022-05-19

## (undated) RX ORDER — HYDROMORPHONE HYDROCHLORIDE 1 MG/ML
INJECTION, SOLUTION INTRAMUSCULAR; INTRAVENOUS; SUBCUTANEOUS
Status: DISPENSED
Start: 2022-05-19

## (undated) RX ORDER — ACETAMINOPHEN 325 MG/1
TABLET ORAL
Status: DISPENSED
Start: 2023-02-07

## (undated) RX ORDER — GABAPENTIN 300 MG/1
CAPSULE ORAL
Status: DISPENSED
Start: 2022-05-19